# Patient Record
Sex: FEMALE | Race: WHITE | NOT HISPANIC OR LATINO | Employment: FULL TIME | URBAN - METROPOLITAN AREA
[De-identification: names, ages, dates, MRNs, and addresses within clinical notes are randomized per-mention and may not be internally consistent; named-entity substitution may affect disease eponyms.]

---

## 2017-01-16 ENCOUNTER — HISTORICAL (OUTPATIENT)
Dept: LAB | Facility: HOSPITAL | Age: 47
End: 2017-01-16

## 2017-02-13 ENCOUNTER — HISTORICAL (OUTPATIENT)
Dept: RADIOLOGY | Facility: HOSPITAL | Age: 47
End: 2017-02-13

## 2018-04-16 LAB — POC BETA-HCG (QUAL): NEGATIVE

## 2018-04-19 ENCOUNTER — HISTORICAL (OUTPATIENT)
Dept: RADIOLOGY | Facility: HOSPITAL | Age: 48
End: 2018-04-19

## 2018-05-01 LAB — POC BETA-HCG (QUAL): NEGATIVE

## 2018-10-03 ENCOUNTER — HISTORICAL (OUTPATIENT)
Dept: LAB | Facility: HOSPITAL | Age: 48
End: 2018-10-03

## 2018-10-03 LAB
CHOLEST SERPL-MCNC: 185 MG/DL (ref 0–200)
CHOLEST/HDLC SERPL: 4 {RATIO} (ref 0–4)
HDLC SERPL-MCNC: 46 MG/DL (ref 40–60)
LDLC SERPL CALC-MCNC: 113 MG/DL (ref 0–129)
T4 FREE SERPL-MCNC: 0.99 NG/DL (ref 0.76–1.46)
TRIGL SERPL-MCNC: 129 MG/DL
TSH SERPL-ACNC: 0.92 MIU/ML (ref 0.36–3.74)
VLDLC SERPL CALC-MCNC: 26 MG/DL

## 2018-10-25 LAB — POC BETA-HCG (QUAL): NEGATIVE

## 2019-05-07 ENCOUNTER — HISTORICAL (OUTPATIENT)
Dept: RADIOLOGY | Facility: HOSPITAL | Age: 49
End: 2019-05-07

## 2019-05-20 LAB
COLOR STL: NORMAL
CONSISTENCY STL: NORMAL
HEMOCCULT SP1 STL QL: NEGATIVE
HEMOCCULT SP2 STL QL: NEGATIVE

## 2019-05-21 ENCOUNTER — HISTORICAL (OUTPATIENT)
Dept: LAB | Facility: HOSPITAL | Age: 49
End: 2019-05-21

## 2019-06-06 ENCOUNTER — HISTORICAL (OUTPATIENT)
Dept: ANESTHESIOLOGY | Facility: HOSPITAL | Age: 49
End: 2019-06-06

## 2019-06-27 ENCOUNTER — HISTORICAL (OUTPATIENT)
Dept: RADIOLOGY | Facility: HOSPITAL | Age: 49
End: 2019-06-27

## 2019-07-08 LAB
ABS NEUT (OLG): 7.2 X10(3)/MCL (ref 1.5–6.9)
ALBUMIN SERPL-MCNC: 3.5 GM/DL (ref 3.4–5)
ALBUMIN/GLOB SERPL: 0.8 RATIO
ALP SERPL-CCNC: 91 UNIT/L (ref 30–113)
ALT SERPL-CCNC: 20 UNIT/L (ref 10–45)
APPEARANCE, UA: ABNORMAL
APTT PPP: 29.9 SECOND(S) (ref 25–35)
AST SERPL-CCNC: 16 UNIT/L (ref 15–37)
BACTERIA SPEC CULT: ABNORMAL /HPF
BASOPHILS # BLD AUTO: 0.1 X10(3)/MCL (ref 0–0.1)
BASOPHILS NFR BLD AUTO: 1 % (ref 0–1)
BILIRUB SERPL-MCNC: 0.2 MG/DL (ref 0.1–0.9)
BILIRUB UR QL STRIP: NEGATIVE
BILIRUBIN DIRECT+TOT PNL SERPL-MCNC: 0.1 MG/DL
BILIRUBIN DIRECT+TOT PNL SERPL-MCNC: 0.1 MG/DL (ref 0–0.3)
BUN SERPL-MCNC: 8 MG/DL (ref 10–20)
CALCIUM SERPL-MCNC: 9.2 MG/DL (ref 8–10.5)
CHLORIDE SERPL-SCNC: 99 MMOL/L (ref 100–108)
CO2 SERPL-SCNC: 25 MMOL/L (ref 21–35)
COLOR UR: ABNORMAL
CREAT SERPL-MCNC: 0.62 MG/DL (ref 0.7–1.3)
EOSINOPHIL # BLD AUTO: 0.2 X10(3)/MCL (ref 0–0.6)
EOSINOPHIL NFR BLD AUTO: 1 % (ref 0–5)
ERYTHROCYTE [DISTWIDTH] IN BLOOD BY AUTOMATED COUNT: 13.9 % (ref 11.5–17)
GLOBULIN SER-MCNC: 4.6 GM/DL
GLUCOSE (UA): NEGATIVE
GLUCOSE SERPL-MCNC: 113 MG/DL (ref 75–116)
HCT VFR BLD AUTO: 42 % (ref 36–48)
HGB BLD-MCNC: 13.6 GM/DL (ref 12–16)
HGB UR QL STRIP: ABNORMAL
IMM GRANULOCYTES # BLD AUTO: 0.03 10*3/UL (ref 0–0.02)
IMM GRANULOCYTES NFR BLD AUTO: 0.3 % (ref 0–0.43)
INR PPP: 1 (ref 0–1.2)
KETONES UR QL STRIP: NEGATIVE
LEUKOCYTE ESTERASE UR QL STRIP: NEGATIVE
LYMPHOCYTES # BLD AUTO: 2.5 X10(3)/MCL (ref 0.5–4.1)
LYMPHOCYTES NFR BLD AUTO: 24 % (ref 15–40)
MCH RBC QN AUTO: 28 PG (ref 27–34)
MCHC RBC AUTO-ENTMCNC: 32 GM/DL (ref 31–36)
MCV RBC AUTO: 88 FL (ref 80–99)
MONOCYTES # BLD AUTO: 0.8 X10(3)/MCL (ref 0–1.1)
MONOCYTES NFR BLD AUTO: 7 % (ref 4–12)
MUCOUS THREADS URNS QL MICRO: ABNORMAL
NEUTROPHILS # BLD AUTO: 7.2 X10(3)/MCL (ref 1.5–6.9)
NEUTROPHILS NFR BLD AUTO: 67 % (ref 43–75)
NITRITE UR QL STRIP: NEGATIVE
PH UR STRIP: 5.5 [PH]
PHOSPHATE SERPL-MCNC: 3.6 MG/DL (ref 2.6–4.7)
PLATELET # BLD AUTO: 308 X10(3)/MCL (ref 140–400)
PMV BLD AUTO: 9.5 FL (ref 6.8–10)
POTASSIUM SERPL-SCNC: 3.8 MMOL/L (ref 3.6–5.2)
PROT SERPL-MCNC: 8.1 GM/DL (ref 6.4–8.2)
PROT UR QL STRIP: 30 MG/DL
PROTHROMBIN TIME: 10.1 SECOND(S) (ref 9–12)
RBC # BLD AUTO: 4.79 X10(6)/MCL (ref 4.2–5.4)
RBC #/AREA URNS HPF: ABNORMAL /HPF
SODIUM SERPL-SCNC: 135 MMOL/L (ref 135–145)
SP GR UR STRIP: 1.02
SQUAMOUS EPITHELIAL, UA: ABNORMAL /LPF
UROBILINOGEN UR STRIP-ACNC: 0.2 EU/DL
WBC # SPEC AUTO: 10.8 X10(3)/MCL (ref 4.5–11.5)
WBC #/AREA URNS HPF: ABNORMAL /HPF

## 2019-07-15 ENCOUNTER — HISTORICAL (OUTPATIENT)
Dept: ANESTHESIOLOGY | Facility: HOSPITAL | Age: 49
End: 2019-07-15

## 2020-01-21 ENCOUNTER — HISTORICAL (OUTPATIENT)
Dept: RADIOLOGY | Facility: HOSPITAL | Age: 50
End: 2020-01-21

## 2020-08-11 ENCOUNTER — HISTORICAL (OUTPATIENT)
Dept: RADIOLOGY | Facility: HOSPITAL | Age: 50
End: 2020-08-11

## 2021-05-07 ENCOUNTER — HISTORICAL (OUTPATIENT)
Dept: RADIOLOGY | Facility: HOSPITAL | Age: 51
End: 2021-05-07

## 2021-05-27 ENCOUNTER — HISTORICAL (OUTPATIENT)
Dept: RADIOLOGY | Facility: HOSPITAL | Age: 51
End: 2021-05-27

## 2021-12-15 ENCOUNTER — HISTORICAL (OUTPATIENT)
Dept: LAB | Facility: HOSPITAL | Age: 51
End: 2021-12-15

## 2021-12-15 LAB
ABS NEUT (OLG): 5.45 X10(3)/MCL (ref 2.1–9.2)
ALBUMIN SERPL-MCNC: 3.7 GM/DL (ref 3.5–5)
ALBUMIN SERPL-MCNC: 3.7 GM/DL (ref 3.5–5)
ALBUMIN/GLOB SERPL: 1.1 RATIO (ref 1.1–2)
ALP SERPL-CCNC: 83 UNIT/L (ref 40–150)
ALP SERPL-CCNC: 83 UNIT/L (ref 40–150)
ALT SERPL-CCNC: 13 UNIT/L (ref 0–55)
ALT SERPL-CCNC: 16 UNIT/L (ref 0–55)
APPEARANCE, UA: CLEAR
AST SERPL-CCNC: 15 UNIT/L (ref 5–34)
AST SERPL-CCNC: 16 UNIT/L (ref 5–34)
BACTERIA SPEC CULT: ABNORMAL
BASOPHILS # BLD AUTO: 0.1 X10(3)/MCL (ref 0–0.2)
BASOPHILS NFR BLD AUTO: 1 %
BILIRUB SERPL-MCNC: 0.4 MG/DL
BILIRUB SERPL-MCNC: 0.4 MG/DL
BILIRUB UR QL STRIP: NEGATIVE
BILIRUBIN DIRECT+TOT PNL SERPL-MCNC: 0.1 MG/DL (ref 0–0.5)
BILIRUBIN DIRECT+TOT PNL SERPL-MCNC: 0.1 MG/DL (ref 0–0.5)
BILIRUBIN DIRECT+TOT PNL SERPL-MCNC: 0.3 MG/DL (ref 0–0.8)
BILIRUBIN DIRECT+TOT PNL SERPL-MCNC: 0.3 MG/DL (ref 0–0.8)
BUN SERPL-MCNC: 6.2 MG/DL (ref 9.8–20.1)
CALCIUM SERPL-MCNC: 9.5 MG/DL (ref 8.7–10.5)
CHLORIDE SERPL-SCNC: 99 MMOL/L (ref 98–107)
CHOLEST SERPL-MCNC: 233 MG/DL
CHOLEST SERPL-MCNC: 235 MG/DL
CHOLEST/HDLC SERPL: 4 {RATIO} (ref 0–5)
CHOLEST/HDLC SERPL: 4 {RATIO} (ref 0–5)
CO2 SERPL-SCNC: 30 MMOL/L (ref 22–29)
COLOR UR: YELLOW
CREAT SERPL-MCNC: 0.71 MG/DL (ref 0.55–1.02)
DEPRECATED CALCIDIOL+CALCIFEROL SERPL-MC: 27.7 NG/ML (ref 30–80)
EOSINOPHIL # BLD AUTO: 0.3 X10(3)/MCL (ref 0–0.9)
EOSINOPHIL NFR BLD AUTO: 3 %
ERYTHROCYTE [DISTWIDTH] IN BLOOD BY AUTOMATED COUNT: 13 % (ref 11.5–17)
EST. AVERAGE GLUCOSE BLD GHB EST-MCNC: 122.6 MG/DL
GLOBULIN SER-MCNC: 3.3 GM/DL (ref 2.4–3.5)
GLUCOSE (UA): NEGATIVE
GLUCOSE SERPL-MCNC: 121 MG/DL (ref 74–100)
HBA1C MFR BLD: 5.9 %
HCT VFR BLD AUTO: 44.6 % (ref 37–47)
HDLC SERPL-MCNC: 58 MG/DL (ref 35–60)
HDLC SERPL-MCNC: 58 MG/DL (ref 35–60)
HGB BLD-MCNC: 14.3 GM/DL (ref 12–16)
HGB UR QL STRIP: ABNORMAL
IMM GRANULOCYTES # BLD AUTO: 0.05 % (ref 0–0.02)
IMM GRANULOCYTES NFR BLD AUTO: 0.5 % (ref 0–0.43)
KETONES UR QL STRIP: NEGATIVE
LDLC SERPL CALC-MCNC: 129 MG/DL (ref 50–140)
LDLC SERPL CALC-MCNC: 131 MG/DL (ref 50–140)
LEUKOCYTE ESTERASE UR QL STRIP: ABNORMAL
LYMPHOCYTES # BLD AUTO: 3.7 X10(3)/MCL (ref 0.6–4.6)
LYMPHOCYTES NFR BLD AUTO: 36 %
MCH RBC QN AUTO: 28.4 PG (ref 27–31)
MCHC RBC AUTO-ENTMCNC: 32.1 GM/DL (ref 33–36)
MCV RBC AUTO: 88.5 FL (ref 80–94)
MONOCYTES # BLD AUTO: 0.6 X10(3)/MCL (ref 0.1–1.3)
MONOCYTES NFR BLD AUTO: 6 %
NEUTROPHILS # BLD AUTO: 5.45 X10(3)/MCL (ref 1.4–7.9)
NEUTROPHILS NFR BLD AUTO: 53 %
NITRITE UR QL STRIP: NEGATIVE
PH UR STRIP: 6 [PH] (ref 5–9)
PLATELET # BLD AUTO: 300 X10(3)/MCL (ref 130–400)
PMV BLD AUTO: 9.1 FL (ref 9.4–12.4)
POTASSIUM SERPL-SCNC: 4 MMOL/L (ref 3.5–5.1)
PROT SERPL-MCNC: 6.9 GM/DL (ref 6.4–8.3)
PROT SERPL-MCNC: 7 GM/DL (ref 6.4–8.3)
PROT UR QL STRIP: 100
RBC # BLD AUTO: 5.04 X10(6)/MCL (ref 4.2–5.4)
RBC #/AREA URNS HPF: ABNORMAL /HPF
SODIUM SERPL-SCNC: 136 MMOL/L (ref 136–145)
SP GR UR STRIP: 1.02 (ref 1–1.03)
SQUAMOUS EPITHELIAL, UA: ABNORMAL
TRIGL SERPL-MCNC: 229 MG/DL (ref 37–140)
TRIGL SERPL-MCNC: 230 MG/DL (ref 37–140)
TSH SERPL-ACNC: 0.63 UIU/ML (ref 0.35–4.94)
UROBILINOGEN UR STRIP-ACNC: 0.2
VIT B12 SERPL-MCNC: 497 PG/ML (ref 213–816)
VLDLC SERPL CALC-MCNC: 46 MG/DL
VLDLC SERPL CALC-MCNC: 46 MG/DL
WBC # SPEC AUTO: 10.2 X10(3)/MCL (ref 4.5–11.5)
WBC #/AREA URNS HPF: ABNORMAL /HPF

## 2022-02-03 ENCOUNTER — HISTORICAL (OUTPATIENT)
Dept: RADIOLOGY | Facility: HOSPITAL | Age: 52
End: 2022-02-03

## 2022-02-03 ENCOUNTER — HISTORICAL (OUTPATIENT)
Dept: ADMINISTRATIVE | Facility: HOSPITAL | Age: 52
End: 2022-02-03

## 2022-04-10 ENCOUNTER — HISTORICAL (OUTPATIENT)
Dept: ADMINISTRATIVE | Facility: HOSPITAL | Age: 52
End: 2022-04-10
Payer: MEDICAID

## 2022-04-26 VITALS
OXYGEN SATURATION: 98 % | WEIGHT: 142.63 LBS | HEIGHT: 62 IN | DIASTOLIC BLOOD PRESSURE: 74 MMHG | SYSTOLIC BLOOD PRESSURE: 126 MMHG | BODY MASS INDEX: 26.25 KG/M2

## 2022-06-19 PROBLEM — K21.9 GASTROESOPHAGEAL REFLUX DISEASE: Status: ACTIVE | Noted: 2022-06-19

## 2022-06-19 PROBLEM — E04.1 THYROID NODULE: Status: ACTIVE | Noted: 2018-10-16

## 2022-06-19 PROBLEM — R73.01 IMPAIRED FASTING GLUCOSE: Status: ACTIVE | Noted: 2022-06-19

## 2022-06-19 PROBLEM — G43.909 MIGRAINE HEADACHE: Status: ACTIVE | Noted: 2022-06-19

## 2022-06-21 PROBLEM — F32.9 MAJOR DEPRESSIVE DISORDER, SINGLE EPISODE, UNSPECIFIED: Status: ACTIVE | Noted: 2022-06-21

## 2022-06-21 PROBLEM — F41.9 ANXIETY: Status: ACTIVE | Noted: 2022-06-21

## 2022-06-21 PROBLEM — G56.00 CARPAL TUNNEL SYNDROME: Status: ACTIVE | Noted: 2022-06-21

## 2022-06-21 PROBLEM — F90.0 ATTENTION DEFICIT HYPERACTIVITY DISORDER, PREDOMINANTLY INATTENTIVE TYPE: Status: ACTIVE | Noted: 2022-06-21

## 2022-06-21 PROBLEM — E01.0 THYROMEGALY: Status: ACTIVE | Noted: 2018-10-16

## 2022-06-21 PROBLEM — R73.02 IMPAIRED GLUCOSE TOLERANCE: Status: ACTIVE | Noted: 2022-06-21

## 2022-06-28 ENCOUNTER — HOSPITAL ENCOUNTER (EMERGENCY)
Facility: HOSPITAL | Age: 52
Discharge: HOME | End: 2022-06-28
Attending: SPECIALIST
Payer: MEDICAID

## 2022-06-28 VITALS
RESPIRATION RATE: 18 BRPM | SYSTOLIC BLOOD PRESSURE: 152 MMHG | TEMPERATURE: 98 F | OXYGEN SATURATION: 100 % | HEART RATE: 87 BPM | DIASTOLIC BLOOD PRESSURE: 84 MMHG

## 2022-06-28 DIAGNOSIS — I83.91 VARICOSE VEINS OF RIGHT LOWER EXTREMITY, UNSPECIFIED WHETHER COMPLICATED: Primary | ICD-10-CM

## 2022-06-28 PROCEDURE — 99282 EMERGENCY DEPT VISIT SF MDM: CPT

## 2022-06-29 NOTE — ED PROVIDER NOTES
Encounter Date: 6/28/2022       History     Chief Complaint   Patient presents with    Knee Pain     Pt c/o posterior right knee pain x 2 days. Denies any redness or warmth.      52 year old female presents with right posterior knee pain. She reports she has had a tender area for about a week but over the past 2 days it feels a little bigger and increased tenderness. She denies redness or area. She reports no calf pain, chest pain or shortness of breath.     The history is provided by the patient. No  was used.     Review of patient's allergies indicates:  No Known Allergies  No past medical history on file.  Past Surgical History:   Procedure Laterality Date    HYSTERECTOMY  2015     No family history on file.  Social History     Tobacco Use    Smoking status: Current Every Day Smoker     Packs/day: 0.50     Types: Cigarettes    Smokeless tobacco: Never Used     Review of Systems   Constitutional: Negative for chills and fever.   Respiratory: Negative for shortness of breath.    Cardiovascular: Negative for chest pain.   Musculoskeletal: Positive for arthralgias.   Skin: Negative for color change and rash.   Neurological: Negative for dizziness and light-headedness.       Physical Exam     Initial Vitals [06/28/22 1913]   BP Pulse Resp Temp SpO2   (!) 152/84 87 18 97.9 °F (36.6 °C) 100 %      MAP       --         Physical Exam    Constitutional: She appears well-developed and well-nourished.   HENT:   Head: Normocephalic.   Eyes: EOM are normal.   Neck: Neck supple.   Normal range of motion.  Cardiovascular: Normal rate and intact distal pulses.   Pulmonary/Chest: No respiratory distress.   Musculoskeletal:      Cervical back: Normal range of motion and neck supple.        Legs:       Comments: TTP over posterior distal thigh without erythema or warmth.      Neurological: She is alert and oriented to person, place, and time.         ED Course   Procedures  Labs Reviewed - No data to  display       Imaging Results    None          Medications - No data to display  Medical Decision Making:   Differential Diagnosis:   Baker's cyst, varicose vein  ED Management:  Patient without erythema, warmth of area that is tender. Firmness of vein palpable. No calf pain. Patient without chest pain or shortness of breath                       Clinical Impression:   Final diagnoses:  [I83.91] Varicose veins of right lower extremity, unspecified whether complicated (Primary)          ED Disposition Condition    Discharge Stable        ED Prescriptions     None        Follow-up Information    None          MARTIN Andrew  06/28/22 193

## 2022-09-15 ENCOUNTER — HISTORICAL (OUTPATIENT)
Dept: ADMINISTRATIVE | Facility: HOSPITAL | Age: 52
End: 2022-09-15
Payer: MEDICAID

## 2022-09-16 ENCOUNTER — HISTORICAL (OUTPATIENT)
Dept: ADMINISTRATIVE | Facility: HOSPITAL | Age: 52
End: 2022-09-16
Payer: MEDICAID

## 2022-10-19 ENCOUNTER — HOSPITAL ENCOUNTER (EMERGENCY)
Facility: HOSPITAL | Age: 52
Discharge: HOME | End: 2022-10-19
Attending: STUDENT IN AN ORGANIZED HEALTH CARE EDUCATION/TRAINING PROGRAM
Payer: MEDICAID

## 2022-10-19 VITALS
HEART RATE: 71 BPM | DIASTOLIC BLOOD PRESSURE: 78 MMHG | HEIGHT: 62 IN | SYSTOLIC BLOOD PRESSURE: 132 MMHG | TEMPERATURE: 98 F | BODY MASS INDEX: 25.76 KG/M2 | OXYGEN SATURATION: 99 % | RESPIRATION RATE: 16 BRPM | WEIGHT: 140 LBS

## 2022-10-19 DIAGNOSIS — J06.9 UPPER RESPIRATORY TRACT INFECTION, UNSPECIFIED TYPE: ICD-10-CM

## 2022-10-19 DIAGNOSIS — E86.0 DEHYDRATION: Primary | ICD-10-CM

## 2022-10-19 LAB
APPEARANCE UR: CLEAR
BACTERIA #/AREA URNS AUTO: ABNORMAL /HPF
BILIRUB UR QL STRIP.AUTO: NEGATIVE MG/DL
COLOR UR AUTO: YELLOW
GLUCOSE UR QL STRIP.AUTO: NEGATIVE MG/DL
KETONES UR QL STRIP.AUTO: NEGATIVE MG/DL
LEUKOCYTE ESTERASE UR QL STRIP.AUTO: NEGATIVE UNIT/L
NITRITE UR QL STRIP.AUTO: NEGATIVE
PH UR STRIP.AUTO: 7 [PH]
PROT UR QL STRIP.AUTO: ABNORMAL MG/DL
RBC #/AREA URNS AUTO: ABNORMAL /HPF
RBC UR QL AUTO: ABNORMAL UNIT/L
SP GR UR STRIP.AUTO: 1.02
SQUAMOUS #/AREA URNS AUTO: ABNORMAL /HPF
STREP A PCR (OHS): NOT DETECTED
UROBILINOGEN UR STRIP-ACNC: 0.2 MG/DL
WBC #/AREA URNS AUTO: ABNORMAL /HPF

## 2022-10-19 PROCEDURE — 25000003 PHARM REV CODE 250: Performed by: STUDENT IN AN ORGANIZED HEALTH CARE EDUCATION/TRAINING PROGRAM

## 2022-10-19 PROCEDURE — 87651 STREP A DNA AMP PROBE: CPT | Performed by: STUDENT IN AN ORGANIZED HEALTH CARE EDUCATION/TRAINING PROGRAM

## 2022-10-19 PROCEDURE — 99284 EMERGENCY DEPT VISIT MOD MDM: CPT | Mod: 25

## 2022-10-19 PROCEDURE — 96360 HYDRATION IV INFUSION INIT: CPT

## 2022-10-19 PROCEDURE — 81001 URINALYSIS AUTO W/SCOPE: CPT | Performed by: STUDENT IN AN ORGANIZED HEALTH CARE EDUCATION/TRAINING PROGRAM

## 2022-10-19 RX ADMIN — SODIUM CHLORIDE 1000 ML: 9 INJECTION, SOLUTION INTRAVENOUS at 03:10

## 2022-10-19 NOTE — ED PROVIDER NOTES
Encounter Date: 10/19/2022       History     Chief Complaint   Patient presents with    throat pain     Throat pain for one month tested negative for flu and covid been on antibiotics not feeling better     52 F presents to ED w/ c/o sore throat x 5 days, lightheadedness x 2-3 days. Tested foir COVID/flu 5 days ago at urgent care, started on augmentin      Review of patient's allergies indicates:  No Known Allergies  History reviewed. No pertinent past medical history.  Past Surgical History:   Procedure Laterality Date    HYSTERECTOMY  2015     History reviewed. No pertinent family history.  Social History     Tobacco Use    Smoking status: Every Day     Packs/day: 0.50     Types: Cigarettes    Smokeless tobacco: Never     Review of Systems   HENT:  Positive for sore throat.    Neurological:  Positive for light-headedness.   All other systems reviewed and are negative.    Physical Exam     Initial Vitals [10/19/22 1359]   BP Pulse Resp Temp SpO2   (!) 137/91 77 20 98.1 °F (36.7 °C) 98 %      MAP       --         Physical Exam    Nursing note and vitals reviewed.  Constitutional: She appears well-developed and well-nourished.   HENT:   Head: Normocephalic and atraumatic.   Right Ear: External ear normal.   Left Ear: External ear normal.   Mouth/Throat: Oropharynx is clear and moist. No oropharyngeal exudate.   Eyes: EOM are normal. Pupils are equal, round, and reactive to light.   Neck: Neck supple.   Normal range of motion.  Cardiovascular:  Normal rate, regular rhythm, normal heart sounds and intact distal pulses.           Pulmonary/Chest: Breath sounds normal.   Abdominal: Abdomen is soft. Bowel sounds are normal.   Musculoskeletal:         General: Normal range of motion.      Cervical back: Normal range of motion and neck supple.     Neurological: She is alert and oriented to person, place, and time. She has normal strength.   Skin: Skin is warm and dry.   Psychiatric: She has a normal mood and affect. Thought  content normal.       ED Course   Procedures  Labs Reviewed   URINALYSIS, REFLEX TO URINE CULTURE - Abnormal; Notable for the following components:       Result Value    Protein, UA Trace (*)     Blood, UA Trace-Intact (*)     All other components within normal limits   URINALYSIS, MICROSCOPIC - Abnormal; Notable for the following components:    Squamous Epithelial Cells, UA Few (*)     All other components within normal limits   STREP GROUP A BY PCR - Normal    Narrative:     The Xpert Xpress Strep A test is a rapid, qualitative in vitro diagnostic test for the detection of Streptococcus pyogenes (Group A ß-hemolytic Streptococcus, Strep A) in throat swab specimens from patients with signs and symptoms of pharyngitis.            Imaging Results    None          Medications   sodium chloride 0.9% bolus 1,000 mL (0 mLs Intravenous Stopped 10/19/22 1638)                              Clinical Impression:   Final diagnoses:  [E86.0] Dehydration (Primary)  [J06.9] Upper respiratory tract infection, unspecified type      ED Disposition Condition    Discharge Stable          ED Prescriptions    None       Follow-up Information    None          Zeny Chaudhary MD  10/21/22 1267

## 2022-10-23 ENCOUNTER — HOSPITAL ENCOUNTER (INPATIENT)
Facility: HOSPITAL | Age: 52
LOS: 4 days | Discharge: HOME | DRG: 465 | End: 2022-10-27
Attending: STUDENT IN AN ORGANIZED HEALTH CARE EDUCATION/TRAINING PROGRAM | Admitting: ORTHOPAEDIC SURGERY
Payer: MEDICAID

## 2022-10-23 DIAGNOSIS — S82.401B TYPE I OR II OPEN FRACTURE OF RIGHT TIBIA AND FIBULA, INITIAL ENCOUNTER: ICD-10-CM

## 2022-10-23 DIAGNOSIS — S82.831A CLOSED FRACTURE OF PROXIMAL END OF RIGHT FIBULA, UNSPECIFIED FRACTURE MORPHOLOGY, INITIAL ENCOUNTER: Primary | ICD-10-CM

## 2022-10-23 DIAGNOSIS — S82.201B TYPE I OR II OPEN FRACTURE OF RIGHT TIBIA AND FIBULA, INITIAL ENCOUNTER: ICD-10-CM

## 2022-10-23 DIAGNOSIS — S82.201A: ICD-10-CM

## 2022-10-23 DIAGNOSIS — S91.022A: ICD-10-CM
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
## 2022-10-23 DIAGNOSIS — R52 PAIN: ICD-10-CM

## 2022-10-23 DIAGNOSIS — S93.05XA ANKLE DISLOCATION, LEFT, INITIAL ENCOUNTER: ICD-10-CM

## 2022-10-23 DIAGNOSIS — Z01.818 PRE-OP TESTING: ICD-10-CM

## 2022-10-23 LAB
ALBUMIN SERPL-MCNC: 3.7 GM/DL (ref 3.5–5)
ALBUMIN/GLOB SERPL: 1.2 RATIO (ref 1.1–2)
ALP SERPL-CCNC: 88 UNIT/L (ref 40–150)
ALT SERPL-CCNC: 28 UNIT/L (ref 0–55)
AMPHET UR QL SCN: POSITIVE
APPEARANCE UR: CLEAR
APTT PPP: 24 SECONDS (ref 23.2–33.7)
AST SERPL-CCNC: 27 UNIT/L (ref 5–34)
BACTERIA #/AREA URNS AUTO: NORMAL /HPF
BARBITURATE SCN PRESENT UR: NEGATIVE
BASOPHILS # BLD AUTO: 0.09 X10(3)/MCL (ref 0–0.2)
BASOPHILS NFR BLD AUTO: 0.7 %
BENZODIAZ UR QL SCN: NEGATIVE
BILIRUB UR QL STRIP.AUTO: NEGATIVE MG/DL
BILIRUBIN DIRECT+TOT PNL SERPL-MCNC: 0.3 MG/DL
BUN SERPL-MCNC: 14.6 MG/DL (ref 9.8–20.1)
CALCIUM SERPL-MCNC: 8.4 MG/DL (ref 8.4–10.2)
CANNABINOIDS UR QL SCN: POSITIVE
CHLORIDE SERPL-SCNC: 104 MMOL/L (ref 98–107)
CO2 SERPL-SCNC: 20 MMOL/L (ref 22–29)
COCAINE UR QL SCN: NEGATIVE
COLOR UR AUTO: YELLOW
CREAT SERPL-MCNC: 0.72 MG/DL (ref 0.55–1.02)
EOSINOPHIL # BLD AUTO: 0.12 X10(3)/MCL (ref 0–0.9)
EOSINOPHIL NFR BLD AUTO: 0.9 %
ERYTHROCYTE [DISTWIDTH] IN BLOOD BY AUTOMATED COUNT: 13.2 % (ref 11.5–17)
ETHANOL SERPL-MCNC: <10 MG/DL
FENTANYL UR QL SCN: POSITIVE
GFR SERPLBLD CREATININE-BSD FMLA CKD-EPI: >60 MLS/MIN/1.73/M2
GLOBULIN SER-MCNC: 3.1 GM/DL (ref 2.4–3.5)
GLUCOSE SERPL-MCNC: 124 MG/DL (ref 74–100)
GLUCOSE UR QL STRIP.AUTO: NEGATIVE MG/DL
GROUP & RH: NORMAL
HCT VFR BLD AUTO: 39 % (ref 37–47)
HGB BLD-MCNC: 12.9 GM/DL (ref 12–16)
IMM GRANULOCYTES # BLD AUTO: 0.17 X10(3)/MCL (ref 0–0.04)
IMM GRANULOCYTES NFR BLD AUTO: 1.2 %
INDIRECT COOMBS GEL: NORMAL
INR BLD: 1.01 (ref 0–1.3)
KETONES UR QL STRIP.AUTO: NEGATIVE MG/DL
LACTATE SERPL-SCNC: 1.9 MMOL/L (ref 0.5–2.2)
LEUKOCYTE ESTERASE UR QL STRIP.AUTO: NEGATIVE UNIT/L
LYMPHOCYTES # BLD AUTO: 1.94 X10(3)/MCL (ref 0.6–4.6)
LYMPHOCYTES NFR BLD AUTO: 14.1 %
MCH RBC QN AUTO: 29.7 PG (ref 27–31)
MCHC RBC AUTO-ENTMCNC: 33.1 MG/DL (ref 33–36)
MCV RBC AUTO: 89.9 FL (ref 80–94)
MDMA UR QL SCN: NEGATIVE
MONOCYTES # BLD AUTO: 0.87 X10(3)/MCL (ref 0.1–1.3)
MONOCYTES NFR BLD AUTO: 6.3 %
NEUTROPHILS # BLD AUTO: 10.6 X10(3)/MCL (ref 2.1–9.2)
NEUTROPHILS NFR BLD AUTO: 76.8 %
NITRITE UR QL STRIP.AUTO: NEGATIVE
NRBC BLD AUTO-RTO: 0 %
OPIATES UR QL SCN: NEGATIVE
PCP UR QL: NEGATIVE
PH UR STRIP.AUTO: 6.5 [PH]
PH UR: 6.5 [PH] (ref 3–11)
PLATELET # BLD AUTO: 275 X10(3)/MCL (ref 130–400)
PMV BLD AUTO: 9.7 FL (ref 7.4–10.4)
POTASSIUM SERPL-SCNC: 4.2 MMOL/L (ref 3.5–5.1)
PROT SERPL-MCNC: 6.8 GM/DL (ref 6.4–8.3)
PROT UR QL STRIP.AUTO: NEGATIVE MG/DL
PROTHROMBIN TIME: 13.2 SECONDS (ref 12.5–14.5)
RBC # BLD AUTO: 4.34 X10(6)/MCL (ref 4.2–5.4)
RBC #/AREA URNS AUTO: <5 /HPF
RBC UR QL AUTO: NEGATIVE UNIT/L
SARS-COV-2 RDRP RESP QL NAA+PROBE: NEGATIVE
SODIUM SERPL-SCNC: 138 MMOL/L (ref 136–145)
SP GR UR STRIP.AUTO: 1.04 (ref 1–1.03)
SPECIFIC GRAVITY, URINE AUTO (.000) (OHS): 1.04 (ref 1–1.03)
SQUAMOUS #/AREA URNS AUTO: <5 /HPF
UROBILINOGEN UR STRIP-ACNC: 0.2 MG/DL
WBC # SPEC AUTO: 13.8 X10(3)/MCL (ref 4.5–11.5)
WBC #/AREA URNS AUTO: <5 /HPF

## 2022-10-23 PROCEDURE — 85730 THROMBOPLASTIN TIME PARTIAL: CPT | Performed by: STUDENT IN AN ORGANIZED HEALTH CARE EDUCATION/TRAINING PROGRAM

## 2022-10-23 PROCEDURE — 85025 COMPLETE CBC W/AUTO DIFF WBC: CPT | Performed by: STUDENT IN AN ORGANIZED HEALTH CARE EDUCATION/TRAINING PROGRAM

## 2022-10-23 PROCEDURE — 11000001 HC ACUTE MED/SURG PRIVATE ROOM

## 2022-10-23 PROCEDURE — 90471 IMMUNIZATION ADMIN: CPT | Performed by: EMERGENCY MEDICINE

## 2022-10-23 PROCEDURE — 25500020 PHARM REV CODE 255: Performed by: STUDENT IN AN ORGANIZED HEALTH CARE EDUCATION/TRAINING PROGRAM

## 2022-10-23 PROCEDURE — 63600175 PHARM REV CODE 636 W HCPCS: Performed by: EMERGENCY MEDICINE

## 2022-10-23 PROCEDURE — 83605 ASSAY OF LACTIC ACID: CPT | Performed by: STUDENT IN AN ORGANIZED HEALTH CARE EDUCATION/TRAINING PROGRAM

## 2022-10-23 PROCEDURE — 85610 PROTHROMBIN TIME: CPT | Performed by: STUDENT IN AN ORGANIZED HEALTH CARE EDUCATION/TRAINING PROGRAM

## 2022-10-23 PROCEDURE — 80307 DRUG TEST PRSMV CHEM ANLYZR: CPT | Performed by: STUDENT IN AN ORGANIZED HEALTH CARE EDUCATION/TRAINING PROGRAM

## 2022-10-23 PROCEDURE — 25000003 PHARM REV CODE 250: Performed by: STUDENT IN AN ORGANIZED HEALTH CARE EDUCATION/TRAINING PROGRAM

## 2022-10-23 PROCEDURE — 86901 BLOOD TYPING SEROLOGIC RH(D): CPT | Performed by: STUDENT IN AN ORGANIZED HEALTH CARE EDUCATION/TRAINING PROGRAM

## 2022-10-23 PROCEDURE — 99285 EMERGENCY DEPT VISIT HI MDM: CPT | Mod: 25

## 2022-10-23 PROCEDURE — 80053 COMPREHEN METABOLIC PANEL: CPT | Performed by: STUDENT IN AN ORGANIZED HEALTH CARE EDUCATION/TRAINING PROGRAM

## 2022-10-23 PROCEDURE — 90715 TDAP VACCINE 7 YRS/> IM: CPT | Performed by: EMERGENCY MEDICINE

## 2022-10-23 PROCEDURE — G0390 TRAUMA RESPONS W/HOSP CRITI: HCPCS

## 2022-10-23 PROCEDURE — 81001 URINALYSIS AUTO W/SCOPE: CPT | Performed by: STUDENT IN AN ORGANIZED HEALTH CARE EDUCATION/TRAINING PROGRAM

## 2022-10-23 PROCEDURE — 87635 SARS-COV-2 COVID-19 AMP PRB: CPT | Performed by: STUDENT IN AN ORGANIZED HEALTH CARE EDUCATION/TRAINING PROGRAM

## 2022-10-23 PROCEDURE — 63600175 PHARM REV CODE 636 W HCPCS: Performed by: STUDENT IN AN ORGANIZED HEALTH CARE EDUCATION/TRAINING PROGRAM

## 2022-10-23 PROCEDURE — 96375 TX/PRO/DX INJ NEW DRUG ADDON: CPT

## 2022-10-23 PROCEDURE — 36415 COLL VENOUS BLD VENIPUNCTURE: CPT | Performed by: STUDENT IN AN ORGANIZED HEALTH CARE EDUCATION/TRAINING PROGRAM

## 2022-10-23 PROCEDURE — 96365 THER/PROPH/DIAG IV INF INIT: CPT

## 2022-10-23 PROCEDURE — 82077 ASSAY SPEC XCP UR&BREATH IA: CPT | Performed by: STUDENT IN AN ORGANIZED HEALTH CARE EDUCATION/TRAINING PROGRAM

## 2022-10-23 RX ORDER — SODIUM CHLORIDE 0.9 % (FLUSH) 0.9 %
10 SYRINGE (ML) INJECTION
Status: DISCONTINUED | OUTPATIENT
Start: 2022-10-23 | End: 2022-10-27 | Stop reason: HOSPADM

## 2022-10-23 RX ORDER — FENTANYL CITRATE 50 UG/ML
INJECTION, SOLUTION INTRAMUSCULAR; INTRAVENOUS
Status: COMPLETED
Start: 2022-10-23 | End: 2022-10-23

## 2022-10-23 RX ORDER — CEFAZOLIN SODIUM 1 G/3ML
INJECTION, POWDER, FOR SOLUTION INTRAMUSCULAR; INTRAVENOUS
Status: COMPLETED
Start: 2022-10-23 | End: 2022-10-23

## 2022-10-23 RX ORDER — CEFAZOLIN SODIUM 1 G/3ML
1 INJECTION, POWDER, FOR SOLUTION INTRAMUSCULAR; INTRAVENOUS
Status: DISCONTINUED | OUTPATIENT
Start: 2022-10-23 | End: 2022-10-23

## 2022-10-23 RX ORDER — HYDROMORPHONE HYDROCHLORIDE 2 MG/ML
0.5 INJECTION, SOLUTION INTRAMUSCULAR; INTRAVENOUS; SUBCUTANEOUS EVERY 4 HOURS PRN
Status: DISCONTINUED | OUTPATIENT
Start: 2022-10-23 | End: 2022-10-27 | Stop reason: HOSPADM

## 2022-10-23 RX ORDER — SODIUM CHLORIDE, SODIUM LACTATE, POTASSIUM CHLORIDE, CALCIUM CHLORIDE 600; 310; 30; 20 MG/100ML; MG/100ML; MG/100ML; MG/100ML
1000 INJECTION, SOLUTION INTRAVENOUS CONTINUOUS
Status: ACTIVE | OUTPATIENT
Start: 2022-10-23 | End: 2022-10-24

## 2022-10-23 RX ORDER — HYDROMORPHONE HYDROCHLORIDE 2 MG/ML
INJECTION, SOLUTION INTRAMUSCULAR; INTRAVENOUS; SUBCUTANEOUS CODE/TRAUMA/SEDATION MEDICATION
Status: COMPLETED | OUTPATIENT
Start: 2022-10-23 | End: 2022-10-23

## 2022-10-23 RX ORDER — ACETAMINOPHEN 325 MG/1
650 TABLET ORAL EVERY 8 HOURS PRN
Status: DISCONTINUED | OUTPATIENT
Start: 2022-10-23 | End: 2022-10-27 | Stop reason: HOSPADM

## 2022-10-23 RX ORDER — ONDANSETRON 2 MG/ML
INJECTION INTRAMUSCULAR; INTRAVENOUS CODE/TRAUMA/SEDATION MEDICATION
Status: COMPLETED | OUTPATIENT
Start: 2022-10-23 | End: 2022-10-23

## 2022-10-23 RX ORDER — FENTANYL CITRATE 50 UG/ML
INJECTION, SOLUTION INTRAMUSCULAR; INTRAVENOUS CODE/TRAUMA/SEDATION MEDICATION
Status: COMPLETED | OUTPATIENT
Start: 2022-10-23 | End: 2022-10-23

## 2022-10-23 RX ORDER — CEFAZOLIN SODIUM 2 G/50ML
SOLUTION INTRAVENOUS
Status: COMPLETED | OUTPATIENT
Start: 2022-10-23 | End: 2022-10-23

## 2022-10-23 RX ORDER — PROPOFOL 10 MG/ML
VIAL (ML) INTRAVENOUS CODE/TRAUMA/SEDATION MEDICATION
Status: COMPLETED | OUTPATIENT
Start: 2022-10-23 | End: 2022-10-23

## 2022-10-23 RX ORDER — HYDROMORPHONE HYDROCHLORIDE 2 MG/ML
1 INJECTION, SOLUTION INTRAMUSCULAR; INTRAVENOUS; SUBCUTANEOUS EVERY 4 HOURS PRN
Status: DISCONTINUED | OUTPATIENT
Start: 2022-10-23 | End: 2022-10-27 | Stop reason: HOSPADM

## 2022-10-23 RX ORDER — LIDOCAINE HYDROCHLORIDE 20 MG/ML
10 INJECTION, SOLUTION EPIDURAL; INFILTRATION; INTRACAUDAL; PERINEURAL ONCE AS NEEDED
Status: DISCONTINUED | OUTPATIENT
Start: 2022-10-23 | End: 2022-10-27 | Stop reason: HOSPADM

## 2022-10-23 RX ORDER — ONDANSETRON 4 MG/1
8 TABLET, ORALLY DISINTEGRATING ORAL EVERY 8 HOURS PRN
Status: DISCONTINUED | OUTPATIENT
Start: 2022-10-23 | End: 2022-10-27 | Stop reason: HOSPADM

## 2022-10-23 RX ORDER — KETOROLAC TROMETHAMINE 30 MG/ML
15 INJECTION, SOLUTION INTRAMUSCULAR; INTRAVENOUS EVERY 6 HOURS PRN
Status: DISPENSED | OUTPATIENT
Start: 2022-10-23 | End: 2022-10-26

## 2022-10-23 RX ORDER — ONDANSETRON 2 MG/ML
4 INJECTION INTRAMUSCULAR; INTRAVENOUS
Status: COMPLETED | OUTPATIENT
Start: 2022-10-23 | End: 2022-10-23

## 2022-10-23 RX ORDER — METHOCARBAMOL 100 MG/ML
1000 INJECTION, SOLUTION INTRAMUSCULAR; INTRAVENOUS EVERY 8 HOURS
Status: DISCONTINUED | OUTPATIENT
Start: 2022-10-23 | End: 2022-10-24

## 2022-10-23 RX ORDER — TALC
6 POWDER (GRAM) TOPICAL NIGHTLY PRN
Status: DISCONTINUED | OUTPATIENT
Start: 2022-10-23 | End: 2022-10-27 | Stop reason: HOSPADM

## 2022-10-23 RX ORDER — PROPOFOL 10 MG/ML
VIAL (ML) INTRAVENOUS
Status: COMPLETED
Start: 2022-10-23 | End: 2022-10-23

## 2022-10-23 RX ORDER — HYDROMORPHONE HYDROCHLORIDE 2 MG/ML
1 INJECTION, SOLUTION INTRAMUSCULAR; INTRAVENOUS; SUBCUTANEOUS
Status: COMPLETED | OUTPATIENT
Start: 2022-10-23 | End: 2022-10-23

## 2022-10-23 RX ADMIN — PROPOFOL 40 MG: 10 INJECTION, EMULSION INTRAVENOUS at 07:10

## 2022-10-23 RX ADMIN — HYDROMORPHONE HYDROCHLORIDE 1 MG: 2 INJECTION, SOLUTION INTRAMUSCULAR; INTRAVENOUS; SUBCUTANEOUS at 08:10

## 2022-10-23 RX ADMIN — ACETAMINOPHEN 650 MG: 325 TABLET, FILM COATED ORAL at 10:10

## 2022-10-23 RX ADMIN — PROPOFOL 20 MG: 10 INJECTION, EMULSION INTRAVENOUS at 07:10

## 2022-10-23 RX ADMIN — ONDANSETRON 4 MG: 2 INJECTION INTRAMUSCULAR; INTRAVENOUS at 08:10

## 2022-10-23 RX ADMIN — TETANUS TOXOID, REDUCED DIPHTHERIA TOXOID AND ACELLULAR PERTUSSIS VACCINE, ADSORBED 0.5 ML: 5; 2.5; 8; 8; 2.5 SUSPENSION INTRAMUSCULAR at 07:10

## 2022-10-23 RX ADMIN — METHOCARBAMOL 1000 MG: 100 INJECTION INTRAMUSCULAR; INTRAVENOUS at 10:10

## 2022-10-23 RX ADMIN — SODIUM CHLORIDE, POTASSIUM CHLORIDE, SODIUM LACTATE AND CALCIUM CHLORIDE 1000 ML: 600; 310; 30; 20 INJECTION, SOLUTION INTRAVENOUS at 10:10

## 2022-10-23 RX ADMIN — HYDROMORPHONE HYDROCHLORIDE 1 MG: 2 INJECTION, SOLUTION INTRAMUSCULAR; INTRAVENOUS; SUBCUTANEOUS at 11:10

## 2022-10-23 RX ADMIN — IOPAMIDOL 100 ML: 755 INJECTION, SOLUTION INTRAVENOUS at 08:10

## 2022-10-23 RX ADMIN — CEFAZOLIN SODIUM 2 G: 2 SOLUTION INTRAVENOUS at 07:10

## 2022-10-23 RX ADMIN — FENTANYL CITRATE 100 MCG: 50 INJECTION, SOLUTION INTRAMUSCULAR; INTRAVENOUS at 07:10

## 2022-10-24 ENCOUNTER — ANESTHESIA (OUTPATIENT)
Dept: SURGERY | Facility: HOSPITAL | Age: 52
DRG: 465 | End: 2022-10-24
Payer: MEDICAID

## 2022-10-24 ENCOUNTER — ANESTHESIA EVENT (OUTPATIENT)
Dept: SURGERY | Facility: HOSPITAL | Age: 52
DRG: 465 | End: 2022-10-24
Payer: MEDICAID

## 2022-10-24 PROBLEM — S91.312A FOOT LACERATION, LEFT, INITIAL ENCOUNTER: Status: ACTIVE | Noted: 2022-10-24

## 2022-10-24 PROBLEM — S82.201B TYPE I OR II OPEN FRACTURE OF RIGHT TIBIA AND FIBULA: Status: ACTIVE | Noted: 2022-10-24

## 2022-10-24 PROBLEM — S82.401B TYPE I OR II OPEN FRACTURE OF RIGHT TIBIA AND FIBULA: Status: ACTIVE | Noted: 2022-10-24

## 2022-10-24 PROCEDURE — 71000039 HC RECOVERY, EACH ADD'L HOUR: Performed by: ORTHOPAEDIC SURGERY

## 2022-10-24 PROCEDURE — 63600175 PHARM REV CODE 636 W HCPCS: Performed by: ANESTHESIOLOGY

## 2022-10-24 PROCEDURE — 63600175 PHARM REV CODE 636 W HCPCS: Performed by: PHYSICIAN ASSISTANT

## 2022-10-24 PROCEDURE — 71000016 HC POSTOP RECOV ADDL HR: Performed by: ORTHOPAEDIC SURGERY

## 2022-10-24 PROCEDURE — 71000015 HC POSTOP RECOV 1ST HR: Performed by: ORTHOPAEDIC SURGERY

## 2022-10-24 PROCEDURE — 93010 EKG 12-LEAD: ICD-10-PCS | Mod: ,,, | Performed by: INTERNAL MEDICINE

## 2022-10-24 PROCEDURE — C1713 ANCHOR/SCREW BN/BN,TIS/BN: HCPCS | Performed by: ORTHOPAEDIC SURGERY

## 2022-10-24 PROCEDURE — 63600175 PHARM REV CODE 636 W HCPCS: Performed by: STUDENT IN AN ORGANIZED HEALTH CARE EDUCATION/TRAINING PROGRAM

## 2022-10-24 PROCEDURE — 11012 PR DEBRIDE ASSOC OPEN FX/DISLO SKIN/MUS/BONE: ICD-10-PCS | Mod: 59,51,, | Performed by: ORTHOPAEDIC SURGERY

## 2022-10-24 PROCEDURE — 27201423 OPTIME MED/SURG SUP & DEVICES STERILE SUPPLY: Performed by: ORTHOPAEDIC SURGERY

## 2022-10-24 PROCEDURE — 27000221 HC OXYGEN, UP TO 24 HOURS

## 2022-10-24 PROCEDURE — 99223 PR INITIAL HOSPITAL CARE,LEVL III: ICD-10-PCS | Mod: 57,,, | Performed by: ORTHOPAEDIC SURGERY

## 2022-10-24 PROCEDURE — 27759 PR TREAT TIBIAL SHAFT FX, INTRAMED IMPLANT: ICD-10-PCS | Mod: RT,,, | Performed by: ORTHOPAEDIC SURGERY

## 2022-10-24 PROCEDURE — 27695 REPAIR OF ANKLE LIGAMENT: CPT | Mod: 51,LT,, | Performed by: ORTHOPAEDIC SURGERY

## 2022-10-24 PROCEDURE — 37000009 HC ANESTHESIA EA ADD 15 MINS: Performed by: ORTHOPAEDIC SURGERY

## 2022-10-24 PROCEDURE — 25000003 PHARM REV CODE 250: Performed by: ANESTHESIOLOGY

## 2022-10-24 PROCEDURE — 25000003 PHARM REV CODE 250: Performed by: PHYSICIAN ASSISTANT

## 2022-10-24 PROCEDURE — 99223 1ST HOSP IP/OBS HIGH 75: CPT | Mod: 57,,, | Performed by: ORTHOPAEDIC SURGERY

## 2022-10-24 PROCEDURE — 63600175 PHARM REV CODE 636 W HCPCS: Performed by: ORTHOPAEDIC SURGERY

## 2022-10-24 PROCEDURE — 37000008 HC ANESTHESIA 1ST 15 MINUTES: Performed by: ORTHOPAEDIC SURGERY

## 2022-10-24 PROCEDURE — 71000033 HC RECOVERY, INTIAL HOUR: Performed by: ORTHOPAEDIC SURGERY

## 2022-10-24 PROCEDURE — 27759 TREATMENT OF TIBIA FRACTURE: CPT | Mod: RT,,, | Performed by: ORTHOPAEDIC SURGERY

## 2022-10-24 PROCEDURE — 27800903 OPTIME MED/SURG SUP & DEVICES OTHER IMPLANTS: Performed by: ORTHOPAEDIC SURGERY

## 2022-10-24 PROCEDURE — 93005 ELECTROCARDIOGRAM TRACING: CPT

## 2022-10-24 PROCEDURE — 27695 PR REPAIR 1 COLLAT ANKLE LIGMNT,PRIMARY: ICD-10-PCS | Mod: 51,LT,, | Performed by: ORTHOPAEDIC SURGERY

## 2022-10-24 PROCEDURE — 11000001 HC ACUTE MED/SURG PRIVATE ROOM

## 2022-10-24 PROCEDURE — 27759 PR TREAT TIBIAL SHAFT FX, INTRAMED IMPLANT: ICD-10-PCS | Mod: AS,RT,, | Performed by: PHYSICIAN ASSISTANT

## 2022-10-24 PROCEDURE — 93010 ELECTROCARDIOGRAM REPORT: CPT | Mod: ,,, | Performed by: INTERNAL MEDICINE

## 2022-10-24 PROCEDURE — 25000003 PHARM REV CODE 250

## 2022-10-24 PROCEDURE — 25000003 PHARM REV CODE 250: Performed by: STUDENT IN AN ORGANIZED HEALTH CARE EDUCATION/TRAINING PROGRAM

## 2022-10-24 PROCEDURE — 63600175 PHARM REV CODE 636 W HCPCS

## 2022-10-24 PROCEDURE — 36000710: Performed by: ORTHOPAEDIC SURGERY

## 2022-10-24 PROCEDURE — 11012 DEB SKIN BONE AT FX SITE: CPT | Mod: 59,51,, | Performed by: ORTHOPAEDIC SURGERY

## 2022-10-24 PROCEDURE — 63600175 PHARM REV CODE 636 W HCPCS: Performed by: NURSE ANESTHETIST, CERTIFIED REGISTERED

## 2022-10-24 PROCEDURE — 36000711: Performed by: ORTHOPAEDIC SURGERY

## 2022-10-24 PROCEDURE — 25000003 PHARM REV CODE 250: Performed by: NURSE ANESTHETIST, CERTIFIED REGISTERED

## 2022-10-24 PROCEDURE — 27759 TREATMENT OF TIBIA FRACTURE: CPT | Mod: AS,RT,, | Performed by: PHYSICIAN ASSISTANT

## 2022-10-24 DEVICE — SCREW LP LOCKING XL25 30MM
Type: IMPLANTABLE DEVICE | Site: LEG | Status: NON-FUNCTIONAL
Removed: 2023-04-12

## 2022-10-24 DEVICE — SCREW LOK XL25 5X32MM: Type: IMPLANTABLE DEVICE | Site: LEG | Status: FUNCTIONAL

## 2022-10-24 DEVICE — IMPLANTABLE DEVICE: Type: IMPLANTABLE DEVICE | Site: LEG | Status: FUNCTIONAL

## 2022-10-24 DEVICE — SCREW XL25 IM 38X5MM: Type: IMPLANTABLE DEVICE | Site: LEG | Status: FUNCTIONAL

## 2022-10-24 DEVICE — SCREW XL25 IM NAIL 36X5MM
Type: IMPLANTABLE DEVICE | Site: LEG | Status: NON-FUNCTIONAL
Removed: 2023-04-12

## 2022-10-24 DEVICE — SCREW BONE XL25 40X5MM: Type: IMPLANTABLE DEVICE | Site: LEG | Status: FUNCTIONAL

## 2022-10-24 RX ORDER — IPRATROPIUM BROMIDE AND ALBUTEROL SULFATE 2.5; .5 MG/3ML; MG/3ML
3 SOLUTION RESPIRATORY (INHALATION)
Status: DISCONTINUED | OUTPATIENT
Start: 2022-10-24 | End: 2022-10-27 | Stop reason: HOSPADM

## 2022-10-24 RX ORDER — DOCUSATE SODIUM 100 MG/1
100 CAPSULE, LIQUID FILLED ORAL DAILY
Status: DISCONTINUED | OUTPATIENT
Start: 2022-10-24 | End: 2022-10-27 | Stop reason: HOSPADM

## 2022-10-24 RX ORDER — HYDROMORPHONE HYDROCHLORIDE 2 MG/ML
0.4 INJECTION, SOLUTION INTRAMUSCULAR; INTRAVENOUS; SUBCUTANEOUS EVERY 5 MIN PRN
Status: DISCONTINUED | OUTPATIENT
Start: 2022-10-24 | End: 2022-10-27 | Stop reason: HOSPADM

## 2022-10-24 RX ORDER — SODIUM CHLORIDE, SODIUM GLUCONATE, SODIUM ACETATE, POTASSIUM CHLORIDE AND MAGNESIUM CHLORIDE 30; 37; 368; 526; 502 MG/100ML; MG/100ML; MG/100ML; MG/100ML; MG/100ML
1000 INJECTION, SOLUTION INTRAVENOUS CONTINUOUS
Status: DISCONTINUED | OUTPATIENT
Start: 2022-10-24 | End: 2022-10-27 | Stop reason: HOSPADM

## 2022-10-24 RX ORDER — OXYCODONE AND ACETAMINOPHEN 10; 325 MG/1; MG/1
1 TABLET ORAL EVERY 4 HOURS PRN
Status: DISCONTINUED | OUTPATIENT
Start: 2022-10-24 | End: 2022-10-27 | Stop reason: HOSPADM

## 2022-10-24 RX ORDER — ONDANSETRON 2 MG/ML
4 INJECTION INTRAMUSCULAR; INTRAVENOUS EVERY 6 HOURS PRN
Status: DISCONTINUED | OUTPATIENT
Start: 2022-10-24 | End: 2022-10-27 | Stop reason: HOSPADM

## 2022-10-24 RX ORDER — METHOCARBAMOL 100 MG/ML
INJECTION, SOLUTION INTRAMUSCULAR; INTRAVENOUS
Status: COMPLETED
Start: 2022-10-24 | End: 2022-10-24

## 2022-10-24 RX ORDER — ACETAMINOPHEN 500 MG
1000 TABLET ORAL ONCE
Status: COMPLETED | OUTPATIENT
Start: 2022-10-24 | End: 2022-10-24

## 2022-10-24 RX ORDER — ONDANSETRON 2 MG/ML
INJECTION INTRAMUSCULAR; INTRAVENOUS
Status: DISCONTINUED | OUTPATIENT
Start: 2022-10-24 | End: 2022-10-24

## 2022-10-24 RX ORDER — METHOCARBAMOL 750 MG/1
750 TABLET, FILM COATED ORAL 3 TIMES DAILY
Status: DISCONTINUED | OUTPATIENT
Start: 2022-10-24 | End: 2022-10-27 | Stop reason: HOSPADM

## 2022-10-24 RX ORDER — KETOROLAC TROMETHAMINE 30 MG/ML
15 INJECTION, SOLUTION INTRAMUSCULAR; INTRAVENOUS EVERY 6 HOURS
Status: COMPLETED | OUTPATIENT
Start: 2022-10-24 | End: 2022-10-25

## 2022-10-24 RX ORDER — ROCURONIUM BROMIDE 10 MG/ML
INJECTION, SOLUTION INTRAVENOUS
Status: DISCONTINUED | OUTPATIENT
Start: 2022-10-24 | End: 2022-10-24

## 2022-10-24 RX ORDER — PHENYLEPHRINE HYDROCHLORIDE 10 MG/ML
INJECTION INTRAVENOUS
Status: DISCONTINUED | OUTPATIENT
Start: 2022-10-24 | End: 2022-10-24

## 2022-10-24 RX ORDER — DIPHENHYDRAMINE HYDROCHLORIDE 50 MG/ML
25 INJECTION INTRAMUSCULAR; INTRAVENOUS EVERY 6 HOURS PRN
Status: DISCONTINUED | OUTPATIENT
Start: 2022-10-24 | End: 2022-10-27 | Stop reason: HOSPADM

## 2022-10-24 RX ORDER — FENTANYL CITRATE 50 UG/ML
INJECTION, SOLUTION INTRAMUSCULAR; INTRAVENOUS
Status: DISCONTINUED | OUTPATIENT
Start: 2022-10-24 | End: 2022-10-24

## 2022-10-24 RX ORDER — LIDOCAINE HYDROCHLORIDE 10 MG/ML
1 INJECTION, SOLUTION EPIDURAL; INFILTRATION; INTRACAUDAL; PERINEURAL ONCE
Status: DISCONTINUED | OUTPATIENT
Start: 2022-10-24 | End: 2022-10-27 | Stop reason: HOSPADM

## 2022-10-24 RX ORDER — MEPERIDINE HYDROCHLORIDE 25 MG/ML
12.5 INJECTION INTRAMUSCULAR; INTRAVENOUS; SUBCUTANEOUS EVERY 10 MIN PRN
Status: DISPENSED | OUTPATIENT
Start: 2022-10-24 | End: 2022-10-25

## 2022-10-24 RX ORDER — CEFAZOLIN SODIUM 2 G/50ML
SOLUTION INTRAVENOUS
Status: DISPENSED
Start: 2022-10-24 | End: 2022-10-24

## 2022-10-24 RX ORDER — MIDAZOLAM HYDROCHLORIDE 1 MG/ML
INJECTION INTRAMUSCULAR; INTRAVENOUS
Status: DISCONTINUED | OUTPATIENT
Start: 2022-10-24 | End: 2022-10-24

## 2022-10-24 RX ORDER — SODIUM CITRATE AND CITRIC ACID MONOHYDRATE 334; 500 MG/5ML; MG/5ML
30 SOLUTION ORAL ONCE
Status: COMPLETED | OUTPATIENT
Start: 2022-10-24 | End: 2022-10-24

## 2022-10-24 RX ORDER — HYDROMORPHONE HYDROCHLORIDE 2 MG/ML
0.2 INJECTION, SOLUTION INTRAMUSCULAR; INTRAVENOUS; SUBCUTANEOUS EVERY 5 MIN PRN
Status: DISCONTINUED | OUTPATIENT
Start: 2022-10-24 | End: 2022-10-27 | Stop reason: HOSPADM

## 2022-10-24 RX ORDER — PROPOFOL 10 MG/ML
VIAL (ML) INTRAVENOUS
Status: DISCONTINUED | OUTPATIENT
Start: 2022-10-24 | End: 2022-10-24

## 2022-10-24 RX ORDER — HYDRALAZINE HYDROCHLORIDE 20 MG/ML
INJECTION INTRAMUSCULAR; INTRAVENOUS
Status: COMPLETED
Start: 2022-10-24 | End: 2022-10-24

## 2022-10-24 RX ORDER — ONDANSETRON 2 MG/ML
4 INJECTION INTRAMUSCULAR; INTRAVENOUS DAILY PRN
Status: DISCONTINUED | OUTPATIENT
Start: 2022-10-24 | End: 2022-10-27 | Stop reason: HOSPADM

## 2022-10-24 RX ORDER — DEXAMETHASONE SODIUM PHOSPHATE 4 MG/ML
INJECTION, SOLUTION INTRA-ARTICULAR; INTRALESIONAL; INTRAMUSCULAR; INTRAVENOUS; SOFT TISSUE
Status: DISCONTINUED | OUTPATIENT
Start: 2022-10-24 | End: 2022-10-24

## 2022-10-24 RX ORDER — MUPIROCIN 20 MG/G
OINTMENT TOPICAL 2 TIMES DAILY
Status: DISCONTINUED | OUTPATIENT
Start: 2022-10-24 | End: 2022-10-27 | Stop reason: HOSPADM

## 2022-10-24 RX ORDER — VANCOMYCIN HYDROCHLORIDE 1 G/20ML
INJECTION, POWDER, LYOPHILIZED, FOR SOLUTION INTRAVENOUS
Status: DISCONTINUED | OUTPATIENT
Start: 2022-10-24 | End: 2022-10-24 | Stop reason: HOSPADM

## 2022-10-24 RX ORDER — OXYCODONE AND ACETAMINOPHEN 5; 325 MG/1; MG/1
1 TABLET ORAL EVERY 4 HOURS PRN
Status: DISCONTINUED | OUTPATIENT
Start: 2022-10-24 | End: 2022-10-27 | Stop reason: HOSPADM

## 2022-10-24 RX ORDER — ENOXAPARIN SODIUM 100 MG/ML
40 INJECTION SUBCUTANEOUS EVERY 24 HOURS
Status: DISCONTINUED | OUTPATIENT
Start: 2022-10-24 | End: 2022-10-27 | Stop reason: HOSPADM

## 2022-10-24 RX ORDER — CEFAZOLIN SODIUM 2 G/50ML
2 SOLUTION INTRAVENOUS
Status: COMPLETED | OUTPATIENT
Start: 2022-10-24 | End: 2022-10-25

## 2022-10-24 RX ORDER — METHOCARBAMOL 500 MG/1
TABLET, FILM COATED ORAL
Status: COMPLETED
Start: 2022-10-24 | End: 2022-10-24

## 2022-10-24 RX ORDER — KETOROLAC TROMETHAMINE 30 MG/ML
INJECTION, SOLUTION INTRAMUSCULAR; INTRAVENOUS
Status: DISCONTINUED | OUTPATIENT
Start: 2022-10-24 | End: 2022-10-24

## 2022-10-24 RX ORDER — MIDAZOLAM HYDROCHLORIDE 1 MG/ML
2 INJECTION INTRAMUSCULAR; INTRAVENOUS
Status: ACTIVE | OUTPATIENT
Start: 2022-10-24 | End: 2022-10-24

## 2022-10-24 RX ORDER — PROCHLORPERAZINE EDISYLATE 5 MG/ML
5 INJECTION INTRAMUSCULAR; INTRAVENOUS EVERY 30 MIN PRN
Status: DISCONTINUED | OUTPATIENT
Start: 2022-10-24 | End: 2022-10-27 | Stop reason: HOSPADM

## 2022-10-24 RX ORDER — MORPHINE SULFATE 10 MG/ML
4 INJECTION INTRAMUSCULAR; INTRAVENOUS; SUBCUTANEOUS EVERY 6 HOURS PRN
Status: DISCONTINUED | OUTPATIENT
Start: 2022-10-24 | End: 2022-10-27 | Stop reason: HOSPADM

## 2022-10-24 RX ADMIN — KETOROLAC TROMETHAMINE 15 MG: 30 INJECTION, SOLUTION INTRAMUSCULAR at 03:10

## 2022-10-24 RX ADMIN — MEPERIDINE HYDROCHLORIDE 12.5 MG: 25 INJECTION INTRAMUSCULAR; INTRAVENOUS; SUBCUTANEOUS at 10:10

## 2022-10-24 RX ADMIN — METHOCARBAMOL 750 MG: 750 TABLET ORAL at 09:10

## 2022-10-24 RX ADMIN — DEXAMETHASONE SODIUM PHOSPHATE 4 MG: 4 INJECTION, SOLUTION INTRA-ARTICULAR; INTRALESIONAL; INTRAMUSCULAR; INTRAVENOUS; SOFT TISSUE at 08:10

## 2022-10-24 RX ADMIN — HYDROMORPHONE HYDROCHLORIDE 1 MG: 2 INJECTION, SOLUTION INTRAMUSCULAR; INTRAVENOUS; SUBCUTANEOUS at 04:10

## 2022-10-24 RX ADMIN — SODIUM CHLORIDE, SODIUM GLUCONATE, SODIUM ACETATE, POTASSIUM CHLORIDE AND MAGNESIUM CHLORIDE: 526; 502; 368; 37; 30 INJECTION, SOLUTION INTRAVENOUS at 07:10

## 2022-10-24 RX ADMIN — FENTANYL CITRATE 100 MCG: 50 INJECTION, SOLUTION INTRAMUSCULAR; INTRAVENOUS at 07:10

## 2022-10-24 RX ADMIN — PHENYLEPHRINE HYDROCHLORIDE 100 MCG: 10 INJECTION INTRAVENOUS at 08:10

## 2022-10-24 RX ADMIN — PHENYLEPHRINE HYDROCHLORIDE 100 MCG: 10 INJECTION INTRAVENOUS at 07:10

## 2022-10-24 RX ADMIN — MIDAZOLAM 2 MG: 1 INJECTION INTRAMUSCULAR; INTRAVENOUS at 07:10

## 2022-10-24 RX ADMIN — PROPOFOL 120 MG: 10 INJECTION, EMULSION INTRAVENOUS at 07:10

## 2022-10-24 RX ADMIN — ONDANSETRON 4 MG: 2 INJECTION INTRAMUSCULAR; INTRAVENOUS at 02:10

## 2022-10-24 RX ADMIN — KETOROLAC TROMETHAMINE 15 MG: 30 INJECTION, SOLUTION INTRAMUSCULAR at 08:10

## 2022-10-24 RX ADMIN — ENOXAPARIN SODIUM 40 MG: 40 INJECTION SUBCUTANEOUS at 05:10

## 2022-10-24 RX ADMIN — DIPHENHYDRAMINE HYDROCHLORIDE 10 ML: 25 SOLUTION ORAL at 03:10

## 2022-10-24 RX ADMIN — CEFAZOLIN SODIUM 2 G: 2 SOLUTION INTRAVENOUS at 03:10

## 2022-10-24 RX ADMIN — ROCURONIUM BROMIDE 50 MG: 10 INJECTION, SOLUTION INTRAVENOUS at 07:10

## 2022-10-24 RX ADMIN — METHOCARBAMOL 750 MG: 500 TABLET ORAL at 03:10

## 2022-10-24 RX ADMIN — ONDANSETRON 4 MG: 2 INJECTION INTRAMUSCULAR; INTRAVENOUS at 08:10

## 2022-10-24 RX ADMIN — HYDROMORPHONE HYDROCHLORIDE 0.4 MG: 2 INJECTION INTRAMUSCULAR; INTRAVENOUS; SUBCUTANEOUS at 09:10

## 2022-10-24 RX ADMIN — KETOROLAC TROMETHAMINE 15 MG: 30 INJECTION, SOLUTION INTRAMUSCULAR at 11:10

## 2022-10-24 RX ADMIN — METHOCARBAMOL 750 MG: 750 TABLET ORAL at 03:10

## 2022-10-24 RX ADMIN — DEXTROSE MONOHYDRATE 2 G: 5 INJECTION INTRAVENOUS at 07:10

## 2022-10-24 RX ADMIN — OXYCODONE AND ACETAMINOPHEN 1 TABLET: 325; 10 TABLET ORAL at 12:10

## 2022-10-24 RX ADMIN — SODIUM CITRATE AND CITRIC ACID MONOHYDRATE 30 ML: 500; 334 SOLUTION ORAL at 06:10

## 2022-10-24 RX ADMIN — OXYCODONE AND ACETAMINOPHEN 1 TABLET: 325; 10 TABLET ORAL at 05:10

## 2022-10-24 RX ADMIN — SUGAMMADEX 127 MG: 100 INJECTION, SOLUTION INTRAVENOUS at 08:10

## 2022-10-24 RX ADMIN — KETOROLAC TROMETHAMINE 30 MG: 30 INJECTION, SOLUTION INTRAMUSCULAR at 09:10

## 2022-10-24 RX ADMIN — ACETAMINOPHEN 1000 MG: 500 TABLET, FILM COATED ORAL at 06:10

## 2022-10-24 RX ADMIN — METHOCARBAMOL 1000 MG: 100 INJECTION, SOLUTION INTRAMUSCULAR; INTRAVENOUS at 09:10

## 2022-10-24 RX ADMIN — HYDROMORPHONE HYDROCHLORIDE 0.4 MG: 2 INJECTION INTRAMUSCULAR; INTRAVENOUS; SUBCUTANEOUS at 10:10

## 2022-10-24 RX ADMIN — OXYCODONE AND ACETAMINOPHEN 1 TABLET: 325; 10 TABLET ORAL at 11:10

## 2022-10-24 RX ADMIN — CEFAZOLIN SODIUM 2 G: 2 SOLUTION INTRAVENOUS at 10:10

## 2022-10-24 RX ADMIN — HYDRALAZINE HYDROCHLORIDE 10 MG: 20 INJECTION INTRAMUSCULAR; INTRAVENOUS at 09:10

## 2022-10-24 NOTE — OP NOTE
OPERATIVE REPORT    Patient: Deborah Cross   : 1970    MRN: [id number]  Date: 10/24/2022      Surgeon:Dillon Scott DO  Assistant: Kevin Mark was essential, part of the procedure including deep hardware placement and deep closure.  No senior assistant was availible  Preoperative Diagnosis:  Right tibial shaft/ fibula shaft fracture open, Left open ankle dislocation  Postoperative Diagnosis: Same  Procedure:    1) Treatment of Right tibial shaft fracture with intramedullary implant - 76628  2) left ankle primary ligament reconstruction lateral ATFL 80561  3) excisional debridement open fracture right tibia-CPT 87748  4) closed treatment right proximal fibula fracture without manipulation 20106  5) closed treatment left 5th metatarsal base fracture without manipulation 89271  Anesthesiologist: Abdirahman Iyer MD  OR Staff: Circulator: Ronda Kirkland RN; Maria Luisa Washington RN  Physician Assistant: Nupur Mark PA-C  Radiology Technologist: RT Batsheva  Scrub Person: ST Claribel  Implants:   Implant Name Type Inv. Item Serial No.  Lot No. LRB No. Used Action   Zite tibial nail-advanced/10mm 315mm    [employer] [id number] Right 1 Implanted   SCREW ROSIO XL25 5X32MM - [id number]  SCREW ROSIO XL25 5X32MM  [employer] [id number] Right 1 Implanted   SCREW XL25 IM NAIL 36X5MM - [id number]  SCREW XL25 IM NAIL 36X5MM  [employer] [id number] Right 1 Implanted   Reaming James 3.8mm Ball Tip [id number]    [employer] [id number] Right 1 Implanted and Explanted   SCREW LP LOCKING XL25 30MM - [id number]  SCREW LP LOCKING XL25 30MM  [employer] [id number] Right 1 Implanted   SCREW BONE XL25 40X5MM - [id number]  SCREW BONE XL25 40X5MM  [employer] [id number] Right 1 Implanted   SCREW XL25 IM 38X5MM - [id number]  SCREW XL25 IM 38X5MM  [employer] [id number] Right 1 Implanted   Mini QuickAnchor Plus (#2/0 Suture)     [id number] Left 1 Implanted   Suture [place], [place] FT    [employer] [id number] Left 1 Implanted     EBL:  200cc  Complications: None  Disposition: To PACU, stable    Indications: Deborah Cross is a 52 y.o. female presenting with the aforementioned injuries. The procedure is indicated to best obtain and maintain stability of the leg while allowing early ROM.  The patient is awake and alert. After thorough discussion of the risks, benefits, expected outcomes, and alternatives to surgical intervention, the patient agreed to proceed with surgical treatment.  Specific risks discussed included, but were not limited to: superficial or deep infection, wound healing complications, DVT/PE, significant bleeding requiring transfusion, damage to named anatomic structures in the immediate area including named neurovascular structures, implant failure, and general risks of anesthesia.  The patient voiced understanding and written as well as verbal consent was obtained by myself prior to the procedure.    Procedure in Detail:  The patient's extremity was marked by me in the preoperative area.  She was taken to the operating room and after satisfactory induction of anesthesia, was positioned supine on a radiolucent table, with a soft bump under the ipsilateral hip. The lower extremity was sterilely prepped and draped in the usual fashion.  Standard time out procedure was performed confirming the correct surgeon, site, side, patient ID and procedure.      Patient has a 1 cm opening on the anterior medial aspect of the right leg with bone fragment protruding for the skin.  I began the procedure with extending the incision proximally distally with a 15 blade followed by bone fragment excision with a rongeur followed by excisional debridement of skin subcutaneous tissue fascia and bone with a rongeur curette and 15 blade.  We then copiously irrigated this area.  We then were able to close reduce the fracture begin nailing of the tibia.    A small longitudinal incision was made superior to the patella. Bovie was used for hemostasis.  The  quadriceps tendon was split in-line with its fibers. Soft tissues and the patella were protected, and a guide wire was placed in the appropriate starting position.  Once this position was confirmed with  C-arm  in multiple planes, the wire was advanced into the proximal tibia.  I did place a blocking drill bit for the reaming to help reduce the distal fragment. The starting reamer was then used through protected soft tissues to create the entry hole over the guide wire.  Next, a long beaded-tip reaming wire was placed into the intramedullary canal.  C-arm images in multiple planes confirmed successful crossing of the fracture site and intramedullary location of this wire in the distal segment.   Intramedullary length was measured, and the intramedullary canal was sequentially reamed to a final reamer size of 1 mm larger than the final nail. Care was taken to maintain fracture reduction during the reaming process.      A Synthes tibia nail was assembled to the jig, and inserted into the tibia over the guide wire.  C-arm imaging confirmed full insertion of the nail, with no prominence.  Fracture reduction and alignment was well maintained.  This fracture is comminuted.  Therefore,  2 interlocking screws were placed proximally using the jig, and 3 interlocking screws were placed distally using free-hand perfect Native technique.  All interlocking screws were place through small incisions, blunt dissection, and with neuro-vascular structures protected.     My attention is now drawn to the left ankle patient contaminated left open ankle injury appears to be a simple fracture dislocation with complete disruption of the lateral ligament complex.  Then excisionally debrided the subcutaneous tissue fascia and capsule.  This was then copiously irrigated.  I then placed an Arthrex anchor in the fibula and repaired the ATFL recent stress the ankle appears to be stable.    At this time, the ankle was evaluated fluoroscopically  for instability.  With a mortise view held, an external rotation moment was placed on the ankle. There was no obvious widening of the syndesmosis or of the medial clear space.    The incisions were then irrigated using copious sterile saline and then closed in layered fashion.  The surgical sites were sterilely cleansed and dressed.    The patient was then subsequently extubated and transferred to to PACU in a stable condition.    All sponge and needle counts were correct at the end of the case.  I was present and participated in all aspects of the procedure.    Prognosis:  The patient will be kept WBAT on the RLE,  NWB LLE 8 weeks.  DVT prophylaxis is indicated for this patient and procedure.  The patient is at risk of pain and stiffness and will be allowed immediate AROM of the knee and ankle       This note/OR report was created with the assistance of  voice recognition software or phone  dictation.  There may be transcription errors as a result of using this technology however minimal. Effort has been made to assure accuracy of transcription but any obvious errors or omissions should be clarified with the author of the document.       Dillon Scott,   Orthopedic Trauma Surgery

## 2022-10-24 NOTE — CLINICAL REVIEW
1) Treatment of Right tibial shaft fracture with intramedullary implant - 16495                 2) left ankle primary ligament reconstruction lateral ATFL 54679                 3) excisional debridement open fracture right tibia-CPT 35371                 4) closed treatment right proximal fibula fracture without manipulation 24527                 5) closed treatment left 5th metatarsal base fracture without manipulation 02467    Chasidy Aaron MD  Utilization Management  Physician Advisor

## 2022-10-24 NOTE — ED NOTES
Pt states she is unable to void with purwick in place. Pt and family requesting kalpan cath. RN explained risk CAUTI. Pt states she still want one place. MD notified.

## 2022-10-24 NOTE — ANESTHESIA PROCEDURE NOTES
Intubation    Date/Time: 10/24/2022 7:08 AM  Performed by: Stanton Cardenas CRNA  Authorized by: Abdirahman Iyer MD     Intubation:     Induction:  Intravenous    Intubated:  Postinduction    Attempts:  1    Attempted By:  CRNA    Method of Intubation:  Direct    Blade:  Martin 2    Laryngeal View Grade: Grade IIA - cords partially seen      Difficult Airway Encountered?: No      Complications:  None    Airway Device:  Oral endotracheal tube    Airway Device Size:  7.5    Style/Cuff Inflation:  Cuffed (inflated to minimal occlusive pressure)    Inflation Amount (mL):  6    Tube secured:  21    Secured at:  The lips    Placement Verified By:  Capnometry    Complicating Factors:  None    Findings Post-Intubation:  BS equal bilateral and atraumatic/condition of teeth unchanged

## 2022-10-24 NOTE — PT/OT/SLP PROGRESS
Physical Therapy      Patient Name:  Deborah Cross   MRN:  [id number]    Patient not seen today secondary to pt off the floor in sx area. Will follow-up tomorrow as schedule permits.

## 2022-10-24 NOTE — H&P
[facility] - Emergency Dept  Orthopedic Trauma  History and Physical    Patient Name: Deborah Cross  MRN: [id number]  Admission Date: 10/23/2022  Hospital Length of Stay: 1 days  Attending Provider: Dillon Scott DO  Primary Care Provider: No primary care provider on file.        Chief Complaint: No chief complaint on file.       HPI: The accident occurred just prior to arrival. The fall occurred from a ladder (8ft). She fell from a height of 6 to 10 ft. Point of impact: right and left leg. Pain location: right and left leg. She was Not ambulatory at the scene. There was No entrapment after the fall. Pertinent negatives include no neck pain, no fever, no numbness, no abdominal pain, no nausea, no vomiting, no hematuria and no headaches. Treatment on scene includes A c-collar.       Patient has bilateral lower extremity injuries.,contaminated a left foot laceration as provisionally cleaned in the ER and a right open tibial shaft fracture. Denies numbness or tingling. No neck pain.        No past medical history on file.    No past surgical history on file.    Review of patient's allergies indicates:  No Known Allergies    Current Facility-Administered Medications   Medication    ceFAZolin 2 g/50mL Dextrose IVPB (ANCEF) 2 gram/50 mL IVPB PgBk    acetaminophen tablet 650 mg    ceFAZolin (ANCEF) 1 g in dextrose 5 % in water (D5W) 5 % 50 mL IVPB (MB+)    HYDROmorphone (PF) injection 0.5 mg    HYDROmorphone (PF) injection 1 mg    ketorolac injection 15 mg    LIDOcaine (PF) 20 mg/mL (2%) injection 10 mg    melatonin tablet 6 mg    methocarbamoL injection 1,000 mg    ondansetron disintegrating tablet 8 mg    sodium chloride 0.9% flush 10 mL     No current outpatient medications on file.     Family History    None       Tobacco Use    Smoking status: Not on file    Smokeless tobacco: Not on file   Substance and Sexual Activity    Alcohol use: Not on file    Drug use: Not on file    Sexual activity: Not on file  "      ROS:  Constitutional: Denies fever chills  Eyes: No change in vision  ENT: No ringing or current infections  CV: No chest pain  Resp: No labored breathing  MSK: Pain evident at site of injury located in HPI,   Integ: No signs of abrasions or lacerations  Neuro: No numbness or tingling  Lymphatic: No swelling outside the area of injury   Objective:     Vital Signs (Most Recent):  Temp: 97.6 °F (36.4 °C) (10/23/22 2025)  Pulse: 84 (10/24/22 0553)  Resp: 20 (10/24/22 0553)  BP: 137/81 (10/24/22 0553)  SpO2: (!) 91 % (10/24/22 0553)   Vital Signs (24h Range):  Temp:  [97.5 °F (36.4 °C)-97.6 °F (36.4 °C)] 97.6 °F (36.4 °C)  Pulse:  [75-93] 84  Resp:  [14-22] 20  SpO2:  [91 %-100 %] 91 %  BP: (122-179)/() 137/81     Weight: 63.5 kg (140 lb)  Height: 5' 4" (162.6 cm)  Body mass index is 24.03 kg/m².      Intake/Output Summary (Last 24 hours) at 10/24/2022 0628  Last data filed at 10/24/2022 0530  Gross per 24 hour   Intake 50 ml   Output 1300 ml   Net -1250 ml       Ortho/SPM Exam  General the patient is alert and oriented x3 no acute distress nontoxic-appearing appropriate affect.    Constitutional: Vital signs are examined and stable.  Resp: No signs of labored breathing                 LLE: -Skin: Dressing CDI, No signs of new abrasions or lacerations, no scars           -MSK: Hip and Knee F/E, EHL/FHL,Strength 5/5           -Neuro:  Sensation intact to light touch L3-S1 dermatomes           -Lymphatic: No signs of lymphadenopathy           -CV: Capillary refill is less than 2 seconds. DP/PT pulses 2/4. Compartments soft and compressible                      RLE: -Skin: Dressing CDI, No signs of new abrasions or lacerations, no scars           -MSK: : Hip and Knee F/E, EHL/FHL, Strength 5/5           -Neuro:  Sensation intact to light touch L3-S1 dermatomes           -Lymphatic: No signs of lymphadenopathy           -CV: Capillary refill is less than 2 seconds. DP/PT pulses  2/4. Compartments soft and " compressible.     Significant Labs:   Recent Lab Results         10/23/22  2151   10/23/22  2124   10/23/22  2021        Phencyclidine   Negative         Albumin/Globulin Ratio     1.2       Albumin     3.7       Alcohol, Serum     <10.0       Alkaline Phosphatase     88       ALT     28       Amphetamine Screen, Ur   Positive         Appearance, UA   Clear         aPTT     24.0  Comment: For Minimal Heparin Infusion, the goal aPTT 64-85 seconds corresponds to an anti-Xa of 0.3-0.5.    For Low Intensity and High Intensity Heparin, the goal aPTT  seconds corresponds to an anti-Xa of 0.3-0.7       AST     27       Bacteria, UA   None Seen         Barbiturate Screen, Ur   Negative         Baso #     0.09       Basophil %     0.7       Benzodiazepine Screen, Urine   Negative         BILIRUBIN TOTAL     0.3       Bilirubin, UA   Negative         BUN     14.6       Calcium     8.4       Cannabinoids, Urine   Positive         Chloride     104       CO2     20       Cocaine (Metab.)   Negative         Color, UA   Yellow         ID NOW COVID-19, (DINA) Negative           Creatinine     0.72       eGFR     >60       Eos #     0.12       Eosinophil %     0.9       Fentanyl, Urine   Positive         Globulin, Total     3.1       Glucose     124       Glucose, UA   Negative         Group & Rh     O POS       Hematocrit     39.0       Hemoglobin     12.9       Immature Grans (Abs)     0.17       Immature Granulocytes     1.2       Indirect Renzo GEL     NEG       INR     1.01       Ketones, UA   Negative         Lactate, Ino     1.9       Leukocytes, UA   Negative         Lymph #     1.94       LYMPH %     14.1       MCH     29.7       MCHC     33.1       MCV     89.9       MDMA, Urine   Negative         Mono #     0.87       Mono %     6.3       MPV     9.7       Neut #     10.6       Neut %     76.8       NITRITE UA   Negative         nRBC     0.0       Occult Blood UA   Negative         Opiate Scrn, Ur   Negative          pH, UA   6.5         pH, Urine   6.5         Platelets     275       Potassium     4.2       PROTEIN TOTAL     6.8       Protein, UA   Negative         Protime     13.2       RBC     4.34       RBC, UA   <5         RDW     13.2       Sodium     138       Specific Gravity,UA   1.038         Specific Gravity, Urine Auto   1.038         Squam Epithel, UA   <5         Urobilinogen, UA   0.2         WBC, UA   <5         WBC     13.8             All pertinent labs within the past 24 hours have been reviewed.  Recent Lab Results         10/23/22  2151   10/23/22  2124   10/23/22  2021        Phencyclidine   Negative         Albumin/Globulin Ratio     1.2       Albumin     3.7       Alcohol, Serum     <10.0       Alkaline Phosphatase     88       ALT     28       Amphetamine Screen, Ur   Positive         Appearance, UA   Clear         aPTT     24.0  Comment: For Minimal Heparin Infusion, the goal aPTT 64-85 seconds corresponds to an anti-Xa of 0.3-0.5.    For Low Intensity and High Intensity Heparin, the goal aPTT  seconds corresponds to an anti-Xa of 0.3-0.7       AST     27       Bacteria, UA   None Seen         Barbiturate Screen, Ur   Negative         Baso #     0.09       Basophil %     0.7       Benzodiazepine Screen, Urine   Negative         BILIRUBIN TOTAL     0.3       Bilirubin, UA   Negative         BUN     14.6       Calcium     8.4       Cannabinoids, Urine   Positive         Chloride     104       CO2     20       Cocaine (Metab.)   Negative         Color, UA   Yellow         ID NOW COVID-19, (DINA) Negative           Creatinine     0.72       eGFR     >60       Eos #     0.12       Eosinophil %     0.9       Fentanyl, Urine   Positive         Globulin, Total     3.1       Glucose     124       Glucose, UA   Negative         Group & Rh     O POS       Hematocrit     39.0       Hemoglobin     12.9       Immature Grans (Abs)     0.17       Immature Granulocytes     1.2       Indirect Renzo GEL     NEG        INR     1.01       Ketones, UA   Negative         Lactate, Ino     1.9       Leukocytes, UA   Negative         Lymph #     1.94       LYMPH %     14.1       MCH     29.7       MCHC     33.1       MCV     89.9       MDMA, Urine   Negative         Mono #     0.87       Mono %     6.3       MPV     9.7       Neut #     10.6       Neut %     76.8       NITRITE UA   Negative         nRBC     0.0       Occult Blood UA   Negative         Opiate Scrn, Ur   Negative         pH, UA   6.5         pH, Urine   6.5         Platelets     275       Potassium     4.2       PROTEIN TOTAL     6.8       Protein, UA   Negative         Protime     13.2       RBC     4.34       RBC, UA   <5         RDW     13.2       Sodium     138       Specific Gravity,UA   1.038         Specific Gravity, Urine Auto   1.038         Squam Epithel, UA   <5         Urobilinogen, UA   0.2         WBC, UA   <5         WBC     13.8                Significant Imaging: I have reviewed all pertinent imaging results/findings.  X-Ray Chest 1 View    Result Date: 10/23/2022  EXAMINATION: XR CHEST 1 VIEW CLINICAL HISTORY: r/o bleeding or hemorrhage; TECHNIQUE: Single frontal view of the chest was performed. COMPARISON: None FINDINGS: The lungs are clear, with normal appearance of pulmonary vasculature and no pleural effusion or pneumothorax. The cardiac silhouette is normal in size. The hilar and mediastinal contours are unremarkable. Bones are intact.     No acute abnormality. Electronically signed by: Florencio Atkinson MD Date:    10/23/2022 Time:    20:45    X-Ray Tibia Fibula 2 View Left    Result Date: 10/23/2022  EXAMINATION: XR TIBIA FIBULA 2 VIEW LEFT CLINICAL HISTORY: Unspecified fall, initial encounter TECHNIQUE: AP and lateral views of the left tibia and fibula were performed. COMPARISON: None. FINDINGS: Subluxation of the ankle mortise is identified without definitive fracture.  The subluxation is anterior and medial.  Diffuse soft tissue swelling is  identified.  Concern for subcutaneous emphysema is identified.  Avulsion injury of the 5th meta tarsal is identified.     Likely open fracture with subluxation of the ankle mortise and subcutaneous emphysema.  No definitive fracture of the ankle.  Incidental note of avulsion injury at the base of the 5th metatarsal. Electronically signed by: Florencio Atkinson MD Date:    10/23/2022 Time:    21:46    X-Ray Tibia Fibula 2 View Right    Result Date: 10/23/2022  EXAMINATION: XR TIBIA FIBULA 2 VIEW RIGHT CLINICAL HISTORY: Unspecified fracture of shaft of right tibia, initial encounter for closed fracture TECHNIQUE: AP and lateral views of the right tibia and fibula were performed. COMPARISON: Same day FINDINGS: Status post reduction with improved alignment of the tibial fracture.  No significant interval change of the fibular fracture.  Splinting material is identified.     As above Electronically signed by: Folrencio Atkinson MD Date:    10/23/2022 Time:    21:50    X-Ray Tibia Fibula 2 View Right    Result Date: 10/23/2022  EXAMINATION: XR TIBIA FIBULA 2 VIEW RIGHT CLINICAL HISTORY: Unspecified fall, initial encounter TECHNIQUE: AP and lateral views of the right tibia and fibula were performed. COMPARISON: None. FINDINGS: Comminuted spiral fracture of the distal tibia as well as fracture of the proximal fibula.  An open fracture is identified distally with subcutaneous emphysema.  The ankle appears grossly intact peer     Comminuted fractures of the tibia and fibula with an open fracture of the tibia. Electronically signed by: Florencio Atkinson MD Date:    10/23/2022 Time:    21:49    X-Ray Ankle Complete Left    Result Date: 10/23/2022  EXAMINATION: XR ANKLE COMPLETE 3 VIEW LEFT CLINICAL HISTORY: Pain, unspecified TECHNIQUE: AP, lateral and oblique views of the left ankle were performed. COMPARISON: None FINDINGS: Casting material is identified with reduction of the subluxed ankle.  No definitive osteochondral defect is identified.   Likely avulsion injury at the base of the 5th metatarsal is identified.     Reduction of the ankle without definitive talar dome defect.  Concern for avulsion injury at the base of the 5th metatarsal.. Electronically signed by: Florencio Atkinson MD Date:    10/23/2022 Time:    20:50    CT Head Without Contrast    Result Date: 10/23/2022  EXAMINATION: CT HEAD WITHOUT CONTRAST CLINICAL HISTORY: Trauma; TECHNIQUE: Low dose axial CT images obtained throughout the head without intravenous contrast. Sagittal and coronal reconstructions were performed. COMPARISON: None. FINDINGS: Intracranial compartment: Ventricles and sulci are normal in size for age without evidence of hydrocephalus. No extra-axial blood or fluid collections. The brain parenchyma appears normal. No parenchymal mass, hemorrhage, edema or major vascular distribution infarct. Skull/extracranial contents (limited evaluation): No fracture. Mastoid air cells and paranasal sinuses are essentially clear.     No acute abnormality. Electronically signed by: Florencio Atkinson MD Date:    10/23/2022 Time:    20:36    CT Cervical Spine Without Contrast    Result Date: 10/23/2022  EXAMINATION: CT CERVICAL SPINE WITHOUT CONTRAST CLINICAL HISTORY: Trauma; TECHNIQUE: Low dose axial images, sagittal and coronal reformations were performed though the cervical spine.  Contrast was not administered. COMPARISON: None FINDINGS: The alignment curvature of the cervical spine is normal. The vertebral heights and intervertebral disc spaces are maintained. No evidence for compression deformity, fracture, or subluxation. The predental space and prevertebral soft tissues are normal. No evidence for central canal stenosis or neural foraminal narrowing. Calcified thyroid nodules are identified. The remaining soft tissues within normal limits. The lung apices are clear.     No fracture of the cervical spine. Electronically signed by: Florencio Atkinson MD Date:    10/23/2022 Time:    20:38    CT Ankle  (Including Hindfoot) Without Contrast Right    Result Date: 10/23/2022  START OF REPORT: Technique: CT of the right ankle was performed with direct axial images as well as sagittal and coronal reconstruction images without intravenous contrast. Clinical History: Fell 8ft, rt ankle fx. Findings: Ankle: Ankle mortise: Intact. Alignment: Normal. Mineralization: Normal. Bones: Tibia: There is a comminuted mildly displaced acute oblique fracture of the distal shaft of the tibia. A bony fragment is seen that extends to the skin surface anteromedially. This is suggestive of an open fracture. There is associated mild soft tissue emphysema and swelling at the level of fracture. Fibula: The visualized distal fibula is intact with no fracture. Tarsal bones: Intact with no fracture. Metatarsal bones: Intact to the extent visualized with no fracture. Impression: 1. There is a comminuted mildly displaced acute oblique fracture of the distal shaft of the tibia. A bony fragment is seen that extends to the skin surface anteromedially. This is suggestive of an open fracture. There is associated mild soft tissue emphysema and swelling at the level of fracture. 2. Correlate clinically as regards additional evaluation and follow-up.     CT Chest Abdomen Pelvis With Contrast (xpd)    Result Date: 10/23/2022  EXAMINATION: CT CHEST ABDOMEN PELVIS WITH CONTRAST (XPD) CLINICAL HISTORY: Trauma; TECHNIQUE: Multiple cross-section of the chest, abdomen, and pelvis were obtained after the intravenous administration of contrast.  Coronal and sagittal reformatted images were obtained.  An automated dose exposure technique was utilized.  This limits radiation does the patient. COMPARISON: None FINDINGS: Chest: Heart size within normal limits with coronary artery calcifications.  The course and caliber of the thoracic aorta is normal.  A triple vessel aortic arch is identified with a great vessels being widely patent.  No evidence for aortic injury.   No evidence for mediastinal hematoma.  Likely remanent thymic tissues identified versus thymic rebound.  Main pulmonary artery is of normal caliber.  No evidence for hilar or mediastinal adenopathy. Bibasilar atelectatic changes lungs.  No evidence for consolidation.  Emphysematous changes are identified with apical blebs and bulla predominantly paraseptal emphysematous changes.  No pneumothorax, pulmonary contusion, laceration.  The trachea and airways are patent. Abdomen/pelvis: Liver demonstrates likely hemangioma in segment Yung numeral 8.  Liver is enlarged.  Gallbladder is present.  Focal area of perfusional defects in segment 5.  The spleen, adrenals, kidneys, and pancreas are normal. Small bowel is of normal caliber.  Colon is normal caliber in stool filled.  Normal appendix. Uterus is surgically absent.  No evidence for adnexal mass.  Bladder is distended.  Course and caliber of the abdominal aorta is normal with scattered calcified atheromatous disease.  No free fluid in the abdomen pelvis.  No evidence for adenopathy. No suspicious osseous lesions.  Spondylotic changes are identified.  Soft tissues are normal.     No sequela of trauma involving the chest, abdomen, or pelvis.  Other secondary findings as above. Electronically signed by: Florencio Atkinson MD Date:    10/23/2022 Time:    20:44       Assessment/Plan:     Active Diagnoses:    Diagnosis Date Noted POA    PRINCIPAL PROBLEM:  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 [S82.201B, S82.401B] 10/24/2022 Unknown    Foot laceration, left, initial encounter [S91.312A] 10/24/2022 Unknown      Problems Resolved During this Admission:       Patient has a right tibial shaft fracture open with a proximal fibula fracture.  Patient also has a contaminated left foot laceration.  We discussed the risks and benefits procedure as stated below.  Patient will be placed on the schedule for 1st thing this morning.  NPO over midnight.    I explained that surgery  and the nature of their condition are not without risks. These include, but are not limited to, bleeding, infection, neurovascular compromise, malunion, nonunion, hardware complications, wound complications, scarring, cosmetic defects, need for later and/or repeated surgeries, pain, loss of ROM, loss of function, PTOA, deformity, stance/gait and/or functional abnormalities, thromboembolic complications, compartment syndrome, loss of limb, loss of life, anesthetic complications, and other imponderables. I explained that these can occur despite the adequacy of treatments rendered, and that their risks are heightened given the nature of their condition. They verbalized understanding. They would like to continue with surgery at this time. If appropriate family was involved with surgical discussion.         The patient has been prescribed an opioid analgesic that exceeds the limits set forth in the opioid prescribing rules for acute pain. Treatment with non-opioid medications is not a suitable alternative given the patient's condition. The patient meets exception criteria for: exceeding the 5 or 7 day rule when prescribing narcotic medications because: he/she suffers from a injury that requires more than 5 or 7 day duration because the patient is continuing to recover. This is the minimum duration consistent with the patient's medical condition.  The prescribed dose and/or duration is the level necessary to therapeutically treat the patient's pain. The patient was advised of the risks and benefits of opioid medications, including the potential for addiction.    This note/OR report was created with the assistance of  voice recognition software or phone  dictation.  There may be transcription errors as a result of using this technology however minimal. Effort has been made to assure accuracy of transcription but any obvious errors or omissions should be clarified with the author of the document.       Dillon Scott, DO    Orthopedic Trauma Surgery  [facility] - Emergency Dept

## 2022-10-24 NOTE — ANESTHESIA POSTPROCEDURE EVALUATION
Anesthesia Post Evaluation    Patient: Deborah Cross    Procedure(s) Performed: Procedure(s) (LRB):  INSERTION, INTRAMEDULLARY JACK, TIBIA (Right)    Final Anesthesia Type: general      Patient location during evaluation: PACU  Patient participation: Yes- Able to Participate  Level of consciousness: awake and alert and oriented  Post-procedure vital signs: reviewed and stable  Pain management: adequate  Airway patency: patent    PONV status at discharge: No PONV  Anesthetic complications: no      Cardiovascular status: hemodynamically stable  Respiratory status: unassisted  Hydration status: euvolemic  Follow-up not needed.          Vitals Value Taken Time   /92 10/24/22 1031   Temp 36.7 °C (98.1 °F) 10/24/22 0909   Pulse 104 10/24/22 1035   Resp 14 10/24/22 1035   SpO2 97 % 10/24/22 1035   Vitals shown include unvalidated device data.      No case tracking events are documented in the log.      Pain/Veronika Score: Pain Rating Prior to Med Admin: 9 (10/24/2022  9:40 AM)  Pain Rating Post Med Admin: 5 (10/24/2022  9:40 AM)  Veronika Score: 8 (10/24/2022 10:15 AM)

## 2022-10-24 NOTE — ANESTHESIA PREPROCEDURE EVALUATION
10/24/2022  Deborah Cross is a 52 y.o., female.      Deborah Cross    Pre-op Diagnosis: Closed displaced comminuted fracture of shaft of right tibia, initial encounter [S82.680A]    Procedure(s): INSERTION, INTRAMEDULLARY JACK, TIBIA         Pre-op Assessment    I have reviewed the Patient Summary Reports.    I have reviewed the NPO Status.   I have reviewed the Medications.     Review of Systems  Anesthesia Hx:  No problems with previous Anesthesia  Denies Family Hx of Anesthesia complications.   Denies Personal Hx of Anesthesia complications.   Social:  Non-Smoker    Cardiovascular:   Exercise tolerance: good  Denies Angina.  Denies Orthopnea.  Denies PND. hyperlipidemia  Denies RIVERA.  Functional Capacity good / => 4 METS    Hepatic/GI:   GERD    Musculoskeletal:  Musculoskeletal Normal    Neurological:   Denies TIA. Denies CVA. Headaches (Migraine Headaches)    Endocrine:   Diabetes  Thyroid Disease (Thyromegaly)    Psych:   Psychiatric History (ADHD) anxiety depression          Physical Exam  General: Well nourished, Alert and Oriented    Airway:  Mallampati: III   Mouth Opening: Normal  TM Distance: Normal  Tongue: Normal  Neck ROM: Normal ROM    Dental:  Intact    Chest/Lungs:  Clear to auscultation    Heart:  Rate: Normal  Rhythm: Regular Rhythm  No pretibial edema  No carotid bruits    Lab Results   Component Value Date    WBC 13.8 (H) 10/23/2022    HGB 12.9 10/23/2022    HCT 39.0 10/23/2022    MCV 89.9 10/23/2022     10/23/2022   BMP  Lab Results   Component Value Date     10/23/2022    K 4.2 10/23/2022    CO2 20 (L) 10/23/2022    BUN 14.6 10/23/2022    CREATININE 0.72 10/23/2022    CALCIUM 8.4 10/23/2022    ECG -  NSR no acute ST T changes 10/24/2022    Anesthesia Plan  Type of Anesthesia, risks & benefits discussed:    Anesthesia Type: Gen ETT  Intra-op Monitoring Plan: Standard ASA  Monitors  Post Op Pain Control Plan: multimodal analgesia  Induction:  IV  Airway Plan: Direct, Post-Induction  Informed Consent: Informed consent signed with the Patient and all parties understand the risks and agree with anesthesia plan.  All questions answered. Patient consented to blood products? Yes  ASA Score: 3  Day of Surgery Review of History & Physical: H&P Update referred to the surgeon/provider.    Ready For Surgery From Anesthesia Perspective.     .

## 2022-10-24 NOTE — ED NOTES
Assumed care of patient at this time. Report received from ADAN Veras. Pt currently resting quietly on stretcher in NAD. Bilateral lower extremities splinted at this time. Neurovascular status intact in all extremities.

## 2022-10-25 LAB
ALBUMIN SERPL-MCNC: 3.2 GM/DL (ref 3.5–5)
ALBUMIN/GLOB SERPL: 1.1 RATIO (ref 1.1–2)
ALP SERPL-CCNC: 75 UNIT/L (ref 40–150)
ALT SERPL-CCNC: 20 UNIT/L (ref 0–55)
AST SERPL-CCNC: 20 UNIT/L (ref 5–34)
BASOPHILS # BLD AUTO: 0.03 X10(3)/MCL (ref 0–0.2)
BASOPHILS NFR BLD AUTO: 0.3 %
BILIRUBIN DIRECT+TOT PNL SERPL-MCNC: 0.5 MG/DL
BUN SERPL-MCNC: 9.5 MG/DL (ref 9.8–20.1)
CALCIUM SERPL-MCNC: 8.8 MG/DL (ref 8.4–10.2)
CHLORIDE SERPL-SCNC: 103 MMOL/L (ref 98–107)
CO2 SERPL-SCNC: 26 MMOL/L (ref 22–29)
CREAT SERPL-MCNC: 0.72 MG/DL (ref 0.55–1.02)
EOSINOPHIL # BLD AUTO: 0.03 X10(3)/MCL (ref 0–0.9)
EOSINOPHIL NFR BLD AUTO: 0.3 %
ERYTHROCYTE [DISTWIDTH] IN BLOOD BY AUTOMATED COUNT: 13.2 % (ref 11.5–17)
GFR SERPLBLD CREATININE-BSD FMLA CKD-EPI: >60 MLS/MIN/1.73/M2
GLOBULIN SER-MCNC: 2.9 GM/DL (ref 2.4–3.5)
GLUCOSE SERPL-MCNC: 146 MG/DL (ref 74–100)
HCT VFR BLD AUTO: 31.6 % (ref 37–47)
HGB BLD-MCNC: 10.2 GM/DL (ref 12–16)
IMM GRANULOCYTES # BLD AUTO: 0.05 X10(3)/MCL (ref 0–0.04)
IMM GRANULOCYTES NFR BLD AUTO: 0.4 %
LYMPHOCYTES # BLD AUTO: 2.29 X10(3)/MCL (ref 0.6–4.6)
LYMPHOCYTES NFR BLD AUTO: 19.4 %
MCH RBC QN AUTO: 29.2 PG (ref 27–31)
MCHC RBC AUTO-ENTMCNC: 32.3 MG/DL (ref 33–36)
MCV RBC AUTO: 90.5 FL (ref 80–94)
MONOCYTES # BLD AUTO: 1.25 X10(3)/MCL (ref 0.1–1.3)
MONOCYTES NFR BLD AUTO: 10.6 %
NEUTROPHILS # BLD AUTO: 8.2 X10(3)/MCL (ref 2.1–9.2)
NEUTROPHILS NFR BLD AUTO: 69 %
NRBC BLD AUTO-RTO: 0 %
PLATELET # BLD AUTO: 245 X10(3)/MCL (ref 130–400)
PMV BLD AUTO: 10.1 FL (ref 7.4–10.4)
POTASSIUM SERPL-SCNC: 4.6 MMOL/L (ref 3.5–5.1)
PROT SERPL-MCNC: 6.1 GM/DL (ref 6.4–8.3)
RBC # BLD AUTO: 3.49 X10(6)/MCL (ref 4.2–5.4)
SODIUM SERPL-SCNC: 140 MMOL/L (ref 136–145)
WBC # SPEC AUTO: 11.8 X10(3)/MCL (ref 4.5–11.5)

## 2022-10-25 PROCEDURE — 80053 COMPREHEN METABOLIC PANEL: CPT | Performed by: PHYSICIAN ASSISTANT

## 2022-10-25 PROCEDURE — 85025 COMPLETE CBC W/AUTO DIFF WBC: CPT | Performed by: PHYSICIAN ASSISTANT

## 2022-10-25 PROCEDURE — 25000003 PHARM REV CODE 250: Performed by: PHYSICIAN ASSISTANT

## 2022-10-25 PROCEDURE — 25000003 PHARM REV CODE 250: Performed by: ORTHOPAEDIC SURGERY

## 2022-10-25 PROCEDURE — 97162 PT EVAL MOD COMPLEX 30 MIN: CPT

## 2022-10-25 PROCEDURE — 63600175 PHARM REV CODE 636 W HCPCS: Performed by: PHYSICIAN ASSISTANT

## 2022-10-25 PROCEDURE — 11000001 HC ACUTE MED/SURG PRIVATE ROOM

## 2022-10-25 PROCEDURE — 36415 COLL VENOUS BLD VENIPUNCTURE: CPT | Performed by: PHYSICIAN ASSISTANT

## 2022-10-25 RX ADMIN — KETOROLAC TROMETHAMINE 15 MG: 30 INJECTION, SOLUTION INTRAMUSCULAR at 06:10

## 2022-10-25 RX ADMIN — MUPIROCIN: 20 OINTMENT TOPICAL at 08:10

## 2022-10-25 RX ADMIN — OXYCODONE AND ACETAMINOPHEN 1 TABLET: 325; 10 TABLET ORAL at 12:10

## 2022-10-25 RX ADMIN — OXYCODONE AND ACETAMINOPHEN 1 TABLET: 325; 10 TABLET ORAL at 04:10

## 2022-10-25 RX ADMIN — OXYCODONE AND ACETAMINOPHEN 1 TABLET: 325; 10 TABLET ORAL at 08:10

## 2022-10-25 RX ADMIN — OXYCODONE HYDROCHLORIDE AND ACETAMINOPHEN 1 TABLET: 5; 325 TABLET ORAL at 04:10

## 2022-10-25 RX ADMIN — METHOCARBAMOL 750 MG: 750 TABLET ORAL at 03:10

## 2022-10-25 RX ADMIN — ENOXAPARIN SODIUM 40 MG: 40 INJECTION SUBCUTANEOUS at 04:10

## 2022-10-25 RX ADMIN — METHOCARBAMOL 750 MG: 750 TABLET ORAL at 08:10

## 2022-10-25 RX ADMIN — DOCUSATE SODIUM 100 MG: 100 CAPSULE, LIQUID FILLED ORAL at 08:10

## 2022-10-25 RX ADMIN — CEFAZOLIN SODIUM 2 G: 2 SOLUTION INTRAVENOUS at 06:10

## 2022-10-25 NOTE — PLAN OF CARE
Goals to be met by: 2022     Patient will increase functional independence with mobility by performin. Sit to stand transfer with Modified [place]  2. Gait  x 100 feet with Modified [place] using Rolling Walker.

## 2022-10-25 NOTE — PROGRESS NOTES
[facility] Neuro  Orthopedics  Progress Note    Patient Name: Deborah Cross  MRN: [id number]  Admission Date: 10/23/2022  Hospital Length of Stay: 2 days  Attending Provider: Dillon Scott DO  Primary Care Provider: Adamaris Cao MD  Follow-up For: Procedure(s) (LRB):  INSERTION, INTRAMEDULLARY JACK, TIBIA (Right)    Post-Operative Day: 1 Day Post-Op  Subjective:     Principal Problem: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    Principal Orthopedic Problem: 1 Day Post-Op   IMN right tibia shaft fracture and I&D of left open ankle dislocation with ATFL repair.    Interval History: Patient states her pain is well controlled. Has not been up to work with therapy at this time. Splint in place to LLE. Okay for WB to RLE. She understands that due to bilateral lower ext injuries, she may need transfer to rehab facility for a short period.      Review of patient's allergies indicates:  No Known Allergies    Current Facility-Administered Medications   Medication    (Magic mouthwash) 1:1:1 diphenhydrAMINE(Benadryl) 12.5mg/5ml liq, aluminum & magnesium hydroxide-simethicone (Maalox), LIDOcaine viscous 2%    acetaminophen tablet 650 mg    albuterol-ipratropium 2.5 mg-0.5 mg/3 mL nebulizer solution 3 mL    diphenhydrAMINE injection 25 mg    docusate sodium capsule 100 mg    electrolyte-A infusion 1,000 mL    enoxaparin injection 40 mg    HYDROmorphone (PF) injection 0.2 mg    HYDROmorphone (PF) injection 0.4 mg    HYDROmorphone (PF) injection 0.5 mg    HYDROmorphone (PF) injection 1 mg    ketorolac injection 15 mg    LIDOcaine (PF) 10 mg/ml (1%) injection 10 mg    LIDOcaine (PF) 20 mg/mL (2%) injection 10 mg    melatonin tablet 6 mg    meperidine (PF) injection 12.5 mg    methocarbamoL tablet 750 mg    morphine injection 4 mg    mupirocin 2 % ointment    ondansetron disintegrating tablet 8 mg    ondansetron injection 4 mg    ondansetron injection 4 mg    oxyCODONE-acetaminophen  mg per  "tablet 1 tablet    oxyCODONE-acetaminophen 5-325 mg per tablet 1 tablet    prochlorperazine injection Soln 5 mg    sodium chloride 0.9% flush 10 mL     Objective:     Vital Signs (Most Recent):  Temp: 98.6 °F (37 °C) (10/25/22 0732)  Pulse: 88 (10/25/22 0732)  Resp: 18 (10/25/22 0732)  BP: 124/75 (10/25/22 0732)  SpO2: 96 % (10/25/22 0732) Vital Signs (24h Range):  Temp:  [98.1 °F (36.7 °C)-99 °F (37.2 °C)] 98.6 °F (37 °C)  Pulse:  [] 88  Resp:  [10-20] 18  SpO2:  [93 %-100 %] 96 %  BP: (104-181)/() 124/75     Weight: 63.5 kg (139 lb 15.9 oz)  Height: 5' 4.02" (162.6 cm)  Body mass index is 24.02 kg/m².      Intake/Output Summary (Last 24 hours) at 10/25/2022 0742  Last data filed at 10/24/2022 2350  Gross per 24 hour   Intake 3280 ml   Output 1050 ml   Net 2230 ml       Physical Exam:   Musculoskeletal:       Right lower extremity: Soft dressing in place clean dry and intact without obvious drainage noted; compartments are soft and compressible; No pain with ROM at the hip, knee, or ankle; appropriate tenderness to palpation; dorsi/plantar flexes the foot; SILT distally; BCR distally; DP pulse palpable    Left lower extremity: Splint in place to LLE; compartments are soft and compressible to posterior calf; No pain ROM attempted ; no tenderness to palpation; SILT distally; BCR distally; DP pulse palpable      Diagnostic Findings:   Significant Labs:   Recent Lab Results         10/25/22  0351   10/23/22  2151   10/23/22  2124   10/23/22  2021        Phencyclidine     Negative         Albumin/Globulin Ratio 1.1       1.2       Albumin 3.2       3.7       Alcohol, Serum       <10.0       Alkaline Phosphatase 75       88       ALT 20       28       Amphetamine Screen, Ur     Positive         Appearance, UA     Clear         aPTT       24.0  Comment: For Minimal Heparin Infusion, the goal aPTT 64-85 seconds corresponds to an anti-Xa of 0.3-0.5.    For Low Intensity and High Intensity Heparin, the goal aPTT "  seconds corresponds to an anti-Xa of 0.3-0.7       AST 20       27       Bacteria, UA     None Seen         Barbiturate Screen, Ur     Negative         Baso # 0.03       0.09       Basophil % 0.3       0.7       Benzodiazepine Screen, Urine     Negative         BILIRUBIN TOTAL 0.5       0.3       Bilirubin, UA     Negative         BUN 9.5       14.6       Calcium 8.8       8.4       Cannabinoids, Urine     Positive         Chloride 103       104       CO2 26       20       Cocaine (Metab.)     Negative         Color, UA     Yellow         ID NOW COVID-19, (DINA)   Negative           Creatinine 0.72       0.72       eGFR >60       >60       Eos # 0.03       0.12       Eosinophil % 0.3       0.9       Fentanyl, Urine     Positive         Globulin, Total 2.9       3.1       Glucose 146       124       Glucose, UA     Negative         Group & Rh       O POS       Hematocrit 31.6       39.0       Hemoglobin 10.2       12.9       Immature Grans (Abs) 0.05       0.17       Immature Granulocytes 0.4       1.2       Indirect Renzo GEL       NEG       INR       1.01       Ketones, UA     Negative         Lactate, Ino       1.9       Leukocytes, UA     Negative         Lymph # 2.29       1.94       LYMPH % 19.4       14.1       MCH 29.2       29.7       MCHC 32.3       33.1       MCV 90.5       89.9       MDMA, Urine     Negative         Mono # 1.25       0.87       Mono % 10.6       6.3       MPV 10.1       9.7       Neut # 8.2       10.6       Neut % 69.0       76.8       NITRITE UA     Negative         nRBC 0.0       0.0       Occult Blood UA     Negative         Opiate Scrn, Ur     Negative         pH, UA     6.5         pH, Urine     6.5         Platelets 245       275       Potassium 4.6       4.2       PROTEIN TOTAL 6.1       6.8       Protein, UA     Negative         Protime       13.2       RBC 3.49       4.34       RBC, UA     <5         RDW 13.2       13.2       Sodium 140       138       Specific Gravity,UA      1.038         Specific Gravity, Urine Auto     1.038         Squam Epithel, UA     <5         Urobilinogen, UA     0.2         WBC, UA     <5         WBC 11.8       13.8                Significant Imaging: I have reviewed and interpreted all pertinent imaging results/findings.     Assessment/Plan:     Active Diagnoses:    Diagnosis Date Noted POA    PRINCIPAL PROBLEM:  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 [S82.201B, S82.401B] 10/24/2022 Unknown    Foot laceration, left, initial encounter [S91.312A] 10/24/2022 Unknown      Problems Resolved During this Admission:     H&H down slightly but stable  NWB to the LLE; no range of motion. WBAT to the RLE with ROMAT  Splint to remain in place to LLE. Daily dry dressing changes to RLE beginning tomorrow.   Mobilize with therapy today if able.   Likely plan for rehab placement pending their evaluation.   Continue multimodal pain control   Lovenox for DVT ppx during stay and x 30 days upon discharge.  Follow up with Dr. Scott in approx 3 weeks. Will continue to monitor through her stay.    The above findings, diagnostics, and treatment plan were discussed with Dr. Scott who is in agreement with the plan of care except as stated in additional documentation.      Nupur Mark PA-C  Orthopedic Trauma Surgery  [facility]

## 2022-10-25 NOTE — PT/OT/SLP EVAL
Physical Therapy Evaluation    Patient Name:  Deborah Cross   MRN:  [id number]    Recommendations:     Discharge Recommendations:  outpatient PT   Discharge Equipment Recommendations: walker, rolling   Barriers to discharge:  acuity of illness    Assessment:     Deborah Cross is a 52 y.o. female admitted with a medical diagnosis of 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, left foot laceration of ATFL.  She presents with the following impairments/functional limitations:  weakness, impaired endurance, impaired self care skills, impaired functional mobility, gait instability, impaired balance, decreased lower extremity function, pain, decreased ROM, orthopedic precautions.    Rehab Prognosis: Good; patient would benefit from acute skilled PT services to address these deficits and reach maximum level of function.    Recent Surgery: Procedure(s) (LRB):  INSERTION, INTRAMEDULLARY JACK, TIBIA (Right) 1 Day Post-Op    Plan:     During this hospitalization, patient to be seen daily (/BID) to address the identified rehab impairments via gait training, therapeutic activities, therapeutic exercises, neuromuscular re-education and progress toward the following goals:    Plan of Care Expires:  11/24/22    Subjective     Chief Complaint: pain  Patient/Family Comments/goals: to be independent  Pain/Comfort:  Pain Rating 1: 4/10  Location - Side 1: Bilateral  Location - Orientation 1: lower  Location 1: leg    Patients cultural, spiritual, Confucianist conflicts given the current situation: no    Living Environment:  Pt lives with son in a [facility]H with no steps to enter.  Prior to admission, patients level of function was independent.  Equipment used at home: none.  DME owned (not currently used): none.  Upon discharge, patient will have assistance from unknown.    Objective:     Communicated with RN prior to session.  Patient found HOB elevated with peripheral IV, kaplan catheter  upon PT entry to room.    General  Precautions: Standard,     Orthopedic Precautions:RLE weight bearing as tolerated, LLE non weight bearing (RLE ROMAT)   Braces:    Respiratory Status: Room air    Exams:  Cognitive Exam:  Patient is oriented to Person, Place, Time, and Situation  RLE ROM: WFL except ankle ROM not assessed.  RLE Strength: WFL except knee and ankle strength not assessed due to surgery on LE  LLE ROM: WFL except ankle ROM not assessed.  LLE Strength: WFL except knee and ankle strength not assessed due to surgery on LE    Functional Mobility:  Bed Mobility:     Supine to Sit: independence  Transfers:     Sit to Stand:  contact guard assistance with rolling walker  Gait: pt ambulated 5 ft with a slow hop to gt pattern on the RLE with a RW and CGA.      AM-PAC 6 CLICK MOBILITY  Total Score:19       Patient left up in chair with all lines intact, call button in reach, RN notified, and daughter present.    GOALS:   Multidisciplinary Problems       Physical Therapy Goals          Problem: Physical Therapy    Goal Priority Disciplines Outcome Goal Variances Interventions   Physical Therapy Goal     PT, PT/OT      Description: Goals to be met by: 2022     Patient will increase functional independence with mobility by performin. Sit to stand transfer with Modified [place]  2. Gait  x 100 feet with Modified [place] using Rolling Walker.                          History:     No past medical history on file.    No past surgical history on file.    Time Tracking:     PT Received On: 10/25/22  PT Start Time: 747     PT Stop Time: 075  PT Total Time (min): 9 min     Billable Minutes: Evaluation , moderate complexity      10/25/2022

## 2022-10-26 LAB
ALBUMIN SERPL-MCNC: 3.1 GM/DL (ref 3.5–5)
ALBUMIN/GLOB SERPL: 0.8 RATIO (ref 1.1–2)
ALP SERPL-CCNC: 92 UNIT/L (ref 40–150)
ALT SERPL-CCNC: 55 UNIT/L (ref 0–55)
AST SERPL-CCNC: 59 UNIT/L (ref 5–34)
BASOPHILS # BLD AUTO: 0.07 X10(3)/MCL (ref 0–0.2)
BASOPHILS NFR BLD AUTO: 0.6 %
BILIRUBIN DIRECT+TOT PNL SERPL-MCNC: 0.4 MG/DL
BUN SERPL-MCNC: 13.2 MG/DL (ref 9.8–20.1)
CALCIUM SERPL-MCNC: 9.3 MG/DL (ref 8.4–10.2)
CHLORIDE SERPL-SCNC: 100 MMOL/L (ref 98–107)
CO2 SERPL-SCNC: 29 MMOL/L (ref 22–29)
CREAT SERPL-MCNC: 0.68 MG/DL (ref 0.55–1.02)
EOSINOPHIL # BLD AUTO: 0.15 X10(3)/MCL (ref 0–0.9)
EOSINOPHIL NFR BLD AUTO: 1.3 %
ERYTHROCYTE [DISTWIDTH] IN BLOOD BY AUTOMATED COUNT: 13.1 % (ref 11.5–17)
GFR SERPLBLD CREATININE-BSD FMLA CKD-EPI: >60 MLS/MIN/1.73/M2
GLOBULIN SER-MCNC: 3.7 GM/DL (ref 2.4–3.5)
GLUCOSE SERPL-MCNC: 141 MG/DL (ref 74–100)
HCT VFR BLD AUTO: 29.8 % (ref 37–47)
HGB BLD-MCNC: 9.5 GM/DL (ref 12–16)
IMM GRANULOCYTES # BLD AUTO: 0.07 X10(3)/MCL (ref 0–0.04)
IMM GRANULOCYTES NFR BLD AUTO: 0.6 %
LYMPHOCYTES # BLD AUTO: 3.18 X10(3)/MCL (ref 0.6–4.6)
LYMPHOCYTES NFR BLD AUTO: 28 %
MCH RBC QN AUTO: 29.3 PG (ref 27–31)
MCHC RBC AUTO-ENTMCNC: 31.9 MG/DL (ref 33–36)
MCV RBC AUTO: 92 FL (ref 80–94)
MONOCYTES # BLD AUTO: 0.98 X10(3)/MCL (ref 0.1–1.3)
MONOCYTES NFR BLD AUTO: 8.6 %
NEUTROPHILS # BLD AUTO: 6.9 X10(3)/MCL (ref 2.1–9.2)
NEUTROPHILS NFR BLD AUTO: 60.9 %
NRBC BLD AUTO-RTO: 0 %
PLATELET # BLD AUTO: 227 X10(3)/MCL (ref 130–400)
PMV BLD AUTO: 10 FL (ref 7.4–10.4)
POTASSIUM SERPL-SCNC: 4.2 MMOL/L (ref 3.5–5.1)
PROT SERPL-MCNC: 6.8 GM/DL (ref 6.4–8.3)
RBC # BLD AUTO: 3.24 X10(6)/MCL (ref 4.2–5.4)
SODIUM SERPL-SCNC: 140 MMOL/L (ref 136–145)
WBC # SPEC AUTO: 11.4 X10(3)/MCL (ref 4.5–11.5)

## 2022-10-26 PROCEDURE — 80053 COMPREHEN METABOLIC PANEL: CPT | Performed by: PHYSICIAN ASSISTANT

## 2022-10-26 PROCEDURE — 25000003 PHARM REV CODE 250: Performed by: ORTHOPAEDIC SURGERY

## 2022-10-26 PROCEDURE — 63600175 PHARM REV CODE 636 W HCPCS: Performed by: STUDENT IN AN ORGANIZED HEALTH CARE EDUCATION/TRAINING PROGRAM

## 2022-10-26 PROCEDURE — 11000001 HC ACUTE MED/SURG PRIVATE ROOM

## 2022-10-26 PROCEDURE — 63600175 PHARM REV CODE 636 W HCPCS: Performed by: PHYSICIAN ASSISTANT

## 2022-10-26 PROCEDURE — 97530 THERAPEUTIC ACTIVITIES: CPT

## 2022-10-26 PROCEDURE — 36415 COLL VENOUS BLD VENIPUNCTURE: CPT | Performed by: PHYSICIAN ASSISTANT

## 2022-10-26 PROCEDURE — 25000003 PHARM REV CODE 250: Performed by: PHYSICIAN ASSISTANT

## 2022-10-26 PROCEDURE — 85025 COMPLETE CBC W/AUTO DIFF WBC: CPT | Performed by: PHYSICIAN ASSISTANT

## 2022-10-26 PROCEDURE — 27000221 HC OXYGEN, UP TO 24 HOURS

## 2022-10-26 RX ORDER — OXYCODONE HYDROCHLORIDE 5 MG/1
5 TABLET ORAL 3 TIMES DAILY PRN
Status: DISCONTINUED | OUTPATIENT
Start: 2022-10-26 | End: 2022-10-27 | Stop reason: HOSPADM

## 2022-10-26 RX ADMIN — ENOXAPARIN SODIUM 40 MG: 40 INJECTION SUBCUTANEOUS at 04:10

## 2022-10-26 RX ADMIN — KETOROLAC TROMETHAMINE 15 MG: 30 INJECTION, SOLUTION INTRAMUSCULAR at 03:10

## 2022-10-26 RX ADMIN — OXYCODONE AND ACETAMINOPHEN 1 TABLET: 325; 10 TABLET ORAL at 08:10

## 2022-10-26 RX ADMIN — METHOCARBAMOL 750 MG: 750 TABLET ORAL at 08:10

## 2022-10-26 RX ADMIN — OXYCODONE AND ACETAMINOPHEN 1 TABLET: 325; 10 TABLET ORAL at 12:10

## 2022-10-26 RX ADMIN — KETOROLAC TROMETHAMINE 15 MG: 30 INJECTION, SOLUTION INTRAMUSCULAR at 08:10

## 2022-10-26 RX ADMIN — METHOCARBAMOL 750 MG: 750 TABLET ORAL at 03:10

## 2022-10-26 RX ADMIN — OXYCODONE AND ACETAMINOPHEN 1 TABLET: 325; 10 TABLET ORAL at 04:10

## 2022-10-26 RX ADMIN — DOCUSATE SODIUM 100 MG: 100 CAPSULE, LIQUID FILLED ORAL at 08:10

## 2022-10-26 RX ADMIN — MUPIROCIN: 20 OINTMENT TOPICAL at 08:10

## 2022-10-26 NOTE — PROGRESS NOTES
[facility] - [facility] Neuro  Orthopedics  Progress Note    Patient Name: Deborah Cross  MRN: [id number]  Admission Date: 10/23/2022  Hospital Length of Stay: 3 days  Attending Provider: Dillon Scott DO  Primary Care Provider: Adamaris Cao MD  Follow-up For: Procedure(s) (LRB):  INSERTION, INTRAMEDULLARY JACK, TIBIA (Right)    Post-Operative Day: 1 Day Post-Op  Subjective:     Principal Problem: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    Principal Orthopedic Problem: 2 Days Post-Op   IMN right tibia shaft fracture and I&D of left open ankle dislocation with ATFL repair.    Interval History: Patient states her pain is higher today. She worked with therapy on stand to transfer through the day yesterday and may have overdone it. to LLE. Okay for WB to RLE. She believes she may be able to go home if she can get her pain under control, will work with therapy today for final decision tomorrow.     Review of patient's allergies indicates:  No Known Allergies    Current Facility-Administered Medications   Medication    (Magic mouthwash) 1:1:1 diphenhydrAMINE(Benadryl) 12.5mg/5ml liq, aluminum & magnesium hydroxide-simethicone (Maalox), LIDOcaine viscous 2%    acetaminophen tablet 650 mg    albuterol-ipratropium 2.5 mg-0.5 mg/3 mL nebulizer solution 3 mL    diphenhydrAMINE injection 25 mg    docusate sodium capsule 100 mg    electrolyte-A infusion 1,000 mL    enoxaparin injection 40 mg    HYDROmorphone (PF) injection 0.2 mg    HYDROmorphone (PF) injection 0.4 mg    HYDROmorphone (PF) injection 0.5 mg    HYDROmorphone (PF) injection 1 mg    ketorolac injection 15 mg    LIDOcaine (PF) 10 mg/ml (1%) injection 10 mg    LIDOcaine (PF) 20 mg/mL (2%) injection 10 mg    melatonin tablet 6 mg    methocarbamoL tablet 750 mg    morphine injection 4 mg    mupirocin 2 % ointment    ondansetron disintegrating tablet 8 mg    ondansetron injection 4 mg    ondansetron injection 4 mg    oxyCODONE immediate release  "tablet 5 mg    oxyCODONE-acetaminophen  mg per tablet 1 tablet    oxyCODONE-acetaminophen 5-325 mg per tablet 1 tablet    prochlorperazine injection Soln 5 mg    sodium chloride 0.9% flush 10 mL     Objective:     Vital Signs (Most Recent):  Temp: 98.2 °F (36.8 °C) (10/26/22 0753)  Pulse: 91 (10/26/22 0753)  Resp: 19 (10/26/22 0851)  BP: (!) 163/80 (10/26/22 0753)  SpO2: 98 % (10/26/22 0753) Vital Signs (24h Range):  Temp:  [97.8 °F (36.6 °C)-98.7 °F (37.1 °C)] 98.2 °F (36.8 °C)  Pulse:  [] 91  Resp:  [17-20] 19  SpO2:  [90 %-98 %] 98 %  BP: (112-163)/(73-83) 163/80     Weight: 63.5 kg (139 lb 15.9 oz)  Height: 5' 4.02" (162.6 cm)  Body mass index is 24.02 kg/m².      Intake/Output Summary (Last 24 hours) at 10/26/2022 1004  Last data filed at 10/26/2022 0833  Gross per 24 hour   Intake 930 ml   Output --   Net 930 ml         Physical Exam:   Musculoskeletal:       Right lower extremity: Soft dressing in place clean dry and intact without obvious drainage noted; compartments are soft and compressible; No pain with ROM at the hip, knee, or ankle; appropriate tenderness to palpation; dorsi/plantar flexes the foot; SILT distally; BCR distally; DP pulse palpable    Left lower extremity: Splint in place to LLE; compartments are soft and compressible to posterior calf; No pain ROM attempted ; no tenderness to palpation; SILT distally; BCR distally; DP pulse palpable      Diagnostic Findings:   Significant Labs:   Recent Lab Results  (Last 5 results in the past 72 hours)        10/26/22  0452   10/25/22  0351   10/23/22  2151   10/23/22  2124   10/23/22  2021        Phencyclidine       Negative         Albumin/Globulin Ratio 0.8   1.1       1.2       Albumin 3.1   3.2       3.7       Alcohol, Serum         <10.0       Alkaline Phosphatase 92   75       88       ALT 55   20       28       Amphetamine Screen, Ur       Positive         Appearance, UA       Clear         aPTT         24.0  Comment: For Minimal " Heparin Infusion, the goal aPTT 64-85 seconds corresponds to an anti-Xa of 0.3-0.5.    For Low Intensity and High Intensity Heparin, the goal aPTT  seconds corresponds to an anti-Xa of 0.3-0.7       AST 59   20       27       Bacteria, UA       None Seen         Barbiturate Screen, Ur       Negative         Baso # 0.07   0.03       0.09       Basophil % 0.6   0.3       0.7       Benzodiazepine Screen, Urine       Negative         BILIRUBIN TOTAL 0.4   0.5       0.3       Bilirubin, UA       Negative         BUN 13.2   9.5       14.6       Calcium 9.3   8.8       8.4       Cannabinoids, Urine       Positive         Chloride 100   103       104       CO2 29   26       20       Cocaine (Metab.)       Negative         Color, UA       Yellow         ID NOW COVID-19, (DINA)     Negative           Creatinine 0.68   0.72       0.72       eGFR >60   >60       >60       Eos # 0.15   0.03       0.12       Eosinophil % 1.3   0.3       0.9       Fentanyl, Urine       Positive         Globulin, Total 3.7   2.9       3.1       Glucose 141   146       124       Glucose, UA       Negative         Group & Rh         O POS       Hematocrit 29.8   31.6       39.0       Hemoglobin 9.5   10.2       12.9       Immature Grans (Abs) 0.07   0.05       0.17       Immature Granulocytes 0.6   0.4       1.2       Indirect Renzo GEL         NEG       INR         1.01       Ketones, UA       Negative         Lactate, Ino         1.9       Leukocytes, UA       Negative         Lymph # 3.18   2.29       1.94       LYMPH % 28.0   19.4       14.1       MCH 29.3   29.2       29.7       MCHC 31.9   32.3       33.1       MCV 92.0   90.5       89.9       MDMA, Urine       Negative         Mono # 0.98   1.25       0.87       Mono % 8.6   10.6       6.3       MPV 10.0   10.1       9.7       Neut # 6.9   8.2       10.6       Neut % 60.9   69.0       76.8       NITRITE UA       Negative         nRBC 0.0   0.0       0.0       Occult Blood UA        Negative         Opiate Scrn, Ur       Negative         pH, UA       6.5         pH, Urine       6.5         Platelets 227   245       275       Potassium 4.2   4.6       4.2       PROTEIN TOTAL 6.8   6.1       6.8       Protein, UA       Negative         Protime         13.2       RBC 3.24   3.49       4.34       RBC, UA       <5         RDW 13.1   13.2       13.2       Sodium 140   140       138       Specific Gravity,UA       1.038         Specific Gravity, Urine Auto       1.038         Squam Epithel, UA       <5         Urobilinogen, UA       0.2         WBC, UA       <5         WBC 11.4   11.8       13.8                               Significant Imaging: I have reviewed and interpreted all pertinent imaging results/findings.     Assessment/Plan:     Active Diagnoses:    Diagnosis Date Noted POA    PRINCIPAL PROBLEM:  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 [S82.201B, S82.401B] 10/24/2022 Unknown    Foot laceration, left, initial encounter [S91.312A] 10/24/2022 Unknown      Problems Resolved During this Admission:     H&H down slightly but stable  NWB to the LLE; no range of motion. WBAT to the RLE with ROMAT  Splint to remain in place to LLE. Daily dry dressing changes to RLE beginning tomorrow.   Mobilize with therapy today if able.   Likely plan for rehab placement pending their evaluation.   Continue multimodal pain control   Lovenox for DVT ppx during stay and x 30 days upon discharge.  Work with therapy on mobilizing today.   Add oxy 5 TID for additional pain control. Plan for DC home tomorrow if she is doing well vs begin therapy referrals.  Follow up with Dr. Scott in approx 3 weeks. Will continue to monitor through her stay.    The above findings, diagnostics, and treatment plan were discussed with Dr. Scott who is in agreement with the plan of care except as stated in additional documentation.      Nupur Mark PA-C  Orthopedic Trauma Surgery  [facility]

## 2022-10-26 NOTE — PT/OT/SLP PROGRESS
Physical Therapy Treatment    Patient Name:  Deborah Cross   MRN:  [id number]    Recommendations:     Discharge Recommendations:  outpatient PT   Discharge Equipment Recommendations: walker, rolling   Barriers to discharge:  acuity of illness    Assessment:     Deborah Cross is a 52 y.o. female admitted with a medical diagnosis of 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, Left foot laceration, s/p Left ATFL reconstruction.  She presents with the following impairments/functional limitations:  weakness, impaired endurance, impaired self care skills, impaired functional mobility, gait instability, impaired balance, decreased lower extremity function, pain, orthopedic precautions, decreased safety awareness.    Rehab Prognosis: Good; patient would benefit from acute skilled PT services to address these deficits and reach maximum level of function.    Recent Surgery: Procedure(s) (LRB):  INSERTION, INTRAMEDULLARY JACK, TIBIA (Right)  REPAIR, LIGAMENT, ANKLE 2 Days Post-Op    Plan:     During this hospitalization, patient to be seen daily (/BID) to address the identified rehab impairments via gait training, therapeutic activities, therapeutic exercises, neuromuscular re-education and progress toward the following goals:    Plan of Care Expires:  11/25/22    Subjective     Chief Complaint: pain  Patient/Family Comments/goals: to be independent  Pain/Comfort:  Pain Rating 1: 5/10  Location - Side 1: Bilateral  Location - Orientation 1: lower  Location 1: leg      Objective:     Communicated with RN prior to session.  Patient found HOB elevated with peripheral IV upon PT entry to room.     General Precautions: Standard,     Orthopedic Precautions:RLE weight bearing as tolerated, LLE non weight bearing (RLE ROMAT)   Braces:    Respiratory Status: Room air     Functional Mobility:  Bed Mobility:     Supine to Sit: independence  Transfers:     Sit to Stand:  contact guard assistance with rolling walker  Gait: pt  ambulated 120 ft with a hop to gt pattern on the RLE with a RW and CGA.       AM-PAC 6 CLICK MOBILITY  Turning over in bed (including adjusting bedclothes, sheets and blankets)?: 4  Sitting down on and standing up from a chair with arms (e.g., wheelchair, bedside commode, etc.): 3  Moving from lying on back to sitting on the side of the bed?: 4  Moving to and from a bed to a chair (including a wheelchair)?: 3  Need to walk in hospital room?: 3  Climbing 3-5 steps with a railing?: 3  Basic Mobility Total Score: 20       Patient left up in chair with all lines intact, call button in reach, RN notified, and daughter present..    GOALS:   Multidisciplinary Problems       Physical Therapy Goals          Problem: Physical Therapy    Goal Priority Disciplines Outcome Goal Variances Interventions   Physical Therapy Goal     PT, PT/OT Ongoing, Progressing     Description: Goals to be met by: 2022     Patient will increase functional independence with mobility by performin. Sit to stand transfer with Modified [place]  2. Gait  x 500 feet with Modified [place] using Rolling Walker.                          Time Tracking:     PT Received On: 10/26/22  PT Start Time: 1025     PT Stop Time: 1038  PT Total Time (min): 13 min     Billable Minutes: Therapeutic Activity 13 minutes    Treatment Type: Treatment  PT/PTA: PT     PTA Visit Number: 1     10/26/2022

## 2022-10-26 NOTE — PLAN OF CARE
Problem: Infection  Goal: Absence of Infection Signs and Symptoms  Outcome: Ongoing, Progressing     Problem: Adult Inpatient Plan of Care  Goal: Plan of Care Review  Outcome: Ongoing, Progressing  Flowsheets (Taken 10/26/2022 0018)  Plan of Care Reviewed With: patient  Goal: Optimal Comfort and Wellbeing  Outcome: Ongoing, Progressing  Goal: Readiness for Transition of Care  Outcome: Ongoing, Progressing     Problem: Fall Injury Risk  Goal: Absence of Fall and Fall-Related Injury  Outcome: Ongoing, Progressing

## 2022-10-27 VITALS
BODY MASS INDEX: 23.9 KG/M2 | HEART RATE: 99 BPM | TEMPERATURE: 98 F | RESPIRATION RATE: 18 BRPM | OXYGEN SATURATION: 97 % | SYSTOLIC BLOOD PRESSURE: 141 MMHG | HEIGHT: 64 IN | WEIGHT: 140 LBS | DIASTOLIC BLOOD PRESSURE: 87 MMHG

## 2022-10-27 LAB
ALBUMIN SERPL-MCNC: 2.9 GM/DL (ref 3.5–5)
ALBUMIN/GLOB SERPL: 0.8 RATIO (ref 1.1–2)
ALP SERPL-CCNC: 94 UNIT/L (ref 40–150)
ALT SERPL-CCNC: 52 UNIT/L (ref 0–55)
AST SERPL-CCNC: 48 UNIT/L (ref 5–34)
BASOPHILS # BLD AUTO: 0.06 X10(3)/MCL (ref 0–0.2)
BASOPHILS NFR BLD AUTO: 0.6 %
BILIRUBIN DIRECT+TOT PNL SERPL-MCNC: 0.4 MG/DL
BUN SERPL-MCNC: 7.9 MG/DL (ref 9.8–20.1)
CALCIUM SERPL-MCNC: 9.2 MG/DL (ref 8.4–10.2)
CHLORIDE SERPL-SCNC: 99 MMOL/L (ref 98–107)
CO2 SERPL-SCNC: 30 MMOL/L (ref 22–29)
CREAT SERPL-MCNC: 0.64 MG/DL (ref 0.55–1.02)
EOSINOPHIL # BLD AUTO: 0.21 X10(3)/MCL (ref 0–0.9)
EOSINOPHIL NFR BLD AUTO: 2.1 %
ERYTHROCYTE [DISTWIDTH] IN BLOOD BY AUTOMATED COUNT: 12.9 % (ref 11.5–17)
GFR SERPLBLD CREATININE-BSD FMLA CKD-EPI: >60 MLS/MIN/1.73/M2
GLOBULIN SER-MCNC: 3.6 GM/DL (ref 2.4–3.5)
GLUCOSE SERPL-MCNC: 162 MG/DL (ref 74–100)
HCT VFR BLD AUTO: 26.4 % (ref 37–47)
HGB BLD-MCNC: 8.6 GM/DL (ref 12–16)
IMM GRANULOCYTES # BLD AUTO: 0.06 X10(3)/MCL (ref 0–0.04)
IMM GRANULOCYTES NFR BLD AUTO: 0.6 %
LYMPHOCYTES # BLD AUTO: 2.42 X10(3)/MCL (ref 0.6–4.6)
LYMPHOCYTES NFR BLD AUTO: 24.6 %
MCH RBC QN AUTO: 29.5 PG (ref 27–31)
MCHC RBC AUTO-ENTMCNC: 32.6 MG/DL (ref 33–36)
MCV RBC AUTO: 90.4 FL (ref 80–94)
MONOCYTES # BLD AUTO: 0.79 X10(3)/MCL (ref 0.1–1.3)
MONOCYTES NFR BLD AUTO: 8 %
NEUTROPHILS # BLD AUTO: 6.3 X10(3)/MCL (ref 2.1–9.2)
NEUTROPHILS NFR BLD AUTO: 64.1 %
NRBC BLD AUTO-RTO: 0 %
PLATELET # BLD AUTO: 231 X10(3)/MCL (ref 130–400)
PMV BLD AUTO: 9.9 FL (ref 7.4–10.4)
POTASSIUM SERPL-SCNC: 3.8 MMOL/L (ref 3.5–5.1)
PROT SERPL-MCNC: 6.5 GM/DL (ref 6.4–8.3)
RBC # BLD AUTO: 2.92 X10(6)/MCL (ref 4.2–5.4)
SODIUM SERPL-SCNC: 138 MMOL/L (ref 136–145)
WBC # SPEC AUTO: 9.8 X10(3)/MCL (ref 4.5–11.5)

## 2022-10-27 PROCEDURE — 85025 COMPLETE CBC W/AUTO DIFF WBC: CPT | Performed by: PHYSICIAN ASSISTANT

## 2022-10-27 PROCEDURE — 80053 COMPREHEN METABOLIC PANEL: CPT | Performed by: PHYSICIAN ASSISTANT

## 2022-10-27 PROCEDURE — 25000003 PHARM REV CODE 250: Performed by: PHYSICIAN ASSISTANT

## 2022-10-27 PROCEDURE — 36415 COLL VENOUS BLD VENIPUNCTURE: CPT | Performed by: PHYSICIAN ASSISTANT

## 2022-10-27 RX ORDER — OXYCODONE AND ACETAMINOPHEN 10; 325 MG/1; MG/1
1 TABLET ORAL EVERY 4 HOURS PRN
Qty: 42 TABLET | Refills: 0 | Status: SHIPPED | OUTPATIENT
Start: 2022-10-27 | End: 2022-11-03

## 2022-10-27 RX ORDER — METHOCARBAMOL 750 MG/1
750 TABLET, FILM COATED ORAL 3 TIMES DAILY
Qty: 30 TABLET | Refills: 0 | Status: SHIPPED | OUTPATIENT
Start: 2022-10-27 | End: 2022-11-07 | Stop reason: SDUPTHER

## 2022-10-27 RX ORDER — ENOXAPARIN SODIUM 100 MG/ML
40 INJECTION SUBCUTANEOUS DAILY
Qty: 30 EACH | Refills: 0 | Status: SHIPPED | OUTPATIENT
Start: 2022-10-27 | End: 2022-12-27

## 2022-10-27 RX ORDER — OXYCODONE HYDROCHLORIDE 5 MG/1
5 TABLET ORAL EVERY 8 HOURS PRN
Qty: 9 TABLET | Refills: 0 | Status: SHIPPED | OUTPATIENT
Start: 2022-10-27 | End: 2022-10-30

## 2022-10-27 RX ORDER — KETOROLAC TROMETHAMINE 10 MG/1
10 TABLET, FILM COATED ORAL EVERY 6 HOURS PRN
Qty: 20 TABLET | Refills: 0 | Status: SHIPPED | OUTPATIENT
Start: 2022-10-27 | End: 2022-11-01

## 2022-10-27 RX ADMIN — DIPHENHYDRAMINE HYDROCHLORIDE 10 ML: 25 SOLUTION ORAL at 11:10

## 2022-10-27 RX ADMIN — DOCUSATE SODIUM 100 MG: 100 CAPSULE, LIQUID FILLED ORAL at 08:10

## 2022-10-27 RX ADMIN — OXYCODONE AND ACETAMINOPHEN 1 TABLET: 325; 10 TABLET ORAL at 06:10

## 2022-10-27 RX ADMIN — METHOCARBAMOL 750 MG: 750 TABLET ORAL at 08:10

## 2022-10-27 RX ADMIN — OXYCODONE AND ACETAMINOPHEN 1 TABLET: 325; 10 TABLET ORAL at 10:10

## 2022-10-27 RX ADMIN — OXYCODONE AND ACETAMINOPHEN 1 TABLET: 325; 10 TABLET ORAL at 02:10

## 2022-10-27 NOTE — PROGRESS NOTES
[facility]  Orthopedics  Progress Note    Patient Name: Deborah Cross  MRN: [id number]  Admission Date: 10/23/2022  Hospital Length of Stay: 4 days  Attending Provider: Dillon Scott DO  Primary Care Provider: Adamaris Cao MD  Follow-up For: Procedure(s) (LRB):  INSERTION, INTRAMEDULLARY JACK, TIBIA (Right)    Post-Operative Day: 1 Day Post-Op  Subjective:     Principal Problem: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    Principal Orthopedic Problem: 3 Days Post-Op   IMN right tibia shaft fracture and I&D of left open ankle dislocation with ATFL repair.    Interval History: Patient states her pain is higher today. Pain improved overall to LLE. Okay for WB to RLE. She is eager for discharge home today. Is working well with physical therapy. No acute complaints. Is requesting oral toradol upon discharge.     Review of patient's allergies indicates:  No Known Allergies    Current Facility-Administered Medications   Medication    (Magic mouthwash) 1:1:1 diphenhydrAMINE(Benadryl) 12.5mg/5ml liq, aluminum & magnesium hydroxide-simethicone (Maalox), LIDOcaine viscous 2%    acetaminophen tablet 650 mg    albuterol-ipratropium 2.5 mg-0.5 mg/3 mL nebulizer solution 3 mL    diphenhydrAMINE injection 25 mg    docusate sodium capsule 100 mg    electrolyte-A infusion 1,000 mL    enoxaparin injection 40 mg    HYDROmorphone (PF) injection 0.2 mg    HYDROmorphone (PF) injection 0.4 mg    HYDROmorphone (PF) injection 0.5 mg    HYDROmorphone (PF) injection 1 mg    LIDOcaine (PF) 10 mg/ml (1%) injection 10 mg    LIDOcaine (PF) 20 mg/mL (2%) injection 10 mg    melatonin tablet 6 mg    methocarbamoL tablet 750 mg    morphine injection 4 mg    mupirocin 2 % ointment    ondansetron disintegrating tablet 8 mg    ondansetron injection 4 mg    ondansetron injection 4 mg    oxyCODONE immediate release tablet 5 mg    oxyCODONE-acetaminophen  mg per tablet 1 tablet    oxyCODONE-acetaminophen  "5-325 mg per tablet 1 tablet    prochlorperazine injection Soln 5 mg    sodium chloride 0.9% flush 10 mL     Objective:     Vital Signs (Most Recent):  Temp: 98.1 °F (36.7 °C) (10/27/22 0717)  Pulse: 90 (10/27/22 0717)  Resp: 19 (10/27/22 1024)  BP: (!) 147/87 (10/27/22 0717)  SpO2: (!) 94 % (10/27/22 0717) Vital Signs (24h Range):  Temp:  [97.7 °F (36.5 °C)-99.3 °F (37.4 °C)] 98.1 °F (36.7 °C)  Pulse:  [80-90] 90  Resp:  [18-20] 19  SpO2:  [91 %-98 %] 94 %  BP: (120-147)/(72-87) 147/87     Weight: 63.5 kg (139 lb 15.9 oz)  Height: 5' 4.02" (162.6 cm)  Body mass index is 24.02 kg/m².      Intake/Output Summary (Last 24 hours) at 10/27/2022 1030  Last data filed at 10/26/2022 1354  Gross per 24 hour   Intake 480 ml   Output --   Net 480 ml         Physical Exam:   Musculoskeletal:       Right lower extremity: Soft dressing in place clean dry and intact without obvious drainage noted; compartments are soft and compressible; No pain with ROM at the hip, knee, or ankle; appropriate tenderness to palpation; dorsi/plantar flexes the foot; SILT distally; BCR distally; DP pulse palpable    Left lower extremity: Splint in place to LLE; compartments are soft and compressible to posterior calf; No pain ROM attempted ; no tenderness to palpation; SILT distally; BCR distally; DP pulse palpable      Diagnostic Findings:   Significant Labs:   Recent Lab Results         10/27/22  0556   10/26/22  0452   10/25/22  0351        Albumin/Globulin Ratio 0.8   0.8   1.1       Albumin 2.9   3.1   3.2       Alkaline Phosphatase 94   92   75       ALT 52   55   20       AST 48   59   20       Baso # 0.06   0.07   0.03       Basophil % 0.6   0.6   0.3       BILIRUBIN TOTAL 0.4   0.4   0.5       BUN 7.9   13.2   9.5       Calcium 9.2   9.3   8.8       Chloride 99   100   103       CO2 30   29   26       Creatinine 0.64   0.68   0.72       eGFR >60   >60   >60       Eos # 0.21   0.15   0.03       Eosinophil % 2.1   1.3   0.3       Globulin, " Total 3.6   3.7   2.9       Glucose 162   141   146       Hematocrit 26.4   29.8   31.6       Hemoglobin 8.6   9.5   10.2       Immature Grans (Abs) 0.06   0.07   0.05       Immature Granulocytes 0.6   0.6   0.4       Lymph # 2.42   3.18   2.29       LYMPH % 24.6   28.0   19.4       MCH 29.5   29.3   29.2       MCHC 32.6   31.9   32.3       MCV 90.4   92.0   90.5       Mono # 0.79   0.98   1.25       Mono % 8.0   8.6   10.6       MPV 9.9   10.0   10.1       Neut # 6.3   6.9   8.2       Neut % 64.1   60.9   69.0       nRBC 0.0   0.0   0.0       Platelets 231   227   245       Potassium 3.8   4.2   4.6       PROTEIN TOTAL 6.5   6.8   6.1       RBC 2.92   3.24   3.49       RDW 12.9   13.1   13.2       Sodium 138   140   140       WBC 9.8   11.4   11.8                Significant Imaging: I have reviewed and interpreted all pertinent imaging results/findings.     Assessment/Plan:     Active Diagnoses:    Diagnosis Date Noted POA    PRINCIPAL PROBLEM:  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 [S82.201B, S82.401B] 10/24/2022 Yes    Foot laceration, left, initial encounter [S91.312A] 10/24/2022 Yes      Problems Resolved During this Admission:     H&H down slightly but stable this morning.  NWB to the LLE; no range of motion. WBAT to the RLE with ROMAT  Splint to remain in place to LLE. Daily dry dressing changes to the RLE.   Mobilize with therapy today if able.   Likely plan for rehab placement pending their evaluation.   Continue multimodal pain control  Lovenox for DVT ppx during stay and x 30 days upon discharge.  Work with therapy on mobilizing today.   Short script for oxy 5 for breakthrough in addition to her regular meds.   Stable for discharge home today.   Follow up with Dr. Scott in approx 3 weeks. Will continue to monitor through her stay.    The above findings, diagnostics, and treatment plan were discussed with Dr. Scott who is in agreement with the plan of care except as stated in additional  documentation.      Nupur Mark PA-C  Orthopedic Trauma Surgery  [facility]

## 2022-10-27 NOTE — NURSING
Patient Verbalizes understanding of discharge teaching. Out patient therapy has been set up and facility will call with appointment times. Follow up has been made, and patient understands when to call doctor with any questions or concerns. Medications were delivered to room and a schedule was made for patient to stay on track of pain control, no further questions with medication education. Night nurse removed IV prior to AM shift. Patient had no further questions. Patient stable upon discharge.

## 2022-10-27 NOTE — DISCHARGE SUMMARY
Discharge Summary    Admit Date: 10/23/2022     Discharge Date: 10/27/2022     Operative Procedure: INSERTION, INTRAMEDULLARY JACK, TIBIA (Right)  REPAIR, LIGAMENT, ANKLE (Ankle)     History of Present Illness/Hospital Course: Deborah Cross is a 52 y.o. female presenting with the aforementioned injuries. The procedure is indicated to best obtain and maintain stability of the leg while allowing early ROM.  The patient is awake and alert. After thorough discussion of the risks, benefits, expected outcomes, and alternatives to surgical intervention, the patient agreed to proceed with surgical treatment.  Specific risks discussed included, but were not limited to: superficial or deep infection, wound healing complications, DVT/PE, significant bleeding requiring transfusion, damage to named anatomic structures in the immediate area including named neurovascular structures, implant failure, and general risks of anesthesia.  The patient voiced understanding and written as well as verbal consent was obtained by myself prior to the procedure.Patient has done well following surgery and has adequate pain control. Working well with therapy on mobilizing.     Discharge Disposition: Home    Activity: WBAT to RLE ; ROMAT; NWB to LLE; Splint to remain in place to follow up. No ankle range of motion.     Diet: Resume previous home diet    Medications:      Medication List        START taking these medications      enoxaparin 40 mg/0.4 mL Syrg  Commonly known as: LOVENOX  Inject 0.4 mLs (40 mg total) into the skin once daily.     ketorolac 10 mg tablet  Commonly known as: TORADOL  Take 1 tablet (10 mg total) by mouth every 6 (six) hours as needed for Pain.     methocarbamoL 750 MG Tab  Commonly known as: ROBAXIN  Take 1 tablet (750 mg total) by mouth 3 (three) times daily. for 10 days     oxyCODONE 5 MG immediate release tablet  Commonly known as: ROXICODONE  Take 1 tablet (5 mg total) by mouth every 8 (eight) hours as needed for  Pain (In addition to regular pain medication for breakthrough).     oxyCODONE-acetaminophen  mg per tablet  Commonly known as: PERCOCET  Take 1 tablet by mouth every 4 (four) hours as needed for Pain.               Where to Get Your Medications        These medications were sent to [facility] - [place], [place] - [address]  [address], [facility] [address]      Phone: [phone]   enoxaparin 40 mg/0.4 mL Syrg  ketorolac 10 mg tablet  methocarbamoL 750 MG Tab  oxyCODONE 5 MG immediate release tablet  oxyCODONE-acetaminophen  mg per tablet          Discharge Instructions: If in splint, keep clean and dry until follow up. Otherwise daily dry dressing changes until follow up. Keep incisions clean and dry. Do not apply ointments or creams.    Follow Up: Dr. Scott in approx 3 weeks    The above findings, diagnostics, and treatment plan were discussed with Dr. Scott who is in agreement with the plan of care except as stated in additional documentation.     Alexandra Blair Lemaire, PA-C [facility]   Orthopedic Trauma

## 2022-10-31 DIAGNOSIS — S82.201E TYPE I OR II OPEN FRACTURE OF RIGHT TIBIA AND FIBULA WITH ROUTINE HEALING, SUBSEQUENT ENCOUNTER: Primary | ICD-10-CM

## 2022-10-31 DIAGNOSIS — S91.022A LACERATION OF ANKLE WITH FOREIGN BODY, LEFT, INITIAL ENCOUNTER: ICD-10-CM

## 2022-10-31 DIAGNOSIS — S82.401E TYPE I OR II OPEN FRACTURE OF RIGHT TIBIA AND FIBULA WITH ROUTINE HEALING, SUBSEQUENT ENCOUNTER: Primary | ICD-10-CM

## 2022-10-31 DIAGNOSIS — S91.312A FOOT LACERATION, LEFT, INITIAL ENCOUNTER: ICD-10-CM

## 2022-10-31 DIAGNOSIS — S82.201A: Primary | ICD-10-CM

## 2022-10-31 DIAGNOSIS — S93.05XA ANKLE DISLOCATION, LEFT, INITIAL ENCOUNTER: ICD-10-CM

## 2022-11-04 ENCOUNTER — CLINICAL SUPPORT (OUTPATIENT)
Dept: REHABILITATION | Facility: HOSPITAL | Age: 52
End: 2022-11-04
Payer: MEDICAID

## 2022-11-04 DIAGNOSIS — S82.201A CLOSED FRACTURE OF RIGHT TIBIA AND FIBULA, INITIAL ENCOUNTER: ICD-10-CM

## 2022-11-04 DIAGNOSIS — S91.022A LACERATION OF ANKLE WITH FOREIGN BODY, LEFT, INITIAL ENCOUNTER: ICD-10-CM

## 2022-11-04 DIAGNOSIS — S82.401A CLOSED FRACTURE OF RIGHT TIBIA AND FIBULA, INITIAL ENCOUNTER: ICD-10-CM

## 2022-11-04 DIAGNOSIS — M79.604 ACUTE LEG PAIN, RIGHT: ICD-10-CM

## 2022-11-04 DIAGNOSIS — R26.2 UNABLE TO AMBULATE: ICD-10-CM

## 2022-11-04 PROCEDURE — 97162 PT EVAL MOD COMPLEX 30 MIN: CPT

## 2022-11-04 NOTE — PLAN OF CARE
[facility]   Physical Therapy Initial Evaluation     Date: 11/4/2022   Name: Deborah Cross  Clinic Number: [id number]    Therapy Diagnosis:   Encounter Diagnoses   Name Primary?    Closed fracture of right tibia and fibula, initial encounter     Laceration of ankle with foreign body, left, initial encounter     Unable to ambulate     Acute leg pain, right      Physician: Dillon Scott DO    Physician Orders: PT Eval and Treat   Medical Diagnosis from Referral: s/p surgeries for fracture of R tibia and fibula, L ankle dislocation  Evaluation Date: 11/4/2022  Authorization Period Expiration: TBD  Plan of Care Expiration: TBD  Reassessment / POC Due: 12/7/2022  Visit # / Visits authorized: 0/      Precautions: standard, fall, NWB L LE, WBAT R LE     Time In: 13:05  Time Out: 13:45  Total Appointment Time (timed & untimed codes): 40 minutes    SUBJECTIVE     Date of onset / History of current condition - Deborah reports: she was on a 8 ft ladder hanging [employer] lights and fell off on 10/23/2022 causing fracture to R tibia and fibula, dislocated L ankle, and had surgery on 10/24/2022. Pt lives alone but is currently staying at daughter's house, no steps to enter either home. Pt using wheelchair for all mobility due to severe pain in R LE and NWB on L LE. Pt was taking Percocet but her daughter took them from her due to history of drug abuse. Pt's DME: RW, wheelchair, BSC.    Limitation/Restriction for FOTO ankle Survey    Therapist reviewed FOTO scores for Deborah Cross on 11/4/2022.   FOTO documents entered into [employer] - see Media section.     Functional Status Score: 41%       Prior Therapy: in hospital prior to discharge  Social History:  lives alone but currently staying at daughter's house  Occupation: works in cafeteria at school  Prior Level of Function: independent  Current Level of Function: modified Independent in wheelchair, min A with transfers    Pain:  Current 8/10, worst 9/10,  "best 5/10   Location: right lower legs, no pain in L ankle   Description: Aching, Dull, Throbbing, Sharp, Shooting, and constant  Aggravating Factors: Standing, Walking, and R leg hanging down  Easing Factors: pain medication and sitting, elevating R LE    Patients goals: "be independent again"     Medical History:   No past medical history on file.    Surgical History:   Deborah Cross  has a past surgical history that includes Hysterectomy (2015); Insertion of intramedullary nail into tibia (Right, 10/24/2022); and Repair of ligament of ankle (10/24/2022).    Medications:   Deborah has a current medication list which includes the following prescription(s): alprazolam, amlodipine, amoxicillin, enoxaparin, fluoxetine, fluticasone propionate, metformin, methocarbamol, ondansetron, oxycodone-acetaminophen, simvastatin, and sumatriptan.    Allergies:   Review of patient's allergies indicates:  No Known Allergies       OBJECTIVE     B LE strength: 4/5 MMT throughout  Swelling: present in R foot / ankle, proximal medial tibia  Functional mobility: modified Independent with all bed mobility, wheelchair mobility, SBA with transfers modified pivot on R LE, CGA for amb using RW 3 ft     TREATMENT     Total Treatment time (time-based codes) separate from Evaluation: 15 minutes      Deborah received the treatments listed below:      therapeutic exercises to develop strength and endurance for 15 minutes including:    Therapeutic Exercise Grid     Exercise 1  Exercise 2  Exercise 3  Exercise 4    Exercise :    B LE  4 way hip LAQ, hip flexion, knee flexion with TB Wheelchair push-ups   Sit to stands     Repetition/Time :    10   10   10        Resist or Assist :                  Comment :                Done :    yes  yes   yes                       Exercise 5  Exercise 6  Exercise 7  Exercise 8    Exercise :    NuStep              Repetition/Time :                  Resist or Assist :                  Comment :                "   Done :                                  Exercise 9  Exercise 10  Exercise 11  Exercise 12    Exercise :                  Repetition/Time :                  Resist or Assist :                  Comment :                  Done :                               PATIENT EDUCATION AND HOME EXERCISES     Education provided / Written Home Exercises Provided  Exercises and stretches demonstrated and initiated, written HEP issued, educated pt on importance and benefits of exercises and stretches, and encouraged pt to continue with HEP daily. Pt voiced understanding    ASSESSMENT     Debroah is a 52 y.o. female referred to outpatient Physical Therapy with a medical diagnosis of R tibia and fibula fracture, L ankle dislocation. Patient presents with limited mobility relying on wheelchair for all mobility due to NWB on L LE and WBAT on R LE, R LE pain, unable to amb, and limited standing tolerance. Discussed currently limited on activities able to perform during therapy but importance of staying as active as possible and performing HEP daily. Will upgrade POC as WB status increases. TB issued of UE strengthening exercises      Patient prognosis is Good.   Patient will benefit from skilled outpatient Physical Therapy to address the deficits stated above and in the chart below, provide patient /family education, and to maximize patientt's level of independence.     Plan of care discussed with patient: Yes  Patient's spiritual, cultural and educational needs considered and patient is agreeable to the plan of care and goals as stated below:     Anticipated Barriers for therapy: NWB on L LE, R LE pain    Goals:  Short Term Goals: 4 weeks     Pt will demonstrate knowledge and independence with HEP to continue with exercises outside of therapy    Reduce c/o pain in R LE to < 4/10 pain level with daily activities    Improve strength in B LE to 4+/5 MMT throughout in order to improve tolerance with overall functional mobility    Improve  safety with transfers to modified Independent level    Long Term Goals: 6 weeks     Pt will improve score on ankle FOTO > 64 which indicates improved ability to perform daily tasks with less difficulty and pain    Improve strength in B LE to 5/5 MMT throughout in order to improve tolerance with overall functional mobility    Pt will improve distance and stability with amb using RW performed with SBA 50 ft to allow pt to amb in her home safely    Pt will reduce risk for falls and improve ability and safety with transfers as evidence by improved 5x sit to stands to < 20 secs      PLAN     Outpatient Physical Therapy 2 times weekly for 6 weeks to include the following interventions: Patient Education, Therapeutic Exercise, and pain management .     Jessica South, PT      I CERTIFY THE NEED FOR THESE SERVICES FURNISHED UNDER THIS PLAN OF TREATMENT AND WHILE UNDER MY CARE   Physician's comments:     Physician's Signature: ___________________________________________________

## 2022-11-07 DIAGNOSIS — S91.022A: ICD-10-CM

## 2022-11-07 DIAGNOSIS — S93.05XA ANKLE DISLOCATION, LEFT, INITIAL ENCOUNTER: ICD-10-CM

## 2022-11-07 DIAGNOSIS — S82.201A RIGHT TIBIAL FRACTURE: Primary | ICD-10-CM

## 2022-11-07 RX ORDER — METHOCARBAMOL 750 MG/1
750 TABLET, FILM COATED ORAL 3 TIMES DAILY
Qty: 42 TABLET | Refills: 0 | Status: SHIPPED | OUTPATIENT
Start: 2022-11-07 | End: 2022-11-21

## 2022-11-07 RX ORDER — OXYCODONE AND ACETAMINOPHEN 10; 325 MG/1; MG/1
1 TABLET ORAL EVERY 4 HOURS PRN
Qty: 42 TABLET | Refills: 0 | Status: SHIPPED | OUTPATIENT
Start: 2022-11-07 | End: 2022-11-14 | Stop reason: SDUPTHER

## 2022-11-09 ENCOUNTER — CLINICAL SUPPORT (OUTPATIENT)
Dept: REHABILITATION | Facility: HOSPITAL | Age: 52
End: 2022-11-09
Payer: MEDICAID

## 2022-11-09 DIAGNOSIS — S82.401A CLOSED FRACTURE OF RIGHT TIBIA AND FIBULA, INITIAL ENCOUNTER: Primary | ICD-10-CM

## 2022-11-09 DIAGNOSIS — S91.022A LACERATION OF ANKLE WITH FOREIGN BODY, LEFT, INITIAL ENCOUNTER: ICD-10-CM

## 2022-11-09 DIAGNOSIS — R26.2 UNABLE TO AMBULATE: ICD-10-CM

## 2022-11-09 DIAGNOSIS — S82.201A CLOSED FRACTURE OF RIGHT TIBIA AND FIBULA, INITIAL ENCOUNTER: Primary | ICD-10-CM

## 2022-11-09 DIAGNOSIS — M79.604 ACUTE LEG PAIN, RIGHT: ICD-10-CM

## 2022-11-09 PROCEDURE — 97110 THERAPEUTIC EXERCISES: CPT

## 2022-11-09 NOTE — PROGRESS NOTES
[facility]   Physical Therapy Treatment Note     Name: Deborah Ahmadiais  Clinic Number: [id number]    Therapy Diagnosis:   Encounter Diagnoses   Name Primary?    Closed fracture of right tibia and fibula, initial encounter Yes    Laceration of ankle with foreign body, left, initial encounter     Unable to ambulate     Acute leg pain, right      Physician: Dillon Scott DO    Authorization Period Expiration: 12/21/22  Plan of Care Expiration: 12/21/22  Reassessment / POC Due: 12/7/2022  Visit # / Visits authorized: 1/ 12     Visit Date: 11/9/2022    Time In: 09:00 am  Time Out: 09:30 am  Total Billable Time: 30 minutes    Precautions: standard, fall, NWB L LE, WBAT R LE     SUBJECTIVE     Pt reports: she did not perform HEP yet, has a lot going on, plans to move back to her house soon, her wheelchair fits throughout her house.  She was not compliant with home exercise program.  Response to previous treatment: good  Functional change: less pain    Pain: 4/10 without medication for pain  Location: right lower leg     OBJECTIVE     Objective Measures updated at progress report unless specified.     Treatment     Deborah received the treatments listed below:      therapeutic exercises to develop strength and endurance for 30 minutes including:     Therapeutic Exercise Grid     Exercise 1  Exercise 2  Exercise 3  Exercise 4    Exercise :    B LE  4 way hip LAQ, hip flexion, knee flexion with TB Wheelchair push-ups   Sit to stands     Repetition/Time :    15  15   15   7     Resist or Assist :       Red TB      RW     Comment :            R LE only     Done :    yes  yes   yes   yes                      Exercise 5  Exercise 6  Exercise 7  Exercise 8    Exercise :    NuStep              Repetition/Time :    5 min              Resist or Assist :    L5              Comment :    NBA CROUCH              Done :                                   Exercise 9  Exercise 10  Exercise 11  Exercise 12     Exercise :                  Repetition/Time :                  Resist or Assist :                  Comment :                  Done :                                Patient Education and Home Exercises     Home Exercises Provided and Patient Education Provided     Education provided: Educated pt on importance of HEP and encouraged pt to continue daily    ASSESSMENT     Pt tolerated initiation of strengthening and endurance exercises to improve / maintain strength while limited with mobility due to multiple fractures and surgeries. Discussed importance of performing HEP daily    Deborah Is progressing well towards her goals.   Pt prognosis is Good.     Pt will continue to benefit from skilled outpatient physical therapy to address the deficits listed in the problem list box on initial evaluation, provide pt/family education and to maximize pt's level of independence in the home and community environment.     Pt's spiritual, cultural and educational needs considered and pt agreeable to plan of care and goals.     Anticipated Barriers for therapy: NWB on L LE, R LE pain     Goals:  Short Term Goals: 4 weeks      Pt will demonstrate knowledge and independence with HEP to continue with exercises outside of therapy     Reduce c/o pain in R LE to < 4/10 pain level with daily activities     Improve strength in B LE to 4+/5 MMT throughout in order to improve tolerance with overall functional mobility     Improve safety with transfers to modified Independent level     Long Term Goals: 6 weeks      Pt will improve score on ankle FOTO > 64 which indicates improved ability to perform daily tasks with less difficulty and pain     Improve strength in B LE to 5/5 MMT throughout in order to improve tolerance with overall functional mobility     Pt will improve distance and stability with amb using RW performed with SBA 50 ft to allow pt to amb in her home safely     Pt will reduce risk for falls and improve ability and safety with  transfers as evidence by improved 5x sit to stands to < 20 secs     PLAN     Continue with current Plan of Care and progress per pt's tolerance    Jessica South, PT

## 2022-11-14 ENCOUNTER — OFFICE VISIT (OUTPATIENT)
Dept: ORTHOPEDICS | Facility: CLINIC | Age: 52
End: 2022-11-14
Payer: MEDICAID

## 2022-11-14 ENCOUNTER — HOSPITAL ENCOUNTER (OUTPATIENT)
Dept: RADIOLOGY | Facility: CLINIC | Age: 52
Discharge: HOME | End: 2022-11-14
Attending: ORTHOPAEDIC SURGERY
Payer: MEDICAID

## 2022-11-14 VITALS
SYSTOLIC BLOOD PRESSURE: 159 MMHG | HEIGHT: 64 IN | HEART RATE: 103 BPM | TEMPERATURE: 98 F | BODY MASS INDEX: 23.73 KG/M2 | WEIGHT: 139 LBS | DIASTOLIC BLOOD PRESSURE: 100 MMHG

## 2022-11-14 DIAGNOSIS — S92.355D CLOSED NONDISPLACED FRACTURE OF FIFTH METATARSAL BONE OF LEFT FOOT WITH ROUTINE HEALING, SUBSEQUENT ENCOUNTER: ICD-10-CM

## 2022-11-14 DIAGNOSIS — S82.201E TYPE I OR II OPEN FRACTURE OF RIGHT TIBIA AND FIBULA WITH ROUTINE HEALING, SUBSEQUENT ENCOUNTER: ICD-10-CM

## 2022-11-14 DIAGNOSIS — S82.401E TYPE I OR II OPEN FRACTURE OF RIGHT TIBIA AND FIBULA WITH ROUTINE HEALING, SUBSEQUENT ENCOUNTER: Primary | ICD-10-CM

## 2022-11-14 DIAGNOSIS — S91.022A LACERATION OF ANKLE WITH FOREIGN BODY, LEFT, INITIAL ENCOUNTER: ICD-10-CM

## 2022-11-14 DIAGNOSIS — S82.401E TYPE I OR II OPEN FRACTURE OF RIGHT TIBIA AND FIBULA WITH ROUTINE HEALING, SUBSEQUENT ENCOUNTER: ICD-10-CM

## 2022-11-14 DIAGNOSIS — S82.201E TYPE I OR II OPEN FRACTURE OF RIGHT TIBIA AND FIBULA WITH ROUTINE HEALING, SUBSEQUENT ENCOUNTER: Primary | ICD-10-CM

## 2022-11-14 PROCEDURE — 73610 XR ANKLE COMPLETE 3 VIEW LEFT: ICD-10-PCS | Mod: LT,,, | Performed by: ORTHOPAEDIC SURGERY

## 2022-11-14 PROCEDURE — 73610 X-RAY EXAM OF ANKLE: CPT | Mod: LT,,, | Performed by: ORTHOPAEDIC SURGERY

## 2022-11-14 PROCEDURE — 73630 X-RAY EXAM OF FOOT: CPT | Mod: LT,,, | Performed by: ORTHOPAEDIC SURGERY

## 2022-11-14 PROCEDURE — 73590 XR TIBIA FIBULA 2 VIEW RIGHT: ICD-10-PCS | Mod: RT,,, | Performed by: ORTHOPAEDIC SURGERY

## 2022-11-14 PROCEDURE — 73630 XR FOOT COMPLETE 3 VIEW LEFT: ICD-10-PCS | Mod: LT,,, | Performed by: ORTHOPAEDIC SURGERY

## 2022-11-14 PROCEDURE — 1159F MED LIST DOCD IN RCRD: CPT | Mod: CPTII,,, | Performed by: ORTHOPAEDIC SURGERY

## 2022-11-14 PROCEDURE — 3008F PR BODY MASS INDEX (BMI) DOCUMENTED: ICD-10-PCS | Mod: CPTII,,, | Performed by: ORTHOPAEDIC SURGERY

## 2022-11-14 PROCEDURE — 1159F PR MEDICATION LIST DOCUMENTED IN MEDICAL RECORD: ICD-10-PCS | Mod: CPTII,,, | Performed by: ORTHOPAEDIC SURGERY

## 2022-11-14 PROCEDURE — 99024 PR POST-OP FOLLOW-UP VISIT: ICD-10-PCS | Mod: ,,, | Performed by: ORTHOPAEDIC SURGERY

## 2022-11-14 PROCEDURE — 99024 POSTOP FOLLOW-UP VISIT: CPT | Mod: ,,, | Performed by: ORTHOPAEDIC SURGERY

## 2022-11-14 PROCEDURE — 3077F PR MOST RECENT SYSTOLIC BLOOD PRESSURE >= 140 MM HG: ICD-10-PCS | Mod: CPTII,,, | Performed by: ORTHOPAEDIC SURGERY

## 2022-11-14 PROCEDURE — 3080F DIAST BP >= 90 MM HG: CPT | Mod: CPTII,,, | Performed by: ORTHOPAEDIC SURGERY

## 2022-11-14 PROCEDURE — 73590 X-RAY EXAM OF LOWER LEG: CPT | Mod: RT,,, | Performed by: ORTHOPAEDIC SURGERY

## 2022-11-14 PROCEDURE — 3077F SYST BP >= 140 MM HG: CPT | Mod: CPTII,,, | Performed by: ORTHOPAEDIC SURGERY

## 2022-11-14 PROCEDURE — 3008F BODY MASS INDEX DOCD: CPT | Mod: CPTII,,, | Performed by: ORTHOPAEDIC SURGERY

## 2022-11-14 PROCEDURE — 3080F PR MOST RECENT DIASTOLIC BLOOD PRESSURE >= 90 MM HG: ICD-10-PCS | Mod: CPTII,,, | Performed by: ORTHOPAEDIC SURGERY

## 2022-11-14 RX ORDER — OXYCODONE AND ACETAMINOPHEN 10; 325 MG/1; MG/1
1 TABLET ORAL EVERY 6 HOURS PRN
Qty: 28 TABLET | Refills: 0 | Status: SHIPPED | OUTPATIENT
Start: 2022-11-14 | End: 2022-11-25 | Stop reason: SDUPTHER

## 2022-11-14 NOTE — PROGRESS NOTES
Subjective:       Patient ID: Deborah Cross is a 52 y.o. female.  No chief complaint on file.      HPI:  Patient is doing well after bilateral lower extremity crush injuries.  Her skin has been healing nicely she is been compliant with weight-bearing.  She is working on getting a knee scooter once her weight-bearing changes.  Dull achy pain controlled with pain medication.  No numbness or tingling.    ROS:  Constitutional: Denies fever chills  Eyes: No change in vision  ENT: No ringing or current infections  CV: No chest pain  Resp: No labored breathing  MSK: Pain evident at site of injury located in HPI,   Integ: No signs of abrasions or lacerations  Neuro: No numbness or tingling  Lymphatic: No swelling outside the area of injury     Current Outpatient Medications on File Prior to Visit   Medication Sig Dispense Refill    ALPRAZolam (XANAX) 1 MG tablet TAKE ONE TABLET BY MOUTH at bedtime AS NEEDED FOR ANXIETY 30 tablet 5    amLODIPine (NORVASC) 5 MG tablet TAKE ONE TABLET BY MOUTH DAILY 90 tablet 1    amoxicillin (AMOXIL) 875 MG tablet Take 1 tablet (875 mg total) by mouth every 12 (twelve) hours. 20 tablet 0    enoxaparin (LOVENOX) 40 mg/0.4 mL Syrg Inject 0.4 mLs (40 mg total) into the skin once daily. 30 each 0    FLUoxetine 40 MG capsule TAKE ONE CAPSULE BY MOUTH EVERY MORNING 90 capsule 1    fluticasone propionate (FLONASE) 50 mcg/actuation nasal spray spray 1 spray into each nostril twice per day 16 g 2    metFORMIN (GLUCOPHAGE) 500 MG tablet Take 1 tablet (500 mg total) by mouth once daily at 6am. 90 tablet 3    methocarbamoL (ROBAXIN) 750 MG Tab Take 1 tablet (750 mg total) by mouth 3 (three) times daily. for 14 days 42 tablet 0    ondansetron (ZOFRAN-ODT) 8 MG TbDL Take 8 mg by mouth every 6 (six) hours as needed.      oxyCODONE-acetaminophen (PERCOCET)  mg per tablet Take 1 tablet by mouth every 4 (four) hours as needed for Pain. for pain. 42 tablet 0    simvastatin (ZOCOR) 40 MG tablet Take 40 mg  "by mouth once daily at 6am.      sumatriptan (IMITREX) 25 MG Tab TAKE ONE TABLET BY MOUTH as a single DOSE AND MAY REPEAT DOSE in 2 hours if needed 9 tablet 1     No current facility-administered medications on file prior to visit.          Objective:      BP (!) 159/100   Pulse 103   Temp 98.4 °F (36.9 °C)   Ht 5' 4" (1.626 m)   Wt 63 kg (139 lb)   BMI 23.86 kg/m²   General the patient is alert and oriented x3 no acute distress nontoxic-appearing appropriate affect.    Constitutional: Vital signs are examined and stable.  Resp: No signs of labored breathing               LLE: -Skin:  Sutures intact and removed, No signs of new abrasions or lacerations, no scars           -MSK: Hip and Knee F/E, EHL/FHL, Gastroc/Tib anterior Strength 5/5           -Neuro:  Sensation intact to light touch L3-S1 dermatomes           -Lymphatic: No signs of lymphadenopathy           -CV: Capillary refill is less than 2 seconds. DP/PT pulses 2/4. Compartments soft and compressible                      RLE: -Skin:  Sutures intact and removed, No signs of new abrasions or lacerations, no scars           -MSK: : Hip and Knee F/E, EHL/FHL, Gastroc/Tib anterior Strength 5/5           -Neuro:  Sensation intact to light touch L3-S1 dermatomes           -Lymphatic: No signs of lymphadenopathy           -CV: Capillary refill is less than 2 seconds. DP/PT pulses  2/4. Compartments soft and compressible.   Body mass index is 23.86 kg/m².  Ideal body weight: 54.7 kg (120 lb 9.5 oz)  Adjusted ideal body weight: 58 kg (127 lb 15.3 oz)  Hemoglobin A1c   Date Value Ref Range Status   12/15/2021 5.9 <<=7.0 % Final     Hgb   Date Value Ref Range Status   10/27/2022 8.6 (L) 12.0 - 16.0 gm/dL Final   10/26/2022 9.5 (L) 12.0 - 16.0 gm/dL Final     Vit D 25 OH   Date Value Ref Range Status   12/15/2021 27.7 (L) 30.0 - 80.0 ng/mL Final     WBC   Date Value Ref Range Status   10/27/2022 9.8 4.5 - 11.5 x10(3)/mcL Final   10/26/2022 11.4 4.5 - 11.5 " x10(3)/mcL Final       Radiology:  Three views left ankle skeletally mature individual showing no talar tilt tibiotalar joint appears to be stable.  Mason appears to be intact.    Two views left foot skeletally mature individual shows a avulsion fracture of the 5th metatarsal no sign of increased displacement    Two views right tibia skeletally mature individual showing intact hardware without signs of displacement        Assessment:         1. 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 with routine healing, subsequent encounter  X-Ray Tibia Fibula 2 View Right      2. Laceration of ankle with foreign body, left, initial encounter  X-Ray Ankle Complete Left      3. Closed nondisplaced fracture of fifth metatarsal bone of left foot with routine healing, subsequent encounter  X-Ray Foot Complete Left              Plan:         No follow-ups on file.    Diagnoses and all orders for this visit:    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 with routine healing, subsequent encounter  -     X-Ray Tibia Fibula 2 View Right; Future    Laceration of ankle with foreign body, left, initial encounter  -     X-Ray Ankle Complete Left; Future    Closed nondisplaced fracture of fifth metatarsal bone of left foot with routine healing, subsequent encounter  -     X-Ray Foot Complete Left; Future    The patient is doing well healing her wounds nicely.  Patient has diabetes not controlling her sugars currently.  She also is using vape and nicotine.  Are pain has been well controlled.  She is been compliant with nonweightbearing the left lower extremity.  We will advance her weight-bearing in a boot at next visit follow-up 4-6 weeks.  She is able to weightbear in a boot in the right leg for now we will transition her to a shoe at the next visit.  Refill pain medications PRN.          This note/OR report was created with the assistance of  voice recognition software or phone  dictation.  There may be transcription  errors as a result of using this technology however minimal. Effort has been made to assure accuracy of transcription but any obvious errors or omissions should be clarified with the author of the document.     Dillon Scott DO  Orthopedic Trauma Surgery  11/14/2022      Future Appointments   Date Time Provider Department Center   12/14/2022  9:30 AM Dillon Scott DO [facility]   12/28/2022  8:15 AM Adamaris Cao MD [facility]

## 2022-11-16 ENCOUNTER — CLINICAL SUPPORT (OUTPATIENT)
Dept: REHABILITATION | Facility: HOSPITAL | Age: 52
End: 2022-11-16
Payer: MEDICAID

## 2022-11-16 DIAGNOSIS — M79.604 ACUTE LEG PAIN, RIGHT: ICD-10-CM

## 2022-11-16 DIAGNOSIS — S82.201A CLOSED FRACTURE OF RIGHT TIBIA AND FIBULA, INITIAL ENCOUNTER: Primary | ICD-10-CM

## 2022-11-16 DIAGNOSIS — S82.401A CLOSED FRACTURE OF RIGHT TIBIA AND FIBULA, INITIAL ENCOUNTER: Primary | ICD-10-CM

## 2022-11-16 DIAGNOSIS — S91.022A LACERATION OF ANKLE WITH FOREIGN BODY, LEFT, INITIAL ENCOUNTER: ICD-10-CM

## 2022-11-16 DIAGNOSIS — R26.2 UNABLE TO AMBULATE: ICD-10-CM

## 2022-11-16 PROCEDURE — 97110 THERAPEUTIC EXERCISES: CPT

## 2022-11-16 NOTE — PROGRESS NOTES
[facility]   Physical Therapy Treatment Note     Name: Deborah Ahmadiais  [facility] Number: [id number]    Therapy Diagnosis:   Encounter Diagnoses   Name Primary?    Closed fracture of right tibia and fibula, initial encounter Yes    Laceration of ankle with foreign body, left, initial encounter     Unable to ambulate     Acute leg pain, right        Physician: Dillon Scott DO    Authorization Period Expiration: 12/21/22  Plan of Care Expiration: 12/21/22  Reassessment / POC Due: 12/7/2022  Visit # / Visits authorized: 2/ 12     Visit Date: 11/16/2022    Time In: 14:00 am  Time Out: 14:30 am  Total Billable Time: 30 minutes    Precautions: standard, fall, NWB L LE, WBAT R LE     SUBJECTIVE     Pt reports: she has been down due to being stuck in the house in a wheelchair. Saw orthopedic surgeon on 11/14/2022, removed cast on L ankle, still unable to WB on L foot, unsure when she should wear the boots she got, and she does not like the look of her L ankle incision  She was not compliant with home exercise program.  Response to previous treatment: good  Functional change: less pain    Pain: 5/10   Location: right lower leg     OBJECTIVE     Objective Measures updated at progress report unless specified.     Treatment     Deborah received the treatments listed below:      therapeutic exercises to develop strength and endurance for 30 minutes including:     Therapeutic Exercise Grid     Exercise 1  Exercise 2  Exercise 3  Exercise 4    Exercise :    B LE  4 way hip LAQ, hip flexion, knee flexion with TB Wheelchair push-ups   Sit to stands     Repetition/Time :    20  20   15   7     Resist or Assist :       Red TB      RW     Comment :            R LE only     Done :    yes  yes   no   no                      Exercise 5  Exercise 6  Exercise 7  Exercise 8    Exercise :    NuStep   L ankle alphabets   L ankle towel stretch        Repetition/Time :    4 min      3 x 30 sec        Resist or Assist :     L6              Comment :    B UE, R LE              Done :    yes   yes   yes                         Exercise 9  Exercise 10  Exercise 11  Exercise 12    Exercise :                  Repetition/Time :                  Resist or Assist :                  Comment :                  Done :                                Patient Education and Home Exercises     Home Exercises Provided and Patient Education Provided     Education provided: Educated pt on importance of HEP and encouraged pt to continue daily    ASSESSMENT     Recommend pt cover L ankle incision with 4x4 gauze due to pt has DM and risk for infection due to incision slightly open in some areas. Recommended pt wear L boot to maintain neutral position due to NWB and R boot when WB. Added gentle L ankle ROM exercises to POC. Pt upset during therapy due to limitations     Deborah Is progressing well towards her goals.   Pt prognosis is Good.     Pt will continue to benefit from skilled outpatient physical therapy to address the deficits listed in the problem list box on initial evaluation, provide pt/family education and to maximize pt's level of independence in the home and community environment.     Pt's spiritual, cultural and educational needs considered and pt agreeable to plan of care and goals.     Anticipated Barriers for therapy: NWB on L LE, R LE pain     Goals:  Short Term Goals: 4 weeks      Pt will demonstrate knowledge and independence with HEP to continue with exercises outside of therapy     Reduce c/o pain in R LE to < 4/10 pain level with daily activities     Improve strength in B LE to 4+/5 MMT throughout in order to improve tolerance with overall functional mobility     Improve safety with transfers to modified Independent level     Long Term Goals: 6 weeks      Pt will improve score on ankle FOTO > 64 which indicates improved ability to perform daily tasks with less difficulty and pain     Improve strength in B LE to 5/5 MMT throughout in  order to improve tolerance with overall functional mobility     Pt will improve distance and stability with amb using RW performed with SBA 50 ft to allow pt to amb in her home safely     Pt will reduce risk for falls and improve ability and safety with transfers as evidence by improved 5x sit to stands to < 20 secs     PLAN     Continue with current Plan of Care and progress per pt's tolerance    Jessica South, PT

## 2022-11-18 ENCOUNTER — CLINICAL SUPPORT (OUTPATIENT)
Dept: REHABILITATION | Facility: HOSPITAL | Age: 52
End: 2022-11-18
Payer: MEDICAID

## 2022-11-18 DIAGNOSIS — S82.201A CLOSED FRACTURE OF RIGHT TIBIA AND FIBULA, INITIAL ENCOUNTER: Primary | ICD-10-CM

## 2022-11-18 DIAGNOSIS — R26.2 UNABLE TO AMBULATE: ICD-10-CM

## 2022-11-18 DIAGNOSIS — M79.604 ACUTE LEG PAIN, RIGHT: ICD-10-CM

## 2022-11-18 DIAGNOSIS — S82.401A CLOSED FRACTURE OF RIGHT TIBIA AND FIBULA, INITIAL ENCOUNTER: Primary | ICD-10-CM

## 2022-11-18 DIAGNOSIS — S91.022A LACERATION OF ANKLE WITH FOREIGN BODY, LEFT, INITIAL ENCOUNTER: ICD-10-CM

## 2022-11-18 PROCEDURE — 97110 THERAPEUTIC EXERCISES: CPT

## 2022-11-18 NOTE — PROGRESS NOTES
[facility]   Physical Therapy Treatment Note     Name: Deborah Ahmadiais  Clinic Number: [id number]    Therapy Diagnosis:   Encounter Diagnoses   Name Primary?    Closed fracture of right tibia and fibula, initial encounter Yes    Laceration of ankle with foreign body, left, initial encounter     Unable to ambulate     Acute leg pain, right        Physician: Dillon Scott DO    Authorization Period Expiration: 12/21/22  Plan of Care Expiration: 12/21/22  Reassessment / POC Due: 12/7/2022  Visit # / Visits authorized: 2/ 12     Visit Date: 11/18/2022    Time In: 1000 am  Time Out: 1035 am  Total Billable Time: 35 minutes    Precautions: standard, fall, NWB L LE, WBAT R LE     SUBJECTIVE     Pt reports: that she is not feeling well today. Having a hard time dealing with losing her independence. Pain level is higher today especially around screws in tibia.   She was not compliant with home exercise program.  Response to previous treatment: good  Functional change: none    Pain: 7/10   Location: right lower leg     OBJECTIVE     Objective Measures updated at progress report unless specified.     Treatment     Deborah received the treatments listed below:      therapeutic exercises to develop strength and endurance for 30 minutes including:     Therapeutic Exercise Grid     Exercise 1  Exercise 2  Exercise 3  Exercise 4    Exercise :    B LE  4 way hip LAQ, hip flexion, knee flexion with TB Wheelchair push-ups   Sit to stands     Repetition/Time :    20  20   15   7     Resist or Assist :       Red TB      RW     Comment :            R LE only     Done :    yes  yes   no   no                      Exercise 5  Exercise 6  Exercise 7  Exercise 8    Exercise :    NuStep   L ankle alphabets   L ankle towel stretch   Balance pad- single leg stand R     Repetition/Time :    4 min      3 x 30 sec   3 x 15 sec     Resist or Assist :    L6              Comment :    CHANTAL UE R LE              Done :    yes    yes   yes   Yes                       Exercise 9  Exercise 10  Exercise 11  Exercise 12    Exercise :                  Repetition/Time :                  Resist or Assist :                  Comment :                  Done :                                Patient Education and Home Exercises     Home Exercises Provided and Patient Education Provided     Education provided: Educated pt on importance of HEP and encouraged pt to continue daily    ASSESSMENT     Deborah tolerated treatment well today as demonstrated with completion of exercise program with min reports of pain . Added standing on balance pad in parallel bars single leg stand  Right. Patient required min tactile cues to complete.     Deborah Is progressing well towards her goals.   Pt prognosis is Good.     Pt will continue to benefit from skilled outpatient physical therapy to address the deficits listed in the problem list box on initial evaluation, provide pt/family education and to maximize pt's level of independence in the home and community environment.     Pt's spiritual, cultural and educational needs considered and pt agreeable to plan of care and goals.     Anticipated Barriers for therapy: NWB on L LE, R LE pain     Goals:  Short Term Goals: 4 weeks      Pt will demonstrate knowledge and independence with HEP to continue with exercises outside of therapy     Reduce c/o pain in R LE to < 4/10 pain level with daily activities     Improve strength in B LE to 4+/5 MMT throughout in order to improve tolerance with overall functional mobility     Improve safety with transfers to modified Independent level     Long Term Goals: 6 weeks      Pt will improve score on ankle FOTO > 64 which indicates improved ability to perform daily tasks with less difficulty and pain     Improve strength in B LE to 5/5 MMT throughout in order to improve tolerance with overall functional mobility     Pt will improve distance and stability with amb using RW performed with  SBA 50 ft to allow pt to amb in her home safely     Pt will reduce risk for falls and improve ability and safety with transfers as evidence by improved 5x sit to stands to < 20 secs     PLAN     Continue with current Plan of Care and progress per pt's tolerance    Ciro Rasmussen, PT

## 2022-11-21 ENCOUNTER — CLINICAL SUPPORT (OUTPATIENT)
Dept: REHABILITATION | Facility: HOSPITAL | Age: 52
End: 2022-11-21
Payer: MEDICAID

## 2022-11-21 DIAGNOSIS — S82.401A CLOSED FRACTURE OF RIGHT TIBIA AND FIBULA, INITIAL ENCOUNTER: Primary | ICD-10-CM

## 2022-11-21 DIAGNOSIS — S82.201A CLOSED FRACTURE OF RIGHT TIBIA AND FIBULA, INITIAL ENCOUNTER: Primary | ICD-10-CM

## 2022-11-21 DIAGNOSIS — R26.2 UNABLE TO AMBULATE: ICD-10-CM

## 2022-11-21 DIAGNOSIS — M79.604 ACUTE LEG PAIN, RIGHT: ICD-10-CM

## 2022-11-21 DIAGNOSIS — S91.022A LACERATION OF ANKLE WITH FOREIGN BODY, LEFT, INITIAL ENCOUNTER: ICD-10-CM

## 2022-11-21 PROCEDURE — 97110 THERAPEUTIC EXERCISES: CPT | Mod: CQ

## 2022-11-21 NOTE — PROGRESS NOTES
[facility]   Physical Therapy Treatment Note     Name: Deborah Ahmadiais  [facility] Number: [id number]    Therapy Diagnosis:   Encounter Diagnoses   Name Primary?    Closed fracture of right tibia and fibula, initial encounter Yes    Laceration of ankle with foreign body, left, initial encounter     Unable to ambulate     Acute leg pain, right        Physician: Dillon Scott DO    Authorization Period Expiration: 12/21/22  Plan of Care Expiration: 12/21/22  Reassessment / POC Due: 12/7/2022  Visit # / Visits authorized: 3/ 12       PTA Visit: 1/5  Visit Date: 11/21/2022    Time In: 1345  Time Out: 1415  Total Billable Time: 30 minutes    Precautions: standard, fall, NWB L LE, WBAT R LE     SUBJECTIVE     Pt reports: pain has been up and down.   She was not compliant with home exercise program.  Response to previous treatment: good  Functional change: none    Pain: 6/10   Location: right lower leg     OBJECTIVE     Objective Measures updated at progress report unless specified.     Treatment     Deborah received the treatments listed below:      therapeutic exercises to develop strength and endurance for 30 minutes including:     Therapeutic Exercise Grid     Exercise 1  Exercise 2  Exercise 3  Exercise 4    Exercise :    B LE  4 way hip LAQ, hip flexion, knee flexion with TB Wheelchair push-ups   Sit to stands     Repetition/Time :    20  20   15   7     Resist or Assist :       Red TB      RW     Comment :            R LE only     Done :    yes  yes   no   no                      Exercise 5  Exercise 6  Exercise 7  Exercise 8    Exercise :    NuStep   L ankle alphabets   L ankle towel stretch   Balance pad- single leg stand R     Repetition/Time :    4 min      3 x 30 sec   3 x 15 sec     Resist or Assist :    L6              Comment :    B UE, R LE              Done :    yes   yes   yes   Yes                       Exercise 9  Exercise 10  Exercise 11  Exercise 12    Exercise :                   Repetition/Time :                  Resist or Assist :                  Comment :                  Done :                                Patient Education and Home Exercises     Home Exercises Provided and Patient Education Provided     Education provided: Educated pt on importance of HEP and encouraged pt to continue daily    ASSESSMENT     Deborah completed all exercises with good effort and no reported increase in pain She had no LOB during SLS on R LE in // bars.     Deborah Is progressing well towards her goals.   Pt prognosis is Good.     Pt will continue to benefit from skilled outpatient physical therapy to address the deficits listed in the problem list box on initial evaluation, provide pt/family education and to maximize pt's level of independence in the home and community environment.     Pt's spiritual, cultural and educational needs considered and pt agreeable to plan of care and goals.     Anticipated Barriers for therapy: NWB on L LE, R LE pain     Goals:  Short Term Goals: 4 weeks      Pt will demonstrate knowledge and independence with HEP to continue with exercises outside of therapy     Reduce c/o pain in R LE to < 4/10 pain level with daily activities     Improve strength in B LE to 4+/5 MMT throughout in order to improve tolerance with overall functional mobility     Improve safety with transfers to modified Independent level     Long Term Goals: 6 weeks      Pt will improve score on ankle FOTO > 64 which indicates improved ability to perform daily tasks with less difficulty and pain     Improve strength in B LE to 5/5 MMT throughout in order to improve tolerance with overall functional mobility     Pt will improve distance and stability with amb using RW performed with SBA 50 ft to allow pt to amb in her home safely     Pt will reduce risk for falls and improve ability and safety with transfers as evidence by improved 5x sit to stands to < 20 secs     PLAN     Continue with current Plan of  Care and progress per pt's tolerance    Ricardo Fortune, PTA

## 2022-11-30 ENCOUNTER — CLINICAL SUPPORT (OUTPATIENT)
Dept: REHABILITATION | Facility: HOSPITAL | Age: 52
End: 2022-11-30
Payer: MEDICAID

## 2022-11-30 DIAGNOSIS — S91.022A LACERATION OF ANKLE WITH FOREIGN BODY, LEFT, INITIAL ENCOUNTER: ICD-10-CM

## 2022-11-30 DIAGNOSIS — S82.201A CLOSED FRACTURE OF RIGHT TIBIA AND FIBULA, INITIAL ENCOUNTER: Primary | ICD-10-CM

## 2022-11-30 DIAGNOSIS — R26.2 UNABLE TO AMBULATE: ICD-10-CM

## 2022-11-30 DIAGNOSIS — M79.604 ACUTE LEG PAIN, RIGHT: ICD-10-CM

## 2022-11-30 DIAGNOSIS — S82.401A CLOSED FRACTURE OF RIGHT TIBIA AND FIBULA, INITIAL ENCOUNTER: Primary | ICD-10-CM

## 2022-11-30 PROCEDURE — 97110 THERAPEUTIC EXERCISES: CPT | Mod: CQ

## 2022-11-30 NOTE — PROGRESS NOTES
[facility]   Physical Therapy Treatment Note     Name: Deborah Cross  Clinic Number: [id number]    Therapy Diagnosis:   Encounter Diagnoses   Name Primary?    Closed fracture of right tibia and fibula, initial encounter Yes    Laceration of ankle with foreign body, left, initial encounter     Unable to ambulate     Acute leg pain, right          Physician: Dillon Scott DO    Authorization Period Expiration: 12/21/22  Plan of Care Expiration: 12/21/22  Reassessment / POC Due: 12/7/2022  Visit # / Visits authorized: 4/ 12       PTA Visit: 2/5  Visit Date: 11/30/2022    Time In: 1303  Time Out: 1332  Total Billable Time: 29 minutes    Precautions: standard, fall, NWB L LE, WBAT R LE     SUBJECTIVE     Pt reports: she is having much more pain in the R ankle than the L. She presents to therapy with FWW today still observing NWB precautions on L LE.   She was not compliant with home exercise program.  Response to previous treatment: good  Functional change: none    Pain: 7/10   Location: right lower leg     OBJECTIVE     Objective Measures updated at progress report unless specified.     Treatment     Deborah received the treatments listed below:      therapeutic exercises to develop strength and endurance for 29 minutes including:     Therapeutic Exercise Grid     Exercise 1  Exercise 2  Exercise 3  Exercise 4    Exercise :    B LE  4 way hip LAQ, hip flexion, knee flexion with TB Wheelchair push-ups   Sit to stands     Repetition/Time :    20  20   15   7     Resist or Assist :       Red TB      RW     Comment :            R LE only     Done :    yes  yes   no   no                      Exercise 5  Exercise 6  Exercise 7  Exercise 8    Exercise :    NuStep   L ankle alphabets   L ankle towel stretch   Balance pad- single leg stand R     Repetition/Time :    4 min      3 x 30 sec   3 x 15 sec     Resist or Assist :    L6              Comment :    B UE, R LE              Done :    yes   yes    yes   Yes                       Exercise 9  Exercise 10  Exercise 11  Exercise 12    Exercise :                  Repetition/Time :                  Resist or Assist :                  Comment :                  Done :                                Patient Education and Home Exercises     Home Exercises Provided and Patient Education Provided     Education provided: Educated pt on importance of HEP and encouraged pt to continue daily    ASSESSMENT     Deborah completed all exercises with good effort and no reported increase in pain She had no LOB during SLS on R LE in // bars. Adjusted FWW to lower height to allow for better SUZE    Deborah Is progressing well towards her goals.   Pt prognosis is Good.     Pt will continue to benefit from skilled outpatient physical therapy to address the deficits listed in the problem list box on initial evaluation, provide pt/family education and to maximize pt's level of independence in the home and community environment.     Pt's spiritual, cultural and educational needs considered and pt agreeable to plan of care and goals.     Anticipated Barriers for therapy: NWB on L LE, R LE pain     Goals:  Short Term Goals: 4 weeks      Pt will demonstrate knowledge and independence with HEP to continue with exercises outside of therapy     Reduce c/o pain in R LE to < 4/10 pain level with daily activities     Improve strength in B LE to 4+/5 MMT throughout in order to improve tolerance with overall functional mobility     Improve safety with transfers to modified Independent level     Long Term Goals: 6 weeks      Pt will improve score on ankle FOTO > 64 which indicates improved ability to perform daily tasks with less difficulty and pain     Improve strength in B LE to 5/5 MMT throughout in order to improve tolerance with overall functional mobility     Pt will improve distance and stability with amb using RW performed with SBA 50 ft to allow pt to amb in her home safely     Pt will  reduce risk for falls and improve ability and safety with transfers as evidence by improved 5x sit to stands to < 20 secs     PLAN     Continue with current Plan of Care and progress per pt's tolerance    Ricardo Fortune, PTA

## 2022-12-05 DIAGNOSIS — S82.201E TYPE I OR II OPEN FRACTURE OF RIGHT TIBIA AND FIBULA WITH ROUTINE HEALING, SUBSEQUENT ENCOUNTER: ICD-10-CM

## 2022-12-05 DIAGNOSIS — S82.401E TYPE I OR II OPEN FRACTURE OF RIGHT TIBIA AND FIBULA WITH ROUTINE HEALING, SUBSEQUENT ENCOUNTER: ICD-10-CM

## 2022-12-06 ENCOUNTER — CLINICAL SUPPORT (OUTPATIENT)
Dept: REHABILITATION | Facility: HOSPITAL | Age: 52
End: 2022-12-06
Payer: MEDICAID

## 2022-12-06 DIAGNOSIS — R26.2 UNABLE TO AMBULATE: ICD-10-CM

## 2022-12-06 DIAGNOSIS — S82.401A CLOSED FRACTURE OF RIGHT TIBIA AND FIBULA, INITIAL ENCOUNTER: Primary | ICD-10-CM

## 2022-12-06 DIAGNOSIS — S91.022A LACERATION OF ANKLE WITH FOREIGN BODY, LEFT, INITIAL ENCOUNTER: ICD-10-CM

## 2022-12-06 DIAGNOSIS — M79.604 ACUTE LEG PAIN, RIGHT: ICD-10-CM

## 2022-12-06 DIAGNOSIS — S82.201A CLOSED FRACTURE OF RIGHT TIBIA AND FIBULA, INITIAL ENCOUNTER: Primary | ICD-10-CM

## 2022-12-06 PROCEDURE — 97110 THERAPEUTIC EXERCISES: CPT

## 2022-12-06 RX ORDER — OXYCODONE AND ACETAMINOPHEN 10; 325 MG/1; MG/1
1 TABLET ORAL EVERY 8 HOURS PRN
Qty: 21 TABLET | Refills: 0 | Status: SHIPPED | OUTPATIENT
Start: 2022-12-06 | End: 2022-12-13

## 2022-12-06 NOTE — PLAN OF CARE
[facility]  Physical Therapy Plan of Care Note    Name: Deborah Cross  Clinic Number: [id number]    Therapy Diagnosis:   Encounter Diagnoses   Name Primary?    Closed fracture of right tibia and fibula, initial encounter Yes    Laceration of ankle with foreign body, left, initial encounter     Unable to ambulate     Acute leg pain, right      Physician: Dillon Scott DO    Visit Date: 12/6/2022  Time In: 09:00 am  Time Out: 09:35 am  Total billable minutes:35 minutes     Physician Orders: PT eval and treat  Medical Diagnosis from Referral:  fx R tibia and fibula, L ankle dislocation   Evaluation Date:11/4/2022  Authorization Period Expiration: 12/21/2022  Reassessment / POC completed: 12/6/2022  Visit # / Visits authorized: 5/ 12    Precautions: Standard, Fall, and NWB L LE  Functional Level Prior to Evaluation:   Independent    SUBJECTIVE     Pt reports: she has severe pain in R LE on inside below her knee. Has follow-up appointment with surgeon on 12/14/2022. Been using RW a lot more for mobility, afraid to drive or eventually put weight on L ankle  She was not compliant with home exercise program.  Response to previous treatment: good  Functional change: amb vs using wheelchair for mobility    Ankle functional status FOTO score: 36 declined from 41 on eval (pt was using wheelchair on eval, now using RW)    Pain: 9/10 with 3 Mortrin (out of pain meds)  Location: right lower legs     OBJECTIVE     Update:   B LE strength: 4+/5 MMT throughout  L ankle ROM: DF 0 degrees, PF 20 degrees  Swelling: present in R proximal medial tibia where pain is located  Functional mobility: modified Independent with all bed mobility, supervision with transfers modified pivot on R LE, supervision with amb using RW 50 ft limited due to R LE pain     Treatment:      therapeutic exercises to develop strength and endurance for 35 minutes including:     Therapeutic Exercise Grid     Exercise 1  Exercise 2   Exercise 3  Exercise 4    Exercise :    B LE  4 way hip LAQ, hip flexion, knee flexion with TB Wheelchair push-ups   Sit to stands     Repetition/Time :    20  20   15   15     Resist or Assist :    2 lbs   Red TB, 2 lbs      RW     Comment :            R LE only     Done :    yes  yes   no   yes                      Exercise 5  Exercise 6  Exercise 7  Exercise 8    Exercise :    NuStep   L ankle alphabets   L ankle towel stretch   Balance pad- single leg stand R     Repetition/Time :    4 min      3 x 30 sec   3 x 15 sec     Resist or Assist :    L6              Comment :    B UE, R LE              Done :    no   yes   yes   no                       Exercise 9  Exercise 10  Exercise 11  Exercise 12    Exercise :                  Repetition/Time :                  Resist or Assist :                  Comment :                  Done :                                ASSESSMENT     Update: Pt progressing well but slowly with overall functional mobility using RW primarily for mobility vs wheelchair. Pt's progress limited due to NWB status on L LE and severe R LE pain in proximal, medial tibia with swelling present. Progression of POC will be based on MD reassessment with surgeon on 12/14/2022. Discussed slow progress to allow for bone healing but may be increased to PWB on L LE. Decreased L ankle ROM and strength present. Pt has difficulty finding transportation to therapy and afraid to drive at this time. Pt has been noncompliant with HEP thus far and discussed importance of stretches and strengthening exercises to prepare for when pt able to FWB with mobility    Anticipated Barriers for therapy: NWB on L LE, R LE pain     Goals:  Short Term Goals: 4 weeks      Pt will demonstrate knowledge and independence with HEP to continue with exercises outside of therapy- progressing (pt reports moves around a lot during the day but does not do HEP)     Reduce c/o pain in R LE to < 4/10 pain level with daily activities- not met  (c/o 9/10 pain level R medial, proximal tibia- out of pain medication)     Improve strength in B LE to 4+/5 MMT throughout in order to improve tolerance with overall functional mobility- met (4+/5 MMT throughout)     Improve safety with transfers to modified Independent level- progressing (Supervision with modified pivot transfer on R LE using RW NWB L LE)     Long Term Goals: 6 weeks      Pt will improve score on ankle FOTO > 64 which indicates improved ability to perform daily tasks with less difficulty and pain- not met (36 declined from 41 on eval (pt was using wheelchair on eval, now using RW))     Improve strength in B LE to 5/5 MMT throughout in order to improve tolerance with overall functional mobility- progressing (4+/5 MMT throughout)     Pt will improve distance and stability with amb using RW performed with SBA 50 ft to allow pt to amb in her home safely- met (amb using RW NWB L LE with supervision, distance limited due to R LE pain)     Pt will reduce risk for falls and improve ability and safety with transfers as evidence by improved 5x sit to stands to < 20 secs- met (12 secs required)    Reasons for Recertification of Therapy:  NWB on L LE, limited amb and mobility, decreased L ankle ROM and strength    PLAN     Recommended Treatment Plan: 3 times per week for 6 weeks:  Gait Training, Neuromuscular Re-ed, Patient Education, Therapeutic Exercise, and pain management      Jessica South, PT    I CERTIFY THE NEED FOR THESE SERVICES FURNISHED UNDER THIS PLAN OF TREATMENT AND WHILE UNDER MY CARE  Physician's comments:      Physician's Signature: ___________________________________________________

## 2022-12-07 ENCOUNTER — CLINICAL SUPPORT (OUTPATIENT)
Dept: REHABILITATION | Facility: HOSPITAL | Age: 52
End: 2022-12-07
Payer: MEDICAID

## 2022-12-07 DIAGNOSIS — S82.401A CLOSED FRACTURE OF RIGHT TIBIA AND FIBULA, INITIAL ENCOUNTER: Primary | ICD-10-CM

## 2022-12-07 DIAGNOSIS — R26.2 UNABLE TO AMBULATE: ICD-10-CM

## 2022-12-07 DIAGNOSIS — S82.201A CLOSED FRACTURE OF RIGHT TIBIA AND FIBULA, INITIAL ENCOUNTER: Primary | ICD-10-CM

## 2022-12-07 DIAGNOSIS — S91.022A LACERATION OF ANKLE WITH FOREIGN BODY, LEFT, INITIAL ENCOUNTER: ICD-10-CM

## 2022-12-07 DIAGNOSIS — M79.604 ACUTE LEG PAIN, RIGHT: ICD-10-CM

## 2022-12-07 PROCEDURE — 97110 THERAPEUTIC EXERCISES: CPT

## 2022-12-07 NOTE — PROGRESS NOTES
[facility]   Physical Therapy Treatment Note     Name: Deborah Cross  [facility] Number: [id number]    Therapy Diagnosis:   Encounter Diagnoses   Name Primary?    Closed fracture of right tibia and fibula, initial encounter Yes    Laceration of ankle with foreign body, left, initial encounter     Unable to ambulate     Acute leg pain, right      Physician: Dillon Scott DO    Authorization Period Expiration: 12/21/22  Plan of Care Expiration: 12/21/22  Reassessment / POC completed: 12/6/2022  Visit # / Visits authorized: 6/ 12     PTA Visit: 2/5  Visit Date: 12/7/2022    Time In: 08:30 am  Time Out: 09:00 am  Total Billable Time: 30 minutes    Precautions: standard, fall, NWB L LE, WBAT R LE     SUBJECTIVE     Pt reports: slightly less pain in R LE today   She was not compliant with home exercise program.  Response to previous treatment: good  Functional change: improved strength, walking using RW vs wheelchair    Pain: 6/10 without medication of pain   Location: right lower leg     OBJECTIVE     Objective Measures updated at progress report unless specified.     Treatment     Deborah received the treatments listed below:      therapeutic exercises to develop strength and endurance for 30 minutes including:     Therapeutic Exercise Grid     Exercise 1  Exercise 2  Exercise 3  Exercise 4    Exercise :    B LE  4 way hip LAQ, hip flexion, knee flexion with TB BAPS: L ankle ROM (CW, CCW)   Sit to stands     Repetition/Time :    20  20   10 each   20     Resist or Assist :    2 lbs   Red TB   L2   RW     Comment :            R LE only     Done :    yes  yes   yes   yes                      Exercise 5  Exercise 6  Exercise 7  Exercise 8    Exercise :    NuStep   L ankle alphabets   L ankle towel stretch   Balance pad- single leg stand R     Repetition/Time :    4 min      3 x 30 sec   3 x 15 sec     Resist or Assist :    L6              Comment :    B UE, R LE              Done :    no   yes   yes    no                       Exercise 9  Exercise 10  Exercise 11  Exercise 12    Exercise :    L ankle TB: DF, PF, IV, EV              Repetition/Time :                  Resist or Assist :    Yellow TB              Comment :                  Done :                                Patient Education and Home Exercises     Home Exercises Provided and Patient Education Provided     Education provided: Educated pt on importance of HEP and encouraged pt to continue daily    ASSESSMENT     Cues required to decrease speed of movements to perform correctly, improve safety, and isolate specific movements. Added BAPS board of L ankle ROM and light resisted ankle strengthening exercises to POC. Discussed importance of maintaining NWB on L ankle until reassessed by MD Cabrera Is progressing well towards her goals.   Pt prognosis is Good.     Pt will continue to benefit from skilled outpatient physical therapy to address the deficits listed in the problem list box on initial evaluation, provide pt/family education and to maximize pt's level of independence in the home and community environment.     Pt's spiritual, cultural and educational needs considered and pt agreeable to plan of care and goals.     Anticipated Barriers for therapy: NWB on L LE, R LE pain     Goals:  Short Term Goals: 4 weeks      Pt will demonstrate knowledge and independence with HEP to continue with exercises outside of therapy- progressing (pt reports moves around a lot during the day but does not do HEP)     Reduce c/o pain in R LE to < 4/10 pain level with daily activities- not met (c/o 9/10 pain level R medial, proximal tibia- out of pain medication)     Improve strength in B LE to 4+/5 MMT throughout in order to improve tolerance with overall functional mobility- met (4+/5 MMT throughout)     Improve safety with transfers to modified Independent level- progressing (Supervision with modified pivot transfer on R LE using RW NWB L LE)     Long Term  Goals: 6 weeks      Pt will improve score on ankle FOTO > 64 which indicates improved ability to perform daily tasks with less difficulty and pain- not met (36 declined from 41 on eval (pt was using wheelchair on eval, now using RW))     Improve strength in B LE to 5/5 MMT throughout in order to improve tolerance with overall functional mobility- progressing (4+/5 MMT throughout)     Pt will improve distance and stability with amb using RW performed with SBA 50 ft to allow pt to amb in her home safely- met (amb using RW NWB L LE with supervision, distance limited due to R LE pain)     Pt will reduce risk for falls and improve ability and safety with transfers as evidence by improved 5x sit to stands to < 20 secs- met (12 secs required)     Reasons for Recertification of Therapy:  NWB on L LE, limited amb and mobility, decreased L ankle ROM and strength      PLAN     Continue with current Plan of Care and progress per pt's tolerance    Jessica South, PT

## 2022-12-14 ENCOUNTER — HOSPITAL ENCOUNTER (OUTPATIENT)
Dept: RADIOLOGY | Facility: CLINIC | Age: 52
Discharge: HOME | End: 2022-12-14
Attending: ORTHOPAEDIC SURGERY
Payer: MEDICAID

## 2022-12-14 ENCOUNTER — OFFICE VISIT (OUTPATIENT)
Dept: ORTHOPEDICS | Facility: CLINIC | Age: 52
End: 2022-12-14
Payer: MEDICAID

## 2022-12-14 VITALS
HEIGHT: 64 IN | WEIGHT: 138.88 LBS | SYSTOLIC BLOOD PRESSURE: 126 MMHG | HEART RATE: 77 BPM | BODY MASS INDEX: 23.71 KG/M2 | DIASTOLIC BLOOD PRESSURE: 85 MMHG | RESPIRATION RATE: 18 BRPM

## 2022-12-14 DIAGNOSIS — S82.401A CLOSED FRACTURE OF RIGHT FIBULA AND TIBIA, INITIAL ENCOUNTER: Primary | ICD-10-CM

## 2022-12-14 DIAGNOSIS — S92.355D CLOSED NONDISPLACED FRACTURE OF FIFTH METATARSAL BONE OF LEFT FOOT WITH ROUTINE HEALING, SUBSEQUENT ENCOUNTER: ICD-10-CM

## 2022-12-14 DIAGNOSIS — S82.201E TYPE I OR II OPEN FRACTURE OF RIGHT TIBIA AND FIBULA WITH ROUTINE HEALING, SUBSEQUENT ENCOUNTER: ICD-10-CM

## 2022-12-14 DIAGNOSIS — S91.022A LACERATION OF ANKLE WITH FOREIGN BODY, LEFT, INITIAL ENCOUNTER: ICD-10-CM

## 2022-12-14 DIAGNOSIS — S82.201E TYPE I OR II OPEN FRACTURE OF RIGHT TIBIA AND FIBULA WITH ROUTINE HEALING, SUBSEQUENT ENCOUNTER: Primary | ICD-10-CM

## 2022-12-14 DIAGNOSIS — S93.05XA ANKLE DISLOCATION, LEFT, INITIAL ENCOUNTER: ICD-10-CM

## 2022-12-14 DIAGNOSIS — S82.401E TYPE I OR II OPEN FRACTURE OF RIGHT TIBIA AND FIBULA WITH ROUTINE HEALING, SUBSEQUENT ENCOUNTER: Primary | ICD-10-CM

## 2022-12-14 DIAGNOSIS — S82.401E TYPE I OR II OPEN FRACTURE OF RIGHT TIBIA AND FIBULA WITH ROUTINE HEALING, SUBSEQUENT ENCOUNTER: ICD-10-CM

## 2022-12-14 DIAGNOSIS — S82.201A CLOSED FRACTURE OF RIGHT FIBULA AND TIBIA, INITIAL ENCOUNTER: Primary | ICD-10-CM

## 2022-12-14 PROCEDURE — 1159F MED LIST DOCD IN RCRD: CPT | Mod: CPTII,,, | Performed by: PHYSICIAN ASSISTANT

## 2022-12-14 PROCEDURE — 73630 X-RAY EXAM OF FOOT: CPT | Mod: LT,,, | Performed by: ORTHOPAEDIC SURGERY

## 2022-12-14 PROCEDURE — 3079F PR MOST RECENT DIASTOLIC BLOOD PRESSURE 80-89 MM HG: ICD-10-PCS | Mod: CPTII,,, | Performed by: PHYSICIAN ASSISTANT

## 2022-12-14 PROCEDURE — 1159F PR MEDICATION LIST DOCUMENTED IN MEDICAL RECORD: ICD-10-PCS | Mod: CPTII,,, | Performed by: PHYSICIAN ASSISTANT

## 2022-12-14 PROCEDURE — 73590 X-RAY EXAM OF LOWER LEG: CPT | Mod: RT,,, | Performed by: ORTHOPAEDIC SURGERY

## 2022-12-14 PROCEDURE — 73610 XR ANKLE COMPLETE 3 VIEW LEFT: ICD-10-PCS | Mod: LT,,, | Performed by: ORTHOPAEDIC SURGERY

## 2022-12-14 PROCEDURE — 3074F PR MOST RECENT SYSTOLIC BLOOD PRESSURE < 130 MM HG: ICD-10-PCS | Mod: CPTII,,, | Performed by: PHYSICIAN ASSISTANT

## 2022-12-14 PROCEDURE — 99024 POSTOP FOLLOW-UP VISIT: CPT | Mod: ,,, | Performed by: PHYSICIAN ASSISTANT

## 2022-12-14 PROCEDURE — 3079F DIAST BP 80-89 MM HG: CPT | Mod: CPTII,,, | Performed by: PHYSICIAN ASSISTANT

## 2022-12-14 PROCEDURE — 3008F PR BODY MASS INDEX (BMI) DOCUMENTED: ICD-10-PCS | Mod: CPTII,,, | Performed by: PHYSICIAN ASSISTANT

## 2022-12-14 PROCEDURE — 3074F SYST BP LT 130 MM HG: CPT | Mod: CPTII,,, | Performed by: PHYSICIAN ASSISTANT

## 2022-12-14 PROCEDURE — 73610 X-RAY EXAM OF ANKLE: CPT | Mod: LT,,, | Performed by: ORTHOPAEDIC SURGERY

## 2022-12-14 PROCEDURE — 99024 PR POST-OP FOLLOW-UP VISIT: ICD-10-PCS | Mod: ,,, | Performed by: PHYSICIAN ASSISTANT

## 2022-12-14 PROCEDURE — 3008F BODY MASS INDEX DOCD: CPT | Mod: CPTII,,, | Performed by: PHYSICIAN ASSISTANT

## 2022-12-14 PROCEDURE — 73630 XR FOOT COMPLETE 3 VIEW LEFT: ICD-10-PCS | Mod: LT,,, | Performed by: ORTHOPAEDIC SURGERY

## 2022-12-14 PROCEDURE — 73590 XR TIBIA FIBULA 2 VIEW RIGHT: ICD-10-PCS | Mod: RT,,, | Performed by: ORTHOPAEDIC SURGERY

## 2022-12-14 RX ORDER — OXYCODONE AND ACETAMINOPHEN 5; 325 MG/1; MG/1
1 TABLET ORAL EVERY 12 HOURS PRN
Qty: 14 TABLET | Refills: 0 | Status: SHIPPED | OUTPATIENT
Start: 2022-12-14 | End: 2022-12-19 | Stop reason: SDUPTHER

## 2022-12-14 NOTE — LETTER
[facility]  [address] [place] [address]. [address], [address]  Phone: [phone]  Fax: [phone]    Name:Deborah Cross  :1970   Date:2022     PATIENT IS UNABLE TO WORK AS OF:  [_] Pending treatment.  [_] For approximately [_] Days [_] Weeks [_] Months  [_] Pending diagnostic testing.  [_] Pending surgical treatment.  [_] For approximately _ months (Post Surgery)    PATIENT IS ABLE TO RETURN TO WORK AS OF: 2022    [XX] SEDENTARY WORK: Lifting 10 pounds maximum and occasionally lifting and/or carrying articles such as dockers, ledgers and small tools.  Although a sedentary job is defined as one which involved sitting, a certain amount of walking and standing are required only occasionally and other sedentary criteria are met.    [_] LIGHT WORK: Lifting 20 pounds with frequent lifting and/or carrying objects weighing up to 10 pounds.  Even though the weight lifted may be only a negotiable amount, a job is in the category when it involves sitting most of the time with a degree of pushing/pulling of arm and/or leg controls.    [_] MEDIUM WORK: Lifting of 50 pounds maximum with frequent lifting and/or carrying of objects up to 25 pounds.    [_] HEAVY WORK: Lifting of 100 pounds maximum with frequent lifting and/or carrying objects up to 50 pounds.    [_] VERY HEAVY WORK: Lifting objects in excess of 100 pounds with frequent lifting and/or carrying of objects weighing 50 pounds or more.    [_] REGULAR DUTY: [_] No Restrictions. [_] With Restrictions (See comments below0:    COMMENTS      IKE DUPONT,

## 2022-12-15 NOTE — PROGRESS NOTES
Subjective:       Patient ID: Deborah Cross is a 52 y.o. female.  Chief Complaint   Patient presents with    Right Lower Leg - Follow-up     7.5 WEEK F/U IMN RIGHT TIBIA, LEFT ANKLE DISLOCATION, LEFT 5TH MT BASE FX, REPORTS PAIN TO RIGHT LOWER LEG.         HPI     Patient presents for 7 week follow up IMN right tibia, left ankle dislocation and 5th MT base fx. States she is having some pain in the right lower leg since her last visit. She has been compliant with non weight bearing. Is eager to begin weight bearing and ambulation. Does have a CAM boot to the Left ankle which she does not have with her today. 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     ROS:  Constitutional: Denies fever chills  Eyes: No change in vision  ENT: No ringing or current infections  CV: No chest pain  Resp: No labored breathing  MSK: Pain evident at site of injury located in HPI,   Integ: No signs of abrasions or lacerations  Neuro: No numbness or tingling  Lymphatic: No swelling outside the area of injury     Current Outpatient Medications on File Prior to Visit   Medication Sig Dispense Refill    ALPRAZolam (XANAX) 1 MG tablet TAKE ONE TABLET BY MOUTH at bedtime AS NEEDED FOR ANXIETY 30 tablet 5    amLODIPine (NORVASC) 5 MG tablet TAKE ONE TABLET BY MOUTH DAILY 90 tablet 1    enoxaparin (LOVENOX) 40 mg/0.4 mL Syrg Inject 0.4 mLs (40 mg total) into the skin once daily. 30 each 0    FLUoxetine 40 MG capsule TAKE ONE CAPSULE BY MOUTH EVERY MORNING 90 capsule 1    fluticasone propionate (FLONASE) 50 mcg/actuation nasal spray spray 1 spray into each nostril twice per day 16 g 1    metFORMIN (GLUCOPHAGE) 500 MG tablet TAKE ONE TABLET BY MOUTH once daily 90 tablet 1    ondansetron (ZOFRAN-ODT) 8 MG TbDL Take 8 mg by mouth every 6 (six) hours as needed.      simvastatin (ZOCOR) 40 MG tablet Take 40 mg by mouth once daily at 6am.      sumatriptan (IMITREX) 25 MG Tab TAKE ONE TABLET BY MOUTH as a  "single DOSE AND MAY REPEAT DOSE in 2 hours if needed 9 tablet 1    amoxicillin (AMOXIL) 875 MG tablet Take 1 tablet (875 mg total) by mouth every 12 (twelve) hours. 20 tablet 0    [] oxyCODONE-acetaminophen (PERCOCET)  mg per tablet Take 1 tablet by mouth every 8 (eight) hours as needed for Pain. for pain. 21 tablet 0     No current facility-administered medications on file prior to visit.          Objective:      /85   Pulse 77   Resp 18   Ht 5' 4" (1.626 m)   Wt 63 kg (138 lb 14.2 oz)   BMI 23.84 kg/m²   Physical Exam  General the patient is alert and oriented x3 no acute distress nontoxic-appearing appropriate affect.    Constitutional: Vital signs are examined and stable.  Resp: No signs of labored breathing    Musculoskeletal:     Right lower extremity: no swelling; no deformity; no open wounds; incisions are healing well without erythema, drainage, signs of dehiscence; compartments are soft and compressible; No pain with ROM at the hip, knee, or ankle; mild tenderness over lateral ankle ; dorsi/plantar flexes the foot; SILT distally; BCR distally; DP pulse 2+  Left lower extremity: no swelling; no deformity; no open wounds; incisions are healing well without erythema or drainage; compartments are soft and compressible; No pain with ROM at the hip, knee, or ankle; mild tenderness to palpation; dorsi/plantar flexes the foot; SILT distally; BCR distally; DP pulse 2+      Body mass index is 23.84 kg/m².  Ideal body weight: 54.7 kg (120 lb 9.5 oz)  Adjusted ideal body weight: 58 kg (127 lb 14.6 oz)  Hemoglobin A1c   Date Value Ref Range Status   12/15/2021 5.9 <<=7.0 % Final     Hgb   Date Value Ref Range Status   10/27/2022 8.6 (L) 12.0 - 16.0 gm/dL Final   10/26/2022 9.5 (L) 12.0 - 16.0 gm/dL Final     Hct   Date Value Ref Range Status   10/27/2022 26.4 (L) 37.0 - 47.0 % Final   10/26/2022 29.8 (L) 37.0 - 47.0 % Final     No results found for: IRON  No components found for: FROLATE  Vit D " 25 OH   Date Value Ref Range Status   12/15/2021 27.7 (L) 30.0 - 80.0 ng/mL Final     WBC   Date Value Ref Range Status   10/27/2022 9.8 4.5 - 11.5 x10(3)/mcL Final   10/26/2022 11.4 4.5 - 11.5 x10(3)/mcL Final       Radiology: 3 view x ray of the right tibia and fibula: hardware intact without signs of loosening or failure. Early consolidation noted to fracture sites, consistent with routine healing.   3 view x ray of the left ankle: anchor in place with positioning unchanged as compared to previous images. No acute injury noted  3 view x ray of the left foot: fracture of the left 5th MT unchanged as compared to previous images        Assessment:         1. 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 with routine healing, subsequent encounter  X-Ray Tibia Fibula 2 View Right    Ambulatory referral/consult to Physical/Occupational Therapy    oxyCODONE-acetaminophen (PERCOCET) 5-325 mg per tablet      2. Laceration of ankle with foreign body, left, initial encounter  X-Ray Ankle Complete Left      3. Closed nondisplaced fracture of fifth metatarsal bone of left foot with routine healing, subsequent encounter  X-Ray Foot Complete Left              Plan:         No follow-ups on file.    Deborah was seen today for follow-up.    Diagnoses and all orders for this visit:    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 with routine healing, subsequent encounter  -     X-Ray Tibia Fibula 2 View Right; Future  -     Ambulatory referral/consult to Physical/Occupational Therapy; Future  -     oxyCODONE-acetaminophen (PERCOCET) 5-325 mg per tablet; Take 1 tablet by mouth every 12 (twelve) hours as needed for Pain.    Laceration of ankle with foreign body, left, initial encounter  -     X-Ray Ankle Complete Left; Future    Closed nondisplaced fracture of fifth metatarsal bone of left foot with routine healing, subsequent encounter  -     X-Ray Foot Complete Left; Future        - Remain NWB to the E through the  week, may begin weight bearing as tolerated to the LLE next week in the CAM boot. Physical therapy order provided today to begin ambulation and gait training.   - refill medications today. Continue taper  - nicotine cessation discussed. Patient smoking approx 1 cigarette per day at this time. Voiced understanding  -Follow up with Dr. Scott in approx 8 weeks for repeat x rays of the left and right lower ex. No foot x rays necessary at the next visit.   -ED precautions given    The above findings, diagnostics, and treatment plan were discussed with Dr. Scott who is in agreement with the plan of care except as stated in additional documentation.       Nupur Mark PA-C          Future Appointments   Date Time Provider Department Center   12/15/2022  8:30 AM Jessica South PT [facility]   12/28/2022  8:15 AM Adamaris Cao MD [facility] [place]   2/8/2023  9:00 AM Dillon Scott DO [facility]

## 2022-12-19 DIAGNOSIS — S82.401E TYPE I OR II OPEN FRACTURE OF RIGHT TIBIA AND FIBULA WITH ROUTINE HEALING, SUBSEQUENT ENCOUNTER: ICD-10-CM

## 2022-12-19 DIAGNOSIS — S82.201E TYPE I OR II OPEN FRACTURE OF RIGHT TIBIA AND FIBULA WITH ROUTINE HEALING, SUBSEQUENT ENCOUNTER: ICD-10-CM

## 2022-12-20 RX ORDER — OXYCODONE AND ACETAMINOPHEN 5; 325 MG/1; MG/1
1 TABLET ORAL
Qty: 7 TABLET | Refills: 0 | Status: SHIPPED | OUTPATIENT
Start: 2022-12-20 | End: 2022-12-26 | Stop reason: SDUPTHER

## 2022-12-31 ENCOUNTER — HOSPITAL ENCOUNTER (EMERGENCY)
Facility: HOSPITAL | Age: 52
Discharge: HOME | End: 2022-12-31
Attending: SPECIALIST
Payer: MEDICAID

## 2022-12-31 VITALS
TEMPERATURE: 98 F | DIASTOLIC BLOOD PRESSURE: 92 MMHG | SYSTOLIC BLOOD PRESSURE: 136 MMHG | RESPIRATION RATE: 18 BRPM | OXYGEN SATURATION: 98 % | HEART RATE: 84 BPM

## 2022-12-31 DIAGNOSIS — G89.29 OTHER CHRONIC PAIN: Primary | ICD-10-CM

## 2022-12-31 PROCEDURE — 99283 EMERGENCY DEPT VISIT LOW MDM: CPT

## 2022-12-31 PROCEDURE — 25000003 PHARM REV CODE 250: Performed by: SPECIALIST

## 2022-12-31 RX ORDER — KETOROLAC TROMETHAMINE 10 MG/1
10 TABLET, FILM COATED ORAL
Status: COMPLETED | OUTPATIENT
Start: 2022-12-31 | End: 2022-12-31

## 2022-12-31 RX ORDER — DICLOFENAC SODIUM 50 MG/1
50 TABLET, DELAYED RELEASE ORAL 3 TIMES DAILY PRN
Qty: 30 TABLET | Refills: 0 | Status: ON HOLD | OUTPATIENT
Start: 2022-12-31 | End: 2023-04-12 | Stop reason: HOSPADM

## 2022-12-31 RX ADMIN — KETOROLAC TROMETHAMINE 10 MG: 10 TABLET, FILM COATED ORAL at 04:12

## 2022-12-31 NOTE — ED NOTES
Patient c/o bilateral lower extremity pain. She reports that it hurts worse on right side. She fell off a ladder 2 months ago and had surgery to bilateral lower extremities. Patient sitting in a wheelchair with bilateral ortho boots on. No acute distress noted

## 2022-12-31 NOTE — ED PROVIDER NOTES
Encounter Date: 2022       History     Chief Complaint   Patient presents with    Leg Pain     C/o pain to lower right extremity pain from fall 2 months ago took percocet yesterday      Patient presents with chronic pain to her right leg following a fall 2 months ago and states she took her last Percocet yesterday    The history is provided by the patient.   Leg Pain     Review of patient's allergies indicates:  No Known Allergies  History reviewed. No pertinent past medical history.  Past Surgical History:   Procedure Laterality Date     SECTION  2005    HYSTERECTOMY  2015    INSERTION OF INTRAMEDULLARY NAIL INTO TIBIA Right 10/24/2022    Procedure: INSERTION, INTRAMEDULLARY JACK, TIBIA;  Surgeon: Dillon Scott DO;  Location: [facility] OR;  Service: Orthopedics;  Laterality: Right;  supine, vascular, bone foam, c-arm, wash stuff    REPAIR OF LIGAMENT OF ANKLE  10/24/2022    Procedure: REPAIR, LIGAMENT, ANKLE;  Surgeon: Dillon Scott DO;  Location: [facility] OR;  Service: Orthopedics;;     Family History   Problem Relation Age of Onset    Diabetes Mother     Heart disease Mother     Diabetes Father      Social History     Tobacco Use    Smoking status: Some Days     Packs/day: 0.50     Years: 15.00     Pack years: 7.50     Types: Cigarettes    Smokeless tobacco: Never   Substance Use Topics    Alcohol use: Not Currently    Drug use: Not Currently     Types: Benzodiazepines, Marijuana     Review of Systems   Constitutional: Negative.    HENT: Negative.     Respiratory: Negative.     Cardiovascular: Negative.    Gastrointestinal: Negative.    Musculoskeletal:  Positive for arthralgias.   Skin: Negative.    Neurological: Negative.    All other systems reviewed and are negative.    Physical Exam     Initial Vitals [22 0415]   BP Pulse Resp Temp SpO2   (!) 136/92 84 18 98 °F (36.7 °C) 98 %      MAP       --         Physical Exam    Constitutional: She appears well-developed and well-nourished.   HENT:    Head: Normocephalic and atraumatic.   Eyes: EOM are normal. Pupils are equal, round, and reactive to light.   Cardiovascular:  Normal rate.           Pulmonary/Chest: No respiratory distress.   Musculoskeletal:      Comments: Leg pain     Neurological: She is alert and oriented to person, place, and time. GCS score is 15. GCS eye subscore is 4. GCS verbal subscore is 5. GCS motor subscore is 6.       ED Course   Procedures  Labs Reviewed - No data to display       Imaging Results    None          Medications   ketorolac tablet 10 mg (has no administration in time range)                       Patient to stop ibuprofen while taking diclofenac       Clinical Impression:   Final diagnoses:  [G89.29] Other chronic pain (Primary)        ED Disposition Condition    Discharge Stable          ED Prescriptions       Medication Sig Dispense Start Date End Date Auth. Provider    diclofenac (VOLTAREN) 50 MG EC tablet Take 1 tablet (50 mg total) by mouth 3 (three) times daily as needed (pain). 30 tablet 12/31/2022 -- Russ Rinaldi MD          Follow-up Information       Follow up With Specialties Details Why Contact Info    Primary MD  Schedule an appointment as soon as possible for a visit       Pain management  Schedule an appointment as soon as possible for a visit                Russ Rinaldi MD  12/31/22 0426       Russ Rinaldi MD  12/31/22 0428

## 2023-01-04 ENCOUNTER — CLINICAL SUPPORT (OUTPATIENT)
Dept: REHABILITATION | Facility: HOSPITAL | Age: 53
End: 2023-01-04
Payer: MEDICAID

## 2023-01-04 DIAGNOSIS — R26.2 UNABLE TO AMBULATE: ICD-10-CM

## 2023-01-04 DIAGNOSIS — S91.022A LACERATION OF ANKLE WITH FOREIGN BODY, LEFT, INITIAL ENCOUNTER: ICD-10-CM

## 2023-01-04 DIAGNOSIS — S82.201A CLOSED FRACTURE OF RIGHT TIBIA AND FIBULA, INITIAL ENCOUNTER: Primary | ICD-10-CM

## 2023-01-04 DIAGNOSIS — S82.401A CLOSED FRACTURE OF RIGHT TIBIA AND FIBULA, INITIAL ENCOUNTER: Primary | ICD-10-CM

## 2023-01-04 DIAGNOSIS — M79.604 ACUTE LEG PAIN, RIGHT: ICD-10-CM

## 2023-01-04 PROCEDURE — 97110 THERAPEUTIC EXERCISES: CPT

## 2023-01-04 NOTE — PROGRESS NOTES
[facility]   Physical Therapy Treatment Note     Name: Deborah Ahmadiais  Clinic Number: [id number]    Therapy Diagnosis:   Encounter Diagnoses   Name Primary?    Closed fracture of right tibia and fibula, initial encounter Yes    Laceration of ankle with foreign body, left, initial encounter     Unable to ambulate     Acute leg pain, right        Physician: Dillon Scott DO    Authorization Period Expiration: 1/30/2023  Plan of Care Expiration: 1/30/2023  Reassessment / POC completed: 12/6/2022  Visit # / Visits authorized:  1/12    PTA Visit: 2/5  Visit Date: 1/4/2023    Time In: 08:30 am  Time Out: 09:00 am  Total Billable Time: 30 minutes    Precautions: standard, fall, WBAT in CAM boot L LE, WBAT R LE     SUBJECTIVE     Pt reports: pain in R mid tibia area, no pain in L ankle. She went back to work 1/3/2023 but used wheelchair and walking boot, sat most of the day. Had follow-up with surgeon on 12/14/2022    She was not compliant with home exercise program.  Response to previous treatment: good  Functional change: improved strength, walking using RW vs wheelchair    Pain: 6/10 with medication for pain   Location: right lower leg     OBJECTIVE     Objective Measures updated at progress report unless specified.     Treatment     Deborah received the treatments listed below:      therapeutic exercises to develop strength and endurance for 30 minutes including:     Therapeutic Exercise Grid     Exercise 1  Exercise 2  Exercise 3  Exercise 4    Exercise :    B LE  4 way hip LAQ, knee flexion with TB BAPS: L ankle ROM (CW, CCW)   Sit to stands     Repetition/Time :    20  20   15 each   20     Resist or Assist :    2 lbs   green TB   L2        Comment :       L LE only     B LE     Done :    no  yes   yes   yes                      Exercise 5  Exercise 6  Exercise 7  Exercise 8    Exercise :    NuStep   L ankle alphabets   L ankle towel stretch   Balance pad- single leg stand R      Repetition/Time :    4 min      3 x 30 sec   3 x 15 sec     Resist or Assist :    L6              Comment :    B UE, R LE              Done :    no   no   no   no                       Exercise 9  Exercise 10  Exercise 11  Exercise 12    Exercise :    L ankle TB: DF, PF, IV, EV              Repetition/Time :    20              Resist or Assist :    red TB              Comment :                  Done :    yes                            Patient Education and Home Exercises     Home Exercises Provided and Patient Education Provided     Education provided: Educated pt on importance of HEP and encouraged pt to continue daily    ASSESSMENT     Discussed MD orders for WBAT in CAM boot on L LE. POC modified based on upgraded WB status with L ankle strengthening exercises added. Pt had difficulty coordinating L ankle IV and EV movements. To added proprioception exercises per pt's tolerance. Pt amb up on L toes due to fear vs decreased ankle ROM    Deborah Is progressing well towards her goals.   Pt prognosis is Good.     Pt will continue to benefit from skilled outpatient physical therapy to address the deficits listed in the problem list box on initial evaluation, provide pt/family education and to maximize pt's level of independence in the home and community environment.     Pt's spiritual, cultural and educational needs considered and pt agreeable to plan of care and goals.     Anticipated Barriers for therapy: WBAT on L LE, R LE pain     Goals:  Short Term Goals: 4 weeks      Pt will demonstrate knowledge and independence with HEP to continue with exercises outside of therapy- progressing (pt reports moves around a lot during the day but does not do HEP)     Reduce c/o pain in R LE to < 4/10 pain level with daily activities- not met (c/o 9/10 pain level R medial, proximal tibia- out of pain medication)     Improve strength in B LE to 4+/5 MMT throughout in order to improve tolerance with overall functional mobility-  met (4+/5 MMT throughout)     Improve safety with transfers to modified Independent level- progressing (Supervision with modified pivot transfer on R LE using RW NWB L LE)     Long Term Goals: 6 weeks      Pt will improve score on ankle FOTO > 64 which indicates improved ability to perform daily tasks with less difficulty and pain- not met (36 declined from 41 on eval (pt was using wheelchair on eval, now using RW))     Improve strength in B LE to 5/5 MMT throughout in order to improve tolerance with overall functional mobility- progressing (4+/5 MMT throughout)     Pt will improve distance and stability with amb using RW performed with SBA 50 ft to allow pt to amb in her home safely- met (amb using RW NWB L LE with supervision, distance limited due to R LE pain)     Pt will reduce risk for falls and improve ability and safety with transfers as evidence by improved 5x sit to stands to < 20 secs- met (12 secs required)     PLAN     Continue with current Plan of Care and progress per pt's tolerance    Jessica South, PT

## 2023-01-11 ENCOUNTER — CLINICAL SUPPORT (OUTPATIENT)
Dept: REHABILITATION | Facility: HOSPITAL | Age: 53
End: 2023-01-11
Payer: MEDICAID

## 2023-01-11 ENCOUNTER — DOCUMENTATION ONLY (OUTPATIENT)
Dept: REHABILITATION | Facility: HOSPITAL | Age: 53
End: 2023-01-11

## 2023-01-11 DIAGNOSIS — S82.201A CLOSED FRACTURE OF RIGHT TIBIA AND FIBULA, INITIAL ENCOUNTER: Primary | ICD-10-CM

## 2023-01-11 DIAGNOSIS — S91.022A LACERATION OF ANKLE WITH FOREIGN BODY, LEFT, INITIAL ENCOUNTER: ICD-10-CM

## 2023-01-11 DIAGNOSIS — R26.2 UNABLE TO AMBULATE: ICD-10-CM

## 2023-01-11 DIAGNOSIS — S82.401A CLOSED FRACTURE OF RIGHT TIBIA AND FIBULA, INITIAL ENCOUNTER: Primary | ICD-10-CM

## 2023-01-11 DIAGNOSIS — M79.604 ACUTE LEG PAIN, RIGHT: ICD-10-CM

## 2023-01-11 PROCEDURE — 97110 THERAPEUTIC EXERCISES: CPT

## 2023-01-11 NOTE — PROGRESS NOTES
[facility]   Physical Therapy Treatment Note     Name: Deborah Ahmadiais  Clinic Number: [id number]    Therapy Diagnosis:   Encounter Diagnoses   Name Primary?    Closed fracture of right tibia and fibula, initial encounter Yes    Laceration of ankle with foreign body, left, initial encounter     Unable to ambulate     Acute leg pain, right        Physician: Dillon Scott DO    Authorization Period Expiration: 1/30/2023  Plan of Care Expiration: 1/30/2023  Reassessment / POC completed: 12/6/2022  Visit # / Visits authorized:  2/12    PTA Visit: 0/5  Visit Date: 1/11/2023    Time In: 08:30 am  Time Out: 09:00 am  Total Billable Time: 30 minutes    Precautions: standard, fall, WBAT B LE     SUBJECTIVE     Pt reports: pain in R mid tibia area, no pain in L ankle. She sits most of the day at work. Purchased good supportive shoes. Having trouble sleeping at night    She was not compliant with home exercise program.  Response to previous treatment: good  Functional change: improved strength, L ankle ROM, normal gait pattern    Ankle FOTO functional score: 43 improved from 41 on eval    Pain: 8/10 without medication for pain   Location: right lower leg     OBJECTIVE     Objective Measures updated at progress report unless specified.     Treatment     Deborah received the treatments listed below:      therapeutic exercises to develop strength and endurance for 30 minutes including:     Therapeutic Exercise Grid     Exercise 1  Exercise 2  Exercise 3  Exercise 4    Exercise :    B LE  4 way hip LAQ, knee flexion with TB BAPS: L ankle ROM (CW, CCW)   Sit to stands     Repetition/Time :    20  20   15 each   20     Resist or Assist :    2 lbs   green TB   L2   Feet on foam mat     Comment :       Knee flexion only- B LE only     B LE     Done :    no  yes   yes   yes                      Exercise 5  Exercise 6  Exercise 7  Exercise 8    Exercise :    NuStep   L ankle alphabets   L ankle towel stretch    single leg stance    Repetition/Time :    4 min      3 x 30 sec   B LE- 30 sec     Resist or Assist :    L6              Comment :    B UE, R LE              Done :    no   no   no   yes                       Exercise 9  Exercise 10  Exercise 11  Exercise 12    Exercise :    L ankle TB: DF, PF, IV, EV   Squats, heel raises           Repetition/Time :    20   20           Resist or Assist :    green TB              Comment :       supported           Done :    yes   yes                         Patient Education and Home Exercises     Home Exercises Provided and Patient Education Provided     Education provided: Educated pt on importance of HEP and encouraged pt to continue daily    ASSESSMENT     Improved gait pattern with minimal to no abnormalities visible. L ankle ROM and strength continues to improve with no pain however pain in R mid tibia area. Upgraded POC. Performed single leg stance well to improve proprioceptive input with multiple attempts required on L LE    Deborah Is progressing well towards her goals.   Pt prognosis is Good.     Pt will continue to benefit from skilled outpatient physical therapy to address the deficits listed in the problem list box on initial evaluation, provide pt/family education and to maximize pt's level of independence in the home and community environment.     Pt's spiritual, cultural and educational needs considered and pt agreeable to plan of care and goals.     Anticipated Barriers for therapy: WBAT on L LE, R LE pain     Goals:  Short Term Goals: 4 weeks      Pt will demonstrate knowledge and independence with HEP to continue with exercises outside of therapy- progressing (pt reports moves around a lot during the day but does not do HEP)     Reduce c/o pain in R LE to < 4/10 pain level with daily activities- not met (c/o 9/10 pain level R medial, proximal tibia- out of pain medication)     Improve strength in B LE to 4+/5 MMT throughout in order to improve tolerance with  overall functional mobility- met (4+/5 MMT throughout)     Improve safety with transfers to modified Independent level- progressing (Supervision with modified pivot transfer on R LE using RW NWB L LE)     Long Term Goals: 6 weeks      Pt will improve score on ankle FOTO > 64 which indicates improved ability to perform daily tasks with less difficulty and pain- not met (36 declined from 41 on eval (pt was using wheelchair on eval, now using RW))     Improve strength in B LE to 5/5 MMT throughout in order to improve tolerance with overall functional mobility- progressing (4+/5 MMT throughout)     Pt will improve distance and stability with amb using RW performed with SBA 50 ft to allow pt to amb in her home safely- met (amb using RW NWB L LE with supervision, distance limited due to R LE pain)     Pt will reduce risk for falls and improve ability and safety with transfers as evidence by improved 5x sit to stands to < 20 secs- met (12 secs required)     PLAN     Continue with current Plan of Care and progress per pt's tolerance    Jessica South, PT

## 2023-01-11 NOTE — PROGRESS NOTES
Performed face to face conference with Ricardo Fortune PTA, regarding pt's POC and progress thus far

## 2023-01-13 ENCOUNTER — CLINICAL SUPPORT (OUTPATIENT)
Dept: REHABILITATION | Facility: HOSPITAL | Age: 53
End: 2023-01-13
Payer: MEDICAID

## 2023-01-13 DIAGNOSIS — S82.201A CLOSED FRACTURE OF RIGHT TIBIA AND FIBULA, INITIAL ENCOUNTER: Primary | ICD-10-CM

## 2023-01-13 DIAGNOSIS — M79.604 ACUTE LEG PAIN, RIGHT: ICD-10-CM

## 2023-01-13 DIAGNOSIS — R26.2 UNABLE TO AMBULATE: ICD-10-CM

## 2023-01-13 DIAGNOSIS — S82.401A CLOSED FRACTURE OF RIGHT TIBIA AND FIBULA, INITIAL ENCOUNTER: Primary | ICD-10-CM

## 2023-01-13 DIAGNOSIS — S91.022A LACERATION OF ANKLE WITH FOREIGN BODY, LEFT, INITIAL ENCOUNTER: ICD-10-CM

## 2023-01-13 PROCEDURE — 97110 THERAPEUTIC EXERCISES: CPT

## 2023-01-13 NOTE — PROGRESS NOTES
[facility]   Physical Therapy Treatment Note     Name: Deborah Ahmadiais  Clinic Number: [id number]    Therapy Diagnosis:   Encounter Diagnoses   Name Primary?    Closed fracture of right tibia and fibula, initial encounter Yes    Laceration of ankle with foreign body, left, initial encounter     Unable to ambulate     Acute leg pain, right      Physician: Dillon Scott DO    Authorization Period Expiration: 1/30/2023  Plan of Care Expiration: 1/30/2023  Reassessment / POC completed: 12/6/2022  Visit # / Visits authorized:  3/12    PTA Visit: 0/5  Visit Date: 1/13/2023    Time In: 08:30 am  Time Out: 08:55 am  Total Billable Time: 25 minutes    Precautions: standard, fall, WBAT B LE     SUBJECTIVE     Pt reports: increased pain in R tibia and L ankle today wearing B CAM boots and using wheelchair for mobility. Reports no increased pain or problems after last PT session  She was not compliant with home exercise program.  Response to previous treatment: good  Functional change: improved strength, L ankle ROM, normal gait pattern    Ankle FOTO functional score: 43 improved from 41 on eval    Pain: 8/10 R tibia, 4/10 L ankle without medication for pain   Location: right lower leg, L ankle     OBJECTIVE     Objective Measures updated at progress report unless specified.     Treatment     Deborah received the treatments listed below:      therapeutic exercises to develop strength and endurance for 30 minutes including:     Therapeutic Exercise Grid     Exercise 1  Exercise 2  Exercise 3  Exercise 4    Exercise :    B LE  4 way hip LAQ, knee flexion with TB BAPS: L ankle ROM (CW, CCW)   Sit to stands     Repetition/Time :    20  20   20 each   20     Resist or Assist :    2 lbs   green TB   L2   Feet on foam mat     Comment :       Knee flexion only- B LE only     B LE     Done :    no  yes   yes   no                      Exercise 5  Exercise 6  Exercise 7  Exercise 8    Exercise :    NuStep   L  ankle alphabets   L ankle towel stretch   single leg stance    Repetition/Time :    4 min      3 x 30 sec   B LE- 30 sec     Resist or Assist :    L6              Comment :    B UE, R LE              Done :    no   no   no   no                       Exercise 9  Exercise 10  Exercise 11  Exercise 12    Exercise :    L ankle TB: DF, PF, IV, EV   Squats, heel raises           Repetition/Time :    20   20           Resist or Assist :    green TB              Comment :       supported           Done :    yes   no                         Patient Education and Home Exercises     Home Exercises Provided and Patient Education Provided     Education provided: Educated pt on importance of HEP and encouraged pt to continue daily    ASSESSMENT     Pt unable to perform all exercises per POC due to increased pain in B LE requiring pt to wear B CAM boots and use wheelchair to reduce pain. Information provided on L ankle brace with medial and lateral upright supports to use vs CAM boot to be less bulky but still provide support    Deborah Is progressing well towards her goals.   Pt prognosis is Good.     Pt will continue to benefit from skilled outpatient physical therapy to address the deficits listed in the problem list box on initial evaluation, provide pt/family education and to maximize pt's level of independence in the home and community environment.     Pt's spiritual, cultural and educational needs considered and pt agreeable to plan of care and goals.     Anticipated Barriers for therapy: WBAT on L LE, R LE pain     Goals:  Short Term Goals: 4 weeks      Pt will demonstrate knowledge and independence with HEP to continue with exercises outside of therapy- progressing (pt reports moves around a lot during the day but does not do HEP)     Reduce c/o pain in R LE to < 4/10 pain level with daily activities- not met (c/o 9/10 pain level R medial, proximal tibia- out of pain medication)     Improve strength in B LE to 4+/5 MMT  throughout in order to improve tolerance with overall functional mobility- met (4+/5 MMT throughout)     Improve safety with transfers to modified Independent level- progressing (Supervision with modified pivot transfer on R LE using RW NWB L LE)     Long Term Goals: 6 weeks      Pt will improve score on ankle FOTO > 64 which indicates improved ability to perform daily tasks with less difficulty and pain- not met (36 declined from 41 on eval (pt was using wheelchair on eval, now using RW))     Improve strength in B LE to 5/5 MMT throughout in order to improve tolerance with overall functional mobility- progressing (4+/5 MMT throughout)     Pt will improve distance and stability with amb using RW performed with SBA 50 ft to allow pt to amb in her home safely- met (amb using RW NWB L LE with supervision, distance limited due to R LE pain)     Pt will reduce risk for falls and improve ability and safety with transfers as evidence by improved 5x sit to stands to < 20 secs- met (12 secs required)     PLAN     Continue with current Plan of Care and progress per pt's tolerance    Jessica South, PT

## 2023-01-18 ENCOUNTER — CLINICAL SUPPORT (OUTPATIENT)
Dept: REHABILITATION | Facility: HOSPITAL | Age: 53
End: 2023-01-18
Payer: MEDICAID

## 2023-01-18 DIAGNOSIS — S82.201A CLOSED FRACTURE OF RIGHT TIBIA AND FIBULA, INITIAL ENCOUNTER: Primary | ICD-10-CM

## 2023-01-18 DIAGNOSIS — S91.022A LACERATION OF ANKLE WITH FOREIGN BODY, LEFT, INITIAL ENCOUNTER: ICD-10-CM

## 2023-01-18 DIAGNOSIS — S82.401A CLOSED FRACTURE OF RIGHT TIBIA AND FIBULA, INITIAL ENCOUNTER: Primary | ICD-10-CM

## 2023-01-18 DIAGNOSIS — R26.2 UNABLE TO AMBULATE: ICD-10-CM

## 2023-01-18 DIAGNOSIS — M79.604 ACUTE LEG PAIN, RIGHT: ICD-10-CM

## 2023-01-18 PROCEDURE — 97110 THERAPEUTIC EXERCISES: CPT

## 2023-01-18 NOTE — PLAN OF CARE
[facility]  Physical Therapy Plan of Care Note    Name: Deborah Cross  Clinic Number: [id number]    Therapy Diagnosis:   Encounter Diagnoses   Name Primary?    Closed fracture of right tibia and fibula, initial encounter Yes    Laceration of ankle with foreign body, left, initial encounter     Unable to ambulate     Acute leg pain, right      Physician: Dillon Scott DO    Visit Date: 1/18/2023  Time In: 08:30 am  Time Out: 09:00 am  Total billable minutes: 30 minutes     Physician Orders: PT eval and treat  Medical Diagnosis from Referral:  Fx of R tibia and fibula, L ankle dislocation   Evaluation Date:11/4/2022  Authorization Period Expiration: 1/30/2023  Reassessment / POC completed: 1/18/2023  Visit # / Visits authorized: 4/ 12    Precautions: Standard  Functional Level Prior to Evaluation: Independent    SUBJECTIVE     Pt reports: she has minimal pain in L ankle today, mostly R mid shin area especially when walking with minimal to no relief with any medication. She walked into PT with shoes on vs wheelchair with B CAM boots like last session. She has follow-up with surgeon on 2/8/2023.  She was not compliant with home exercise program.  Response to previous treatment: good  Functional change: improved strength, balance, gait pattern    Pain: 5/10 R tibia, 1/10 L ankle  Location: R tibia, L ankle     OBJECTIVE     Update:   B LE strength: 4+/5 MMT throughout  L ankle ROM: DF 5 degrees, PF 30 degrees    Functional mobility: modified Independent with all mobility. Improved gait pattern with minimal abnormalities however distance limited due to R tibia pain    Single leg stance: B LE: 30 secs (on firm surface)     Treatment:   therapeutic exercises to develop strength and endurance for 30 minutes including:     Therapeutic Exercise Grid     Exercise 1  Exercise 2  Exercise 3  Exercise 4    Exercise :    Tandem walking LAQ, knee flexion with TB BAPS: L ankle ROM (CW, CCW)   Sit to stands      Repetition/Time :    2 laps 20   20 each   20     Resist or Assist :     green TB   L2   Feet on foam mat     Comment :       Knee flexion only- B LE    B LE     Done :    yes  yes   no  yes                      Exercise 5  Exercise 6  Exercise 7  Exercise 8    Exercise :    Heel walking, toe walking  L ankle towel stretch   single leg stance on foam mat    Repetition/Time :        3 x 30 sec   R:22 sec max  L:19 sec max    Resist or Assist :                Comment :                Done :    no    no   yes                     Exercise 9  Exercise 10  Exercise 11  Exercise 12    Exercise :    L ankle TB: DF, PF, IV, EV   Squats, heel raises           Repetition/Time :    20   20           Resist or Assist :    green TB   On foam pad           Comment :       supported           Done :    yes   yes                         ASSESSMENT     Update: Pt progressing well with B LE strengthening, stability with amb and gait pattern, proprioceptive input improving balance, and reduction of pain allowing pt to be less limited with daily activities. Pt mostly limited due to R tibia pain occasionally requiring pt to wear CAM boots and use wheelchair for mobility. Pt purchased and wears good supportive shoes. Recommended pt increase daily standing and walking to improve tolerance and maybe reduce pain. Progressing POC to challenge ankle strategies and balance. Pt to continue with PT to address continued deficits with planned discharge from PT at end of authorization period     Goals:    Short Term Goals: 4 weeks      Pt will demonstrate knowledge and independence with HEP to continue with exercises outside of therapy- progressing (pt reports moves around a lot during the day but does not do HEP)     Reduce c/o pain in R LE to < 4/10 pain level with daily activities- progressing (c/o 5/10 pain level R mid tibia, 1/10 L ankle)     Improve strength in B LE to 4+/5 MMT throughout in order to improve tolerance with overall  functional mobility- met (4+/5 MMT throughout)     Improve safety with transfers to modified Independent level- met (modified Independent WBAT on B LE)     Long Term Goals: 6 weeks      Pt will improve score on ankle FOTO > 64 which indicates improved ability to perform daily tasks with less difficulty and pain- progressing (43 improved from 41 on eval however pt was using wheelchair on eval, now no AD))     Improve strength in B LE to 5/5 MMT throughout in order to improve tolerance with overall functional mobility- progressing (4+/5 MMT throughout)     Pt will improve distance and stability with amb using RW performed with SBA 50 ft to allow pt to amb in her home safely- met (amb without AD modified Independent with improved gait pattern however distance limited due to R LE pain)     Pt will reduce risk for falls and improve ability and safety with transfers as evidence by improved 5x sit to stands to < 20 secs- met (12 secs required)     PLAN     Continue with current POC, progress per pt's tolerance, prepare for discharge from PT at end of authorization period    Jessica South, PT

## 2023-01-20 ENCOUNTER — CLINICAL SUPPORT (OUTPATIENT)
Dept: REHABILITATION | Facility: HOSPITAL | Age: 53
End: 2023-01-20
Payer: MEDICAID

## 2023-01-20 DIAGNOSIS — S82.201A CLOSED FRACTURE OF RIGHT TIBIA AND FIBULA, INITIAL ENCOUNTER: Primary | ICD-10-CM

## 2023-01-20 DIAGNOSIS — S82.401A CLOSED FRACTURE OF RIGHT TIBIA AND FIBULA, INITIAL ENCOUNTER: Primary | ICD-10-CM

## 2023-01-20 DIAGNOSIS — R26.2 UNABLE TO AMBULATE: ICD-10-CM

## 2023-01-20 DIAGNOSIS — S91.022A LACERATION OF ANKLE WITH FOREIGN BODY, LEFT, INITIAL ENCOUNTER: ICD-10-CM

## 2023-01-20 DIAGNOSIS — M79.604 ACUTE LEG PAIN, RIGHT: ICD-10-CM

## 2023-01-20 PROCEDURE — 97110 THERAPEUTIC EXERCISES: CPT

## 2023-01-20 NOTE — PROGRESS NOTES
[facility]PAT[facility] THERAP[facility] AND [facility]   Physical Therapy Treatment Note     Name: Deborah Ahmadiais  Clinic Number: [id number]    Therapy Diagnosis:   Encounter Diagnoses   Name Primary?    Closed fracture of right tibia and fibula, initial encounter Yes    Laceration of ankle with foreign body, left, initial encounter     Unable to ambulate     Acute leg pain, right      Physician: Dillon Scott DO    Authorization Period Expiration: 1/30/2023  Plan of Care Expiration: 1/30/2023  Reassessment / POC completed: 12/6/2022  Visit # / Visits authorized:  5/12    PTA Visit: 0/5  Visit Date: 1/20/2023    Time In: 08:30 am  Time Out: 08:55 am  Total Billable Time: 25 minutes    Precautions: standard, fall, WBAT B LE     SUBJECTIVE     Pt reports: she has increased pain at the end of the day even though she is not walking a lot  She was not compliant with home exercise program.  Response to previous treatment: good  Functional change: improved strength, L ankle ROM, normal gait pattern    Ankle FOTO functional score: 43 improved from 41 on eval    Pain: 5/10 R tibia, 4/10 L ankle without medication for pain   Location: right lower leg, L ankle     OBJECTIVE     Objective Measures updated at progress report unless specified.     Treatment     Deborah received the treatments listed below:      therapeutic exercises to develop strength and endurance for 25 minutes including:     Therapeutic Exercise Grid     Exercise 1  Exercise 2  Exercise 3  Exercise 4    Exercise :    BOSU: static, ball toss LAQ, knee flexion with TB BAPS: L ankle ROM (CW, CCW)   Sit to stands     Repetition/Time :     20   20 each   20     Resist or Assist :    SBA green TB   L2   Feet on foam mat     Comment :       Knee flexion only- B LE only     B LE     Done :    yes  no   no no                      Exercise 5  Exercise 6  Exercise 7  Exercise 8    Exercise :    Leg press Heel walking, toe walking L ankle towel stretch   single leg stance on balance pad     Repetition/Time :    20   2 laps each 3 x 30 sec   R: >30 sec  L: 21 sec max    Resist or Assist :    80 lbs            Comment :                Done :    yes yes no   yes                     Exercise 9  Exercise 10  Exercise 11  Exercise 12    Exercise :    L ankle TB: DF, PF, IV, EV   Squats, heel raises   Tandem walking        Repetition/Time :    20   20           Resist or Assist :    blue TB   On balance pad           Comment :       Unsupported with heel raises           Done :    yes   yes   no                      Patient Education and Home Exercises     Home Exercises Provided and Patient Education Provided     Education provided: Educated pt on importance of HEP and encouraged pt to continue daily    ASSESSMENT     B LE proprioceptive input progressing well with improved stability with dynamic standing balance activities. Continue to progress POC per pt's tolerance to continue to challenge pt. Distance with amb and standing tolerance continues to be limited due to pain    Deborah Is progressing well towards her goals.   Pt prognosis is Good.     Pt will continue to benefit from skilled outpatient physical therapy to address the deficits listed in the problem list box on initial evaluation, provide pt/family education and to maximize pt's level of independence in the home and community environment.     Pt's spiritual, cultural and educational needs considered and pt agreeable to plan of care and goals.     Anticipated Barriers for therapy: WBAT on L LE, R LE pain     Goals:     Short Term Goals: 4 weeks      Pt will demonstrate knowledge and independence with HEP to continue with exercises outside of therapy- progressing (pt reports moves around a lot during the day but does not do HEP)     Reduce c/o pain in R LE to < 4/10 pain level with daily activities- progressing (c/o 5/10 pain level R mid tibia, 1/10 L ankle)     Improve strength in B LE to 4+/5 MMT throughout in order to improve tolerance with  overall functional mobility- met (4+/5 MMT throughout)     Improve safety with transfers to modified Independent level- met (modified Independent WBAT on B LE)     Long Term Goals: 6 weeks      Pt will improve score on ankle FOTO > 64 which indicates improved ability to perform daily tasks with less difficulty and pain- progressing (43 improved from 41 on eval however pt was using wheelchair on eval, now no AD))     Improve strength in B LE to 5/5 MMT throughout in order to improve tolerance with overall functional mobility- progressing (4+/5 MMT throughout)     Pt will improve distance and stability with amb using RW performed with SBA 50 ft to allow pt to amb in her home safely- met (amb without AD modified Independent with improved gait pattern however distance limited due to R LE pain)     Pt will reduce risk for falls and improve ability and safety with transfers as evidence by improved 5x sit to stands to < 20 secs- met (12 secs required)     PLAN     Continue with current Plan of Care and progress per pt's tolerance    Jessica South, PT

## 2023-01-23 ENCOUNTER — CLINICAL SUPPORT (OUTPATIENT)
Dept: REHABILITATION | Facility: HOSPITAL | Age: 53
End: 2023-01-23
Payer: MEDICAID

## 2023-01-23 DIAGNOSIS — S82.201A CLOSED FRACTURE OF RIGHT TIBIA AND FIBULA, INITIAL ENCOUNTER: Primary | ICD-10-CM

## 2023-01-23 DIAGNOSIS — R26.2 UNABLE TO AMBULATE: ICD-10-CM

## 2023-01-23 DIAGNOSIS — S91.022A LACERATION OF ANKLE WITH FOREIGN BODY, LEFT, INITIAL ENCOUNTER: ICD-10-CM

## 2023-01-23 DIAGNOSIS — M79.604 ACUTE LEG PAIN, RIGHT: ICD-10-CM

## 2023-01-23 DIAGNOSIS — S82.401A CLOSED FRACTURE OF RIGHT TIBIA AND FIBULA, INITIAL ENCOUNTER: Primary | ICD-10-CM

## 2023-01-23 PROCEDURE — 97110 THERAPEUTIC EXERCISES: CPT

## 2023-01-23 NOTE — PROGRESS NOTES
[facility]ERAP[facility] AND [facility]   Physical Therapy Treatment Note     Name: Deborah Ahmadiais  Clinic Number: [id number]    Therapy Diagnosis:   Encounter Diagnoses   Name Primary?    Closed fracture of right tibia and fibula, initial encounter Yes    Laceration of ankle with foreign body, left, initial encounter     Unable to ambulate     Acute leg pain, right        Physician: Dillon Scott DO    Authorization Period Expiration: 1/30/2023  Plan of Care Expiration: 1/30/2023  Reassessment / POC completed: 12/6/2022  Visit # / Visits authorized:  6/12    PTA Visit: 0/5  Visit Date: 1/23/2023    Time In: 08:25 am  Time Out: 08:55 am  Total Billable Time: 30 minutes    Precautions: standard, fall, WBAT B LE     SUBJECTIVE     Pt reports: increased pain in B LE, R > L, limiting daily activities and requiring pt to take pain medication. Pt tearful due to limitations and pain  She was not compliant with home exercise program.  Response to previous treatment: good  Functional change: improved strength, L ankle ROM, normal gait pattern    Ankle FOTO functional score: 43 improved from 41 on eval    Pain: 9/10 R tibia, 3/10 L ankle with [place]   Location: right lower leg, L ankle     OBJECTIVE     Objective Measures updated at progress report unless specified.     Treatment     Deborah received the treatments listed below:      therapeutic exercises to develop strength and endurance for 30 minutes including:     Therapeutic Exercise Grid     Exercise 1  Exercise 2  Exercise 3  Exercise 4    Exercise :    BOSU: static, ball toss, squats LAQ, knee flexion with TB BAPS: L ankle ROM (CW, CCW)   Sit to stands     Repetition/Time :    10 20   20 each   20     Resist or Assist :    SBA green TB   L2   Feet on foam mat     Comment :      no ball toss Knee flexion only- B LE only     B LE     Done :    yes  no   no yes                      Exercise 5  Exercise 6  Exercise 7  Exercise 8    Exercise :    Leg press, calf press Heel  walking, toe walking L ankle towel stretch   single leg stance on balance pad    Repetition/Time :    20, 10   2 laps each 3 x 30 sec   L: 21 sec max    Resist or Assist :    80 lbs            Comment :           L LE only     Done :    yes no no   yes                     Exercise 9  Exercise 10  Exercise 11  Exercise 12    Exercise :    L ankle TB: DF, PF, IV, EV   Squats, heel raises   Tandem walking        Repetition/Time :    20   20           Resist or Assist :    blue TB, red TB for IV and EV   On balance pad           Comment :       Unsupported           Done :    yes   yes   no                      Patient Education and Home Exercises     Home Exercises Provided and Patient Education Provided     Education provided: Educated pt on importance of HEP and encouraged pt to continue daily    ASSESSMENT     Discussed possible change in weather that could be causing increased pain. Pt very discouraged about continued severe R LE pain limiting daily activities including standing tolerance and distance with amb. Pt's pain is constant without any specific R LE exercises increasing her pain. Less exercises performed on R LE today    Deborah Is progressing well towards her goals.   Pt prognosis is Good.     Pt will continue to benefit from skilled outpatient physical therapy to address the deficits listed in the problem list box on initial evaluation, provide pt/family education and to maximize pt's level of independence in the home and community environment.     Pt's spiritual, cultural and educational needs considered and pt agreeable to plan of care and goals.     Anticipated Barriers for therapy: WBAT on L LE, R LE pain     Goals:     Short Term Goals: 4 weeks      Pt will demonstrate knowledge and independence with HEP to continue with exercises outside of therapy- progressing (pt reports moves around a lot during the day but does not do HEP)     Reduce c/o pain in R LE to < 4/10 pain level with daily activities-  progressing (c/o 5/10 pain level R mid tibia, 1/10 L ankle)     Improve strength in B LE to 4+/5 MMT throughout in order to improve tolerance with overall functional mobility- met (4+/5 MMT throughout)     Improve safety with transfers to modified Independent level- met (modified Independent WBAT on B LE)     Long Term Goals: 6 weeks      Pt will improve score on ankle FOTO > 64 which indicates improved ability to perform daily tasks with less difficulty and pain- progressing (43 improved from 41 on eval however pt was using wheelchair on eval, now no AD))     Improve strength in B LE to 5/5 MMT throughout in order to improve tolerance with overall functional mobility- progressing (4+/5 MMT throughout)     Pt will improve distance and stability with amb using RW performed with SBA 50 ft to allow pt to amb in her home safely- met (amb without AD modified Independent with improved gait pattern however distance limited due to R LE pain)     Pt will reduce risk for falls and improve ability and safety with transfers as evidence by improved 5x sit to stands to < 20 secs- met (12 secs required)     PLAN     Continue with current Plan of Care and progress per pt's tolerance    Jessica South, PT

## 2023-01-25 ENCOUNTER — CLINICAL SUPPORT (OUTPATIENT)
Dept: REHABILITATION | Facility: HOSPITAL | Age: 53
End: 2023-01-25
Payer: MEDICAID

## 2023-01-25 DIAGNOSIS — R26.2 UNABLE TO AMBULATE: ICD-10-CM

## 2023-01-25 DIAGNOSIS — S82.401A CLOSED FRACTURE OF RIGHT TIBIA AND FIBULA, INITIAL ENCOUNTER: Primary | ICD-10-CM

## 2023-01-25 DIAGNOSIS — M79.604 ACUTE LEG PAIN, RIGHT: ICD-10-CM

## 2023-01-25 DIAGNOSIS — S91.022A LACERATION OF ANKLE WITH FOREIGN BODY, LEFT, INITIAL ENCOUNTER: ICD-10-CM

## 2023-01-25 DIAGNOSIS — S82.201A CLOSED FRACTURE OF RIGHT TIBIA AND FIBULA, INITIAL ENCOUNTER: Primary | ICD-10-CM

## 2023-01-25 PROCEDURE — 97110 THERAPEUTIC EXERCISES: CPT | Mod: CQ

## 2023-01-25 NOTE — PROGRESS NOTES
[facility]   Physical Therapy Treatment Note     Name: Deborah Ahmadiais  Clinic Number: [id number]    Therapy Diagnosis:   Encounter Diagnoses   Name Primary?    Closed fracture of right tibia and fibula, initial encounter Yes    Laceration of ankle with foreign body, left, initial encounter     Unable to ambulate     Acute leg pain, right          Physician: Dillon Scott DO    Authorization Period Expiration: 1/30/2023  Plan of Care Expiration: 1/30/2023  Reassessment / POC completed: 1/18/2022  Visit # / Visits authorized:  6/12    PTA Visit: 1/5    Visit Date: 1/25/2023    Time In: 0830 am  Time Out: 0856 am  Total Billable Time: 26 minutes    Precautions: standard, fall, WBAT B LE     SUBJECTIVE     Pt reports: islightly less pain today, but knows it will be worse after she goes to work all day.   She was not compliant with home exercise program.  Response to previous treatment: good  Functional change: improved strength, L ankle ROM, normal gait pattern        Pain: 6/10 R tibia, 0/10 L ankle with [place]   Location: right lower leg, L ankle     OBJECTIVE     Objective Measures updated at progress report unless specified.     Treatment     Deborah received the treatments listed below:      therapeutic exercises to develop strength and endurance for 26 minutes including:     Therapeutic Exercise Grid     Exercise 1  Exercise 2  Exercise 3  Exercise 4    Exercise :    BOSU: static, ball toss, squats LAQ, knee flexion with TB BAPS: L ankle ROM (CW, CCW)   Sit to stands     Repetition/Time :    10 20   20 each   20     Resist or Assist :    SBA green TB   L2   Feet on foam mat     Comment :      no ball toss Knee flexion only- B LE only     B LE     Done :    yes  no   no yes                      Exercise 5  Exercise 6  Exercise 7  Exercise 8    Exercise :    Leg press, calf press Heel walking, toe walking L ankle towel stretch   single leg stance on balance pad    Repetition/Time :    20,  10   2 laps each 3 x 30 sec   L: 21 sec max    Resist or Assist :    80 lbs            Comment :           L LE only     Done :    yes no no   yes                     Exercise 9  Exercise 10  Exercise 11  Exercise 12    Exercise :    L ankle TB: DF, PF, IV, EV   Squats, heel raises   Tandem walking        Repetition/Time :    20   20           Resist or Assist :    blue TB, red TB for IV and EV   On balance pad           Comment :       Unsupported           Done :    yes   yes   no                      Patient Education and Home Exercises     Home Exercises Provided and Patient Education Provided     Education provided: Educated pt on importance of HEP and encouraged pt to continue daily    ASSESSMENT     Patient completed all exercises with good effort and no reported increase in pain. Was able to perform  SLS for 20 sec without any LOB. Deborah was more positive toward therapy today.     Deborah Is progressing well towards her goals.   Pt prognosis is Good.     Pt will continue to benefit from skilled outpatient physical therapy to address the deficits listed in the problem list box on initial evaluation, provide pt/family education and to maximize pt's level of independence in the home and community environment.     Pt's spiritual, cultural and educational needs considered and pt agreeable to plan of care and goals.     Anticipated Barriers for therapy: WBAT on L LE, R LE pain     Goals:     Short Term Goals: 4 weeks      Pt will demonstrate knowledge and independence with HEP to continue with exercises outside of therapy- progressing (pt reports moves around a lot during the day but does not do HEP)     Reduce c/o pain in R LE to < 4/10 pain level with daily activities- progressing (c/o 5/10 pain level R mid tibia, 1/10 L ankle)     Improve strength in B LE to 4+/5 MMT throughout in order to improve tolerance with overall functional mobility- met (4+/5 MMT throughout)     Improve safety with transfers to  modified Independent level- met (modified Independent WBAT on B LE)     Long Term Goals: 6 weeks      Pt will improve score on ankle FOTO > 64 which indicates improved ability to perform daily tasks with less difficulty and pain- progressing (43 improved from 41 on eval however pt was using wheelchair on eval, now no AD))     Improve strength in B LE to 5/5 MMT throughout in order to improve tolerance with overall functional mobility- progressing (4+/5 MMT throughout)     Pt will improve distance and stability with amb using RW performed with SBA 50 ft to allow pt to amb in her home safely- met (amb without AD modified Independent with improved gait pattern however distance limited due to R LE pain)     Pt will reduce risk for falls and improve ability and safety with transfers as evidence by improved 5x sit to stands to < 20 secs- met (12 secs required)     PLAN     Continue with current Plan of Care and progress per pt's tolerance    Ricardo Fortune, PTA

## 2023-01-27 ENCOUNTER — CLINICAL SUPPORT (OUTPATIENT)
Dept: REHABILITATION | Facility: HOSPITAL | Age: 53
End: 2023-01-27
Payer: MEDICAID

## 2023-01-27 DIAGNOSIS — S82.201A CLOSED FRACTURE OF RIGHT TIBIA AND FIBULA, INITIAL ENCOUNTER: Primary | ICD-10-CM

## 2023-01-27 DIAGNOSIS — M79.604 ACUTE LEG PAIN, RIGHT: ICD-10-CM

## 2023-01-27 DIAGNOSIS — S82.401A CLOSED FRACTURE OF RIGHT TIBIA AND FIBULA, INITIAL ENCOUNTER: Primary | ICD-10-CM

## 2023-01-27 DIAGNOSIS — S91.022A LACERATION OF ANKLE WITH FOREIGN BODY, LEFT, INITIAL ENCOUNTER: ICD-10-CM

## 2023-01-27 DIAGNOSIS — R26.2 UNABLE TO AMBULATE: ICD-10-CM

## 2023-01-27 PROCEDURE — 97110 THERAPEUTIC EXERCISES: CPT | Mod: CQ

## 2023-01-27 NOTE — PROGRESS NOTES
[facility]   Physical Therapy Treatment Note     Name: Deborah Ahmadiais  Clinic Number: [id number]    Therapy Diagnosis:   Encounter Diagnoses   Name Primary?    Closed fracture of right tibia and fibula, initial encounter Yes    Laceration of ankle with foreign body, left, initial encounter     Unable to ambulate     Acute leg pain, right            Physician: Dillon Scott DO    Authorization Period Expiration: 1/30/2023  Plan of Care Expiration: 1/30/2023  Reassessment / POC completed: 1/18/2022  Visit # / Visits authorized:  8/12    PTA Visit: 2/5    Visit Date: 1/27/2023    Time In: 0830 am  Time Out: 0900 am  Total Billable Time: 30 minutes    Precautions: standard, fall, WBAT B LE     SUBJECTIVE     Pt reports: islightly less pain today, but knows it will be worse after she goes to work all day.   She was not compliant with home exercise program.  Response to previous treatment: good  Functional change: improved strength, L ankle ROM, normal gait pattern        Pain: 6/10 R tibia, 0/10 L ankle with [place]   Location: right lower leg, L ankle     OBJECTIVE     Objective Measures updated at progress report unless specified.     Treatment     Deborah received the treatments listed below:      therapeutic exercises to develop strength and endurance for 30 minutes including:     Therapeutic Exercise Grid     Exercise 1  Exercise 2  Exercise 3  Exercise 4    Exercise :    BOSU: static, ball toss, squats LAQ, knee flexion with TB BAPS: L ankle ROM (CW, CCW)   Sit to stands     Repetition/Time :    10 20   20 each   20     Resist or Assist :    SBA green TB   L2   Feet on foam mat     Comment :      no ball toss Knee flexion only- B LE only     B LE     Done :    yes  no   no yes                      Exercise 5  Exercise 6  Exercise 7  Exercise 8    Exercise :    Leg press, calf press Heel walking, toe walking L ankle towel stretch   single leg stance on balance pad    Repetition/Time :    20,  10   2 laps each 3 x 30 sec   L: 21 sec max    Resist or Assist :    80 lbs            Comment :           L LE only     Done :    yes no no   yes                     Exercise 9  Exercise 10  Exercise 11  Exercise 12    Exercise :    L ankle TB: DF, PF, IV, EV   Squats, heel raises   Tandem walking        Repetition/Time :    20   20           Resist or Assist :    blue TB, red TB for IV and EV   On balance pad           Comment :       Unsupported           Done :    yes   yes   no                      Patient Education and Home Exercises     Home Exercises Provided and Patient Education Provided     Education provided: Educated pt on importance of HEP and encouraged pt to continue daily    ASSESSMENT     Patient completed all exercises with good effort and no reported increase in pain. Was able to perform  SLS for 25 sec without any LOB. Moderate verbal cues required for form.    Deborah Is progressing well towards her goals.   Pt prognosis is Good.     Pt will continue to benefit from skilled outpatient physical therapy to address the deficits listed in the problem list box on initial evaluation, provide pt/family education and to maximize pt's level of independence in the home and community environment.     Pt's spiritual, cultural and educational needs considered and pt agreeable to plan of care and goals.     Anticipated Barriers for therapy: WBAT on L LE, R LE pain     Goals:     Short Term Goals: 4 weeks      Pt will demonstrate knowledge and independence with HEP to continue with exercises outside of therapy- progressing (pt reports moves around a lot during the day but does not do HEP)     Reduce c/o pain in R LE to < 4/10 pain level with daily activities- progressing (c/o 5/10 pain level R mid tibia, 1/10 L ankle)     Improve strength in B LE to 4+/5 MMT throughout in order to improve tolerance with overall functional mobility- met (4+/5 MMT throughout)     Improve safety with transfers to modified  Independent level- met (modified Independent WBAT on B LE)     Long Term Goals: 6 weeks      Pt will improve score on ankle FOTO > 64 which indicates improved ability to perform daily tasks with less difficulty and pain- progressing (43 improved from 41 on eval however pt was using wheelchair on eval, now no AD))     Improve strength in B LE to 5/5 MMT throughout in order to improve tolerance with overall functional mobility- progressing (4+/5 MMT throughout)     Pt will improve distance and stability with amb using RW performed with SBA 50 ft to allow pt to amb in her home safely- met (amb without AD modified Independent with improved gait pattern however distance limited due to R LE pain)     Pt will reduce risk for falls and improve ability and safety with transfers as evidence by improved 5x sit to stands to < 20 secs- met (12 secs required)     PLAN     Continue with current Plan of Care and progress per pt's tolerance    Ricardo Fortune, PTA

## 2023-01-30 ENCOUNTER — DOCUMENTATION ONLY (OUTPATIENT)
Dept: REHABILITATION | Facility: HOSPITAL | Age: 53
End: 2023-01-30

## 2023-01-30 ENCOUNTER — CLINICAL SUPPORT (OUTPATIENT)
Dept: REHABILITATION | Facility: HOSPITAL | Age: 53
End: 2023-01-30
Payer: MEDICAID

## 2023-01-30 DIAGNOSIS — S91.022A LACERATION OF ANKLE WITH FOREIGN BODY, LEFT, INITIAL ENCOUNTER: ICD-10-CM

## 2023-01-30 DIAGNOSIS — M79.604 ACUTE LEG PAIN, RIGHT: ICD-10-CM

## 2023-01-30 DIAGNOSIS — S82.401A CLOSED FRACTURE OF RIGHT TIBIA AND FIBULA, INITIAL ENCOUNTER: Primary | ICD-10-CM

## 2023-01-30 DIAGNOSIS — R26.2 UNABLE TO AMBULATE: ICD-10-CM

## 2023-01-30 DIAGNOSIS — S82.201A CLOSED FRACTURE OF RIGHT TIBIA AND FIBULA, INITIAL ENCOUNTER: Primary | ICD-10-CM

## 2023-01-30 PROCEDURE — 97110 THERAPEUTIC EXERCISES: CPT

## 2023-01-30 NOTE — PLAN OF CARE
[facility]  Physical Therapy Discharge Note    Name: Deborah Ahmadiais  Clinic Number: [id number]    Therapy Diagnosis:   Encounter Diagnoses   Name Primary?    Closed fracture of right tibia and fibula, initial encounter Yes    Laceration of ankle with foreign body, left, initial encounter     Unable to ambulate     Acute leg pain, right      Physician: Dillon Scott DO    Physician Orders: PT eval and treat  Medical Diagnosis: fx R tibia and fibula, L ankle dislocation  PT diagnosis: limited amb and mobility, R LE pain, decreased L ankle ROM and strength  Evaluation Date: 11/4/2022    Date of Last visit: 1/30/2023  Total Visits Received: 15 plus initial eval    Time In: 08:30 am  Time Out: 08:55 am  Total Billable Time: 25 minutes    SUBJECTIVE     Pt reports: she still has most pain in R LE right below her knee, some pain in L ankle which increases at the end of the day even if she has not been standing a lot. NMD 2/8/2023  She was compliant with home exercise program.  Response to previous treatment: good  Functional change: improved balance, gait pattern    Functional ankle FOTO score: 29 declined from 41 on eval    Pain: 5/10 R LE, 3/10 L ankle  Location: R LE, L ankle     OBJECTIVE     B LE strength: 5/5 MMT throughout  L ankle ROM: DF 10 degrees, PF 40 degrees     Functional mobility: Independent with all mobility. Improved gait pattern with minimal to no abnormalities however distance limited due to R tibia pain     Single leg stance: B LE: >30 secs (on firm surface)      Treatment     Deborah received the treatments listed below:         therapeutic exercises to develop strength and endurance for 30 minutes including:     Therapeutic Exercise Grid     Exercise 1  Exercise 2  Exercise 3  Exercise 4    Exercise :    BOSU: static, ball toss, squats LAQ, knee flexion with TB BAPS: L ankle ROM (CW, CCW)   Sit to stands     Repetition/Time :    20 20   20 each   20     Resist or Assist :      green TB   L2   Feet on foam mat     Comment :      no ball toss Knee flexion only- B LE only     B LE     Done :    yes  no   no yes                      Exercise 5  Exercise 6  Exercise 7  Exercise 8    Exercise :    Leg press, calf press Heel walking, toe walking L ankle towel stretch   single leg stance on balance pad    Repetition/Time :    20   2 laps each 3 x 30 sec   >30 secs    Resist or Assist :    90 lbs            Comment :               Done :    yes yes no   yes                     Exercise 9  Exercise 10  Exercise 11  Exercise 12    Exercise :    L ankle TB: DF, PF, IV, EV   Squats, heel raises   Tandem walking        Repetition/Time :    20   20           Resist or Assist :    blue TB, red TB for IV and EV   On balance pad           Comment :       Unsupported           Done :    yes   yes   no                      Patient Education and Home Exercises     Home Exercises Provided and Patient Education Provided     Education provided:   Pt has written HEP issued on initial eval, educated pt on importance and benefits of exercises and stretches, and encouraged pt to continue with HEP daily    ASSESSMENT     Pt has progressed well with overall functional mobility, B LE strength, L ankle ROM, stability and gait pattern, and balance since initial eval no longer using AD however continues to be limited due to continued pain mostly in R proximal tibia, minimal pain in L ankle which limits daily activities. Pt's functional FOTO score does not reflect how pt is truly doing functionally.Pt has minimal to no gait abnormalities however distance limited due to pain. Improved proprioceptive input and minimal difficulty with B LE balance activities. Pt to be discharged from PT due to goals met with HEP    Deborah Is progressing well towards her goals.   Pt prognosis is Good.     Pt's spiritual, cultural and educational needs considered and pt agreeable to plan of care and goals.     Anticipated barriers to  physical therapy: severity of injury    Discharge reason: Patient has met all of his/her goals    Goals:   Short Term Goals: 4 weeks      Pt will demonstrate knowledge and independence with HEP to continue with exercises outside of therapy- progressing (pt reports moves around a lot during the day but does not do HEP)     Reduce c/o pain in R LE to < 4/10 pain level with daily activities- progressing (c/o 5/10 pain level R proximal tibia, 3/10 L ankle)     Improve strength in B LE to 4+/5 MMT throughout in order to improve tolerance with overall functional mobility- met (5/5 MMT throughout)     Improve safety with transfers to modified Independent level- met ( Independent WBAT on B LE)     Long Term Goals: 6 weeks      Pt will improve score on ankle FOTO > 64 which indicates improved ability to perform daily tasks with less difficulty and pain- progressing (43 improved from 41 on eval however pt was using wheelchair on eval, now no AD))     Improve strength in B LE to 5/5 MMT throughout in order to improve tolerance with overall functional mobility- met (5/5 MMT throughout)     Pt will improve distance and stability with amb using RW performed with SBA 50 ft to allow pt to amb in her home safely- met (amb without AD modified Independent with improved gait pattern however distance limited due to R LE pain)     Pt will reduce risk for falls and improve ability and safety with transfers as evidence by improved 5x sit to stands to < 20 secs- met (12 secs required)       PLAN   This patient is discharged from Physical Therapy      Jessica South, PT

## 2023-02-08 ENCOUNTER — OFFICE VISIT (OUTPATIENT)
Dept: ORTHOPEDICS | Facility: CLINIC | Age: 53
End: 2023-02-08
Payer: MEDICAID

## 2023-02-08 ENCOUNTER — HOSPITAL ENCOUNTER (OUTPATIENT)
Dept: RADIOLOGY | Facility: CLINIC | Age: 53
Discharge: HOME | End: 2023-02-08
Attending: ORTHOPAEDIC SURGERY
Payer: MEDICAID

## 2023-02-08 VITALS
HEIGHT: 64 IN | WEIGHT: 135 LBS | TEMPERATURE: 98 F | BODY MASS INDEX: 23.05 KG/M2 | SYSTOLIC BLOOD PRESSURE: 138 MMHG | HEART RATE: 98 BPM | DIASTOLIC BLOOD PRESSURE: 76 MMHG

## 2023-02-08 DIAGNOSIS — S82.401M TYPE I OR II OPEN FRACTURE OF RIGHT TIBIA AND FIBULA WITH NONUNION, SUBSEQUENT ENCOUNTER: Primary | ICD-10-CM

## 2023-02-08 DIAGNOSIS — S82.201E TYPE I OR II OPEN FRACTURE OF RIGHT TIBIA AND FIBULA WITH ROUTINE HEALING, SUBSEQUENT ENCOUNTER: ICD-10-CM

## 2023-02-08 DIAGNOSIS — S82.201M TYPE I OR II OPEN FRACTURE OF RIGHT TIBIA AND FIBULA WITH NONUNION, SUBSEQUENT ENCOUNTER: Primary | ICD-10-CM

## 2023-02-08 DIAGNOSIS — S91.022A LACERATION OF ANKLE WITH FOREIGN BODY, LEFT, INITIAL ENCOUNTER: ICD-10-CM

## 2023-02-08 DIAGNOSIS — S82.401E TYPE I OR II OPEN FRACTURE OF RIGHT TIBIA AND FIBULA WITH ROUTINE HEALING, SUBSEQUENT ENCOUNTER: ICD-10-CM

## 2023-02-08 PROCEDURE — 73590 X-RAY EXAM OF LOWER LEG: CPT | Mod: RT,,, | Performed by: ORTHOPAEDIC SURGERY

## 2023-02-08 PROCEDURE — 73610 X-RAY EXAM OF ANKLE: CPT | Mod: LT,,, | Performed by: ORTHOPAEDIC SURGERY

## 2023-02-08 PROCEDURE — 1159F PR MEDICATION LIST DOCUMENTED IN MEDICAL RECORD: ICD-10-PCS | Mod: CPTII,,, | Performed by: ORTHOPAEDIC SURGERY

## 2023-02-08 PROCEDURE — 99214 OFFICE O/P EST MOD 30 MIN: CPT | Mod: ,,, | Performed by: ORTHOPAEDIC SURGERY

## 2023-02-08 PROCEDURE — 3008F BODY MASS INDEX DOCD: CPT | Mod: CPTII,,, | Performed by: ORTHOPAEDIC SURGERY

## 2023-02-08 PROCEDURE — 1159F MED LIST DOCD IN RCRD: CPT | Mod: CPTII,,, | Performed by: ORTHOPAEDIC SURGERY

## 2023-02-08 PROCEDURE — 3008F PR BODY MASS INDEX (BMI) DOCUMENTED: ICD-10-PCS | Mod: CPTII,,, | Performed by: ORTHOPAEDIC SURGERY

## 2023-02-08 PROCEDURE — 73590 XR TIBIA FIBULA 2 VIEW RIGHT: ICD-10-PCS | Mod: RT,,, | Performed by: ORTHOPAEDIC SURGERY

## 2023-02-08 PROCEDURE — 3078F DIAST BP <80 MM HG: CPT | Mod: CPTII,,, | Performed by: ORTHOPAEDIC SURGERY

## 2023-02-08 PROCEDURE — 3075F SYST BP GE 130 - 139MM HG: CPT | Mod: CPTII,,, | Performed by: ORTHOPAEDIC SURGERY

## 2023-02-08 PROCEDURE — 99214 PR OFFICE/OUTPT VISIT, EST, LEVL IV, 30-39 MIN: ICD-10-PCS | Mod: ,,, | Performed by: ORTHOPAEDIC SURGERY

## 2023-02-08 PROCEDURE — 3075F PR MOST RECENT SYSTOLIC BLOOD PRESS GE 130-139MM HG: ICD-10-PCS | Mod: CPTII,,, | Performed by: ORTHOPAEDIC SURGERY

## 2023-02-08 PROCEDURE — 73610 XR ANKLE COMPLETE 3 VIEW LEFT: ICD-10-PCS | Mod: LT,,, | Performed by: ORTHOPAEDIC SURGERY

## 2023-02-08 PROCEDURE — 3078F PR MOST RECENT DIASTOLIC BLOOD PRESSURE < 80 MM HG: ICD-10-PCS | Mod: CPTII,,, | Performed by: ORTHOPAEDIC SURGERY

## 2023-02-08 NOTE — PROGRESS NOTES
Subjective:       Patient ID: Deborah Cross is a 52 y.o. female.  Chief Complaint   Patient presents with    Right Lower Leg - Follow-up     3.5 month f/u from IMN right tib/fib shaft fx, left ankle dislocation, left 5th MT base fx. In boot to RLE. No complaints.        HPI:  Patient is 4 months out from intramedullary nail right tibia and fibula.  Patient has a left ankle dislocation with the ATFL repair.  Patient is doing well.  She does state she has achy pain in her right ankle dull achy pain around fracture site.  She states she is taking 1 pain pill daily but she is not getting this medication from my office.  She is asked for pain management referral.  She is overall very happy with the care at this time states she is still smoking a vape nicotine.  We discussed again that nicotine is bad for bone healing.    ROS:  Constitutional: Denies fever chills  Eyes: No change in vision  ENT: No ringing or current infections  CV: No chest pain  Resp: No labored breathing  MSK: Pain evident at site of injury located in HPI,   Integ: No signs of abrasions or lacerations  Neuro: No numbness or tingling  Lymphatic: No swelling outside the area of injury     Current Outpatient Medications on File Prior to Visit   Medication Sig Dispense Refill    ALPRAZolam (XANAX) 1 MG tablet Take 1 tablet (1 mg total) by mouth 2 (two) times daily as needed for Anxiety. 40 tablet 5    amLODIPine (NORVASC) 5 MG tablet Take 1 tablet (5 mg total) by mouth once daily. 90 tablet 1    diclofenac (VOLTAREN) 50 MG EC tablet Take 1 tablet (50 mg total) by mouth 3 (three) times daily as needed (pain). 30 tablet 0    FLUoxetine 40 MG capsule Take 1 capsule (40 mg total) by mouth every morning. 90 capsule 1    fluticasone propionate (FLONASE) 50 mcg/actuation nasal spray spray 1 spray into each nostril twice per day 16 g 1    ibuprofen (ADVIL,MOTRIN) 800 MG tablet Take 1 tablet (800 mg total) by mouth 2 (two) times a day. 60 tablet 2    metFORMIN  "(GLUCOPHAGE) 500 MG tablet Take 1 tablet (500 mg total) by mouth once daily. 90 tablet 1    ondansetron (ZOFRAN-ODT) 8 MG TbDL Take 1 tablet (8 mg total) by mouth every 6 (six) hours as needed (nausea). 21 tablet 0    QUEtiapine (SEROQUEL) 50 MG tablet Take 1 tablet (50 mg total) by mouth every evening. 30 tablet 11    simvastatin (ZOCOR) 40 MG tablet Take 1 tablet (40 mg total) by mouth every evening. 90 tablet 1    sumatriptan (IMITREX) 25 MG Tab TAKE ONE TABLET BY MOUTH as a single DOSE AND MAY REPEAT DOSE in 2 hours if needed 9 tablet 1     No current facility-administered medications on file prior to visit.          Objective:      /76   Pulse 98   Temp 97.9 °F (36.6 °C)   Ht 5' 4" (1.626 m)   Wt 61.2 kg (135 lb)   BMI 23.17 kg/m²   General the patient is alert and oriented x3 no acute distress nontoxic-appearing appropriate affect.    Constitutional: Vital signs are examined and stable.  Resp: No signs of labored breathing                 LLE: -Skin:  No signs of new abrasions or lacerations, no scars           -MSK: Hip and Knee F/E, EHL/FHL, Gastroc/Tib anterior Strength 5/5           -Neuro:  Sensation intact to light touch L3-S1 dermatomes           -Lymphatic: No signs of lymphadenopathy           -CV: Capillary refill is less than 2 seconds. DP/PT pulses 2/4. Compartments soft and compressible                      RLE: -Skin:  No signs of new abrasions or lacerations, no scars           -MSK: : Hip and Knee F/E, EHL/FHL, Gastroc/Tib anterior Strength 5/5           -Neuro:  Sensation intact to light touch L3-S1 dermatomes           -Lymphatic: No signs of lymphadenopathy           -CV: Capillary refill is less than 2 seconds. DP/PT pulses  2/4. Compartments soft and compressible.   Body mass index is 23.17 kg/m².  Ideal body weight: 54.7 kg (120 lb 9.5 oz)  Adjusted ideal body weight: 57.3 kg (126 lb 5.7 oz)  Hemoglobin A1c   Date Value Ref Range Status   12/15/2021 5.9 <<=7.0 % Final     Hgb "   Date Value Ref Range Status   10/27/2022 8.6 (L) 12.0 - 16.0 gm/dL Final   10/26/2022 9.5 (L) 12.0 - 16.0 gm/dL Final     Vit D 25 OH   Date Value Ref Range Status   12/15/2021 27.7 (L) 30.0 - 80.0 ng/mL Final     WBC   Date Value Ref Range Status   10/27/2022 9.8 4.5 - 11.5 x10(3)/mcL Final   10/26/2022 11.4 4.5 - 11.5 x10(3)/mcL Final       Radiology:  Two views right tibia skeletally mature individual showing nonunion tibia skeletally mature less than 1 cm fracture gap hardware intact.  Three views left ankle skeletally mature individual show intact hardware tibiotalar joint appears to be stable        Assessment:         1. 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 Laceration of ankle with foreign body, left, initial encounter  X-Ray Ankle Complete Left              Plan:         No follow-ups on file.    Deborah was seen today for follow-up.    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  Her left tibia appears to be slow to heal likely nonunion.  We will order a bone stimulator due the fact she skeletally mature with the fracture gap less than 1 cm.  Continue weight-bearing as tolerated with normal shoes.  Continue smoking cessation.  She understands this increases her risk of infection amputation and possible need for revision surgery with non union . F/u 2-3 months. She is asking for her records because she needs pain mgt. She is taking 1 pain pill daily from another provider or private use. We discussed non union and risks with revision surgery including nail dynamization.        This note/OR report was created with the assistance of  voice  recognition software or phone  dictation.  There may be transcription errors as a result of using this technology however minimal. Effort has been made to assure accuracy of transcription but any obvious errors or omissions should be clarified with the author of the document.     Dillon Scott DO  Orthopedic Trauma Surgery  02/14/2023      Future Appointments   Date Time Provider Department Center   4/11/2023  9:00 AM Dillon Scott DO [facility]   6/28/2023  8:45 AM Adamaris Cao MD [facility]

## 2023-02-14 DIAGNOSIS — S82.401M TYPE I OR II OPEN FRACTURE OF RIGHT TIBIA AND FIBULA WITH NONUNION, SUBSEQUENT ENCOUNTER: Primary | ICD-10-CM

## 2023-02-14 DIAGNOSIS — S82.201M TYPE I OR II OPEN FRACTURE OF RIGHT TIBIA AND FIBULA WITH NONUNION, SUBSEQUENT ENCOUNTER: Primary | ICD-10-CM

## 2023-02-23 ENCOUNTER — TELEPHONE (OUTPATIENT)
Dept: ORTHOPEDICS | Facility: CLINIC | Age: 53
End: 2023-02-23
Payer: MEDICAID

## 2023-02-23 NOTE — TELEPHONE ENCOUNTER
Received message through call center re: patient requesting call back, called patient, she has not heard from Aminah with [employer] bone stim.  Given Aminah's number.

## 2023-03-02 ENCOUNTER — HOSPITAL ENCOUNTER (EMERGENCY)
Facility: HOSPITAL | Age: 53
Discharge: HOME | End: 2023-03-02
Attending: STUDENT IN AN ORGANIZED HEALTH CARE EDUCATION/TRAINING PROGRAM
Payer: MEDICAID

## 2023-03-02 VITALS
HEIGHT: 62 IN | HEART RATE: 83 BPM | RESPIRATION RATE: 18 BRPM | SYSTOLIC BLOOD PRESSURE: 165 MMHG | BODY MASS INDEX: 24.84 KG/M2 | WEIGHT: 135 LBS | DIASTOLIC BLOOD PRESSURE: 107 MMHG | TEMPERATURE: 98 F | OXYGEN SATURATION: 97 %

## 2023-03-02 DIAGNOSIS — R11.0 NAUSEA: ICD-10-CM

## 2023-03-02 DIAGNOSIS — N20.0 NEPHROLITHIASIS: ICD-10-CM

## 2023-03-02 DIAGNOSIS — R11.2 NAUSEA & VOMITING: ICD-10-CM

## 2023-03-02 DIAGNOSIS — N30.01 ACUTE CYSTITIS WITH HEMATURIA: Primary | ICD-10-CM

## 2023-03-02 LAB
ALBUMIN SERPL-MCNC: 4.1 G/DL (ref 3.5–5)
ALBUMIN/GLOB SERPL: 1.1 RATIO (ref 1.1–2)
ALP SERPL-CCNC: 110 UNIT/L (ref 40–150)
ALT SERPL-CCNC: 12 UNIT/L (ref 0–55)
APPEARANCE UR: CLEAR
AST SERPL-CCNC: 14 UNIT/L (ref 5–34)
BACTERIA #/AREA URNS AUTO: ABNORMAL /HPF
BASOPHILS # BLD AUTO: 0.06 X10(3)/MCL (ref 0–0.2)
BASOPHILS NFR BLD AUTO: 0.8 %
BILIRUB UR QL STRIP.AUTO: NEGATIVE MG/DL
BILIRUBIN DIRECT+TOT PNL SERPL-MCNC: 0.4 MG/DL
BUN SERPL-MCNC: 7.5 MG/DL (ref 9.8–20.1)
CALCIUM SERPL-MCNC: 9.7 MG/DL (ref 8.4–10.2)
CHLORIDE SERPL-SCNC: 100 MMOL/L (ref 98–107)
CO2 SERPL-SCNC: 29 MMOL/L (ref 22–29)
COLOR UR AUTO: YELLOW
CREAT SERPL-MCNC: 0.65 MG/DL (ref 0.55–1.02)
EOSINOPHIL # BLD AUTO: 0.1 X10(3)/MCL (ref 0–0.9)
EOSINOPHIL NFR BLD AUTO: 1.3 %
ERYTHROCYTE [DISTWIDTH] IN BLOOD BY AUTOMATED COUNT: 13.9 % (ref 11.5–17)
FLUAV AG UPPER RESP QL IA.RAPID: NOT DETECTED
FLUBV AG UPPER RESP QL IA.RAPID: NOT DETECTED
GFR SERPLBLD CREATININE-BSD FMLA CKD-EPI: >60 MLS/MIN/1.73/M2
GLOBULIN SER-MCNC: 3.8 GM/DL (ref 2.4–3.5)
GLUCOSE SERPL-MCNC: 112 MG/DL (ref 74–100)
GLUCOSE UR QL STRIP.AUTO: NEGATIVE MG/DL
HCT VFR BLD AUTO: 39 % (ref 37–47)
HGB BLD-MCNC: 12.5 G/DL (ref 12–16)
IMM GRANULOCYTES # BLD AUTO: 0.01 X10(3)/MCL (ref 0–0.04)
IMM GRANULOCYTES NFR BLD AUTO: 0.1 %
KETONES UR QL STRIP.AUTO: NEGATIVE MG/DL
LEUKOCYTE ESTERASE UR QL STRIP.AUTO: ABNORMAL UNIT/L
LIPASE SERPL-CCNC: 21 U/L
LYMPHOCYTES # BLD AUTO: 2.64 X10(3)/MCL (ref 0.6–4.6)
LYMPHOCYTES NFR BLD AUTO: 33.1 %
MCH RBC QN AUTO: 26.1 PG
MCHC RBC AUTO-ENTMCNC: 32.1 G/DL (ref 33–36)
MCV RBC AUTO: 81.4 FL (ref 80–94)
MONOCYTES # BLD AUTO: 0.41 X10(3)/MCL (ref 0.1–1.3)
MONOCYTES NFR BLD AUTO: 5.1 %
NEUTROPHILS # BLD AUTO: 4.75 X10(3)/MCL (ref 2.1–9.2)
NEUTROPHILS NFR BLD AUTO: 59.6 %
NITRITE UR QL STRIP.AUTO: NEGATIVE
PH UR STRIP.AUTO: 7 [PH]
PLATELET # BLD AUTO: 345 X10(3)/MCL (ref 130–400)
PMV BLD AUTO: 9.3 FL (ref 7.4–10.4)
POTASSIUM SERPL-SCNC: 3.2 MMOL/L (ref 3.5–5.1)
PROT SERPL-MCNC: 7.9 GM/DL (ref 6.4–8.3)
PROT UR QL STRIP.AUTO: 100 MG/DL
RBC # BLD AUTO: 4.79 X10(6)/MCL (ref 4.2–5.4)
RBC #/AREA URNS AUTO: ABNORMAL /HPF
RBC UR QL AUTO: ABNORMAL UNIT/L
SARS-COV-2 RNA RESP QL NAA+PROBE: NOT DETECTED
SODIUM SERPL-SCNC: 142 MMOL/L (ref 136–145)
SP GR UR STRIP.AUTO: 1.01
SQUAMOUS #/AREA URNS AUTO: ABNORMAL /HPF
UROBILINOGEN UR STRIP-ACNC: 0.2 MG/DL
WBC # SPEC AUTO: 8 X10(3)/MCL (ref 4.5–11.5)
WBC #/AREA URNS AUTO: ABNORMAL /HPF

## 2023-03-02 PROCEDURE — 25000003 PHARM REV CODE 250: Performed by: STUDENT IN AN ORGANIZED HEALTH CARE EDUCATION/TRAINING PROGRAM

## 2023-03-02 PROCEDURE — 99285 EMERGENCY DEPT VISIT HI MDM: CPT | Mod: 25

## 2023-03-02 PROCEDURE — 93010 ELECTROCARDIOGRAM REPORT: CPT | Mod: ,,, | Performed by: INTERNAL MEDICINE

## 2023-03-02 PROCEDURE — 81001 URINALYSIS AUTO W/SCOPE: CPT | Performed by: STUDENT IN AN ORGANIZED HEALTH CARE EDUCATION/TRAINING PROGRAM

## 2023-03-02 PROCEDURE — 93005 ELECTROCARDIOGRAM TRACING: CPT

## 2023-03-02 PROCEDURE — 80053 COMPREHEN METABOLIC PANEL: CPT | Performed by: STUDENT IN AN ORGANIZED HEALTH CARE EDUCATION/TRAINING PROGRAM

## 2023-03-02 PROCEDURE — 93010 EKG 12-LEAD: ICD-10-PCS | Mod: ,,, | Performed by: INTERNAL MEDICINE

## 2023-03-02 PROCEDURE — 83690 ASSAY OF LIPASE: CPT | Performed by: STUDENT IN AN ORGANIZED HEALTH CARE EDUCATION/TRAINING PROGRAM

## 2023-03-02 PROCEDURE — 0240U COVID/FLU A&B PCR: CPT | Performed by: STUDENT IN AN ORGANIZED HEALTH CARE EDUCATION/TRAINING PROGRAM

## 2023-03-02 PROCEDURE — 85025 COMPLETE CBC W/AUTO DIFF WBC: CPT | Performed by: STUDENT IN AN ORGANIZED HEALTH CARE EDUCATION/TRAINING PROGRAM

## 2023-03-02 RX ORDER — NITROFURANTOIN 25; 75 MG/1; MG/1
100 CAPSULE ORAL 2 TIMES DAILY
Qty: 14 CAPSULE | Refills: 0 | Status: SHIPPED | OUTPATIENT
Start: 2023-03-02 | End: 2023-03-09

## 2023-03-02 RX ORDER — PROMETHAZINE HYDROCHLORIDE 25 MG/1
25 TABLET ORAL
Status: COMPLETED | OUTPATIENT
Start: 2023-03-02 | End: 2023-03-02

## 2023-03-02 RX ORDER — POTASSIUM CHLORIDE 20 MEQ/1
40 TABLET, EXTENDED RELEASE ORAL
Status: COMPLETED | OUTPATIENT
Start: 2023-03-02 | End: 2023-03-02

## 2023-03-02 RX ORDER — ONDANSETRON 4 MG/1
4 TABLET, ORALLY DISINTEGRATING ORAL
Status: COMPLETED | OUTPATIENT
Start: 2023-03-02 | End: 2023-03-02

## 2023-03-02 RX ORDER — PROMETHAZINE HYDROCHLORIDE 25 MG/1
25 TABLET ORAL EVERY 6 HOURS PRN
Qty: 7 TABLET | Refills: 0 | Status: SHIPPED | OUTPATIENT
Start: 2023-03-02 | End: 2023-06-25

## 2023-03-02 RX ADMIN — ONDANSETRON 4 MG: 4 TABLET, ORALLY DISINTEGRATING ORAL at 06:03

## 2023-03-02 RX ADMIN — POTASSIUM CHLORIDE 40 MEQ: 1500 TABLET, EXTENDED RELEASE ORAL at 08:03

## 2023-03-02 RX ADMIN — PROMETHAZINE HYDROCHLORIDE 25 MG: 25 TABLET ORAL at 08:03

## 2023-03-03 NOTE — ED PROVIDER NOTES
"Encounter Date: 3/2/2023       History     Chief Complaint   Patient presents with    Vomiting     N,V,D with ABD cramping today --also c/o pain and swelling to L ankle from previous injury        Is a 53-year-old white female past medical history of chronic smoking, hyperlipidemia and prediabetes who presented to the ER today due to 3 episodes of nonbloody nonbilious emesis associated with abdominal cramping that has resolved.  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 "Patient denies any accompanying chest pain, shortness of breath, dysuria, hematuria, diarrhea constipation.  Last bowel movement was this morning was normal brown in color with no hematochezia melena.  Patient also complains of left ankle pain for the last 8 months in which she had an ORIF to fix fractures. Pt denies fever but does endorse chills.  Has not taken anything for his symptoms.    Review of patient's allergies indicates:  No Known Allergies  Past Medical History:   Diagnosis Date    Hypertension      Past Surgical History:   Procedure Laterality Date     SECTION  2005    HYSTERECTOMY  2015    INSERTION OF INTRAMEDULLARY NAIL INTO TIBIA Right 10/24/2022    Procedure: INSERTION, INTRAMEDULLARY JACK, TIBIA;  Surgeon: Dillon Scott DO;  Location: [facility] OR;  Service: Orthopedics;  Laterality: Right;  supine, vascular, bone foam, c-arm, wash stuff    REPAIR OF LIGAMENT OF ANKLE  10/24/2022    Procedure: REPAIR, LIGAMENT, ANKLE;  Surgeon: Dillon Scott DO;  Location: [facility] OR;  Service: Orthopedics;;     Family History   Problem Relation Age of Onset    Diabetes Mother     Heart disease Mother     Diabetes Father      Social History     Tobacco Use    Smoking status: Some Days     Packs/day: 0.50     Years: 15.00     Pack years: 7.50     Types: Cigarettes    Smokeless tobacco: Never   Substance Use Topics    Alcohol use: Not Currently    Drug use: Not " Currently     Types: Benzodiazepines, Marijuana     Review of Systems   Constitutional:  Negative for chills, fatigue and fever.   HENT:  Negative for congestion, sore throat and trouble swallowing.    Eyes:  Negative for pain and visual disturbance.   Respiratory:  Negative for cough, shortness of breath and wheezing.    Cardiovascular:  Negative for chest pain and palpitations.   Gastrointestinal:  Positive for nausea and vomiting. Negative for abdominal pain, blood in stool, constipation and diarrhea.   Genitourinary:  Negative for dysuria and hematuria.   Musculoskeletal:  Positive for arthralgias. Negative for back pain and myalgias.   Skin:  Negative for rash and wound.   Neurological:  Negative for seizures, syncope and headaches.   Psychiatric/Behavioral:  Negative for confusion. The patient is not nervous/anxious.      Physical Exam     Initial Vitals [03/02/23 1700]   BP Pulse Resp Temp SpO2   (!) 145/103 92 18 98.2 °F (36.8 °C) 99 %      MAP       --         Physical Exam    Nursing note and vitals reviewed.  Constitutional: She appears well-developed and well-nourished. She is not diaphoretic. No distress.   HENT:   Head: Normocephalic.   Right Ear: External ear normal.   Left Ear: External ear normal.   Nose: Nose normal.   Eyes: Conjunctivae and EOM are normal. Right eye exhibits no discharge. Left eye exhibits no discharge. No scleral icterus.   Neck:   Normal range of motion.  Cardiovascular:  Normal rate, regular rhythm and normal heart sounds.     Exam reveals no gallop and no friction rub.       No murmur heard.  Pulmonary/Chest: Breath sounds normal. No stridor. No respiratory distress. She has no wheezes. She has no rhonchi. She has no rales.   Abdominal: Abdomen is soft. She exhibits no distension. There is no abdominal tenderness.   Negative Gaffney sign.  No pain at McBurney's point.  Abdomen soft diffusely.  No rebound tenderness or guarding.  Negative Rovsing sign.  No rashes. There is no  rebound and no guarding.   Musculoskeletal:         General: Normal range of motion.      Cervical back: Normal range of motion.     Neurological: She is alert.   Skin: Skin is warm. No rash noted. No erythema.   Psychiatric: She has a normal mood and affect. Her behavior is normal.       ED Course   Procedures  Labs Reviewed   COMPREHENSIVE METABOLIC PANEL - Abnormal; Notable for the following components:       Result Value    Potassium Level 3.2 (*)     Glucose Level 112 (*)     Blood Urea Nitrogen 7.5 (*)     Globulin 3.8 (*)     All other components within normal limits   URINALYSIS, REFLEX TO URINE CULTURE - Abnormal; Notable for the following components:    Protein,  (*)     Blood, UA Moderate (*)     Leukocyte Esterase, UA Small (*)     All other components within normal limits   CBC WITH DIFFERENTIAL - Abnormal; Notable for the following components:    MCHC 32.1 (*)     All other components within normal limits   URINALYSIS, MICROSCOPIC - Abnormal; Notable for the following components:    Bacteria, UA Few (*)     RBC, UA 11-20 (*)     WBC, UA 6-10 (*)     Squamous Epithelial Cells, UA Moderate (*)     All other components within normal limits   LIPASE - Normal   COVID/FLU A&B PCR - Normal    Narrative:     The Xpert Xpress SARS-CoV-2/FLU/RSV plus is a rapid, multiplexed real-time PCR test intended for the simultaneous qualitative detection and differentiation of SARS-CoV-2, Influenza A, Influenza B, and respiratory syncytial virus (RSV) viral RNA in either nasopharyngeal swab or nasal swab specimens.         CBC W/ AUTO DIFFERENTIAL    Narrative:     The following orders were created for panel order CBC Auto Differential.  Procedure                               Abnormality         Status                     ---------                               -----------         ------                     CBC with Differential[[id number]]        Abnormal            Final result                 Please view results  for these tests on the individual orders.     EKG Readings: (Independently Interpreted)   Initial Reading: No STEMI. Rhythm: Normal Sinus Rhythm. Heart Rate: 82. Ectopy: No Ectopy. Conduction: Normal. ST Segments: Normal ST Segments. T Waves: Normal. Axis: Normal.     Imaging Results              CT Renal Stone Study ABD Pelvis WO (Final result)  Result time 03/02/23 20:57:58      Final result by Alex Villar MD (03/02/23 20:57:58)                   Narrative:    EXAMINATION  CT RENAL STONE STUDY ABD PELVIS WO    CLINICAL HISTORY  Nephrolithiasis, symptomatic/complicated;    TECHNIQUE  Non-contrast helical-acquisition CT images were obtained and multiplanar reformats accomplished by a CT technologist at a separate workstation, pushed to PACS for physician review.    Enteric contrast: none utilized    COMPARISON  23 October 2022    FINDINGS  Images were reviewed in soft tissue, lung, and bone windows.    Exam quality: Inherently limited evaluation of the abdominopelvic organs and vasculature secondary to non-contrast protocol.    Lines/tubes: none visualized    Chest: Stable heart chamber size. No pericardial effusion. No interval change of the visualized vasculature. No airspace consolidation or suspicious lung lesion. No worsening pleural effusion or evidence for loculation.    Hepatobiliary/Pancreas: No density structure statistically favoring cyst at the hepatic dome unchanged in the interval.  No new or enlarging hepatic lesion. No suspicious findings of the gallbladder or biliary system. No acute process or suspicious interval change of the pancreas.    Spleen: No interval change.    Adrenal/: No new, enlarging, or otherwise suspicious adrenal or renal lesion. Multiple 2-3 mm nonobstructing bilateral caliceal stones are present, with no findings to suggest distal obstructive uropathy.  No abnormal perinephric fluid is appreciated.  The urinary bladder is minimally distended, with no focal mural abnormality  identified.  The uterus is not visualized.  No suspicious adnexal lesion.    Esophagus/GI tract: The included lower esophagus is unremarkable. No evidence of gastric outlet or small bowel obstruction. No acute inflammatory process or convincing focal lesion.    Musculoskeletal: No interval change.    Other findings: No free fluid or air. No drainable collections. Aortoiliac vascular structures are similar in comparison. No development of pathologic conor enlargement or necrotic adenopathy.    IMPRESSION  1. Bilateral nonobstructing nephrolithiasis.  2. No evidence of distal obstructive uropathy or other acute abdominopelvic process.  3. Additional secondary details discussed above, grossly unchanged from the 23 October 2022 CT appearance.    RADIATION DOSE  Automated tube current modulation, weight-based exposure dosing, and/or iterative reconstruction technique utilized to reach lowest reasonably achievable exposure rate.    DLP: 399 mGy*cm      Electronically signed by: Alex Villar  Date:    03/02/2023  Time:    20:57                                     X-Ray Abdomen Flat And Erect (Final result)  Result time 03/02/23 19:51:10      Final result by Janelle eGnao MD (03/02/23 19:51:10)                   Impression:      Possible punctate left renal calculus.      Electronically signed by: Janelle Genao  Date:    03/02/2023  Time:    19:51               Narrative:    EXAMINATION:  XR ABDOMEN FLAT AND ERECT    CLINICAL HISTORY:  Nausea with vomiting, unspecified    TECHNIQUE:  Supine and upright views of the abdomen performed.    COMPARISON:  None.    FINDINGS:  No dilated bowel loops. No evidence of obstruction.    No evidence of free intraperitoneal air.    Phleboliths in the pelvis.  Punctate 2-3 mm calcification projects over the lower left renal shadow.                                       Medications   ondansetron disintegrating tablet 4 mg (4 mg Oral Given 3/2/23 8773)   potassium chloride SA CR  tablet 40 mEq (40 mEq Oral Given 3/2/23 2010)   promethazine tablet 25 mg (25 mg Oral Given 3/2/23 2045)     Medical Decision Making:   Initial Assessment:   Overall well-appearing 53-year-old female  Differential Diagnosis:   Viral gastroenteritis, UTI, nephrolithiasis, pyelonephritis, other intra-abdominal etiology  Clinical Tests:   Lab Tests: Ordered and Reviewed  The following lab test(s) were unremarkable: CBC, CMP, Lipase and Urinalysis  Radiological Study: Ordered and Reviewed  ED Management:  Vital signs stable patient is afebrile   Abdominal exam is largely unremarkable   Zofran and fluids given  Zofran patient states she is resistant to   Phenergan given as patient has a ride home  Nausea resolved   Basic labs largely unremarkable  Urine concerning for nephrolithiasis versus pyelonephritis versus UTI   CT stone protocol showed multiple nonobstructing noninfected stones measuring 2 mm in diameter  Do suspect this will likely past with adequate hydration  Will put patient on Macrobid b.i.d. for 7 days and send Phenergan to the pharmacy with fall precautions driving restrictions   All questions answered in layman's terms and patient states she is feeling much better   Return precautions discussed and follow up with PCP is recommended                        Clinical Impression:   Final diagnoses:  [R11.0] Nausea  [R11.2] Nausea & vomiting  [N30.01] Acute cystitis with hematuria (Primary)  [N20.0] Nephrolithiasis        ED Disposition Condition    Discharge Stable          ED Prescriptions       Medication Sig Dispense Start Date End Date Auth. Provider    nitrofurantoin, macrocrystal-monohydrate, (MACROBID) 100 MG capsule Take 1 capsule (100 mg total) by mouth 2 (two) times daily. for 7 days 14 capsule 3/2/2023 3/9/2023 Josh Robles MD    promethazine (PHENERGAN) 25 MG tablet Take 1 tablet (25 mg total) by mouth every 6 (six) hours as needed for Nausea. 7 tablet 3/2/2023 -- Josh Robles MD           Follow-up Information       Follow up With Specialties Details Why Contact Info    [facility] - Emergency Dept Emergency Medicine  If symptoms worsen [address] [facility] [id number] [phone]    Adamaris Cao MD Family Medicine Schedule an appointment as soon as possible for a visit   [address] [facility] [address]  [phone]               Josh Robles MD  03/02/23 211

## 2023-04-06 ENCOUNTER — HOSPITAL ENCOUNTER (OUTPATIENT)
Dept: RADIOLOGY | Facility: HOSPITAL | Age: 53
Discharge: HOME | End: 2023-04-06
Attending: FAMILY MEDICINE
Payer: MEDICAID

## 2023-04-06 DIAGNOSIS — Z12.31 SCREENING MAMMOGRAM FOR BREAST CANCER: ICD-10-CM

## 2023-04-06 PROCEDURE — 77063 MAMMO DIGITAL SCREENING BILAT WITH TOMO: ICD-10-PCS | Mod: 26,,, | Performed by: STUDENT IN AN ORGANIZED HEALTH CARE EDUCATION/TRAINING PROGRAM

## 2023-04-06 PROCEDURE — 77063 BREAST TOMOSYNTHESIS BI: CPT | Mod: 26,,, | Performed by: STUDENT IN AN ORGANIZED HEALTH CARE EDUCATION/TRAINING PROGRAM

## 2023-04-06 PROCEDURE — 77067 SCR MAMMO BI INCL CAD: CPT | Mod: TC

## 2023-04-06 PROCEDURE — 77067 MAMMO DIGITAL SCREENING BILAT WITH TOMO: ICD-10-PCS | Mod: 26,,, | Performed by: STUDENT IN AN ORGANIZED HEALTH CARE EDUCATION/TRAINING PROGRAM

## 2023-04-06 PROCEDURE — 77067 SCR MAMMO BI INCL CAD: CPT | Mod: 26,,, | Performed by: STUDENT IN AN ORGANIZED HEALTH CARE EDUCATION/TRAINING PROGRAM

## 2023-04-11 ENCOUNTER — OFFICE VISIT (OUTPATIENT)
Dept: ORTHOPEDICS | Facility: CLINIC | Age: 53
End: 2023-04-11
Payer: MEDICAID

## 2023-04-11 ENCOUNTER — HOSPITAL ENCOUNTER (OUTPATIENT)
Dept: RADIOLOGY | Facility: CLINIC | Age: 53
Discharge: HOME | End: 2023-04-11
Attending: ORTHOPAEDIC SURGERY
Payer: MEDICAID

## 2023-04-11 ENCOUNTER — LAB VISIT (OUTPATIENT)
Dept: LAB | Facility: HOSPITAL | Age: 53
End: 2023-04-11
Attending: ORTHOPAEDIC SURGERY
Payer: MEDICAID

## 2023-04-11 VITALS
SYSTOLIC BLOOD PRESSURE: 136 MMHG | TEMPERATURE: 98 F | HEART RATE: 112 BPM | WEIGHT: 135 LBS | DIASTOLIC BLOOD PRESSURE: 89 MMHG | BODY MASS INDEX: 24.84 KG/M2 | HEIGHT: 62 IN

## 2023-04-11 DIAGNOSIS — S91.022D: ICD-10-CM

## 2023-04-11 DIAGNOSIS — S82.201M TYPE I OR II OPEN FRACTURE OF RIGHT TIBIA AND FIBULA WITH NONUNION, SUBSEQUENT ENCOUNTER: ICD-10-CM

## 2023-04-11 DIAGNOSIS — S82.201M TYPE I OR II OPEN FRACTURE OF RIGHT TIBIA AND FIBULA WITH NONUNION, SUBSEQUENT ENCOUNTER: Primary | ICD-10-CM

## 2023-04-11 DIAGNOSIS — S82.401M TYPE I OR II OPEN FRACTURE OF RIGHT TIBIA AND FIBULA WITH NONUNION, SUBSEQUENT ENCOUNTER: ICD-10-CM

## 2023-04-11 DIAGNOSIS — S82.401M TYPE I OR II OPEN FRACTURE OF RIGHT TIBIA AND FIBULA WITH NONUNION, SUBSEQUENT ENCOUNTER: Primary | ICD-10-CM

## 2023-04-11 LAB
ANION GAP SERPL CALC-SCNC: 8 MEQ/L
BASOPHILS # BLD AUTO: 0.07 X10(3)/MCL (ref 0–0.2)
BASOPHILS NFR BLD AUTO: 1 %
BUN SERPL-MCNC: 9.3 MG/DL (ref 9.8–20.1)
CALCIUM SERPL-MCNC: 9.6 MG/DL (ref 8.4–10.2)
CHLORIDE SERPL-SCNC: 101 MMOL/L (ref 98–107)
CO2 SERPL-SCNC: 28 MMOL/L (ref 22–29)
CREAT SERPL-MCNC: 0.71 MG/DL (ref 0.55–1.02)
CREAT/UREA NIT SERPL: 13
CRP SERPL-MCNC: 4.7 MG/L
EOSINOPHIL # BLD AUTO: 0.11 X10(3)/MCL (ref 0–0.9)
EOSINOPHIL NFR BLD AUTO: 1.5 %
ERYTHROCYTE [DISTWIDTH] IN BLOOD BY AUTOMATED COUNT: 13.2 % (ref 11.5–17)
ERYTHROCYTE [SEDIMENTATION RATE] IN BLOOD: 7 MM/HR (ref 0–20)
GFR SERPLBLD CREATININE-BSD FMLA CKD-EPI: >60 MLS/MIN/1.73/M2
GLUCOSE SERPL-MCNC: 107 MG/DL (ref 74–100)
HCT VFR BLD AUTO: 39.4 % (ref 37–47)
HGB BLD-MCNC: 12.8 G/DL (ref 12–16)
IMM GRANULOCYTES # BLD AUTO: 0.02 X10(3)/MCL (ref 0–0.04)
IMM GRANULOCYTES NFR BLD AUTO: 0.3 %
LYMPHOCYTES # BLD AUTO: 2.48 X10(3)/MCL (ref 0.6–4.6)
LYMPHOCYTES NFR BLD AUTO: 34.3 %
MCH RBC QN AUTO: 28 PG (ref 27–31)
MCHC RBC AUTO-ENTMCNC: 32.5 G/DL (ref 33–36)
MCV RBC AUTO: 86.2 FL (ref 80–94)
MONOCYTES # BLD AUTO: 0.46 X10(3)/MCL (ref 0.1–1.3)
MONOCYTES NFR BLD AUTO: 6.4 %
NEUTROPHILS # BLD AUTO: 4.08 X10(3)/MCL (ref 2.1–9.2)
NEUTROPHILS NFR BLD AUTO: 56.5 %
NRBC BLD AUTO-RTO: 0 %
PLATELET # BLD AUTO: 290 X10(3)/MCL (ref 130–400)
PMV BLD AUTO: 9.2 FL (ref 7.4–10.4)
POTASSIUM SERPL-SCNC: 4.2 MMOL/L (ref 3.5–5.1)
PREALB SERPL-MCNC: 30.6 MG/DL (ref 16–38)
RBC # BLD AUTO: 4.57 X10(6)/MCL (ref 4.2–5.4)
SODIUM SERPL-SCNC: 137 MMOL/L (ref 136–145)
TSH SERPL-ACNC: 0.91 UIU/ML (ref 0.35–4.94)
WBC # SPEC AUTO: 7.2 X10(3)/MCL (ref 4.5–11.5)

## 2023-04-11 PROCEDURE — 84134 ASSAY OF PREALBUMIN: CPT

## 2023-04-11 PROCEDURE — 99214 PR OFFICE/OUTPT VISIT, EST, LEVL IV, 30-39 MIN: ICD-10-PCS | Mod: 57,,, | Performed by: ORTHOPAEDIC SURGERY

## 2023-04-11 PROCEDURE — 3008F PR BODY MASS INDEX (BMI) DOCUMENTED: ICD-10-PCS | Mod: CPTII,,, | Performed by: ORTHOPAEDIC SURGERY

## 2023-04-11 PROCEDURE — 80048 BASIC METABOLIC PNL TOTAL CA: CPT

## 2023-04-11 PROCEDURE — 36415 COLL VENOUS BLD VENIPUNCTURE: CPT

## 2023-04-11 PROCEDURE — 80323 ALKALOIDS NOS: CPT | Mod: 90

## 2023-04-11 PROCEDURE — 85651 RBC SED RATE NONAUTOMATED: CPT

## 2023-04-11 PROCEDURE — 3075F SYST BP GE 130 - 139MM HG: CPT | Mod: CPTII,,, | Performed by: ORTHOPAEDIC SURGERY

## 2023-04-11 PROCEDURE — 73610 XR ANKLE COMPLETE 3 VIEW LEFT: ICD-10-PCS | Mod: LT,,, | Performed by: ORTHOPAEDIC SURGERY

## 2023-04-11 PROCEDURE — 99214 OFFICE O/P EST MOD 30 MIN: CPT | Mod: 57,,, | Performed by: ORTHOPAEDIC SURGERY

## 2023-04-11 PROCEDURE — 73610 X-RAY EXAM OF ANKLE: CPT | Mod: LT,,, | Performed by: ORTHOPAEDIC SURGERY

## 2023-04-11 PROCEDURE — 85025 COMPLETE CBC W/AUTO DIFF WBC: CPT

## 2023-04-11 PROCEDURE — 3079F PR MOST RECENT DIASTOLIC BLOOD PRESSURE 80-89 MM HG: ICD-10-PCS | Mod: CPTII,,, | Performed by: ORTHOPAEDIC SURGERY

## 2023-04-11 PROCEDURE — 86140 C-REACTIVE PROTEIN: CPT

## 2023-04-11 PROCEDURE — 3075F PR MOST RECENT SYSTOLIC BLOOD PRESS GE 130-139MM HG: ICD-10-PCS | Mod: CPTII,,, | Performed by: ORTHOPAEDIC SURGERY

## 2023-04-11 PROCEDURE — 73590 X-RAY EXAM OF LOWER LEG: CPT | Mod: RT,,, | Performed by: ORTHOPAEDIC SURGERY

## 2023-04-11 PROCEDURE — 1159F MED LIST DOCD IN RCRD: CPT | Mod: CPTII,,, | Performed by: ORTHOPAEDIC SURGERY

## 2023-04-11 PROCEDURE — 3008F BODY MASS INDEX DOCD: CPT | Mod: CPTII,,, | Performed by: ORTHOPAEDIC SURGERY

## 2023-04-11 PROCEDURE — 84443 ASSAY THYROID STIM HORMONE: CPT

## 2023-04-11 PROCEDURE — 3079F DIAST BP 80-89 MM HG: CPT | Mod: CPTII,,, | Performed by: ORTHOPAEDIC SURGERY

## 2023-04-11 PROCEDURE — 1159F PR MEDICATION LIST DOCUMENTED IN MEDICAL RECORD: ICD-10-PCS | Mod: CPTII,,, | Performed by: ORTHOPAEDIC SURGERY

## 2023-04-11 PROCEDURE — 73590 XR TIBIA FIBULA 2 VIEW RIGHT: ICD-10-PCS | Mod: RT,,, | Performed by: ORTHOPAEDIC SURGERY

## 2023-04-11 RX ORDER — SODIUM CHLORIDE 0.9 % (FLUSH) 0.9 %
10 SYRINGE (ML) INJECTION
Status: DISCONTINUED | OUTPATIENT
Start: 2023-04-11 | End: 2023-04-12 | Stop reason: CLARIF

## 2023-04-11 RX ORDER — MUPIROCIN 20 MG/G
OINTMENT TOPICAL
Status: CANCELLED | OUTPATIENT
Start: 2023-04-11

## 2023-04-11 NOTE — PROGRESS NOTES
Spoke with the patient to schedule preop labs for scheduled surgery on 4/12/23. The patient requested to contact Dr. Scott office to make sure more labs were needed. Spoke with Carla (nurse) at Dr. Scott office who verified that patient needed to complete preop labs.

## 2023-04-11 NOTE — H&P (VIEW-ONLY)
Subjective:       Patient ID: Deborah Cross is a 53 y.o. female.  Chief Complaint   Patient presents with    Left Ankle - Follow-up     5.5 MONTH F/U FROM IMN RIGHT TIB/FIB SHAFT FX. LEFT ANKLE DISLOCATION WITH ATFL, LEFT 5TH MT BASE FX. REPORTS NO IMPROVEMENT IN PAIN. AMBULATES WITHOUT ASSISTANCE.          HPI:  Patient is 6 months out from a right tibia and fibula shaft intramedullary nail and a ligamentous reconstruction left ankle.  Patient continues to smoke tobacco she is gone on to nonunion.  She used bone stimulator for proximally 4 weeks and then ceased using it due to a lump on the leg.  Patient has dull achy pain to the right tibia which worse with ambulation in the morning better rest better with pain medication denies numbness tingling.    ROS:  Constitutional: Denies fever chills  Eyes: No change in vision  ENT: No ringing or current infections  CV: No chest pain  Resp: No labored breathing  MSK: Pain evident at site of injury located in HPI,   Integ: No signs of abrasions or lacerations  Neuro: No numbness or tingling  Lymphatic: No swelling outside the area of injury     Current Outpatient Medications on File Prior to Visit   Medication Sig Dispense Refill    ALPRAZolam (XANAX) 1 MG tablet Take 1 tablet (1 mg total) by mouth 2 (two) times daily as needed for Anxiety. 40 tablet 5    amLODIPine (NORVASC) 5 MG tablet Take 1 tablet (5 mg total) by mouth once daily. 90 tablet 1    diclofenac (VOLTAREN) 50 MG EC tablet Take 1 tablet (50 mg total) by mouth 3 (three) times daily as needed (pain). 30 tablet 0    FLUoxetine 40 MG capsule Take 1 capsule (40 mg total) by mouth every morning. 90 capsule 1    fluticasone propionate (FLONASE) 50 mcg/actuation nasal spray use 1 spray in each nostril twice daily 16 g 1    ibuprofen (ADVIL,MOTRIN) 800 MG tablet Take 1 tablet (800 mg total) by mouth 2 (two) times a day. 60 tablet 2    metFORMIN (GLUCOPHAGE) 500 MG tablet Take 1 tablet (500 mg total) by mouth once  "daily. 90 tablet 1    promethazine (PHENERGAN) 25 MG tablet Take 1 tablet (25 mg total) by mouth every 6 (six) hours as needed for Nausea. 7 tablet 0    QUEtiapine (SEROQUEL) 50 MG tablet Take 1 tablet (50 mg total) by mouth every evening. 30 tablet 11    rosuvastatin (CRESTOR) 20 MG tablet Take 1 tablet (20 mg total) by mouth once daily. 90 tablet 0    simvastatin (ZOCOR) 40 MG tablet Take 1 tablet (40 mg total) by mouth every evening. 90 tablet 1    sumatriptan (IMITREX) 25 MG Tab TAKE ONE TABLET BY MOUTH as a single DOSE AND MAY REPEAT DOSE in 2 hours if needed 9 tablet 1    traZODone (DESYREL) 100 MG tablet Take 1 tablet (100 mg total) by mouth every evening. 90 tablet 0     No current facility-administered medications on file prior to visit.          Objective:      /89   Pulse (!) 112   Temp 98 °F (36.7 °C)   Ht 5' 2" (1.575 m)   Wt 61.2 kg (135 lb)   BMI 24.69 kg/m²   General the patient is alert and oriented x3 no acute distress nontoxic-appearing appropriate affect.    Constitutional: Vital signs are examined and stable.  Resp: No signs of labored breathing               LLE: -Skin: No signs of new abrasions or lacerations, no scars           -MSK: Hip and Knee F/E, EHL/FHL, Gastroc/Tib anterior Strength 5/5           -Neuro:  Sensation intact to light touch L3-S1 dermatomes           -Lymphatic: No signs of lymphadenopathy           -CV: Capillary refill is less than 2 seconds. DP/PT pulses 2/4. Compartments soft and compressible                      RLE: -Skin:  Tenderness to palpation over fracture site no sign of infection No signs of new abrasions or lacerations, no scars           -MSK: : Hip and Knee F/E, EHL/FHL, Gastroc/Tib anterior Strength 5/5           -Neuro:  Sensation intact to light touch L3-S1 dermatomes           -Lymphatic: No signs of lymphadenopathy           -CV: Capillary refill is less than 2 seconds. DP/PT pulses  2/4. Compartments soft and compressible.   Body mass index " is 24.69 kg/m².  Ideal body weight: 50.1 kg (110 lb 7.2 oz)  Adjusted ideal body weight: 54.6 kg (120 lb 4.3 oz)  Hemoglobin A1c   Date Value Ref Range Status   04/06/2023 6.0 <=7.0 % Final   12/15/2021 5.9 <<=7.0 % Final     Hgb   Date Value Ref Range Status   04/06/2023 14.0 12.0 - 16.0 g/dL Final   03/02/2023 12.5 12.0 - 16.0 g/dL Final     Vit D 25 OH   Date Value Ref Range Status   04/06/2023 63.1 30.0 - 80.0 ng/mL Final   12/15/2021 27.7 (L) 30.0 - 80.0 ng/mL Final     WBC   Date Value Ref Range Status   04/06/2023 8.3 4.5 - 11.5 x10(3)/mcL Final   03/02/2023 8.0 4.5 - 11.5 x10(3)/mcL Final       Radiology:  Two views right fibula tibia skeletally mature individual show hypertrophic nonunion right tibia        Assessment:         1. 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 Laceration of left ankle with foreign body, subsequent encounter  X-Ray Ankle Complete Left              Plan:         No follow-ups on file.    Deborah was seen today for follow-up.    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, subsequent encounter  -     X-Ray Tibia Fibula 2 View Right; Future  -     Case Request Operating Room: REPAIR,FRACTURE NONUNION,TIBIA  -     CBC Auto Differential; Future  -     Basic Metabolic Panel; Future  -     Prealbumin; Future  -     TSH; Future  -     C-reactive protein; Future  -     Sedimentation rate; Future  -     Nicotine and Metabolites, Blood; Future    Laceration of left ankle with foreign body, subsequent encounter  -     X-Ray Ankle Complete Left; Future    Other orders  -     Vital signs; Standing  -     Cleanse with Chlorhexidine  (CHG); Standing  -     Diet NPO; Standing  -     sodium chloride 0.9% flush 10 mL  -     IP VTE LOW RISK PATIENT; Standing  -     Place ESTELLE hose; Standing  -     Place sequential compression device; Standing  -     Chlorohexidine Gluconate Bath; Standing  -     mupirocin 2 % ointment  -     Full code; Standing  -     Place in Outpatient; Standing  -     ceFAZolin (ANCEF) 2 g in dextrose 5 % (D5W) 50 mL IVPB  -     Cleanse with Chlorhexidine (CHG)  -     Diet NPO  -     IP VTE LOW RISK PATIENT  -     Place ESTELLE hose  -     Place sequential compression device  -     Full code      Patient has continued pain in the right tibia.  She states she is taking pain medication currently.  Patient continues to use nicotine.  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  Patient understands smoking leads to increased risk of infection nonunion possible need for revision surgery including poor outcome.  Patient continues to smoke against medical recommendations.  We will add her on the schedule for tomorrow for nail dynamization possible screw placement proximally.  We will also repair of the nonunion.  Patient may need pain referral in the future.  She will be an outpatient procedure she understands risks and benefits and possible failure of the surgery.    I explained that surgery and the nature of their condition are not without risks. These include, but are not limited to, bleeding, infection, neurovascular compromise, malunion, nonunion, hardware complications, wound complications, scarring, cosmetic defects, need for later and/or repeated surgeries, pain, loss of ROM, loss of function, PTOA, deformity, stance/gait and/or functional abnormalities, thromboembolic complications, compartment syndrome, loss of limb, loss of life, anesthetic complications, and other imponderables. I explained that these can occur despite the adequacy of treatments rendered, and that their risks are heightened given the nature of their  condition. They verbalized understanding. They would like to continue with surgery at this time. If appropriate family was involved with surgical discussion.         This note/OR report was created with the assistance of  voice recognition software or phone  dictation.  There may be transcription errors as a result of using this technology however minimal. Effort has been made to assure accuracy of transcription but any obvious errors or omissions should be clarified with the author of the document.     Dillon Scott DO  Orthopedic Trauma Surgery  04/11/2023      Future Appointments   Date Time Provider Department Center   6/28/2023  8:45 AM Adamaris Cao MD [facility] MIKE Cao

## 2023-04-11 NOTE — PRE-PROCEDURE INSTRUCTIONS
[facility]: Outpatient Surgery  Preprocedure Check-In Instructions    Your arrival time for your surgery or procedure is 5:30am.  We ask patients to arrive about 2 hours before surgery to allow for enough time to review your health history & medications, start your IV, complete any outstanding labwork or tests, and meet your Anesthesiologist.  You will arrive at [facility], [address] [place] [address].  Enter through the [place] entrance next to the Emergency Room, and come to the 6th floor to the Outpatient Surgery Department.    Visitory Policy:  You are allowed 2 adult visitors to be with you in the hospital.  All hospital visitors should be in good current health.  No small children.    What to Bring:  Please have your ID, insurance cards, and all home medication bottles with you at check in.  Bring your CPAP machine if one is used at home.    Fasting:  Nothing to eat or drink after midnight the night before your procedure. This includes no ice, gum, hard candies, and/or tobacco products.  Follow your doctor's instructions for taking any medications on the morning of your procedure.  If no instructions for taking medications were given, do not take any medications but bring your medications in their bottles to your procedure check in.    Follow your doctor's preoperative instructions regarding skin prep, bowel prep, bathing, or showering prior to your procedure.  If any special soaps were provided to you, please use according to your doctor's instructions. If no instructions were given from your doctor, take a good bath or shower with antibacterial soap the night before and the morning of your procedure.  On the morning of procedure, wear loose, comfortable clothing.  No lotions, makeup, perfumes, colognes, deodorant, or jewelry to your procedure.  Removable items (glasses, contact lenses, dentures, retainers, hearing aids) need to be removed for your procedure.  Bring  your storage containers for these items if you wear them.    Artificial nails, body jewelry, eyelash extensions, and/or hair extensions with metal clips are not allowed during your surgery.  If you currently wear any of these items, please arrange for them to be removed prior to your arrival to the hospital.    Outpatient or Same Day Surgeries:  Any patients receiving sedation/anesthesia are advised not to drive for 24 hours after their procedure.  We do not allow patients to drive themselves home after discharge.  If you are going home after your procedure, please have someone available to drive you home from the hospital.      You may call the Outpatient Surgery Department at [phone] with any questions or concerns.  We are looking forward to meeting you and taking great care of you for your procedure.  Thank you for choosing [facility] for your surgical needs.        Status: complete  Spoke with: patient  Time:  7128

## 2023-04-11 NOTE — PROGRESS NOTES
Subjective:       Patient ID: Deborah Cross is a 53 y.o. female.  Chief Complaint   Patient presents with    Left Ankle - Follow-up     5.5 MONTH F/U FROM IMN RIGHT TIB/FIB SHAFT FX. LEFT ANKLE DISLOCATION WITH ATFL, LEFT 5TH MT BASE FX. REPORTS NO IMPROVEMENT IN PAIN. AMBULATES WITHOUT ASSISTANCE.          HPI:  Patient is 6 months out from a right tibia and fibula shaft intramedullary nail and a ligamentous reconstruction left ankle.  Patient continues to smoke tobacco she is gone on to nonunion.  She used bone stimulator for proximally 4 weeks and then ceased using it due to a lump on the leg.  Patient has dull achy pain to the right tibia which worse with ambulation in the morning better rest better with pain medication denies numbness tingling.    ROS:  Constitutional: Denies fever chills  Eyes: No change in vision  ENT: No ringing or current infections  CV: No chest pain  Resp: No labored breathing  MSK: Pain evident at site of injury located in HPI,   Integ: No signs of abrasions or lacerations  Neuro: No numbness or tingling  Lymphatic: No swelling outside the area of injury     Current Outpatient Medications on File Prior to Visit   Medication Sig Dispense Refill    ALPRAZolam (XANAX) 1 MG tablet Take 1 tablet (1 mg total) by mouth 2 (two) times daily as needed for Anxiety. 40 tablet 5    amLODIPine (NORVASC) 5 MG tablet Take 1 tablet (5 mg total) by mouth once daily. 90 tablet 1    diclofenac (VOLTAREN) 50 MG EC tablet Take 1 tablet (50 mg total) by mouth 3 (three) times daily as needed (pain). 30 tablet 0    FLUoxetine 40 MG capsule Take 1 capsule (40 mg total) by mouth every morning. 90 capsule 1    fluticasone propionate (FLONASE) 50 mcg/actuation nasal spray use 1 spray in each nostril twice daily 16 g 1    ibuprofen (ADVIL,MOTRIN) 800 MG tablet Take 1 tablet (800 mg total) by mouth 2 (two) times a day. 60 tablet 2    metFORMIN (GLUCOPHAGE) 500 MG tablet Take 1 tablet (500 mg total) by mouth once  "daily. 90 tablet 1    promethazine (PHENERGAN) 25 MG tablet Take 1 tablet (25 mg total) by mouth every 6 (six) hours as needed for Nausea. 7 tablet 0    QUEtiapine (SEROQUEL) 50 MG tablet Take 1 tablet (50 mg total) by mouth every evening. 30 tablet 11    rosuvastatin (CRESTOR) 20 MG tablet Take 1 tablet (20 mg total) by mouth once daily. 90 tablet 0    simvastatin (ZOCOR) 40 MG tablet Take 1 tablet (40 mg total) by mouth every evening. 90 tablet 1    sumatriptan (IMITREX) 25 MG Tab TAKE ONE TABLET BY MOUTH as a single DOSE AND MAY REPEAT DOSE in 2 hours if needed 9 tablet 1    traZODone (DESYREL) 100 MG tablet Take 1 tablet (100 mg total) by mouth every evening. 90 tablet 0     No current facility-administered medications on file prior to visit.          Objective:      /89   Pulse (!) 112   Temp 98 °F (36.7 °C)   Ht 5' 2" (1.575 m)   Wt 61.2 kg (135 lb)   BMI 24.69 kg/m²   General the patient is alert and oriented x3 no acute distress nontoxic-appearing appropriate affect.    Constitutional: Vital signs are examined and stable.  Resp: No signs of labored breathing               LLE: -Skin: No signs of new abrasions or lacerations, no scars           -MSK: Hip and Knee F/E, EHL/FHL, Gastroc/Tib anterior Strength 5/5           -Neuro:  Sensation intact to light touch L3-S1 dermatomes           -Lymphatic: No signs of lymphadenopathy           -CV: Capillary refill is less than 2 seconds. DP/PT pulses 2/4. Compartments soft and compressible                      RLE: -Skin:  Tenderness to palpation over fracture site no sign of infection No signs of new abrasions or lacerations, no scars           -MSK: : Hip and Knee F/E, EHL/FHL, Gastroc/Tib anterior Strength 5/5           -Neuro:  Sensation intact to light touch L3-S1 dermatomes           -Lymphatic: No signs of lymphadenopathy           -CV: Capillary refill is less than 2 seconds. DP/PT pulses  2/4. Compartments soft and compressible.   Body mass index " is 24.69 kg/m².  Ideal body weight: 50.1 kg (110 lb 7.2 oz)  Adjusted ideal body weight: 54.6 kg (120 lb 4.3 oz)  Hemoglobin A1c   Date Value Ref Range Status   04/06/2023 6.0 <=7.0 % Final   12/15/2021 5.9 <<=7.0 % Final     Hgb   Date Value Ref Range Status   04/06/2023 14.0 12.0 - 16.0 g/dL Final   03/02/2023 12.5 12.0 - 16.0 g/dL Final     Vit D 25 OH   Date Value Ref Range Status   04/06/2023 63.1 30.0 - 80.0 ng/mL Final   12/15/2021 27.7 (L) 30.0 - 80.0 ng/mL Final     WBC   Date Value Ref Range Status   04/06/2023 8.3 4.5 - 11.5 x10(3)/mcL Final   03/02/2023 8.0 4.5 - 11.5 x10(3)/mcL Final       Radiology:  Two views right fibula tibia skeletally mature individual show hypertrophic nonunion right tibia        Assessment:         1. 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 Laceration of left ankle with foreign body, subsequent encounter  X-Ray Ankle Complete Left              Plan:         No follow-ups on file.    Deborah was seen today for follow-up.    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, subsequent encounter  -     X-Ray Tibia Fibula 2 View Right; Future  -     Case Request Operating Room: REPAIR,FRACTURE NONUNION,TIBIA  -     CBC Auto Differential; Future  -     Basic Metabolic Panel; Future  -     Prealbumin; Future  -     TSH; Future  -     C-reactive protein; Future  -     Sedimentation rate; Future  -     Nicotine and Metabolites, Blood; Future    Laceration of left ankle with foreign body, subsequent encounter  -     X-Ray Ankle Complete Left; Future    Other orders  -     Vital signs; Standing  -     Cleanse with Chlorhexidine  (CHG); Standing  -     Diet NPO; Standing  -     sodium chloride 0.9% flush 10 mL  -     IP VTE LOW RISK PATIENT; Standing  -     Place ESTELLE hose; Standing  -     Place sequential compression device; Standing  -     Chlorohexidine Gluconate Bath; Standing  -     mupirocin 2 % ointment  -     Full code; Standing  -     Place in Outpatient; Standing  -     ceFAZolin (ANCEF) 2 g in dextrose 5 % (D5W) 50 mL IVPB  -     Cleanse with Chlorhexidine (CHG)  -     Diet NPO  -     IP VTE LOW RISK PATIENT  -     Place ESTELLE hose  -     Place sequential compression device  -     Full code      Patient has continued pain in the right tibia.  She states she is taking pain medication currently.  Patient continues to use nicotine.  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  Patient understands smoking leads to increased risk of infection nonunion possible need for revision surgery including poor outcome.  Patient continues to smoke against medical recommendations.  We will add her on the schedule for tomorrow for nail dynamization possible screw placement proximally.  We will also repair of the nonunion.  Patient may need pain referral in the future.  She will be an outpatient procedure she understands risks and benefits and possible failure of the surgery.    I explained that surgery and the nature of their condition are not without risks. These include, but are not limited to, bleeding, infection, neurovascular compromise, malunion, nonunion, hardware complications, wound complications, scarring, cosmetic defects, need for later and/or repeated surgeries, pain, loss of ROM, loss of function, PTOA, deformity, stance/gait and/or functional abnormalities, thromboembolic complications, compartment syndrome, loss of limb, loss of life, anesthetic complications, and other imponderables. I explained that these can occur despite the adequacy of treatments rendered, and that their risks are heightened given the nature of their  condition. They verbalized understanding. They would like to continue with surgery at this time. If appropriate family was involved with surgical discussion.         This note/OR report was created with the assistance of  voice recognition software or phone  dictation.  There may be transcription errors as a result of using this technology however minimal. Effort has been made to assure accuracy of transcription but any obvious errors or omissions should be clarified with the author of the document.     Dillon Scott DO  Orthopedic Trauma Surgery  04/11/2023      Future Appointments   Date Time Provider Department Center   6/28/2023  8:45 AM Adamaris Cao MD [facility] Cao

## 2023-04-12 ENCOUNTER — HOSPITAL ENCOUNTER (OUTPATIENT)
Facility: HOSPITAL | Age: 53
Discharge: HOME | End: 2023-04-12
Attending: ORTHOPAEDIC SURGERY | Admitting: ORTHOPAEDIC SURGERY
Payer: MEDICAID

## 2023-04-12 ENCOUNTER — ANESTHESIA (OUTPATIENT)
Dept: SURGERY | Facility: HOSPITAL | Age: 53
End: 2023-04-12
Payer: MEDICAID

## 2023-04-12 ENCOUNTER — ANESTHESIA EVENT (OUTPATIENT)
Dept: SURGERY | Facility: HOSPITAL | Age: 53
End: 2023-04-12
Payer: MEDICAID

## 2023-04-12 DIAGNOSIS — S82.201M TYPE I OR II OPEN FRACTURE OF RIGHT TIBIA AND FIBULA WITH NONUNION, SUBSEQUENT ENCOUNTER: ICD-10-CM

## 2023-04-12 DIAGNOSIS — S82.401K CLOSED FRACTURE OF RIGHT TIBIA AND FIBULA WITH NONUNION, SUBSEQUENT ENCOUNTER: Primary | ICD-10-CM

## 2023-04-12 DIAGNOSIS — S82.201K CLOSED FRACTURE OF RIGHT TIBIA AND FIBULA WITH NONUNION, SUBSEQUENT ENCOUNTER: Primary | ICD-10-CM

## 2023-04-12 DIAGNOSIS — S82.401M TYPE I OR II OPEN FRACTURE OF RIGHT TIBIA AND FIBULA WITH NONUNION, SUBSEQUENT ENCOUNTER: ICD-10-CM

## 2023-04-12 LAB
COTININE SERPL-MCNC: >500 NG/ML
NICOTINE SERPL-MCNC: 15 NG/ML
POCT GLUCOSE: 119 MG/DL (ref 70–110)

## 2023-04-12 PROCEDURE — 71000033 HC RECOVERY, INTIAL HOUR: Performed by: ORTHOPAEDIC SURGERY

## 2023-04-12 PROCEDURE — 63600175 PHARM REV CODE 636 W HCPCS: Performed by: ORTHOPAEDIC SURGERY

## 2023-04-12 PROCEDURE — D9220A PRA ANESTHESIA: ICD-10-PCS | Mod: CRNA,,, | Performed by: NURSE ANESTHETIST, CERTIFIED REGISTERED

## 2023-04-12 PROCEDURE — 27720 PR RNON/MALUNION TIBIA: ICD-10-PCS | Mod: AS,RT,, | Performed by: PHYSICIAN ASSISTANT

## 2023-04-12 PROCEDURE — D9220A PRA ANESTHESIA: Mod: CRNA,,, | Performed by: NURSE ANESTHETIST, CERTIFIED REGISTERED

## 2023-04-12 PROCEDURE — 36000710: Performed by: ORTHOPAEDIC SURGERY

## 2023-04-12 PROCEDURE — 63600175 PHARM REV CODE 636 W HCPCS

## 2023-04-12 PROCEDURE — 25000003 PHARM REV CODE 250: Performed by: PHYSICIAN ASSISTANT

## 2023-04-12 PROCEDURE — 71000015 HC POSTOP RECOV 1ST HR: Performed by: ORTHOPAEDIC SURGERY

## 2023-04-12 PROCEDURE — C1713 ANCHOR/SCREW BN/BN,TIS/BN: HCPCS | Performed by: ORTHOPAEDIC SURGERY

## 2023-04-12 PROCEDURE — 27720 PR RNON/MALUNION TIBIA: ICD-10-PCS | Mod: RT,,, | Performed by: ORTHOPAEDIC SURGERY

## 2023-04-12 PROCEDURE — 82962 GLUCOSE BLOOD TEST: CPT | Performed by: ORTHOPAEDIC SURGERY

## 2023-04-12 PROCEDURE — 37000009 HC ANESTHESIA EA ADD 15 MINS: Performed by: ORTHOPAEDIC SURGERY

## 2023-04-12 PROCEDURE — 71000039 HC RECOVERY, EACH ADD'L HOUR: Performed by: ORTHOPAEDIC SURGERY

## 2023-04-12 PROCEDURE — 27720 REPAIR OF TIBIA: CPT | Mod: AS,RT,, | Performed by: PHYSICIAN ASSISTANT

## 2023-04-12 PROCEDURE — D9220A PRA ANESTHESIA: Mod: ANES,,, | Performed by: ANESTHESIOLOGY

## 2023-04-12 PROCEDURE — 36000711: Performed by: ORTHOPAEDIC SURGERY

## 2023-04-12 PROCEDURE — 27201423 OPTIME MED/SURG SUP & DEVICES STERILE SUPPLY: Performed by: ORTHOPAEDIC SURGERY

## 2023-04-12 PROCEDURE — 37000008 HC ANESTHESIA 1ST 15 MINUTES: Performed by: ORTHOPAEDIC SURGERY

## 2023-04-12 PROCEDURE — D9220A PRA ANESTHESIA: ICD-10-PCS | Mod: ANES,,, | Performed by: ANESTHESIOLOGY

## 2023-04-12 PROCEDURE — 63600175 PHARM REV CODE 636 W HCPCS: Performed by: NURSE ANESTHETIST, CERTIFIED REGISTERED

## 2023-04-12 PROCEDURE — 25000003 PHARM REV CODE 250: Performed by: NURSE ANESTHETIST, CERTIFIED REGISTERED

## 2023-04-12 PROCEDURE — 27720 REPAIR OF TIBIA: CPT | Mod: RT,,, | Performed by: ORTHOPAEDIC SURGERY

## 2023-04-12 DEVICE — SCREW XL25 IM NAIL 36X5MM: Type: IMPLANTABLE DEVICE | Site: TIBIA | Status: FUNCTIONAL

## 2023-04-12 RX ORDER — FENTANYL CITRATE 50 UG/ML
INJECTION, SOLUTION INTRAMUSCULAR; INTRAVENOUS
Status: DISCONTINUED | OUTPATIENT
Start: 2023-04-12 | End: 2023-04-12

## 2023-04-12 RX ORDER — IPRATROPIUM BROMIDE AND ALBUTEROL SULFATE 2.5; .5 MG/3ML; MG/3ML
3 SOLUTION RESPIRATORY (INHALATION) ONCE AS NEEDED
Status: DISCONTINUED | OUTPATIENT
Start: 2023-04-12 | End: 2023-04-12 | Stop reason: HOSPADM

## 2023-04-12 RX ORDER — ROCURONIUM BROMIDE 10 MG/ML
INJECTION, SOLUTION INTRAVENOUS
Status: DISCONTINUED | OUTPATIENT
Start: 2023-04-12 | End: 2023-04-12

## 2023-04-12 RX ORDER — ACETAMINOPHEN 10 MG/ML
1000 INJECTION, SOLUTION INTRAVENOUS ONCE
Status: DISCONTINUED | OUTPATIENT
Start: 2023-04-12 | End: 2023-04-12 | Stop reason: HOSPADM

## 2023-04-12 RX ORDER — HYDROMORPHONE HYDROCHLORIDE 2 MG/ML
INJECTION, SOLUTION INTRAMUSCULAR; INTRAVENOUS; SUBCUTANEOUS
Status: COMPLETED
Start: 2023-04-12 | End: 2023-04-12

## 2023-04-12 RX ORDER — IPRATROPIUM BROMIDE AND ALBUTEROL SULFATE 2.5; .5 MG/3ML; MG/3ML
3 SOLUTION RESPIRATORY (INHALATION)
Status: CANCELLED | OUTPATIENT
Start: 2023-04-12

## 2023-04-12 RX ORDER — ONDANSETRON 2 MG/ML
INJECTION INTRAMUSCULAR; INTRAVENOUS
Status: DISCONTINUED | OUTPATIENT
Start: 2023-04-12 | End: 2023-04-12

## 2023-04-12 RX ORDER — CEFAZOLIN SODIUM 2 G/50ML
2 SOLUTION INTRAVENOUS
Status: COMPLETED | OUTPATIENT
Start: 2023-04-12 | End: 2023-04-12

## 2023-04-12 RX ORDER — ONDANSETRON 2 MG/ML
4 INJECTION INTRAMUSCULAR; INTRAVENOUS ONCE AS NEEDED
Status: COMPLETED | OUTPATIENT
Start: 2023-04-12 | End: 2023-04-12

## 2023-04-12 RX ORDER — ONDANSETRON 2 MG/ML
4 INJECTION INTRAMUSCULAR; INTRAVENOUS EVERY 6 HOURS PRN
Status: DISCONTINUED | OUTPATIENT
Start: 2023-04-12 | End: 2023-04-12 | Stop reason: HOSPADM

## 2023-04-12 RX ORDER — OXYCODONE AND ACETAMINOPHEN 10; 325 MG/1; MG/1
1 TABLET ORAL EVERY 4 HOURS PRN
Status: DISCONTINUED | OUTPATIENT
Start: 2023-04-12 | End: 2023-04-12 | Stop reason: HOSPADM

## 2023-04-12 RX ORDER — MUPIROCIN 20 MG/G
OINTMENT TOPICAL
Status: DISCONTINUED | OUTPATIENT
Start: 2023-04-12 | End: 2023-04-12 | Stop reason: HOSPADM

## 2023-04-12 RX ORDER — OXYCODONE AND ACETAMINOPHEN 5; 325 MG/1; MG/1
1 TABLET ORAL EVERY 4 HOURS PRN
Status: DISCONTINUED | OUTPATIENT
Start: 2023-04-12 | End: 2023-04-12 | Stop reason: HOSPADM

## 2023-04-12 RX ORDER — LIDOCAINE HYDROCHLORIDE 20 MG/ML
INJECTION, SOLUTION EPIDURAL; INFILTRATION; INTRACAUDAL; PERINEURAL
Status: DISCONTINUED | OUTPATIENT
Start: 2023-04-12 | End: 2023-04-12

## 2023-04-12 RX ORDER — OXYCODONE AND ACETAMINOPHEN 10; 325 MG/1; MG/1
1 TABLET ORAL EVERY 4 HOURS PRN
Qty: 42 TABLET | Refills: 0 | Status: SHIPPED | OUTPATIENT
Start: 2023-04-12 | End: 2023-04-24 | Stop reason: SDUPTHER

## 2023-04-12 RX ORDER — ROSUVASTATIN CALCIUM 20 MG/1
20 TABLET, COATED ORAL DAILY
Status: ON HOLD | COMMUNITY
End: 2023-06-12 | Stop reason: HOSPADM

## 2023-04-12 RX ORDER — PROCHLORPERAZINE EDISYLATE 5 MG/ML
5 INJECTION INTRAMUSCULAR; INTRAVENOUS EVERY 30 MIN PRN
Status: CANCELLED | OUTPATIENT
Start: 2023-04-12

## 2023-04-12 RX ORDER — ONDANSETRON 2 MG/ML
4 INJECTION INTRAMUSCULAR; INTRAVENOUS DAILY PRN
Status: CANCELLED | OUTPATIENT
Start: 2023-04-12

## 2023-04-12 RX ORDER — PROPOFOL 10 MG/ML
VIAL (ML) INTRAVENOUS
Status: DISCONTINUED | OUTPATIENT
Start: 2023-04-12 | End: 2023-04-12

## 2023-04-12 RX ORDER — HYDROMORPHONE HYDROCHLORIDE 2 MG/ML
0.5 INJECTION, SOLUTION INTRAMUSCULAR; INTRAVENOUS; SUBCUTANEOUS EVERY 5 MIN PRN
Status: DISCONTINUED | OUTPATIENT
Start: 2023-04-12 | End: 2023-04-12 | Stop reason: HOSPADM

## 2023-04-12 RX ORDER — HYDROMORPHONE HYDROCHLORIDE 2 MG/ML
0.4 INJECTION, SOLUTION INTRAMUSCULAR; INTRAVENOUS; SUBCUTANEOUS EVERY 5 MIN PRN
Status: CANCELLED | OUTPATIENT
Start: 2023-04-12

## 2023-04-12 RX ORDER — MEPERIDINE HYDROCHLORIDE 25 MG/ML
12.5 INJECTION INTRAMUSCULAR; INTRAVENOUS; SUBCUTANEOUS EVERY 10 MIN PRN
Status: CANCELLED | OUTPATIENT
Start: 2023-04-12 | End: 2023-04-13

## 2023-04-12 RX ORDER — METHOCARBAMOL 100 MG/ML
1000 INJECTION, SOLUTION INTRAMUSCULAR; INTRAVENOUS ONCE
Status: COMPLETED | OUTPATIENT
Start: 2023-04-12 | End: 2023-04-12

## 2023-04-12 RX ORDER — VANCOMYCIN HYDROCHLORIDE 1 G/20ML
INJECTION, POWDER, LYOPHILIZED, FOR SOLUTION INTRAVENOUS
Status: DISCONTINUED | OUTPATIENT
Start: 2023-04-12 | End: 2023-04-12 | Stop reason: HOSPADM

## 2023-04-12 RX ORDER — HYDROMORPHONE HYDROCHLORIDE 2 MG/ML
0.2 INJECTION, SOLUTION INTRAMUSCULAR; INTRAVENOUS; SUBCUTANEOUS EVERY 5 MIN PRN
Status: CANCELLED | OUTPATIENT
Start: 2023-04-12

## 2023-04-12 RX ORDER — MIDAZOLAM HYDROCHLORIDE 1 MG/ML
INJECTION INTRAMUSCULAR; INTRAVENOUS
Status: DISCONTINUED
Start: 2023-04-12 | End: 2023-04-12 | Stop reason: WASHOUT

## 2023-04-12 RX ORDER — LIDOCAINE HYDROCHLORIDE 10 MG/ML
1 INJECTION, SOLUTION EPIDURAL; INFILTRATION; INTRACAUDAL; PERINEURAL ONCE
Status: CANCELLED | OUTPATIENT
Start: 2023-04-12 | End: 2023-04-12

## 2023-04-12 RX ORDER — MORPHINE SULFATE 10 MG/ML
4 INJECTION INTRAMUSCULAR; INTRAVENOUS; SUBCUTANEOUS EVERY 6 HOURS PRN
Status: DISCONTINUED | OUTPATIENT
Start: 2023-04-12 | End: 2023-04-12 | Stop reason: HOSPADM

## 2023-04-12 RX ORDER — SODIUM CHLORIDE, SODIUM GLUCONATE, SODIUM ACETATE, POTASSIUM CHLORIDE AND MAGNESIUM CHLORIDE 30; 37; 368; 526; 502 MG/100ML; MG/100ML; MG/100ML; MG/100ML; MG/100ML
INJECTION, SOLUTION INTRAVENOUS CONTINUOUS
Status: CANCELLED | OUTPATIENT
Start: 2023-04-12 | End: 2023-05-12

## 2023-04-12 RX ORDER — ASPIRIN 81 MG/1
81 TABLET ORAL 2 TIMES DAILY
Qty: 28 TABLET | Refills: 0 | Status: ON HOLD | OUTPATIENT
Start: 2023-04-12 | End: 2023-10-16 | Stop reason: HOSPADM

## 2023-04-12 RX ORDER — LORAZEPAM 2 MG/ML
0.25 INJECTION INTRAMUSCULAR ONCE AS NEEDED
Status: CANCELLED | OUTPATIENT
Start: 2023-04-12 | End: 2034-09-07

## 2023-04-12 RX ORDER — MEPERIDINE HYDROCHLORIDE 25 MG/ML
12.5 INJECTION INTRAMUSCULAR; INTRAVENOUS; SUBCUTANEOUS EVERY 10 MIN PRN
Status: DISCONTINUED | OUTPATIENT
Start: 2023-04-12 | End: 2023-04-12 | Stop reason: HOSPADM

## 2023-04-12 RX ORDER — KETOROLAC TROMETHAMINE 30 MG/ML
15 INJECTION, SOLUTION INTRAMUSCULAR; INTRAVENOUS ONCE
Status: DISCONTINUED | OUTPATIENT
Start: 2023-04-12 | End: 2023-04-12 | Stop reason: HOSPADM

## 2023-04-12 RX ORDER — MIDAZOLAM HYDROCHLORIDE 1 MG/ML
INJECTION INTRAMUSCULAR; INTRAVENOUS
Status: DISCONTINUED | OUTPATIENT
Start: 2023-04-12 | End: 2023-04-12

## 2023-04-12 RX ORDER — METHOCARBAMOL 100 MG/ML
INJECTION, SOLUTION INTRAMUSCULAR; INTRAVENOUS
Status: COMPLETED
Start: 2023-04-12 | End: 2023-04-12

## 2023-04-12 RX ORDER — METHOCARBAMOL 750 MG/1
750 TABLET, FILM COATED ORAL 3 TIMES DAILY
Status: DISCONTINUED | OUTPATIENT
Start: 2023-04-12 | End: 2023-04-12 | Stop reason: HOSPADM

## 2023-04-12 RX ORDER — KETOROLAC TROMETHAMINE 30 MG/ML
15 INJECTION, SOLUTION INTRAMUSCULAR; INTRAVENOUS ONCE
Status: COMPLETED | OUTPATIENT
Start: 2023-04-12 | End: 2023-04-12

## 2023-04-12 RX ORDER — METHOCARBAMOL 750 MG/1
750 TABLET, FILM COATED ORAL 3 TIMES DAILY
Qty: 30 TABLET | Refills: 0 | Status: SHIPPED | OUTPATIENT
Start: 2023-04-12 | End: 2023-04-22

## 2023-04-12 RX ORDER — MIDAZOLAM HYDROCHLORIDE 1 MG/ML
2 INJECTION INTRAMUSCULAR; INTRAVENOUS ONCE AS NEEDED
Status: CANCELLED | OUTPATIENT
Start: 2023-04-12 | End: 2034-09-07

## 2023-04-12 RX ORDER — BUPIVACAINE HYDROCHLORIDE AND EPINEPHRINE 5; 5 MG/ML; UG/ML
INJECTION, SOLUTION EPIDURAL; INTRACAUDAL; PERINEURAL
Status: DISCONTINUED
Start: 2023-04-12 | End: 2023-04-12 | Stop reason: WASHOUT

## 2023-04-12 RX ORDER — DIPHENHYDRAMINE HYDROCHLORIDE 50 MG/ML
25 INJECTION INTRAMUSCULAR; INTRAVENOUS ONCE AS NEEDED
Status: COMPLETED | OUTPATIENT
Start: 2023-04-12 | End: 2023-04-12

## 2023-04-12 RX ORDER — ONDANSETRON 4 MG/1
8 TABLET, ORALLY DISINTEGRATING ORAL EVERY 6 HOURS PRN
Status: CANCELLED | OUTPATIENT
Start: 2023-04-12

## 2023-04-12 RX ADMIN — FENTANYL CITRATE 50 MCG: 50 INJECTION, SOLUTION INTRAMUSCULAR; INTRAVENOUS at 08:04

## 2023-04-12 RX ADMIN — DIPHENHYDRAMINE HYDROCHLORIDE 6.25 MG: 50 INJECTION INTRAMUSCULAR; INTRAVENOUS at 09:04

## 2023-04-12 RX ADMIN — HYDROMORPHONE HYDROCHLORIDE 0.5 MG: 2 INJECTION, SOLUTION INTRAMUSCULAR; INTRAVENOUS; SUBCUTANEOUS at 09:04

## 2023-04-12 RX ADMIN — HYDROMORPHONE HYDROCHLORIDE 2 MG: 2 INJECTION, SOLUTION INTRAMUSCULAR; INTRAVENOUS; SUBCUTANEOUS at 08:04

## 2023-04-12 RX ADMIN — OXYCODONE AND ACETAMINOPHEN 1 TABLET: 325; 10 TABLET ORAL at 10:04

## 2023-04-12 RX ADMIN — ONDANSETRON 4 MG: 2 INJECTION INTRAMUSCULAR; INTRAVENOUS at 10:04

## 2023-04-12 RX ADMIN — MIDAZOLAM HYDROCHLORIDE 2 MG: 1 INJECTION, SOLUTION INTRAMUSCULAR; INTRAVENOUS at 07:04

## 2023-04-12 RX ADMIN — SODIUM CHLORIDE, SODIUM GLUCONATE, SODIUM ACETATE, POTASSIUM CHLORIDE AND MAGNESIUM CHLORIDE: 526; 502; 368; 37; 30 INJECTION, SOLUTION INTRAVENOUS at 07:04

## 2023-04-12 RX ADMIN — ROCURONIUM BROMIDE 50 MG: 10 SOLUTION INTRAVENOUS at 08:04

## 2023-04-12 RX ADMIN — LIDOCAINE HYDROCHLORIDE 80 MG: 20 INJECTION, SOLUTION EPIDURAL; INFILTRATION; INTRACAUDAL; PERINEURAL at 08:04

## 2023-04-12 RX ADMIN — METHOCARBAMOL 1000 MG: 100 INJECTION, SOLUTION INTRAMUSCULAR; INTRAVENOUS at 09:04

## 2023-04-12 RX ADMIN — ONDANSETRON 4 MG: 2 INJECTION INTRAMUSCULAR; INTRAVENOUS at 08:04

## 2023-04-12 RX ADMIN — KETOROLAC TROMETHAMINE 15 MG: 30 INJECTION, SOLUTION INTRAMUSCULAR; INTRAVENOUS at 09:04

## 2023-04-12 RX ADMIN — PROPOFOL 150 MG: 10 INJECTION, EMULSION INTRAVENOUS at 08:04

## 2023-04-12 RX ADMIN — FENTANYL CITRATE 100 MCG: 50 INJECTION, SOLUTION INTRAMUSCULAR; INTRAVENOUS at 08:04

## 2023-04-12 RX ADMIN — SUGAMMADEX 130 MG: 100 INJECTION, SOLUTION INTRAVENOUS at 08:04

## 2023-04-12 RX ADMIN — CEFAZOLIN SODIUM 2 G: 2 SOLUTION INTRAVENOUS at 08:04

## 2023-04-12 RX ADMIN — METHOCARBAMOL 1000 MG: 100 INJECTION INTRAMUSCULAR; INTRAVENOUS at 09:04

## 2023-04-12 RX ADMIN — PROPOFOL 50 MG: 10 INJECTION, EMULSION INTRAVENOUS at 08:04

## 2023-04-12 NOTE — OP NOTE
OPERATIVE REPORT    Patient: Deborah Cross   : 1970    MRN: [id number]  Date: 2023      Surgeon:Dillon Scott DO  Assistant: Kevin Mark was essential, part of the procedure including deep hardware placement and deep closure.  No senior assistant was availible  Preoperative Diagnosis:  Right tibial shaft nonunion  Postoperative Diagnosis: Same  Procedure:  1) Repair of Right tibial nonunion -  CPT 38727     Anesthesiologist: Gallo Garcia Jr., MD  OR Staff: Circulator: Ronda Kirkland RN; Rustam Sanchez RN  Physician Assistant: Nupur Mark PA-C  Scrub Person: Shana MendezST  Implants:   Implant Name Type Inv. Item Serial No.  Lot No. LRB No. Used Action   SCREW XL25 IM NAIL 36X5MM - [id number]  SCREW XL25 IM NAIL 36X5MM  [employer] [id number] Right 1 Implanted   SCREW XL25 IM NAIL 36X5MM - [id number]  SCREW XL25 IM NAIL 36X5MM  [employer] [id number] Right 1 Explanted   SCREW LP LOCKING XL25 30MM - [id number]  SCREW LP LOCKING XL25 30MM  [employer] [id number] Right 1 Explanted     EBL: 20cc  Complications: None  Disposition: To PACU, stable    Indications: Deborah Cross is a 53 y.o. female presenting with the aforementioned injuries. The procedure is indicated to best obtain and maintain stability of the leg while allowing early ROM.  The patient is awake and alert. After thorough discussion of the risks, benefits, expected outcomes, and alternatives to surgical intervention, the patient agreed to proceed with surgical treatment.  Specific risks discussed included, but were not limited to: superficial or deep infection, wound healing complications, DVT/PE, significant bleeding requiring transfusion, damage to named anatomic structures in the immediate area including named neurovascular structures, implant failure, recalcitrant nonunion, knee pain, and general risks of anesthesia.  The patient voiced understanding and written as well as verbal consent was  obtained by myself prior to the procedure.    Patient understands if she continues to smoke she will likely lead to nonunion and continued complications from her tibial shaft fracture    Procedure in Detail:  The patient's extremity was marked by me in the preoperative area.  She was taken to the operating room and after satisfactory induction of anesthesia, was positioned supine on a radiolucent table, with a soft bump under the ipsilateral hip. The lower extremity was sterilely prepped and draped in the usual fashion.  Standard time out procedure was performed confirming the correct surgeon, site, side, patient ID and procedure.      I utilized the previous approach for the tibia nail proximal screws.Bovie was used for hemostasis.  I was able to access the proximal nail and  removing the 2 static bolts without difficulty.  I then approached the nonunion site made a small 2 cm incision over the nonunion site of the distal tibia I then used a curved curette to address the nonunion and to get good bleeding fracture ends.  Also used a drill bit to provide bleeding bone.  I then placed a dynamic bolt proximally to allow compression at the nonunion site.  This completes the nonunion repair.        The incisions were then irrigated using copious sterile saline and then closed in layered fashion.  The leg was sterilely cleansed and dressed.    The patient was then subsequently extubated and transferred to to PACU in a stable condition.    All sponge and needle counts were correct at the end of the case.  I was present and participated in all aspects of the procedure.    Prognosis:  The patient will be kept WBAT on the ipsilateral extremity.  DVT prophylaxis is not indicated for this patient and procedure.  The patient is at risk of continued pain, nonunion, and need for further surgery, along with chronic knee pain and infection.        This note/OR report was created with the assistance of  voice recognition software or  phone  dictation.  There may be transcription errors as a result of using this technology however minimal. Effort has been made to assure accuracy of transcription but any obvious errors or omissions should be clarified with the author of the document.       Dillon Scott,   Orthopedic Trauma Surgery

## 2023-04-12 NOTE — INTERVAL H&P NOTE
The patient has been examined and the H&P has been reviewed:    I concur with the findings and no changes have occurred since H&P was written.    Surgery risks, benefits and alternative options discussed and understood by patient/family.    I explained that surgery and the nature of their condition are not without risks. These include, but are not limited to, bleeding, infection, neurovascular compromise, malunion, nonunion, hardware complications, wound complications, scarring, cosmetic defects, need for later and/or repeated surgeries, pain, loss of ROM, loss of function, PTOA, deformity, stance/gait and/or functional abnormalities, thromboembolic complications, compartment syndrome, loss of limb, loss of life, anesthetic complications, and other imponderables. I explained that these can occur despite the adequacy of treatments rendered, and that their risks are heightened given the nature of their condition. They verbalized understanding. They would like to continue with surgery at this time. If appropriate family was involved with surgical discussion.     This note/OR report was created with the assistance of  voice recognition software or phone  dictation.  There may be transcription errors as a result of using this technology however minimal. Effort has been made to assure accuracy of transcription but any obvious errors or omissions should be clarified with the author of the document.       Dillon Scott, DO  Orthopedic Trauma Surgery       There are no hospital problems to display for this patient.

## 2023-04-12 NOTE — ANESTHESIA PREPROCEDURE EVALUATION
04/12/2023  Deborah Cross is a 53 y.o., female presents as an outpatient for ORIF right tibia (nonunion).  Approximately 5 months postop from tib-fib fracture requiring ORIF.  EKG normal sinus rhythm regional anesthesia offered for postoperative pain management and declined by patient.    Last 3 sets of Vitals    Vitals - 1 value per visit 4/11/2023 4/12/2023 4/12/2023   SYSTOLIC - 110 -   DIASTOLIC - 73 -   Pulse - 68 -   Temp - 97.9 -   Resp - 19 -   SPO2 - 96 -   Weight (lb) 135 - 132.94   Weight (kg) 61.236 - 60.3   Height 62 - 62   BMI (Calculated) 24.7 - 24.3   VISIT REPORT - - -   Pain Score  - - -         Lab Results   Component Value Date    WBC 7.2 04/11/2023    HGB 12.8 04/11/2023    HCT 39.4 04/11/2023    MCV 86.2 04/11/2023     04/11/2023          BMP  Lab Results   Component Value Date     04/11/2023    K 4.2 04/11/2023    CO2 28 04/11/2023    BUN 9.3 (L) 04/11/2023    CREATININE 0.71 04/11/2023    CALCIUM 9.6 04/11/2023    EGFRNONAA >60 12/15/2021                Pre-op Assessment    I have reviewed the Patient Summary Reports.    I have reviewed the NPO Status.   I have reviewed the Medications.     Review of Systems  Anesthesia Hx:   Denies Personal Hx of Anesthesia complications.   Social:  Non-Smoker    Cardiovascular:   Hypertension  Functional Capacity good / => 4 METS, Prior to injury in October    Hepatic/GI:   GERD    Endocrine:   Diabetes, type 2        Physical Exam  General: Well nourished, Cooperative, Alert and Oriented    Airway:  Mallampati: II   Mouth Opening: Normal  TM Distance: Normal  Tongue: Normal  Neck ROM: Normal ROM    Dental:  Intact    Chest/Lungs:  Clear to auscultation, Normal Respiratory Rate    Heart:  Rate: Normal  Rhythm: Regular Rhythm        Anesthesia Plan  Type of Anesthesia, risks & benefits discussed:    Anesthesia Type: Gen  Supraglottic Airway  Intra-op Monitoring Plan: Standard ASA Monitors  Post Op Pain Control Plan: multimodal analgesia and IV/PO Opioids PRN  Induction:  IV  Airway Plan: Direct  Informed Consent: Informed consent signed with the Patient and all parties understand the risks and agree with anesthesia plan.  All questions answered.   ASA Score: 2  Day of Surgery Review of History & Physical: H&P Update referred to the surgeon/provider.    Ready For Surgery From Anesthesia Perspective.     .

## 2023-04-12 NOTE — ANESTHESIA POSTPROCEDURE EVALUATION
Anesthesia Post Evaluation    Patient: Deborah Cross    Procedure(s) Performed: Procedure(s) (LRB):  ORIF, FRACTURE, TIBIA (Right)    Final Anesthesia Type: general      Patient location during evaluation: floor  Patient participation: Yes- Able to Participate  Level of consciousness: awake and alert  Post-procedure vital signs: reviewed and stable  Pain management: adequate  Airway patency: patent    PONV status at discharge: No PONV  Anesthetic complications: no      Cardiovascular status: blood pressure returned to baseline  Respiratory status: spontaneous ventilation and room air  Hydration status: euvolemic  Follow-up not needed.          Vitals Value Taken Time   /71 04/12/23 1025   Temp 36.2 °C (97.2 °F) 04/12/23 0850   Pulse 80 04/12/23 1025   Resp 18 04/12/23 1011   SpO2 97 % 04/12/23 1025         Event Time   Out of Recovery 10:00:00         Pain/Veronika Score: Pain Rating Prior to Med Admin: 10 (4/12/2023 10:11 AM)  Pain Rating Post Med Admin: 8 (4/12/2023  9:30 AM)  Veronika Score: 10 (4/12/2023 10:06 AM)  Modified Veronika Score: 20 (4/12/2023 10:06 AM)

## 2023-04-12 NOTE — ANESTHESIA PROCEDURE NOTES
Intubation    Date/Time: 4/12/2023 8:04 AM  Performed by: Reed Arias CRNA  Authorized by: Gallo Garcia Jr., MD     Intubation:     Induction:  Intravenous    Intubated:  Postinduction    Mask Ventilation:  Easy mask    Attempts:  1    Attempted By:  CRNA    Method of Intubation:  Direct    Blade:  Martin 2    Laryngeal View Grade: Grade IIb - only the arytenoids and epiglottis seen      Difficult Airway Encountered?: No      Complications:  None    Airway Device:  Oral endotracheal tube    Airway Device Size:  7.5    Style/Cuff Inflation:  Cuffed (inflated to minimal occlusive pressure)    Inflation Amount (mL):  6    Tube secured:  22    Secured at:  The lips    Placement Verified By:  Capnometry    Complicating Factors:  Anterior larynx and small mouth    Findings Post-Intubation:  BS equal bilateral and atraumatic/condition of teeth unchanged

## 2023-04-12 NOTE — TRANSFER OF CARE
"Anesthesia Transfer of Care Note    Patient: Deborah Cross    Procedure(s) Performed: Procedure(s) (LRB):  ORIF, FRACTURE, TIBIA (Right)    Patient location: PACU    Anesthesia Type: general    Transport from OR: Transported from OR on room air with adequate spontaneous ventilation    Post pain: adequate analgesia    Post assessment: no apparent anesthetic complications    Post vital signs: stable    Level of consciousness: sedated and responds to stimulation    Nausea/Vomiting: no nausea/vomiting    Complications: none    Transfer of care protocol was followed      Last vitals:   Visit Vitals  /69   Pulse 78   Temp 36.2 °C (97.2 °F)   Resp (!) 31   Ht 5' 2" (1.575 m)   Wt 60.3 kg (132 lb 15 oz)   SpO2 (!) 94%   Breastfeeding No   BMI 24.31 kg/m²     "

## 2023-04-12 NOTE — DISCHARGE SUMMARY
[facility] - Periop Services  Discharge Note  Short Stay    Procedure(s) (LRB):  ORIF, FRACTURE, TIBIA (Right)      OUTCOME: Patient tolerated treatment/procedure well without complication and is now ready for discharge.    DISPOSITION: Home or Self Care    FINAL DIAGNOSIS:  Right tibia nonunion repair    FOLLOWUP: In clinic    DISCHARGE INSTRUCTIONS:    Discharge Procedure Orders   Remove dressing in 48 hours   Order Comments: Replace with large band aids over incisions     Weight bearing restrictions (specify):   Order Comments: WBAT ROMAT RLE         Clinical Reference Documents Added to Patient Instructions         Document    OPEN REDUCTION AND INTERNAL FIXATION SURGERY (ENGLISH)    OPEN REDUCTION AND INTERNAL FIXATION SURGERY DISCHARGE INSTRUCTIONS (ENGLISH)            TIME SPENT ON DISCHARGE: 15 minutes

## 2023-04-14 VITALS
RESPIRATION RATE: 18 BRPM | OXYGEN SATURATION: 97 % | SYSTOLIC BLOOD PRESSURE: 107 MMHG | HEIGHT: 62 IN | TEMPERATURE: 97 F | BODY MASS INDEX: 24.46 KG/M2 | HEART RATE: 80 BPM | DIASTOLIC BLOOD PRESSURE: 71 MMHG | WEIGHT: 132.94 LBS

## 2023-04-24 RX ORDER — OXYCODONE AND ACETAMINOPHEN 10; 325 MG/1; MG/1
1 TABLET ORAL EVERY 6 HOURS PRN
Qty: 28 TABLET | Refills: 0 | Status: SHIPPED | OUTPATIENT
Start: 2023-04-24 | End: 2023-05-01

## 2023-04-29 ENCOUNTER — HOSPITAL ENCOUNTER (EMERGENCY)
Facility: HOSPITAL | Age: 53
Discharge: HOME | End: 2023-04-29
Attending: EMERGENCY MEDICINE
Payer: MEDICAID

## 2023-04-29 VITALS
RESPIRATION RATE: 18 BRPM | SYSTOLIC BLOOD PRESSURE: 118 MMHG | HEART RATE: 72 BPM | TEMPERATURE: 98 F | OXYGEN SATURATION: 97 % | DIASTOLIC BLOOD PRESSURE: 83 MMHG

## 2023-04-29 DIAGNOSIS — G56.01 CARPAL TUNNEL SYNDROME OF RIGHT WRIST: Primary | ICD-10-CM

## 2023-04-29 PROCEDURE — 96372 THER/PROPH/DIAG INJ SC/IM: CPT | Performed by: EMERGENCY MEDICINE

## 2023-04-29 PROCEDURE — 63600175 PHARM REV CODE 636 W HCPCS: Performed by: EMERGENCY MEDICINE

## 2023-04-29 PROCEDURE — 99284 EMERGENCY DEPT VISIT MOD MDM: CPT

## 2023-04-29 RX ORDER — METHYLPREDNISOLONE ACETATE 80 MG/ML
80 INJECTION, SUSPENSION INTRA-ARTICULAR; INTRALESIONAL; INTRAMUSCULAR; SOFT TISSUE
Status: COMPLETED | OUTPATIENT
Start: 2023-04-29 | End: 2023-04-29

## 2023-04-29 RX ADMIN — METHYLPREDNISOLONE ACETATE 80 MG: 80 INJECTION, SUSPENSION INTRA-ARTICULAR; INTRALESIONAL; INTRAMUSCULAR; SOFT TISSUE at 05:04

## 2023-04-29 NOTE — ED PROVIDER NOTES
Encounter Date: 2023       History     Chief Complaint   Patient presents with    Wrist Pain     Hx carpal tunnel rt wrist     This 53-year-old female presents with complaints of carpal tunnel in her right wrist.  She states she is had it for quite a long time and in May of last year she was given an injection of steroid which improved the condition.  She requests a repeat injection of steroids.     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, with nonunion, subsequent encounter      Past Surgical History:   Procedure Laterality Date     SECTION  2005    HYSTERECTOMY  2015    INSERTION OF INTRAMEDULLARY NAIL INTO TIBIA Right 10/24/2022    Procedure: INSERTION, INTRAMEDULLARY JACK, TIBIA;  Surgeon: Dillon Scott DO;  Location: [facility] OR;  Service: Orthopedics;  Laterality: Right;  supine, vascular, bone foam, c-arm, wash stuff    ORIF TIBIA FRACTURE Right 2023    Procedure: ORIF, FRACTURE, TIBIA;  Surgeon: Dillon Scott DO;  Location: [facility] OR;  Service: Orthopedics;  Laterality: Right;    REPAIR OF LIGAMENT OF ANKLE  10/24/2022    Procedure: REPAIR, LIGAMENT, ANKLE;  Surgeon: Dillon Scott DO;  Location: [facility] OR;  Service: Orthopedics;;     Family History   Problem Relation Age of Onset    Diabetes Mother     Heart disease Mother     Diabetes Father      Social History     Tobacco Use    Smoking status: Some Days     Packs/day: 0.50     Years: 15.00     Pack years: 7.50     Types: Cigarettes    Smokeless tobacco: Never   Substance Use Topics    Alcohol use: Not Currently    Drug use: Not Currently     Types: Benzodiazepines, Marijuana     Review of Systems   Constitutional:  Negative for fever.   HENT:  Negative for sore throat.    Respiratory:  Negative for shortness of breath.    Cardiovascular:   Negative for chest pain.   Gastrointestinal:  Negative for nausea.   Genitourinary:  Negative for dysuria.   Musculoskeletal:  Negative for back pain.   Skin:  Negative for rash.   Neurological:  Negative for weakness.   Hematological:  Does not bruise/bleed easily.     Physical Exam     Initial Vitals [04/29/23 1459]   BP Pulse Resp Temp SpO2   118/83 72 18 98.1 °F (36.7 °C) 97 %      MAP       --         Physical Exam    Nursing note and vitals reviewed.  Constitutional: She appears well-developed and well-nourished.   HENT:   Head: Normocephalic and atraumatic.   Eyes: Conjunctivae and EOM are normal. Pupils are equal, round, and reactive to light.   Neck: Neck supple.   Normal range of motion.  Cardiovascular:  Normal rate, regular rhythm, normal heart sounds and intact distal pulses.           Pulmonary/Chest: Breath sounds normal.   Abdominal: Abdomen is soft. Bowel sounds are normal.   Musculoskeletal:         General: Tenderness (Tenderness over the right wrist to tap + tinel) present. Normal range of motion.      Cervical back: Normal range of motion and neck supple.     Neurological: She is alert and oriented to person, place, and time. She has normal strength.   Skin: Skin is warm and dry. Capillary refill takes less than 2 seconds.   Psychiatric: She has a normal mood and affect. Her behavior is normal. Judgment and thought content normal.       ED Course   Procedures  Labs Reviewed - No data to display       Imaging Results    None          Medications   methylPREDNISolone acetate injection 80 mg (has no administration in time range)                              Clinical Impression:   Final diagnoses:  [G56.01] Carpal tunnel syndrome of right wrist (Primary)        ED Disposition Condition    Discharge Stable          ED Prescriptions    None       Follow-up Information       Follow up With Specialties Details Why Contact Info    Myles Del Cid MD Family Medicine   [address]  [place] [address]  [phone]               Davi Raymond MD  04/29/23 7627

## 2023-05-03 ENCOUNTER — HOSPITAL ENCOUNTER (OUTPATIENT)
Dept: RADIOLOGY | Facility: CLINIC | Age: 53
Discharge: HOME | End: 2023-05-03
Attending: ORTHOPAEDIC SURGERY
Payer: MEDICAID

## 2023-05-03 ENCOUNTER — OFFICE VISIT (OUTPATIENT)
Dept: ORTHOPEDICS | Facility: CLINIC | Age: 53
End: 2023-05-03
Payer: MEDICAID

## 2023-05-03 VITALS
HEART RATE: 113 BPM | TEMPERATURE: 97 F | WEIGHT: 132 LBS | BODY MASS INDEX: 24.29 KG/M2 | DIASTOLIC BLOOD PRESSURE: 87 MMHG | SYSTOLIC BLOOD PRESSURE: 130 MMHG | HEIGHT: 62 IN

## 2023-05-03 DIAGNOSIS — S82.201M TYPE I OR II OPEN FRACTURE OF RIGHT TIBIA AND FIBULA WITH NONUNION, SUBSEQUENT ENCOUNTER: ICD-10-CM

## 2023-05-03 DIAGNOSIS — S82.401M TYPE I OR II OPEN FRACTURE OF RIGHT TIBIA AND FIBULA WITH NONUNION, SUBSEQUENT ENCOUNTER: ICD-10-CM

## 2023-05-03 DIAGNOSIS — S82.401M TYPE I OR II OPEN FRACTURE OF RIGHT TIBIA AND FIBULA WITH NONUNION, SUBSEQUENT ENCOUNTER: Primary | ICD-10-CM

## 2023-05-03 DIAGNOSIS — S82.201M TYPE I OR II OPEN FRACTURE OF RIGHT TIBIA AND FIBULA WITH NONUNION, SUBSEQUENT ENCOUNTER: Primary | ICD-10-CM

## 2023-05-03 PROCEDURE — 3075F SYST BP GE 130 - 139MM HG: CPT | Mod: CPTII,,, | Performed by: PHYSICIAN ASSISTANT

## 2023-05-03 PROCEDURE — 1159F MED LIST DOCD IN RCRD: CPT | Mod: CPTII,,, | Performed by: PHYSICIAN ASSISTANT

## 2023-05-03 PROCEDURE — 99024 POSTOP FOLLOW-UP VISIT: CPT | Mod: ,,, | Performed by: PHYSICIAN ASSISTANT

## 2023-05-03 PROCEDURE — 1159F PR MEDICATION LIST DOCUMENTED IN MEDICAL RECORD: ICD-10-PCS | Mod: CPTII,,, | Performed by: PHYSICIAN ASSISTANT

## 2023-05-03 PROCEDURE — 73590 X-RAY EXAM OF LOWER LEG: CPT | Mod: RT,,, | Performed by: ORTHOPAEDIC SURGERY

## 2023-05-03 PROCEDURE — 3075F PR MOST RECENT SYSTOLIC BLOOD PRESS GE 130-139MM HG: ICD-10-PCS | Mod: CPTII,,, | Performed by: PHYSICIAN ASSISTANT

## 2023-05-03 PROCEDURE — 73590 XR TIBIA FIBULA 2 VIEW RIGHT: ICD-10-PCS | Mod: RT,,, | Performed by: ORTHOPAEDIC SURGERY

## 2023-05-03 PROCEDURE — 3008F BODY MASS INDEX DOCD: CPT | Mod: CPTII,,, | Performed by: PHYSICIAN ASSISTANT

## 2023-05-03 PROCEDURE — 3008F PR BODY MASS INDEX (BMI) DOCUMENTED: ICD-10-PCS | Mod: CPTII,,, | Performed by: PHYSICIAN ASSISTANT

## 2023-05-03 PROCEDURE — 99024 PR POST-OP FOLLOW-UP VISIT: ICD-10-PCS | Mod: ,,, | Performed by: PHYSICIAN ASSISTANT

## 2023-05-03 PROCEDURE — 3079F DIAST BP 80-89 MM HG: CPT | Mod: CPTII,,, | Performed by: PHYSICIAN ASSISTANT

## 2023-05-03 PROCEDURE — 3079F PR MOST RECENT DIASTOLIC BLOOD PRESSURE 80-89 MM HG: ICD-10-PCS | Mod: CPTII,,, | Performed by: PHYSICIAN ASSISTANT

## 2023-05-03 RX ORDER — METHOCARBAMOL 750 MG/1
750 TABLET, FILM COATED ORAL 3 TIMES DAILY PRN
Qty: 30 TABLET | Refills: 0 | Status: SHIPPED | OUTPATIENT
Start: 2023-05-03 | End: 2023-05-13

## 2023-05-03 RX ORDER — OXYCODONE AND ACETAMINOPHEN 7.5; 325 MG/1; MG/1
1 TABLET ORAL EVERY 8 HOURS PRN
Qty: 21 TABLET | Refills: 0 | Status: SHIPPED | OUTPATIENT
Start: 2023-05-03 | End: 2023-05-15 | Stop reason: SDUPTHER

## 2023-05-03 NOTE — PROGRESS NOTES
Subjective:       Patient ID: Deborah Cross is a 53 y.o. female.  Chief Complaint   Patient presents with    Right Lower Leg - Post-op Evaluation     3 week f/u from right tibia repair nonunion. Ambulates without assistance. Reports intermittent pain.         HPI  Patient presents for 3 week follow up right tibia nail dynamization. Doing well overall. Ambulating in regular shoes without assistance. States in need of pain medication refill at this time due to pain at the fracture site, otherwise doing well. Sutures remain in place. Denies numbness and tingling distally.     ROS:  Constitutional: Denies fever chills  Eyes: No change in vision  ENT: No ringing or current infections  CV: No chest pain  Resp: No labored breathing  MSK: Pain evident at site of injury located in HPI,   Integ: No signs of abrasions or lacerations  Neuro: No numbness or tingling  Lymphatic: No swelling outside the area of injury     Current Outpatient Medications on File Prior to Visit   Medication Sig Dispense Refill    ALPRAZolam (XANAX) 1 MG tablet Take 1 tablet (1 mg total) by mouth 2 (two) times daily as needed for Anxiety. 40 tablet 5    amLODIPine (NORVASC) 5 MG tablet Take 1 tablet (5 mg total) by mouth once daily. 90 tablet 1    FLUoxetine 40 MG capsule Take 1 capsule (40 mg total) by mouth every morning. 90 capsule 1    fluticasone propionate (FLONASE) 50 mcg/actuation nasal spray use 1 spray in each nostril twice daily 16 g 1    metFORMIN (GLUCOPHAGE) 500 MG tablet Take 1 tablet (500 mg total) by mouth once daily. 90 tablet 1    mupirocin (BACTROBAN) 2 % ointment Apply topically 3 (three) times daily. 30 g 1    rosuvastatin (CRESTOR) 20 MG tablet Take 1 tablet (20 mg total) by mouth once daily. 90 tablet 0    rosuvastatin (CRESTOR) 20 MG tablet Take 20 mg by mouth once daily.      sumatriptan (IMITREX) 25 MG Tab TAKE ONE TABLET BY MOUTH as a single DOSE AND MAY REPEAT DOSE in 2 hours if needed 9 tablet 1    traZODone  "(DESYREL) 100 MG tablet Take 1 tablet (100 mg total) by mouth every evening. 90 tablet 0    aspirin (ECOTRIN) 81 MG EC tablet Take 1 tablet (81 mg total) by mouth 2 (two) times a day. for 14 days 28 tablet 0    promethazine (PHENERGAN) 25 MG tablet Take 1 tablet (25 mg total) by mouth every 6 (six) hours as needed for Nausea. 7 tablet 0    QUEtiapine (SEROQUEL) 50 MG tablet Take 1 tablet (50 mg total) by mouth every evening. 30 tablet 11    simvastatin (ZOCOR) 40 MG tablet Take 1 tablet (40 mg total) by mouth every evening. 90 tablet 1     No current facility-administered medications on file prior to visit.          Objective:      /87   Pulse (!) 113   Temp 97 °F (36.1 °C)   Ht 5' 2" (1.575 m)   Wt 59.9 kg (132 lb)   BMI 24.14 kg/m²   Physical Exam  General the patient is alert and oriented x3 no acute distress nontoxic-appearing appropriate affect.    Constitutional: Vital signs are examined and stable.  Resp: No signs of labored breathing    Musculoskeletal:   Right lower extremity: incision clean and dry without erythema, drainage, signs of dehiscence; sutures removed today without complication; compartments are soft and compressible; No pain with ROM at the hip, knee, or ankle; appropriate tenderness to palpation at surgical site; dorsi/plantar flexes the foot; SILT distally; BCR distally; DP pulse 2+        Body mass index is 24.14 kg/m².  Ideal body weight: 50.1 kg (110 lb 7.2 oz)  Adjusted ideal body weight: 54 kg (119 lb 1.1 oz)  Hemoglobin A1c   Date Value Ref Range Status   04/06/2023 6.0 <=7.0 % Final   12/15/2021 5.9 <<=7.0 % Final     Hgb   Date Value Ref Range Status   04/11/2023 12.8 12.0 - 16.0 g/dL Final   04/06/2023 14.0 12.0 - 16.0 g/dL Final     Hct   Date Value Ref Range Status   04/11/2023 39.4 37.0 - 47.0 % Final   04/06/2023 43.9 37.0 - 47.0 % Final     Iron Level   Date Value Ref Range Status   04/06/2023 90 50 - 170 ug/dL Final     No components found for: FROLATE  Vit D 25 OH "   Date Value Ref Range Status   04/06/2023 63.1 30.0 - 80.0 ng/mL Final   12/15/2021 27.7 (L) 30.0 - 80.0 ng/mL Final     WBC   Date Value Ref Range Status   04/11/2023 7.2 4.5 - 11.5 x10(3)/mcL Final   04/06/2023 8.3 4.5 - 11.5 x10(3)/mcL Final       Radiology: 3 view x ray right tibia and fibula: hardware intact without signs of loosening or failure. Appropriate dynamization of proximal interlocking screw. Some compression noted at the fracture site.         Assessment:         1. 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    Deborah was seen today for post-op evaluation.    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  -     methocarbamoL (ROBAXIN) 750 MG Tab; Take 1 tablet (750 mg total) by mouth 3 (three) times daily as needed (spasms).        -Appears to have dynamized appropriately. Early consolidation noted.  -Refilled pain med and muscle relaxer. Tapered to TID at this time.  -Will continue to taper over the coming weeks.   -Continue WBAT and ROMAT.   -Follow up in 6-8 weeks for repeat x rays and evaluation.   -ED precautions given    The above findings, diagnostics, and treatment plan were discussed with Dr. Scott who is in agreement with the plan of care except as stated in additional documentation.       Nupur Mark PA-C          Future Appointments   Date Time Provider Department Center   6/28/2023  8:45 AM Adamaris Cao MD [facility]  [place]

## 2023-05-15 DIAGNOSIS — S82.201M TYPE I OR II OPEN FRACTURE OF RIGHT TIBIA AND FIBULA WITH NONUNION, SUBSEQUENT ENCOUNTER: ICD-10-CM

## 2023-05-15 DIAGNOSIS — S82.401M TYPE I OR II OPEN FRACTURE OF RIGHT TIBIA AND FIBULA WITH NONUNION, SUBSEQUENT ENCOUNTER: ICD-10-CM

## 2023-05-15 RX ORDER — OXYCODONE AND ACETAMINOPHEN 7.5; 325 MG/1; MG/1
1 TABLET ORAL EVERY 12 HOURS PRN
Qty: 14 TABLET | Refills: 0 | Status: SHIPPED | OUTPATIENT
Start: 2023-05-15 | End: 2023-05-23 | Stop reason: SDUPTHER

## 2023-05-23 DIAGNOSIS — S82.401M TYPE I OR II OPEN FRACTURE OF RIGHT TIBIA AND FIBULA WITH NONUNION, SUBSEQUENT ENCOUNTER: ICD-10-CM

## 2023-05-23 DIAGNOSIS — S82.201M TYPE I OR II OPEN FRACTURE OF RIGHT TIBIA AND FIBULA WITH NONUNION, SUBSEQUENT ENCOUNTER: ICD-10-CM

## 2023-05-23 RX ORDER — OXYCODONE AND ACETAMINOPHEN 7.5; 325 MG/1; MG/1
1 TABLET ORAL
Qty: 7 TABLET | Refills: 0 | Status: SHIPPED | OUTPATIENT
Start: 2023-05-23 | End: 2023-05-30

## 2023-06-12 ENCOUNTER — HOSPITAL ENCOUNTER (OUTPATIENT)
Dept: RADIOLOGY | Facility: CLINIC | Age: 53
Discharge: HOME | End: 2023-06-12
Attending: ORTHOPAEDIC SURGERY
Payer: MEDICAID

## 2023-06-12 ENCOUNTER — ANESTHESIA (OUTPATIENT)
Dept: SURGERY | Facility: HOSPITAL | Age: 53
End: 2023-06-12
Payer: MEDICAID

## 2023-06-12 ENCOUNTER — OFFICE VISIT (OUTPATIENT)
Dept: ORTHOPEDICS | Facility: CLINIC | Age: 53
End: 2023-06-12
Payer: MEDICAID

## 2023-06-12 ENCOUNTER — HOSPITAL ENCOUNTER (OUTPATIENT)
Facility: HOSPITAL | Age: 53
Discharge: HOME | End: 2023-06-12
Attending: ORTHOPAEDIC SURGERY | Admitting: ORTHOPAEDIC SURGERY
Payer: MEDICAID

## 2023-06-12 ENCOUNTER — ANESTHESIA EVENT (OUTPATIENT)
Dept: SURGERY | Facility: HOSPITAL | Age: 53
End: 2023-06-12
Payer: MEDICAID

## 2023-06-12 VITALS
TEMPERATURE: 98 F | RESPIRATION RATE: 18 BRPM | SYSTOLIC BLOOD PRESSURE: 139 MMHG | HEIGHT: 62 IN | BODY MASS INDEX: 24.3 KG/M2 | HEART RATE: 90 BPM | DIASTOLIC BLOOD PRESSURE: 79 MMHG | WEIGHT: 132.06 LBS

## 2023-06-12 DIAGNOSIS — S82.401M TYPE I OR II OPEN FRACTURE OF RIGHT TIBIA AND FIBULA WITH NONUNION, SUBSEQUENT ENCOUNTER: ICD-10-CM

## 2023-06-12 DIAGNOSIS — S82.201M TYPE I OR II OPEN FRACTURE OF RIGHT TIBIA AND FIBULA WITH NONUNION, SUBSEQUENT ENCOUNTER: ICD-10-CM

## 2023-06-12 DIAGNOSIS — S82.401M TYPE I OR II OPEN FRACTURE OF RIGHT TIBIA AND FIBULA WITH NONUNION, SUBSEQUENT ENCOUNTER: Primary | ICD-10-CM

## 2023-06-12 DIAGNOSIS — S82.201A CLOSED FRACTURE OF RIGHT TIBIA AND FIBULA, INITIAL ENCOUNTER: Primary | ICD-10-CM

## 2023-06-12 DIAGNOSIS — S82.201M TYPE I OR II OPEN FRACTURE OF RIGHT TIBIA AND FIBULA WITH NONUNION, SUBSEQUENT ENCOUNTER: Primary | ICD-10-CM

## 2023-06-12 DIAGNOSIS — S91.012A: ICD-10-CM

## 2023-06-12 DIAGNOSIS — S82.401A CLOSED FRACTURE OF RIGHT TIBIA AND FIBULA, INITIAL ENCOUNTER: Primary | ICD-10-CM

## 2023-06-12 LAB — POCT GLUCOSE: 114 MG/DL (ref 70–110)

## 2023-06-12 PROCEDURE — 3008F BODY MASS INDEX DOCD: CPT | Mod: CPTII,,, | Performed by: ORTHOPAEDIC SURGERY

## 2023-06-12 PROCEDURE — 87205 SMEAR GRAM STAIN: CPT | Performed by: ORTHOPAEDIC SURGERY

## 2023-06-12 PROCEDURE — 71000015 HC POSTOP RECOV 1ST HR: Performed by: ORTHOPAEDIC SURGERY

## 2023-06-12 PROCEDURE — 3008F PR BODY MASS INDEX (BMI) DOCUMENTED: ICD-10-PCS | Mod: CPTII,,, | Performed by: ORTHOPAEDIC SURGERY

## 2023-06-12 PROCEDURE — 87075 CULTR BACTERIA EXCEPT BLOOD: CPT | Performed by: ORTHOPAEDIC SURGERY

## 2023-06-12 PROCEDURE — 73610 XR ANKLE COMPLETE 3 VIEW LEFT: ICD-10-PCS | Mod: LT,,, | Performed by: ORTHOPAEDIC SURGERY

## 2023-06-12 PROCEDURE — 99215 OFFICE O/P EST HI 40 MIN: CPT | Mod: 57,,, | Performed by: ORTHOPAEDIC SURGERY

## 2023-06-12 PROCEDURE — 36000704 HC OR TIME LEV I 1ST 15 MIN: Performed by: ORTHOPAEDIC SURGERY

## 2023-06-12 PROCEDURE — 25000003 PHARM REV CODE 250: Performed by: NURSE ANESTHETIST, CERTIFIED REGISTERED

## 2023-06-12 PROCEDURE — 1159F PR MEDICATION LIST DOCUMENTED IN MEDICAL RECORD: ICD-10-PCS | Mod: CPTII,,, | Performed by: ORTHOPAEDIC SURGERY

## 2023-06-12 PROCEDURE — 99215 PR OFFICE/OUTPT VISIT, EST, LEVL V, 40-54 MIN: ICD-10-PCS | Mod: 57,,, | Performed by: ORTHOPAEDIC SURGERY

## 2023-06-12 PROCEDURE — 63600175 PHARM REV CODE 636 W HCPCS: Performed by: ORTHOPAEDIC SURGERY

## 2023-06-12 PROCEDURE — 36000705 HC OR TIME LEV I EA ADD 15 MIN: Performed by: ORTHOPAEDIC SURGERY

## 2023-06-12 PROCEDURE — 63600175 PHARM REV CODE 636 W HCPCS: Performed by: ANESTHESIOLOGY

## 2023-06-12 PROCEDURE — 3075F SYST BP GE 130 - 139MM HG: CPT | Mod: CPTII,,, | Performed by: ORTHOPAEDIC SURGERY

## 2023-06-12 PROCEDURE — 3078F DIAST BP <80 MM HG: CPT | Mod: CPTII,,, | Performed by: ORTHOPAEDIC SURGERY

## 2023-06-12 PROCEDURE — D9220A PRA ANESTHESIA: Mod: ANES,,, | Performed by: ANESTHESIOLOGY

## 2023-06-12 PROCEDURE — 3075F PR MOST RECENT SYSTOLIC BLOOD PRESS GE 130-139MM HG: ICD-10-PCS | Mod: CPTII,,, | Performed by: ORTHOPAEDIC SURGERY

## 2023-06-12 PROCEDURE — 63600175 PHARM REV CODE 636 W HCPCS: Performed by: PHYSICIAN ASSISTANT

## 2023-06-12 PROCEDURE — 71000016 HC POSTOP RECOV ADDL HR: Performed by: ORTHOPAEDIC SURGERY

## 2023-06-12 PROCEDURE — 99499 NO LOS: ICD-10-PCS | Mod: ,,, | Performed by: PHYSICIAN ASSISTANT

## 2023-06-12 PROCEDURE — 3078F PR MOST RECENT DIASTOLIC BLOOD PRESSURE < 80 MM HG: ICD-10-PCS | Mod: CPTII,,, | Performed by: ORTHOPAEDIC SURGERY

## 2023-06-12 PROCEDURE — 25000003 PHARM REV CODE 250: Performed by: PHYSICIAN ASSISTANT

## 2023-06-12 PROCEDURE — 13160 PR SECD CLOS SURG WND EXTEN/COMPLIC: ICD-10-PCS | Mod: 78,LT,, | Performed by: ORTHOPAEDIC SURGERY

## 2023-06-12 PROCEDURE — 37000008 HC ANESTHESIA 1ST 15 MINUTES: Performed by: ORTHOPAEDIC SURGERY

## 2023-06-12 PROCEDURE — 13160 SEC CLSR SURG WND/DEHSN XTN: CPT | Mod: 78,LT,, | Performed by: ORTHOPAEDIC SURGERY

## 2023-06-12 PROCEDURE — 63600175 PHARM REV CODE 636 W HCPCS: Performed by: NURSE ANESTHETIST, CERTIFIED REGISTERED

## 2023-06-12 PROCEDURE — 99499 UNLISTED E&M SERVICE: CPT | Mod: ,,, | Performed by: PHYSICIAN ASSISTANT

## 2023-06-12 PROCEDURE — 73610 X-RAY EXAM OF ANKLE: CPT | Mod: LT,,, | Performed by: ORTHOPAEDIC SURGERY

## 2023-06-12 PROCEDURE — 25000003 PHARM REV CODE 250: Performed by: ORTHOPAEDIC SURGERY

## 2023-06-12 PROCEDURE — 87070 CULTURE OTHR SPECIMN AEROBIC: CPT | Performed by: ORTHOPAEDIC SURGERY

## 2023-06-12 PROCEDURE — D9220A PRA ANESTHESIA: Mod: CRNA,,, | Performed by: NURSE ANESTHETIST, CERTIFIED REGISTERED

## 2023-06-12 PROCEDURE — D9220A PRA ANESTHESIA: ICD-10-PCS | Mod: CRNA,,, | Performed by: NURSE ANESTHETIST, CERTIFIED REGISTERED

## 2023-06-12 PROCEDURE — 37000009 HC ANESTHESIA EA ADD 15 MINS: Performed by: ORTHOPAEDIC SURGERY

## 2023-06-12 PROCEDURE — D9220A PRA ANESTHESIA: ICD-10-PCS | Mod: ANES,,, | Performed by: ANESTHESIOLOGY

## 2023-06-12 PROCEDURE — 1159F MED LIST DOCD IN RCRD: CPT | Mod: CPTII,,, | Performed by: ORTHOPAEDIC SURGERY

## 2023-06-12 PROCEDURE — 71000033 HC RECOVERY, INTIAL HOUR: Performed by: ORTHOPAEDIC SURGERY

## 2023-06-12 PROCEDURE — 82962 GLUCOSE BLOOD TEST: CPT | Performed by: ORTHOPAEDIC SURGERY

## 2023-06-12 RX ORDER — METHOCARBAMOL 750 MG/1
750 TABLET, FILM COATED ORAL 3 TIMES DAILY
Status: DISCONTINUED | OUTPATIENT
Start: 2023-06-12 | End: 2023-06-12 | Stop reason: HOSPADM

## 2023-06-12 RX ORDER — VANCOMYCIN HYDROCHLORIDE 1 G/20ML
INJECTION, POWDER, LYOPHILIZED, FOR SOLUTION INTRAVENOUS
Status: DISCONTINUED
Start: 2023-06-12 | End: 2023-06-12 | Stop reason: HOSPADM

## 2023-06-12 RX ORDER — FENTANYL CITRATE 50 UG/ML
INJECTION, SOLUTION INTRAMUSCULAR; INTRAVENOUS
Status: DISCONTINUED | OUTPATIENT
Start: 2023-06-12 | End: 2023-06-12

## 2023-06-12 RX ORDER — LIDOCAINE HYDROCHLORIDE 20 MG/ML
INJECTION, SOLUTION EPIDURAL; INFILTRATION; INTRACAUDAL; PERINEURAL
Status: DISCONTINUED | OUTPATIENT
Start: 2023-06-12 | End: 2023-06-12

## 2023-06-12 RX ORDER — MUPIROCIN 20 MG/G
OINTMENT TOPICAL
Status: CANCELLED | OUTPATIENT
Start: 2023-06-12

## 2023-06-12 RX ORDER — MUPIROCIN 20 MG/G
OINTMENT TOPICAL
Status: DISCONTINUED | OUTPATIENT
Start: 2023-06-12 | End: 2023-06-12

## 2023-06-12 RX ORDER — PROPOFOL 10 MG/ML
INJECTION, EMULSION INTRAVENOUS
Status: DISCONTINUED | OUTPATIENT
Start: 2023-06-12 | End: 2023-06-12

## 2023-06-12 RX ORDER — MIDAZOLAM HYDROCHLORIDE 1 MG/ML
INJECTION INTRAMUSCULAR; INTRAVENOUS
Status: DISCONTINUED | OUTPATIENT
Start: 2023-06-12 | End: 2023-06-12

## 2023-06-12 RX ORDER — DEXAMETHASONE SODIUM PHOSPHATE 4 MG/ML
INJECTION, SOLUTION INTRA-ARTICULAR; INTRALESIONAL; INTRAMUSCULAR; INTRAVENOUS; SOFT TISSUE
Status: DISCONTINUED | OUTPATIENT
Start: 2023-06-12 | End: 2023-06-12

## 2023-06-12 RX ORDER — KETOROLAC TROMETHAMINE 10 MG/1
10 TABLET, FILM COATED ORAL EVERY 6 HOURS PRN
Qty: 20 TABLET | Refills: 0 | Status: SHIPPED | OUTPATIENT
Start: 2023-06-12 | End: 2023-06-17

## 2023-06-12 RX ORDER — LIDOCAINE HYDROCHLORIDE 10 MG/ML
1 INJECTION, SOLUTION EPIDURAL; INFILTRATION; INTRACAUDAL; PERINEURAL ONCE
Status: DISCONTINUED | OUTPATIENT
Start: 2023-06-12 | End: 2023-06-12 | Stop reason: HOSPADM

## 2023-06-12 RX ORDER — HYDROMORPHONE HYDROCHLORIDE 2 MG/ML
0.4 INJECTION, SOLUTION INTRAMUSCULAR; INTRAVENOUS; SUBCUTANEOUS EVERY 5 MIN PRN
Status: DISCONTINUED | OUTPATIENT
Start: 2023-06-12 | End: 2023-06-12 | Stop reason: HOSPADM

## 2023-06-12 RX ORDER — GLYCOPYRROLATE 0.2 MG/ML
INJECTION INTRAMUSCULAR; INTRAVENOUS
Status: DISCONTINUED | OUTPATIENT
Start: 2023-06-12 | End: 2023-06-12

## 2023-06-12 RX ORDER — HYDROCODONE BITARTRATE AND ACETAMINOPHEN 10; 325 MG/1; MG/1
1 TABLET ORAL EVERY 4 HOURS PRN
Status: DISCONTINUED | OUTPATIENT
Start: 2023-06-12 | End: 2023-06-12 | Stop reason: HOSPADM

## 2023-06-12 RX ORDER — HYDROMORPHONE HYDROCHLORIDE 2 MG/ML
INJECTION, SOLUTION INTRAMUSCULAR; INTRAVENOUS; SUBCUTANEOUS
Status: DISCONTINUED
Start: 2023-06-12 | End: 2023-06-12 | Stop reason: HOSPADM

## 2023-06-12 RX ORDER — HYDROCODONE BITARTRATE AND ACETAMINOPHEN 5; 325 MG/1; MG/1
1 TABLET ORAL EVERY 4 HOURS PRN
Qty: 42 TABLET | Refills: 0 | Status: SHIPPED | OUTPATIENT
Start: 2023-06-12 | End: 2023-06-26 | Stop reason: SDUPTHER

## 2023-06-12 RX ORDER — SODIUM CHLORIDE, SODIUM LACTATE, POTASSIUM CHLORIDE, CALCIUM CHLORIDE 600; 310; 30; 20 MG/100ML; MG/100ML; MG/100ML; MG/100ML
INJECTION, SOLUTION INTRAVENOUS CONTINUOUS
Status: CANCELLED | OUTPATIENT
Start: 2023-06-12

## 2023-06-12 RX ORDER — KETOROLAC TROMETHAMINE 30 MG/ML
15 INJECTION, SOLUTION INTRAMUSCULAR; INTRAVENOUS EVERY 6 HOURS
Status: DISCONTINUED | OUTPATIENT
Start: 2023-06-12 | End: 2023-06-12 | Stop reason: HOSPADM

## 2023-06-12 RX ORDER — ONDANSETRON 2 MG/ML
INJECTION INTRAMUSCULAR; INTRAVENOUS
Status: DISCONTINUED | OUTPATIENT
Start: 2023-06-12 | End: 2023-06-12

## 2023-06-12 RX ORDER — SODIUM CHLORIDE 0.9 % (FLUSH) 0.9 %
10 SYRINGE (ML) INJECTION
Status: DISCONTINUED | OUTPATIENT
Start: 2023-06-12 | End: 2023-07-17

## 2023-06-12 RX ORDER — ONDANSETRON 2 MG/ML
4 INJECTION INTRAMUSCULAR; INTRAVENOUS ONCE AS NEEDED
Status: DISCONTINUED | OUTPATIENT
Start: 2023-06-12 | End: 2023-06-12 | Stop reason: HOSPADM

## 2023-06-12 RX ORDER — SULFAMETHOXAZOLE AND TRIMETHOPRIM 800; 160 MG/1; MG/1
1 TABLET ORAL 2 TIMES DAILY
Qty: 28 TABLET | Refills: 0 | Status: SHIPPED | OUTPATIENT
Start: 2023-06-12 | End: 2023-07-17

## 2023-06-12 RX ORDER — HYDROCODONE BITARTRATE AND ACETAMINOPHEN 5; 325 MG/1; MG/1
1 TABLET ORAL EVERY 4 HOURS PRN
Status: DISCONTINUED | OUTPATIENT
Start: 2023-06-12 | End: 2023-06-12 | Stop reason: HOSPADM

## 2023-06-12 RX ADMIN — HYDROMORPHONE HYDROCHLORIDE 0.4 MG: 2 INJECTION INTRAMUSCULAR; INTRAVENOUS; SUBCUTANEOUS at 11:06

## 2023-06-12 RX ADMIN — GLYCOPYRROLATE 0.1 MG: 0.2 INJECTION INTRAMUSCULAR; INTRAVENOUS at 10:06

## 2023-06-12 RX ADMIN — ONDANSETRON HYDROCHLORIDE 4 MG: 2 SOLUTION INTRAMUSCULAR; INTRAVENOUS at 10:06

## 2023-06-12 RX ADMIN — KETOROLAC TROMETHAMINE 15 MG: 30 INJECTION, SOLUTION INTRAMUSCULAR; INTRAVENOUS at 11:06

## 2023-06-12 RX ADMIN — HYDROCODONE BITARTRATE AND ACETAMINOPHEN 1 TABLET: 5; 325 TABLET ORAL at 12:06

## 2023-06-12 RX ADMIN — PROPOFOL 150 MG: 10 INJECTION, EMULSION INTRAVENOUS at 10:06

## 2023-06-12 RX ADMIN — CEFAZOLIN 2 G: 2 INJECTION, POWDER, FOR SOLUTION INTRAMUSCULAR; INTRAVENOUS at 10:06

## 2023-06-12 RX ADMIN — SODIUM CHLORIDE, SODIUM GLUCONATE, SODIUM ACETATE, POTASSIUM CHLORIDE AND MAGNESIUM CHLORIDE: 526; 502; 368; 37; 30 INJECTION, SOLUTION INTRAVENOUS at 10:06

## 2023-06-12 RX ADMIN — DEXAMETHASONE SODIUM PHOSPHATE 4 MG: 4 INJECTION, SOLUTION INTRA-ARTICULAR; INTRALESIONAL; INTRAMUSCULAR; INTRAVENOUS; SOFT TISSUE at 10:06

## 2023-06-12 RX ADMIN — LIDOCAINE HYDROCHLORIDE 50 MG: 20 INJECTION, SOLUTION EPIDURAL; INFILTRATION; INTRACAUDAL; PERINEURAL at 10:06

## 2023-06-12 RX ADMIN — FENTANYL CITRATE 100 MCG: 50 INJECTION, SOLUTION INTRAMUSCULAR; INTRAVENOUS at 10:06

## 2023-06-12 RX ADMIN — MIDAZOLAM 2 MG: 1 INJECTION INTRAMUSCULAR; INTRAVENOUS at 10:06

## 2023-06-12 NOTE — INTERVAL H&P NOTE
The patient has been examined and the H&P has been reviewed:    I concur with the findings and no changes have occurred since H&P was written.    Surgery risks, benefits and alternative options discussed and understood by patient/family.    I explained that surgery and the nature of their condition are not without risks. These include, but are not limited to, bleeding, infection, neurovascular compromise, malunion, nonunion, hardware complications, wound complications, scarring, cosmetic defects, need for later and/or repeated surgeries, avascular necrosis, bone death due to initial trauma, pain, loss of ROM, loss of function, PTOA, deformity, stance/gait and/or functional abnormalities, thromboembolic complications, compartment syndrome, loss of limb, loss of life, anesthetic complications, and other imponderables. I explained that these can occur despite the adequacy of treatments rendered, and that their risks are heightened given the nature of their condition. They verbalized understanding. They would like to continue with surgery at this time. If appropriate family was involved with surgical discussion.     This note/OR report was created with the assistance of  voice recognition software or phone  dictation.  There may be transcription errors as a result of using this technology however minimal. Effort has been made to assure accuracy of transcription but any obvious errors or omissions should be clarified with the author of the document.       Dillon Scott, DO  Orthopedic Trauma Surgery       There are no hospital problems to display for this patient.

## 2023-06-12 NOTE — DISCHARGE INSTRUCTIONS
Please call your DR. If you have problems with pain, bleeding, any signs of infection such as fever 102 or greater, procedure area redness, swelling, drainage, hard or hot to touch or anything of concern. Keep dressing clean, dry and intact for 3 days then remove dressing. You can gently wash area in shower at that time but do not submerge. Apply dressing  and change every other day. No powders, lotions or creams to incisions. Keep extremity elevated to help with pain and swelling. Use ice 20-30 minute periods with 10 minute breaks  for 2 days to help with pain and swelling and then as needed. No driving or legal decisions for 24 hours. Clear liquid diet and advance to regular as tolerated.

## 2023-06-12 NOTE — ANESTHESIA PROCEDURE NOTES
Intubation    Date/Time: 6/12/2023 10:34 AM  Performed by: Latrell Thomason CRNA  Authorized by: Castillo Fulton MD     Intubation:     Induction:  Intravenous    Intubated:  Postinduction    Mask Ventilation:  Easy with oral airway    Attempts:  1    Attempted By:  CRNA    Difficult Airway Encountered?: No      Complications:  None    Airway Device:  Supraglottic airway/LMA    Airway Device Size:  3.0    Style/Cuff Inflation:  Cuffed    Secured at:  The lips    Placement Verified By:  Capnometry    Complicating Factors:  None

## 2023-06-12 NOTE — ANESTHESIA POSTPROCEDURE EVALUATION
Anesthesia Post Evaluation    Patient: Deborah Cross    Procedure(s) Performed: Procedure(s) (LRB):  INCISION AND DRAINAGE, LOWER EXTREMITY - supine bone foam wash stuff cultures (Left)    Final Anesthesia Type: general      Patient location during evaluation: OPS  Patient participation: Yes- Able to Participate  Level of consciousness: awake and oriented  Post-procedure vital signs: reviewed and stable  Pain management: adequate  Airway patency: patent    PONV status at discharge: No PONV  Anesthetic complications: no      Cardiovascular status: hemodynamically stable  Respiratory status: unassisted and spontaneous ventilation  Hydration status: euvolemic  Follow-up not needed.          Vitals Value Taken Time   /95 06/12/23 1201   Temp 36 06/12/23 1338   Pulse 84 06/12/23 1215   Resp 20 06/12/23 1209   SpO2 99 % 06/12/23 1215   Vitals shown include unvalidated device data.      Event Time   Out of Recovery 11:48:43         Pain/Veronika Score: Pain Rating Prior to Med Admin: 3 (6/12/2023 12:09 PM)  Veronika Score: 10 (6/12/2023 12:38 PM)  Modified Veronika Score: 20 (6/12/2023 12:38 PM)

## 2023-06-12 NOTE — ANESTHESIA PREPROCEDURE EVALUATION
06/12/2023  Deborah Cross is a 53 y.o., female , who presents for the following:    Procedure: INCISION AND DRAINAGE, LOWER EXTREMITY - supine bone foam wash stuff cultures (Left) - supine bone foam wash stuff cultures   Anesthesia type: General   Diagnosis: Laceration of left ankle with complication, initial encounter [S91.012A]   Pre-op diagnosis: Laceration of left ankle with complication, initial encounter [S91.012A]   [facility]: [facility]  / [facility]   Surgeons: Dillon Scott DO     HPI:  Patient presents for 6 Month Follow-up from her open fracture dislocation of her left ankle.  Patient has anchors in her distal fibula but overall no other hardware.  She states she is been doing more than usual tennis shoe rubs right on the incision site in this area.  She is a draining sinus tract was draining purulent matter over the last few days.  She is still smoking nicotine.  She states she is on summer break and will be able to decrease her activity.  No fevers or chills.    04/06 0702 04/11 1219 06/12 0840    HGB 14.0     12.8     13.0       WBC 8.3     7.2     8.59       Platelets 315     290     315            137     140       K+ 4.3     4.2     3.9       Creatinine 0.72     0.71     0.70       Glucose 162 High      107 High      129 High        INR --     --     1.00 Low             Pre-op Assessment    I have reviewed the Patient Summary Reports.     I have reviewed the Nursing Notes. I have reviewed the NPO Status.   I have reviewed the Medications.     Review of Systems  Anesthesia Hx:  No problems with previous Anesthesia  Denies Family Hx of Anesthesia complications.   Denies Personal Hx of Anesthesia complications.   Social:  Smoker    Pulmonary:  Pulmonary Normal    Hepatic/GI:   GERD    Neurological:   Headaches    Endocrine:   Diabetes, type 2    Psych:   anxiety depression ADHD          Physical Exam  General: Alert and Oriented    Airway:  Mallampati: II   Mouth Opening: Normal  TM Distance: Normal  Tongue: Normal  Neck ROM: Normal ROM    Dental:  Intact    Chest/Lungs:  Normal Respiratory Rate    Heart:  Rate: Normal  Rhythm: Regular Rhythm        Anesthesia Plan  Type of Anesthesia, risks & benefits discussed:    Anesthesia Type: Gen Supraglottic Airway  Intra-op Monitoring Plan: Standard ASA Monitors  Post Op Pain Control Plan: IV/PO Opioids PRN and multimodal analgesia  Induction:  IV  Airway Plan: Direct, Post-Induction  Informed Consent: Informed consent signed with the Patient and all parties understand the risks and agree with anesthesia plan.  All questions answered. Patient consented to blood products? Yes  ASA Score: 2  Day of Surgery Review of History & Physical: H&P Update referred to the surgeon/provider.    Ready For Surgery From Anesthesia Perspective.     .

## 2023-06-12 NOTE — ANESTHESIA POSTPROCEDURE EVALUATION
Anesthesia Post Evaluation    Patient: Deborah Cross    Procedure(s) Performed: Procedure(s) (LRB):  INCISION AND DRAINAGE, LOWER EXTREMITY - supine bone foam wash stuff cultures (Left)    [facility] Anesthesia Post Op Evaluation      Vitals Value Taken Time   /95 06/12/23 1201   Temp 36 06/12/23 1337   Pulse 84 06/12/23 1215   Resp 20 06/12/23 1209   SpO2 99 % 06/12/23 1215   Vitals shown include unvalidated device data.      Event Time   Out of Recovery 11:48:43         Pain/Veronika Score: Pain Rating Prior to Med Admin: 3 (6/12/2023 12:09 PM)  Veronika Score: 10 (6/12/2023 12:38 PM)  Modified Veronika Score: 20 (6/12/2023 12:38 PM)

## 2023-06-12 NOTE — H&P (VIEW-ONLY)
Subjective:       Patient ID: Deborah Cross is a 53 y.o. female.  Chief Complaint   Patient presents with    Left Ankle - Follow-up     5.5 month f/u left ankle injury.  Has some drainage to incision over last couple days.         Follow-up    Patient presents for 6 Month Follow-up from her open fracture dislocation of her left ankle.  Patient has anchors in her distal fibula but overall no other hardware.  She states she is been doing more than usual tennis shoe rubs right on the incision site in this area.  She is a draining sinus tract was draining purulent matter over the last few days.  She is still smoking nicotine.  She states she is on summer break and will be able to decrease her activity.  No fevers or chills.    ROS:  Constitutional: Denies fever chills  Eyes: No change in vision  ENT: No ringing or current infections  CV: No chest pain  Resp: No labored breathing  MSK: Pain evident at site of injury located in HPI,   Integ: No signs of abrasions or lacerations  Neuro: No numbness or tingling  Lymphatic: No swelling outside the area of injury     Current Outpatient Medications on File Prior to Visit   Medication Sig Dispense Refill    ALPRAZolam (XANAX) 1 MG tablet TAKE ONE TABLET BY MOUTH TWICE DAILY AS NEEDED for anxiety 40 tablet 5    amLODIPine (NORVASC) 5 MG tablet Take 1 tablet (5 mg total) by mouth once daily. 90 tablet 1    FLUoxetine 40 MG capsule TAKE ONE CAPSULE BY MOUTH EVERY MORNING 90 capsule 1    fluticasone propionate (FLONASE) 50 mcg/actuation nasal spray use 1 spray in each nostril twice daily 16 g 1    metFORMIN (GLUCOPHAGE) 500 MG tablet TAKE ONE TABLET BY MOUTH once daily 90 tablet 1    mupirocin (BACTROBAN) 2 % ointment Apply topically 3 (three) times daily. 30 g 1    rosuvastatin (CRESTOR) 20 MG tablet Take 1 tablet (20 mg total) by mouth once daily. 90 tablet 0    rosuvastatin (CRESTOR) 20 MG tablet Take 20 mg by mouth once daily.      sumatriptan (IMITREX) 25 MG Tab TAKE  "ONE TABLET BY MOUTH as a single DOSE AND MAY REPEAT DOSE in 2 hours if needed 9 tablet 1    traZODone (DESYREL) 100 MG tablet Take 1 tablet (100 mg total) by mouth every evening. 90 tablet 0    aspirin (ECOTRIN) 81 MG EC tablet Take 1 tablet (81 mg total) by mouth 2 (two) times a day. for 14 days 28 tablet 0    promethazine (PHENERGAN) 25 MG tablet Take 1 tablet (25 mg total) by mouth every 6 (six) hours as needed for Nausea. 7 tablet 0    QUEtiapine (SEROQUEL) 50 MG tablet Take 1 tablet (50 mg total) by mouth every evening. 30 tablet 11    simvastatin (ZOCOR) 40 MG tablet Take 1 tablet (40 mg total) by mouth every evening. 90 tablet 1     No current facility-administered medications on file prior to visit.          Objective:      /79   Pulse 90   Temp 98 °F (36.7 °C) (Oral)   Resp 18   Ht 5' 2" (1.575 m)   Wt 59.9 kg (132 lb 0.9 oz)   BMI 24.15 kg/m²   Physical Exam  General the patient is alert and oriented x3 no acute distress nontoxic-appearing appropriate affect.    Constitutional: Vital signs are examined and stable.  Resp: No signs of labored breathing    Musculoskeletal:   LLE:   Patient has a draining sinus tract over the incision on the lateral aspect.  Minimal erythema.  It is draining purulent fluid.  No pain in the subtalar or tibiotalar joint.  Compartments soft and compressible.  Sensation grossly intact.  Capillary refill is brisk        Body mass index is 24.15 kg/m².  Ideal body weight: 50.1 kg (110 lb 7.2 oz)  Adjusted ideal body weight: 54 kg (119 lb 1.5 oz)  Hemoglobin A1c   Date Value Ref Range Status   04/06/2023 6.0 <=7.0 % Final   12/15/2021 5.9 <<=7.0 % Final     Hgb   Date Value Ref Range Status   04/11/2023 12.8 12.0 - 16.0 g/dL Final   04/06/2023 14.0 12.0 - 16.0 g/dL Final     Hct   Date Value Ref Range Status   04/11/2023 39.4 37.0 - 47.0 % Final   04/06/2023 43.9 37.0 - 47.0 % Final     Iron Level   Date Value Ref Range Status   04/06/2023 90 50 - 170 ug/dL Final     No " components found for: LAN  Vit D 25 OH   Date Value Ref Range Status   04/06/2023 63.1 30.0 - 80.0 ng/mL Final   12/15/2021 27.7 (L) 30.0 - 80.0 ng/mL Final     WBC   Date Value Ref Range Status   04/11/2023 7.2 4.5 - 11.5 x10(3)/mcL Final   04/06/2023 8.3 4.5 - 11.5 x10(3)/mcL Final       Radiology:  three views left ankle skeletally mature individual show intact hardware no signs of osteolytic process        Assessment:         1. 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 Laceration of left ankle with complication, initial encounter  Case Request Operating Room: INCISION AND DRAINAGE, LOWER EXTREMITY    C-reactive protein    Sedimentation rate    CBC Auto Differential    Basic Metabolic Panel    HCG, Quantitative    Protime-INR              Plan:         No follow-ups on file.    Deborah was seen today for follow-up.    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, subsequent encounter  -     Cancel: X-Ray Tibia Fibula 2 View Right; Future  -     X-Ray Ankle Complete Left; Future    Laceration of left ankle with complication, initial encounter  -     Case Request Operating Room: INCISION AND DRAINAGE, LOWER EXTREMITY  -     C-reactive protein; Future  -     Sedimentation rate; Future  -     CBC Auto Differential; Future  -     Basic Metabolic Panel; Future  -     HCG, Quantitative; Future  -     Protime-INR; Future    Other orders  -     Vital signs; Standing  -     Cleanse with Chlorhexidine (CHG); Standing  -     Diet NPO; Standing  -     sodium chloride 0.9% flush 10 mL  -     IP VTE LOW RISK PATIENT; Standing  -     Place ESTELLE hose; Standing  -     Place sequential compression device; Standing  -     Chlorohexidine Gluconate Bath; Standing  -     mupirocin 2 % ointment  -     Full code; Standing  -     Place in  Outpatient; Standing  -     ceFAZolin (ANCEF) 2 g in dextrose 5 % (D5W) 50 mL IVPB  -     Cleanse with Chlorhexidine (CHG)  -     Diet NPO  -     IP VTE LOW RISK PATIENT  -     Place ESTELLE hose  -     Place sequential compression device  -     Full code       Patient had an open subtalar dislocation in the left with a large laceration.  She is had relatively no problems in this area throughout the entire postsurgical. .  We recently just dynamize her right tibial nail which she is had excellent growth with.  She is still smoking nicotine.  She reports today NPO at my direction with a draining sinus tract to the left ankle.  This is distal to the tibiotalar joint and distal to metal anchors that were placed to repair her ligamentous structures.  She states she is doing quite well over the last few days and increased drainage and opening of the incision.      She is agreeable today for urgent outpatient surgery due to the draining sinus tract.  She understands risks best procedure including the risks with nicotine use which includes surgical wound dehiscence and infection.    I explained that surgery and the nature of their condition are not without risks. These include, but are not limited to, bleeding, infection, neurovascular compromise, malunion, nonunion, hardware complications, wound complications, scarring, cosmetic defects, need for later and/or repeated surgeries, avascular necrosis, bone death due to initial trauma, pain, loss of ROM, loss of function, PTOA, deformity, stance/gait and/or functional abnormalities, thromboembolic complications, compartment syndrome, loss of limb, loss of life, anesthetic complications, and other imponderables. I explained that these can occur despite the adequacy of treatments rendered, and that their risks are heightened given the nature of their condition. They verbalized understanding. They would like to continue with surgery at this time. If appropriate family was involved  with surgical discussion.         Plan is to take the patient's surgery this morning for incision and drainage and closure of the wound followed by oral antibiotics and cultures.      This note/OR report was created with the assistance of  voice recognition software or phone  dictation.  There may be transcription errors as a result of using this technology however minimal. Effort has been made to assure accuracy of transcription but any obvious errors or omissions should be clarified with the author of the document.       Dillon Scott DO  Orthopedic Trauma Surgery           Future Appointments   Date Time Provider Department [place]   6/28/2023  8:30 AM Dillon Scott DO [facility]   6/28/2023  8:45 AM Adamaris Cao MD [facility] Cao

## 2023-06-12 NOTE — OP NOTE
OPERATIVE REPORT      Patient: Deborah Cross   : 1970    MRN: [id number]  Date: 2023      Surgeon:Dillon Scott DO  Assistant: Kevin Mark was essential, part of the procedure including deep hardware placement and deep closure.  No senior assistant was availible  Preoperative Diagnosis:  Left Ankle draining sinus tract surgical wound dehiscence lateral  Postoperative Diagnosis: Same  Procedure:   excision of draining sinus tract surgical wound dehiscence left ankle CPT 07568  Anesthesiologist: Castillo Fulton MD  OR Staff: Circulator: Pedrito Whitman RN  Scrub Person: Lisandro Mccray  Implants: * No implants in log *  EBL: 20cc  Complications: None  Disposition: To PACU, stable    Indications: Deborah Cross is a 53 y.o. female presenting with the aforementioned injuries/findings.  Patient was seen in office today.  She is been doing quite well with her contralateral side and this side as well.  Over the last 3 or 4 days patient noticed a draining sinus tract on her incision   From her open traumatic ankle dislocation.  She states she is been wearing tennis shoes been rubbing in the area and started knows purulent drainage.  She is still smoking tobacco.  The patient is awake and alert. After thorough discussion of the risks, benefits, expected outcomes, and alternatives to surgical intervention, the patient agreed to proceed with surgical treatment.  Specific risks discussed included, but were not limited to: superficial or deep infection, wound healing complications, DVT/PE, significant bleeding requiring transfusion, damage to named anatomic structures in the immediate area including named neurovascular structures, infection, nonunion, malunion and general risks of anesthesia.  The patient voiced understanding and written as well as verbal consent was obtained by myself prior to the procedure.    Procedure Note:  The patient was brought back to the OR and placed  supine on the OR table. After successful induction of anesthesia by anesthesia staff, the patient was positioned in the supine position and all bony prominences were padded appropriately.  The surgical field was then provisionally cleansed and then prepped and draped in the usual sterile fashion.    At this time a time-out was performed, with the correct patient, site, and procedure identified.  The universal time out as well as sign your site protocols were followed.  Preoperative antibiotics were verified as administered.      Attention is drawn to the left lateral ankle patient has a draining sinus tract to the posterior 3rd of the incision there does appear to be purulence coming from the wound I made a ellipse of this sinus tract and excised it with a 15 blade blunt dissected down and came in contact with the fluid collection appears to be liquid I liquified adipose tissue from initial trauma versus purulence.  Overall minimal cellulitis in this area.  I completely irrigated the area completed excisional debridement and took deep cultures.  Put vancomycin deep in the wound andand then began closing    The incision(s) was/were then irrigated using copious sterile saline and then vancomyocin was added to the wound bed for prophylaxis. The surgical wound was closed in layered fashion.  The surgical site(s) was/were were sterilely cleansed and dressed.    The patient was then subsequently transferred to to PACU in a stable condition.    All sponge and needle counts were correct at the end of the case.  I was present and participated in all aspects of the procedure.    Prognosis:  The patient will be kept WBAT on the ipsilateral extremity .  Patient will receive DVT prophylaxis .      We will order cultures and sensitivities for her.   She will be placed on oral antibiotics at this time Bactrim DS.      This note/OR report was created with the assistance of  voice recognition software or phone  dictation.  There may  be transcription errors as a result of using this technology however minimal. Effort has been made to assure accuracy of transcription but any obvious errors or omissions should be clarified with the author of the document.       Dillon Scott,   Orthopedic Trauma Surgery

## 2023-06-12 NOTE — PROGRESS NOTES
Subjective:       Patient ID: Deborah Cross is a 53 y.o. female.  Chief Complaint   Patient presents with    Left Ankle - Follow-up     5.5 month f/u left ankle injury.  Has some drainage to incision over last couple days.         Follow-up    Patient presents for 6 Month Follow-up from her open fracture dislocation of her left ankle.  Patient has anchors in her distal fibula but overall no other hardware.  She states she is been doing more than usual tennis shoe rubs right on the incision site in this area.  She is a draining sinus tract was draining purulent matter over the last few days.  She is still smoking nicotine.  She states she is on summer break and will be able to decrease her activity.  No fevers or chills.    ROS:  Constitutional: Denies fever chills  Eyes: No change in vision  ENT: No ringing or current infections  CV: No chest pain  Resp: No labored breathing  MSK: Pain evident at site of injury located in HPI,   Integ: No signs of abrasions or lacerations  Neuro: No numbness or tingling  Lymphatic: No swelling outside the area of injury     Current Outpatient Medications on File Prior to Visit   Medication Sig Dispense Refill    ALPRAZolam (XANAX) 1 MG tablet TAKE ONE TABLET BY MOUTH TWICE DAILY AS NEEDED for anxiety 40 tablet 5    amLODIPine (NORVASC) 5 MG tablet Take 1 tablet (5 mg total) by mouth once daily. 90 tablet 1    FLUoxetine 40 MG capsule TAKE ONE CAPSULE BY MOUTH EVERY MORNING 90 capsule 1    fluticasone propionate (FLONASE) 50 mcg/actuation nasal spray use 1 spray in each nostril twice daily 16 g 1    metFORMIN (GLUCOPHAGE) 500 MG tablet TAKE ONE TABLET BY MOUTH once daily 90 tablet 1    mupirocin (BACTROBAN) 2 % ointment Apply topically 3 (three) times daily. 30 g 1    rosuvastatin (CRESTOR) 20 MG tablet Take 1 tablet (20 mg total) by mouth once daily. 90 tablet 0    rosuvastatin (CRESTOR) 20 MG tablet Take 20 mg by mouth once daily.      sumatriptan (IMITREX) 25 MG Tab TAKE  "ONE TABLET BY MOUTH as a single DOSE AND MAY REPEAT DOSE in 2 hours if needed 9 tablet 1    traZODone (DESYREL) 100 MG tablet Take 1 tablet (100 mg total) by mouth every evening. 90 tablet 0    aspirin (ECOTRIN) 81 MG EC tablet Take 1 tablet (81 mg total) by mouth 2 (two) times a day. for 14 days 28 tablet 0    promethazine (PHENERGAN) 25 MG tablet Take 1 tablet (25 mg total) by mouth every 6 (six) hours as needed for Nausea. 7 tablet 0    QUEtiapine (SEROQUEL) 50 MG tablet Take 1 tablet (50 mg total) by mouth every evening. 30 tablet 11    simvastatin (ZOCOR) 40 MG tablet Take 1 tablet (40 mg total) by mouth every evening. 90 tablet 1     No current facility-administered medications on file prior to visit.          Objective:      /79   Pulse 90   Temp 98 °F (36.7 °C) (Oral)   Resp 18   Ht 5' 2" (1.575 m)   Wt 59.9 kg (132 lb 0.9 oz)   BMI 24.15 kg/m²   Physical Exam  General the patient is alert and oriented x3 no acute distress nontoxic-appearing appropriate affect.    Constitutional: Vital signs are examined and stable.  Resp: No signs of labored breathing    Musculoskeletal:   LLE:   Patient has a draining sinus tract over the incision on the lateral aspect.  Minimal erythema.  It is draining purulent fluid.  No pain in the subtalar or tibiotalar joint.  Compartments soft and compressible.  Sensation grossly intact.  Capillary refill is brisk        Body mass index is 24.15 kg/m².  Ideal body weight: 50.1 kg (110 lb 7.2 oz)  Adjusted ideal body weight: 54 kg (119 lb 1.5 oz)  Hemoglobin A1c   Date Value Ref Range Status   04/06/2023 6.0 <=7.0 % Final   12/15/2021 5.9 <<=7.0 % Final     Hgb   Date Value Ref Range Status   04/11/2023 12.8 12.0 - 16.0 g/dL Final   04/06/2023 14.0 12.0 - 16.0 g/dL Final     Hct   Date Value Ref Range Status   04/11/2023 39.4 37.0 - 47.0 % Final   04/06/2023 43.9 37.0 - 47.0 % Final     Iron Level   Date Value Ref Range Status   04/06/2023 90 50 - 170 ug/dL Final     No " components found for: LAN  Vit D 25 OH   Date Value Ref Range Status   04/06/2023 63.1 30.0 - 80.0 ng/mL Final   12/15/2021 27.7 (L) 30.0 - 80.0 ng/mL Final     WBC   Date Value Ref Range Status   04/11/2023 7.2 4.5 - 11.5 x10(3)/mcL Final   04/06/2023 8.3 4.5 - 11.5 x10(3)/mcL Final       Radiology:  three views left ankle skeletally mature individual show intact hardware no signs of osteolytic process        Assessment:         1. 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 Laceration of left ankle with complication, initial encounter  Case Request Operating Room: INCISION AND DRAINAGE, LOWER EXTREMITY    C-reactive protein    Sedimentation rate    CBC Auto Differential    Basic Metabolic Panel    HCG, Quantitative    Protime-INR              Plan:         No follow-ups on file.    Deborah was seen today for follow-up.    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, subsequent encounter  -     Cancel: X-Ray Tibia Fibula 2 View Right; Future  -     X-Ray Ankle Complete Left; Future    Laceration of left ankle with complication, initial encounter  -     Case Request Operating Room: INCISION AND DRAINAGE, LOWER EXTREMITY  -     C-reactive protein; Future  -     Sedimentation rate; Future  -     CBC Auto Differential; Future  -     Basic Metabolic Panel; Future  -     HCG, Quantitative; Future  -     Protime-INR; Future    Other orders  -     Vital signs; Standing  -     Cleanse with Chlorhexidine (CHG); Standing  -     Diet NPO; Standing  -     sodium chloride 0.9% flush 10 mL  -     IP VTE LOW RISK PATIENT; Standing  -     Place ESTELLE hose; Standing  -     Place sequential compression device; Standing  -     Chlorohexidine Gluconate Bath; Standing  -     mupirocin 2 % ointment  -     Full code; Standing  -     Place in  Outpatient; Standing  -     ceFAZolin (ANCEF) 2 g in dextrose 5 % (D5W) 50 mL IVPB  -     Cleanse with Chlorhexidine (CHG)  -     Diet NPO  -     IP VTE LOW RISK PATIENT  -     Place ESTELLE hose  -     Place sequential compression device  -     Full code       Patient had an open subtalar dislocation in the left with a large laceration.  She is had relatively no problems in this area throughout the entire postsurgical. .  We recently just dynamize her right tibial nail which she is had excellent growth with.  She is still smoking nicotine.  She reports today NPO at my direction with a draining sinus tract to the left ankle.  This is distal to the tibiotalar joint and distal to metal anchors that were placed to repair her ligamentous structures.  She states she is doing quite well over the last few days and increased drainage and opening of the incision.      She is agreeable today for urgent outpatient surgery due to the draining sinus tract.  She understands risks best procedure including the risks with nicotine use which includes surgical wound dehiscence and infection.    I explained that surgery and the nature of their condition are not without risks. These include, but are not limited to, bleeding, infection, neurovascular compromise, malunion, nonunion, hardware complications, wound complications, scarring, cosmetic defects, need for later and/or repeated surgeries, avascular necrosis, bone death due to initial trauma, pain, loss of ROM, loss of function, PTOA, deformity, stance/gait and/or functional abnormalities, thromboembolic complications, compartment syndrome, loss of limb, loss of life, anesthetic complications, and other imponderables. I explained that these can occur despite the adequacy of treatments rendered, and that their risks are heightened given the nature of their condition. They verbalized understanding. They would like to continue with surgery at this time. If appropriate family was involved  with surgical discussion.         Plan is to take the patient's surgery this morning for incision and drainage and closure of the wound followed by oral antibiotics and cultures.      This note/OR report was created with the assistance of  voice recognition software or phone  dictation.  There may be transcription errors as a result of using this technology however minimal. Effort has been made to assure accuracy of transcription but any obvious errors or omissions should be clarified with the author of the document.       Dillon Scott DO  Orthopedic Trauma Surgery           Future Appointments   Date Time Provider Department [place]   6/28/2023  8:30 AM Dillon Scott DO [facility]   6/28/2023  8:45 AM Adamaris Cao MD [facility] Cao

## 2023-06-12 NOTE — TRANSFER OF CARE
"Anesthesia Transfer of Care Note    Patient: Deborah Cross    Procedure(s) Performed: Procedure(s) (LRB):  INCISION AND DRAINAGE, LOWER EXTREMITY - supine bone foam wash stuff cultures (Left)    Patient location: PACU    Anesthesia Type: general    Transport from OR: Transported from OR on room air with adequate spontaneous ventilation    Post pain: adequate analgesia    Post assessment: no apparent anesthetic complications    Post vital signs: stable    Level of consciousness: sedated    Nausea/Vomiting: no nausea/vomiting    Complications: none    Transfer of care protocol was followed      Last vitals:   Visit Vitals  /80   Pulse 76   Temp 35.9 °C (96.6 °F) (Tympanic)   Resp 20   Ht 5' 2" (1.575 m)   Wt 62.8 kg (138 lb 7.2 oz)   SpO2 99%   Breastfeeding No   BMI 25.32 kg/m²     "

## 2023-06-13 VITALS
OXYGEN SATURATION: 99 % | DIASTOLIC BLOOD PRESSURE: 95 MMHG | SYSTOLIC BLOOD PRESSURE: 149 MMHG | HEART RATE: 84 BPM | BODY MASS INDEX: 25.48 KG/M2 | RESPIRATION RATE: 20 BRPM | WEIGHT: 138.44 LBS | TEMPERATURE: 97 F | HEIGHT: 62 IN

## 2023-06-13 LAB
GRAM STN SPEC: NORMAL
GRAM STN SPEC: NORMAL

## 2023-06-14 NOTE — DISCHARGE SUMMARY
Discharge Summary    Admit Date: 6/12/2023     Discharge Date: 6/13/2023     Operative Procedure: INCISION AND DRAINAGE, LOWER EXTREMITY - supine bone foam wash stuff cultures (Left)     History of Present Illness/Hospital Course: Deborah Cross is a 53 y.o. female presenting with the aforementioned injuries/findings.  Patient was seen in office today.  She is been doing quite well with her contralateral side and this side as well.  Over the last 3 or 4 days patient noticed a draining sinus tract on her incision   From her open traumatic ankle dislocation.  She states she is been wearing tennis shoes been rubbing in the area and started knows purulent drainage.  She is still smoking tobacco.  The patient is awake and alert. After thorough discussion of the risks, benefits, expected outcomes, and alternatives to surgical intervention, the patient agreed to proceed with surgical treatment.  Specific risks discussed included, but were not limited to: superficial or deep infection, wound healing complications, DVT/PE, significant bleeding requiring transfusion, damage to named anatomic structures in the immediate area including named neurovascular structures, infection, nonunion, malunion and general risks of anesthesia.  The patient voiced understanding and written as well as verbal consent was obtained by myself prior to the procedure.    Discharge Disposition: Home    Activity: WBAT to affected extremity ; ROMAT    Diet: Resume previous home diet    Medications:      Medication List        START taking these medications      HYDROcodone-acetaminophen 5-325 mg per tablet  Commonly known as: NORCO  Take 1 tablet by mouth every 4 (four) hours as needed for Pain.     ketorolac 10 mg tablet  Commonly known as: TORADOL  Take 1 tablet (10 mg total) by mouth every 6 (six) hours as needed for Pain.     sulfamethoxazole-trimethoprim 800-160mg 800-160 mg Tab  Commonly known as: BACTRIM DS  Take 1 tablet by mouth 2 (two)  times daily. for 14 days            CHANGE how you take these medications      rosuvastatin 20 MG tablet  Commonly known as: CRESTOR  Take 1 tablet (20 mg total) by mouth once daily.  What changed: Another medication with the same name was removed. Continue taking this medication, and follow the directions you see here.            CONTINUE taking these medications      ALPRAZolam 1 MG tablet  Commonly known as: XANAX  TAKE ONE TABLET BY MOUTH TWICE DAILY AS NEEDED for anxiety     amLODIPine 5 MG tablet  Commonly known as: NORVASC  Take 1 tablet (5 mg total) by mouth once daily.     FLUoxetine 40 MG capsule  TAKE ONE CAPSULE BY MOUTH EVERY MORNING     fluticasone propionate 50 mcg/actuation nasal spray  Commonly known as: FLONASE  use 1 spray in each nostril twice daily     metFORMIN 500 MG tablet  Commonly known as: GLUCOPHAGE  TAKE ONE TABLET BY MOUTH once daily     mupirocin 2 % ointment  Commonly known as: BACTROBAN  Apply topically 3 (three) times daily.     promethazine 25 MG tablet  Commonly known as: PHENERGAN  Take 1 tablet (25 mg total) by mouth every 6 (six) hours as needed for Nausea.     QUEtiapine 50 MG tablet  Commonly known as: SEROQUEL  Take 1 tablet (50 mg total) by mouth every evening.     simvastatin 40 MG tablet  Commonly known as: ZOCOR  Take 1 tablet (40 mg total) by mouth every evening.     sumatriptan 25 MG Tab  Commonly known as: IMITREX  TAKE ONE TABLET BY MOUTH as a single DOSE AND MAY REPEAT DOSE in 2 hours if needed     traZODone 100 MG tablet  Commonly known as: DESYREL  Take 1 tablet (100 mg total) by mouth every evening.            ASK your doctor about these medications      aspirin 81 MG EC tablet  Commonly known as: ECOTRIN  Take 1 tablet (81 mg total) by mouth 2 (two) times a day. for 14 days               Where to Get Your Medications        These medications were sent to [facility] - [facility]  [address]  [address], [place]  [address]      Phone: [phone]   HYDROcodone-acetaminophen 5-325 mg per tablet  ketorolac 10 mg tablet  sulfamethoxazole-trimethoprim 800-160mg 800-160 mg Tab          Discharge Instructions: If in splint, keep clean and dry until follow up. Otherwise daily dry dressing changes until follow up. Keep incisions clean and dry. Do not apply ointments or creams.    Follow Up: Dr. Scott in approx 3 weeks    The above findings, diagnostics, and treatment plan were discussed with Dr. Scott who is in agreement with the plan of care except as stated in additional documentation.     Nupur Mark PA-C  [facility]   Orthopedic Trauma

## 2023-06-15 LAB
BACTERIA SPEC ANAEROBE CULT: NORMAL
BACTERIA SPEC CULT: NO GROWTH

## 2023-06-19 DIAGNOSIS — S82.401M TYPE I OR II OPEN FRACTURE OF RIGHT TIBIA AND FIBULA WITH NONUNION, SUBSEQUENT ENCOUNTER: Primary | ICD-10-CM

## 2023-06-19 DIAGNOSIS — S82.201M TYPE I OR II OPEN FRACTURE OF RIGHT TIBIA AND FIBULA WITH NONUNION, SUBSEQUENT ENCOUNTER: Primary | ICD-10-CM

## 2023-06-19 RX ORDER — ONDANSETRON 4 MG/1
4 TABLET, ORALLY DISINTEGRATING ORAL 2 TIMES DAILY
Qty: 14 TABLET | Refills: 0 | Status: SHIPPED | OUTPATIENT
Start: 2023-06-19 | End: 2023-06-26

## 2023-06-26 RX ORDER — HYDROCODONE BITARTRATE AND ACETAMINOPHEN 5; 325 MG/1; MG/1
1 TABLET ORAL EVERY 6 HOURS PRN
Qty: 28 TABLET | Refills: 0 | Status: SHIPPED | OUTPATIENT
Start: 2023-06-26 | End: 2023-07-11 | Stop reason: SDUPTHER

## 2023-06-28 ENCOUNTER — OFFICE VISIT (OUTPATIENT)
Dept: ORTHOPEDICS | Facility: CLINIC | Age: 53
End: 2023-06-28
Payer: MEDICAID

## 2023-06-28 VITALS
RESPIRATION RATE: 18 BRPM | BODY MASS INDEX: 25.48 KG/M2 | SYSTOLIC BLOOD PRESSURE: 124 MMHG | TEMPERATURE: 98 F | WEIGHT: 138.44 LBS | HEART RATE: 108 BPM | HEIGHT: 62 IN | DIASTOLIC BLOOD PRESSURE: 83 MMHG

## 2023-06-28 DIAGNOSIS — S91.022D: Primary | ICD-10-CM

## 2023-06-28 PROCEDURE — 1159F PR MEDICATION LIST DOCUMENTED IN MEDICAL RECORD: ICD-10-PCS | Mod: CPTII,,, | Performed by: PHYSICIAN ASSISTANT

## 2023-06-28 PROCEDURE — 3079F PR MOST RECENT DIASTOLIC BLOOD PRESSURE 80-89 MM HG: ICD-10-PCS | Mod: CPTII,,, | Performed by: PHYSICIAN ASSISTANT

## 2023-06-28 PROCEDURE — 3008F PR BODY MASS INDEX (BMI) DOCUMENTED: ICD-10-PCS | Mod: CPTII,,, | Performed by: PHYSICIAN ASSISTANT

## 2023-06-28 PROCEDURE — 99024 PR POST-OP FOLLOW-UP VISIT: ICD-10-PCS | Mod: ,,, | Performed by: PHYSICIAN ASSISTANT

## 2023-06-28 PROCEDURE — 3074F SYST BP LT 130 MM HG: CPT | Mod: CPTII,,, | Performed by: PHYSICIAN ASSISTANT

## 2023-06-28 PROCEDURE — 3079F DIAST BP 80-89 MM HG: CPT | Mod: CPTII,,, | Performed by: PHYSICIAN ASSISTANT

## 2023-06-28 PROCEDURE — 1159F MED LIST DOCD IN RCRD: CPT | Mod: CPTII,,, | Performed by: PHYSICIAN ASSISTANT

## 2023-06-28 PROCEDURE — 3074F PR MOST RECENT SYSTOLIC BLOOD PRESSURE < 130 MM HG: ICD-10-PCS | Mod: CPTII,,, | Performed by: PHYSICIAN ASSISTANT

## 2023-06-28 PROCEDURE — 3008F BODY MASS INDEX DOCD: CPT | Mod: CPTII,,, | Performed by: PHYSICIAN ASSISTANT

## 2023-06-28 PROCEDURE — 99024 POSTOP FOLLOW-UP VISIT: CPT | Mod: ,,, | Performed by: PHYSICIAN ASSISTANT

## 2023-06-28 NOTE — PROGRESS NOTES
Subjective:       Patient ID: Deborah Cross is a 53 y.o. female.  Chief Complaint   Patient presents with    Right Ankle - Post-op Evaluation     2.5 WEEK F/U I&D Left ANKLE WOUND.  IN BOOT, NO COMPLAINTS.         HPI    Patient presents for 2.5 week follow up I&D of the left ankle draining sinus tract. She is doing well overall with no acute complaints. Endorses a lump to the lateral ankle present following the initial surgery which is not painful to palpation. No acute complaints, she is still wearing her CAM boot to protect her incision sites. Denies numbness and tingling distally.     ROS:  Constitutional: Denies fever chills  Eyes: No change in vision  ENT: No ringing or current infections  CV: No chest pain  Resp: No labored breathing  MSK: Pain evident at site of injury located in HPI,   Integ: No signs of abrasions or lacerations  Neuro: No numbness or tingling  Lymphatic: No swelling outside the area of injury     Current Outpatient Medications on File Prior to Visit   Medication Sig Dispense Refill    ALPRAZolam (XANAX) 1 MG tablet TAKE ONE TABLET BY MOUTH TWICE DAILY AS NEEDED for anxiety 40 tablet 5    amLODIPine (NORVASC) 5 MG tablet TAKE ONE TABLET BY MOUTH DAILY 90 tablet 1    FLUoxetine 40 MG capsule TAKE ONE CAPSULE BY MOUTH EVERY MORNING 90 capsule 1    fluticasone propionate (FLONASE) 50 mcg/actuation nasal spray use 1 spray in each nostril twice daily 16 g 1    HYDROcodone-acetaminophen (NORCO) 5-325 mg per tablet Take 1 tablet by mouth every 6 (six) hours as needed for Pain. 28 tablet 0    metFORMIN (GLUCOPHAGE) 500 MG tablet TAKE ONE TABLET BY MOUTH once daily 90 tablet 1    mupirocin (BACTROBAN) 2 % ointment Apply topically 3 (three) times daily. 30 g 1    rosuvastatin (CRESTOR) 20 MG tablet Take 1 tablet (20 mg total) by mouth once daily. 90 tablet 0    sumatriptan (IMITREX) 25 MG Tab TAKE ONE TABLET BY MOUTH as a single DOSE AND MAY REPEAT DOSE in 2 hours if needed 9 tablet 1     "traZODone (DESYREL) 100 MG tablet Take 1 tablet (100 mg total) by mouth every evening. 90 tablet 0    aspirin (ECOTRIN) 81 MG EC tablet Take 1 tablet (81 mg total) by mouth 2 (two) times a day. for 14 days 28 tablet 0     Current Facility-Administered Medications on File Prior to Visit   Medication Dose Route Frequency Provider Last Rate Last Admin    sodium chloride 0.9% flush 10 mL  10 mL Intravenous PRN Dillon Scott DO              Objective:      /83   Pulse 108   Temp 98 °F (36.7 °C) (Oral)   Resp 18   Ht 5' 2" (1.575 m)   Wt 62.8 kg (138 lb 7.2 oz)   BMI 25.32 kg/m²   Physical Exam  General the patient is alert and oriented x3 no acute distress nontoxic-appearing appropriate affect.    Constitutional: Vital signs are examined and stable.  Resp: No signs of labored breathing    Musculoskeletal:     Left lower extremity: incision healing well without erythema,drainage, signs of dehiscence; compartments are soft and compressible; No pain with ROM at the hip, knee, or ankle; no tenderness to palpation; dorsi/plantar flexes the foot; SILT distally; BCR distally; DP pulse 2+      Body mass index is 25.32 kg/m².  Ideal body weight: 50.1 kg (110 lb 7.2 oz)  Adjusted ideal body weight: 55.2 kg (121 lb 10.4 oz)  Hemoglobin A1c   Date Value Ref Range Status   04/06/2023 6.0 <=7.0 % Final   12/15/2021 5.9 <<=7.0 % Final     Hgb   Date Value Ref Range Status   06/12/2023 13.0 12.0 - 16.0 g/dL Final   04/11/2023 12.8 12.0 - 16.0 g/dL Final     Hct   Date Value Ref Range Status   06/12/2023 40.2 37.0 - 47.0 % Final   04/11/2023 39.4 37.0 - 47.0 % Final     Iron Level   Date Value Ref Range Status   04/06/2023 90 50 - 170 ug/dL Final     No components found for: FROLATE  Vit D 25 OH   Date Value Ref Range Status   04/06/2023 63.1 30.0 - 80.0 ng/mL Final   12/15/2021 27.7 (L) 30.0 - 80.0 ng/mL Final     WBC   Date Value Ref Range Status   06/12/2023 8.59 4.50 - 11.50 x10(3)/mcL Final   04/11/2023 7.2 4.5 - 11.5 " x10(3)/mcL Final       Radiology: no x rays taken today        Assessment:         1. Laceration of left ankle with foreign body, subsequent encounter                Plan:         Follow up in about 4 weeks (around 7/26/2023), or if symptoms worsen or fail to improve.    Deborah was seen today for post-op evaluation.    Diagnoses and all orders for this visit:    Laceration of left ankle with foreign body, subsequent encounter        - doing well overall. Incision is healing well. Sutures removed today without complication.   - Pain well controlled. Begin to transition out of boot as incision continues to mature. Likely over the next 2 weeks.   - Pain control as needed. Continue taper. No driving while on prescription pain medication.   -Follow up in approx 4 weeks for repeat evaluation.  -ED precautions given    The above findings, diagnostics, and treatment plan were discussed with Dr. Scott who is in agreement with the plan of care except as stated in additional documentation.       Nupur Mark PA-C          Future Appointments   Date Time Provider Department Center   7/17/2023  9:15 AM Adamaris Cao MD [facility]   7/26/2023  8:00 AM Dillon Scott DO [facility] [place] [facility]

## 2023-07-11 DIAGNOSIS — S91.022A LACERATION OF ANKLE WITH FOREIGN BODY, LEFT, INITIAL ENCOUNTER: Primary | ICD-10-CM

## 2023-07-11 RX ORDER — HYDROCODONE BITARTRATE AND ACETAMINOPHEN 5; 325 MG/1; MG/1
1 TABLET ORAL EVERY 12 HOURS PRN
Qty: 14 TABLET | Refills: 0 | Status: SHIPPED | OUTPATIENT
Start: 2023-07-11 | End: 2023-07-24 | Stop reason: SDUPTHER

## 2023-07-11 NOTE — TELEPHONE ENCOUNTER
Patient called and left voicemail for a refill. Routed request to provider. Called patient back and explained request was routed to provider and they can check with their pharmacy within the next few hours. Patient voiced a clear understanding.  Patient states she also has completed ABX at this time and is still having drainage that worsens with warm compress. Patient told to come into office tomorrow at 8:00 am NPO after midnight.

## 2023-07-12 ENCOUNTER — OFFICE VISIT (OUTPATIENT)
Dept: ORTHOPEDICS | Facility: CLINIC | Age: 53
End: 2023-07-12
Payer: MEDICAID

## 2023-07-12 DIAGNOSIS — S91.022D: Primary | ICD-10-CM

## 2023-07-12 PROCEDURE — 3044F PR MOST RECENT HEMOGLOBIN A1C LEVEL <7.0%: ICD-10-PCS | Mod: CPTII,,, | Performed by: PHYSICIAN ASSISTANT

## 2023-07-12 PROCEDURE — 99212 PR OFFICE/OUTPT VISIT, EST, LEVL II, 10-19 MIN: ICD-10-PCS | Mod: 24,,, | Performed by: PHYSICIAN ASSISTANT

## 2023-07-12 PROCEDURE — 3044F HG A1C LEVEL LT 7.0%: CPT | Mod: CPTII,,, | Performed by: PHYSICIAN ASSISTANT

## 2023-07-12 PROCEDURE — 99212 OFFICE O/P EST SF 10 MIN: CPT | Mod: 24,,, | Performed by: PHYSICIAN ASSISTANT

## 2023-07-12 RX ORDER — CLINDAMYCIN HYDROCHLORIDE 300 MG/1
300 CAPSULE ORAL EVERY 6 HOURS
Qty: 28 CAPSULE | Refills: 0 | Status: SHIPPED | OUTPATIENT
Start: 2023-07-12 | End: 2023-07-19

## 2023-07-12 NOTE — PROGRESS NOTES
Subjective:       Patient ID: Deborah Cross is a 53 y.o. female.  No chief complaint on file.       HPI    Patient presents for wound check to the left ankle. She called yesterday having some drainage to the left ankle. She has been putting hot compresses to the ankle which has increased the drainage. She has completed her prescription for bactrim. Doing well otherwise. She is ambulatory in her boot today. She is doing well otherwise, no fevers, chills, malaise.     ROS:  Constitutional: Denies fever chills  Eyes: No change in vision  ENT: No ringing or current infections  CV: No chest pain  Resp: No labored breathing  MSK: Pain evident at site of injury located in HPI,   Integ: No signs of abrasions or lacerations  Neuro: No numbness or tingling  Lymphatic: No swelling outside the area of injury     Current Outpatient Medications on File Prior to Visit   Medication Sig Dispense Refill    ALPRAZolam (XANAX) 1 MG tablet TAKE ONE TABLET BY MOUTH TWICE DAILY AS NEEDED for anxiety 40 tablet 5    amLODIPine (NORVASC) 5 MG tablet TAKE ONE TABLET BY MOUTH DAILY 90 tablet 1    aspirin (ECOTRIN) 81 MG EC tablet Take 1 tablet (81 mg total) by mouth 2 (two) times a day. for 14 days 28 tablet 0    FLUoxetine 40 MG capsule TAKE ONE CAPSULE BY MOUTH EVERY MORNING 90 capsule 1    fluticasone propionate (FLONASE) 50 mcg/actuation nasal spray use 1 spray in each nostril twice daily 16 g 1    HYDROcodone-acetaminophen (NORCO) 5-325 mg per tablet Take 1 tablet by mouth every 12 (twelve) hours as needed for Pain. 14 tablet 0    metFORMIN (GLUCOPHAGE) 500 MG tablet TAKE ONE TABLET BY MOUTH once daily 90 tablet 1    mupirocin (BACTROBAN) 2 % ointment Apply topically 3 (three) times daily. 30 g 1    rosuvastatin (CRESTOR) 20 MG tablet TAKE ONE TABLET ONCE DAILY 90 tablet 0    sumatriptan (IMITREX) 25 MG Tab TAKE ONE TABLET BY MOUTH as a single DOSE AND MAY REPEAT DOSE in 2 hours if needed 9 tablet 1    traZODone (DESYREL) 100 MG  tablet take one tablet every evening 90 tablet 0     Current Facility-Administered Medications on File Prior to Visit   Medication Dose Route Frequency Provider Last Rate Last Admin    sodium chloride 0.9% flush 10 mL  10 mL Intravenous PRN Dillon Scott DO              Objective:      There were no vitals taken for this visit.  Physical Exam  General the patient is alert and oriented x3 no acute distress nontoxic-appearing appropriate affect.    Constitutional: Vital signs are examined and stable.  Resp: No signs of labored breathing    Musculoskeletal:     Left lower extremity: approx 1 cm x 1 cm area with scabbing to the lateral ankle, there is no expressible drainage; compartments are soft and compressible; No pain with ROM at the hip, knee, or ankle; minimal tenderness to palpation; dorsi/plantar flexes the foot; SILT distally; BCR distally; DP pulse 2+      There is no height or weight on file to calculate BMI.  Patient weight not recorded  Hemoglobin A1c   Date Value Ref Range Status   04/06/2023 6.0 <=7.0 % Final   12/15/2021 5.9 <<=7.0 % Final     Hgb   Date Value Ref Range Status   06/12/2023 13.0 12.0 - 16.0 g/dL Final   04/11/2023 12.8 12.0 - 16.0 g/dL Final     Hct   Date Value Ref Range Status   06/12/2023 40.2 37.0 - 47.0 % Final   04/11/2023 39.4 37.0 - 47.0 % Final     Iron Level   Date Value Ref Range Status   04/06/2023 90 50 - 170 ug/dL Final     No components found for: FROLATE  Vit D 25 OH   Date Value Ref Range Status   04/06/2023 63.1 30.0 - 80.0 ng/mL Final   12/15/2021 27.7 (L) 30.0 - 80.0 ng/mL Final     WBC   Date Value Ref Range Status   06/12/2023 8.59 4.50 - 11.50 x10(3)/mcL Final   04/11/2023 7.2 4.5 - 11.5 x10(3)/mcL Final       Radiology: no x rays taken today.         Assessment:         1. Laceration of left ankle with foreign body, subsequent encounter  clindamycin (CLEOCIN) 300 MG capsule              Plan:         Follow up in about 1 week (around 7/19/2023) for wound  check.    Diagnoses and all orders for this visit:    Laceration of left ankle with foreign body, subsequent encounter  -     clindamycin (CLEOCIN) 300 MG capsule; Take 1 capsule (300 mg total) by mouth every 6 (six) hours. for 7 days      -Trial 7 days clindamycin. Leave open to air when home. No ointments or creams. Discontinue hot compresses at this time.   - she is comfortable WBAT at this time.   - I have low suspicion for recurrent infection at this time although will continue to monitor. She does continue to use nicotine products and has been slow to heal from the I&D.   -We will follow up with her in 1 week for repeat evaluation.   -ED precautions given    The above findings, diagnostics, and treatment plan were discussed with Dr. Scott who is in agreement with the plan of care except as stated in additional documentation.       Nupur Mark PA-C          Future Appointments   Date Time Provider Department Center   7/17/2023  9:15 AM Adamaris Cao MD [facility] [place]   7/26/2023  8:00 AM Dillon Scott DO [facility] [place]

## 2023-07-14 PROBLEM — F41.9 ANXIETY: Status: RESOLVED | Noted: 2022-06-21 | Resolved: 2023-07-14

## 2023-07-14 PROBLEM — R73.02 IMPAIRED GLUCOSE TOLERANCE: Status: RESOLVED | Noted: 2022-06-21 | Resolved: 2023-07-14

## 2023-07-14 PROBLEM — F32.9 MAJOR DEPRESSIVE DISORDER, SINGLE EPISODE, UNSPECIFIED: Status: RESOLVED | Noted: 2022-06-21 | Resolved: 2023-07-14

## 2023-07-24 DIAGNOSIS — S91.022A LACERATION OF ANKLE WITH FOREIGN BODY, LEFT, INITIAL ENCOUNTER: ICD-10-CM

## 2023-07-24 RX ORDER — HYDROCODONE BITARTRATE AND ACETAMINOPHEN 5; 325 MG/1; MG/1
1 TABLET ORAL
Qty: 7 TABLET | Refills: 0 | Status: SHIPPED | OUTPATIENT
Start: 2023-07-24 | End: 2023-07-31

## 2023-07-26 ENCOUNTER — OFFICE VISIT (OUTPATIENT)
Dept: ORTHOPEDICS | Facility: CLINIC | Age: 53
End: 2023-07-26
Payer: MEDICAID

## 2023-07-26 VITALS — SYSTOLIC BLOOD PRESSURE: 134 MMHG | DIASTOLIC BLOOD PRESSURE: 76 MMHG | HEART RATE: 100 BPM | TEMPERATURE: 98 F

## 2023-07-26 DIAGNOSIS — S91.012A: Primary | ICD-10-CM

## 2023-07-26 PROCEDURE — 3075F SYST BP GE 130 - 139MM HG: CPT | Mod: CPTII,,, | Performed by: ORTHOPAEDIC SURGERY

## 2023-07-26 PROCEDURE — 3078F PR MOST RECENT DIASTOLIC BLOOD PRESSURE < 80 MM HG: ICD-10-PCS | Mod: CPTII,,, | Performed by: ORTHOPAEDIC SURGERY

## 2023-07-26 PROCEDURE — 99024 PR POST-OP FOLLOW-UP VISIT: ICD-10-PCS | Mod: ,,, | Performed by: ORTHOPAEDIC SURGERY

## 2023-07-26 PROCEDURE — 99024 POSTOP FOLLOW-UP VISIT: CPT | Mod: ,,, | Performed by: ORTHOPAEDIC SURGERY

## 2023-07-26 PROCEDURE — 3044F HG A1C LEVEL LT 7.0%: CPT | Mod: CPTII,,, | Performed by: ORTHOPAEDIC SURGERY

## 2023-07-26 PROCEDURE — 3044F PR MOST RECENT HEMOGLOBIN A1C LEVEL <7.0%: ICD-10-PCS | Mod: CPTII,,, | Performed by: ORTHOPAEDIC SURGERY

## 2023-07-26 PROCEDURE — 3078F DIAST BP <80 MM HG: CPT | Mod: CPTII,,, | Performed by: ORTHOPAEDIC SURGERY

## 2023-07-26 PROCEDURE — 3075F PR MOST RECENT SYSTOLIC BLOOD PRESS GE 130-139MM HG: ICD-10-PCS | Mod: CPTII,,, | Performed by: ORTHOPAEDIC SURGERY

## 2023-07-26 NOTE — PROGRESS NOTES
Subjective:       Patient ID: Deborah Cross is a 53 y.o. female.  Chief Complaint   Patient presents with    Left Ankle - Follow-up     6 week f/u I&D left ankle wound, reports some pain with small amount of yellowish drainage.        Follow-up      Patient is 6 weeks out from incision and drainage of a draining sinus tract to her left ankle.  She is been doing well.  No fevers no chills no systemic signs of infection.  She is still smoking 4 cigarettes a day.  This area of the incision is right over where her shoe makes contact with the skin.  She has some dull achy pain here but it is minimal.  No fluctuance no redness.    ROS:  Constitutional: Denies fever chills  Eyes: No change in vision  ENT: No ringing or current infections  CV: No chest pain  Resp: No labored breathing  MSK: Pain evident at site of injury located in HPI,   Integ: No signs of abrasions or lacerations  Neuro: No numbness or tingling  Lymphatic: No swelling outside the area of injury     Current Outpatient Medications on File Prior to Visit   Medication Sig Dispense Refill    ALPRAZolam (XANAX) 1 MG tablet Take 1 tablet (1 mg total) by mouth 2 (two) times daily. 45 tablet 5    amLODIPine (NORVASC) 5 MG tablet TAKE ONE TABLET BY MOUTH DAILY 90 tablet 1    FLUoxetine 40 MG capsule TAKE ONE CAPSULE BY MOUTH EVERY MORNING 90 capsule 1    fluticasone propionate (FLONASE) 50 mcg/actuation nasal spray use 1 spray in each nostril twice daily 16 g 1    HYDROcodone-acetaminophen (NORCO) 5-325 mg per tablet Take 1 tablet by mouth every 24 hours as needed for Pain. 7 tablet 0    metFORMIN (GLUCOPHAGE) 500 MG tablet TAKE ONE TABLET BY MOUTH once daily 90 tablet 1    mupirocin (BACTROBAN) 2 % ointment Apply topically 3 (three) times daily. 30 g 1    naloxone (NARCAN) 4 mg/actuation Spry use 1 spray into one nostril. If no response after 2 to 3 minutes, administer additional spray into other nostril. call 911      QUEtiapine (SEROQUEL) 25 MG Tab Take  1 tablet (25 mg total) by mouth every evening. 90 tablet 1    rosuvastatin (CRESTOR) 20 MG tablet TAKE ONE TABLET ONCE DAILY 90 tablet 0    sumatriptan (IMITREX) 25 MG Tab TAKE ONE TABLET BY MOUTH as a single DOSE AND MAY REPEAT DOSE in 2 hours if needed 9 tablet 1    aspirin (ECOTRIN) 81 MG EC tablet Take 1 tablet (81 mg total) by mouth 2 (two) times a day. for 14 days 28 tablet 0     No current facility-administered medications on file prior to visit.          Objective:      /76   Pulse 100   Temp 98 °F (36.7 °C)   Physical Exam  General the patient is alert and oriented x3 no acute distress nontoxic-appearing appropriate affect.    Constitutional: Vital signs are examined and stable.  Resp: No signs of labored breathing    Musculoskeletal:     Left lower extremity: approx 1 cm x 1 cm area with granulation tissue to the lateral ankle, there is no expressible drainage; compartments are soft and compressible; No pain with ROM at the hip, knee, or ankle; minimal tenderness to palpation; dorsi/plantar flexes the foot; SILT distally; BCR distally; DP pulse 2+      There is no height or weight on file to calculate BMI.  Patient weight not recorded  Hemoglobin A1c   Date Value Ref Range Status   04/06/2023 6.0 <=7.0 % Final   12/15/2021 5.9 <<=7.0 % Final     Hgb   Date Value Ref Range Status   06/12/2023 13.0 12.0 - 16.0 g/dL Final   04/11/2023 12.8 12.0 - 16.0 g/dL Final     Hct   Date Value Ref Range Status   06/12/2023 40.2 37.0 - 47.0 % Final   04/11/2023 39.4 37.0 - 47.0 % Final     Iron Level   Date Value Ref Range Status   04/06/2023 90 50 - 170 ug/dL Final     No components found for: FROLATE  Vit D 25 OH   Date Value Ref Range Status   04/06/2023 63.1 30.0 - 80.0 ng/mL Final   12/15/2021 27.7 (L) 30.0 - 80.0 ng/mL Final     WBC   Date Value Ref Range Status   06/12/2023 8.59 4.50 - 11.50 x10(3)/mcL Final   04/11/2023 7.2 4.5 - 11.5 x10(3)/mcL Final       Radiology: no x rays taken today.          Assessment:         1. Laceration of left ankle with complication, initial encounter                  Plan:         No follow-ups on file.    There are no diagnoses linked to this encounter.    Patient here for wound check.  Still having serous drainage from the lateral aspect of the ankle.  Granulation tissue evident.  No purulence no erythema no fluctuant mass.  She is ambulatory today in sandals.  She is still smoking 4 cigarettes a day which decreases wound healing increase his risk of infection.  I have recommend a referral to our wound care specialists.  I believe this was already washed out and she is resistant to surgery at this time.  Recommend weightbearing as tolerated no restrictions.        Future Appointments   Date Time Provider Department Center   7/26/2023  8:00 AM [facility]   1/15/2024  1:15 PM Adamaris Cao MD [facility]

## 2023-07-27 ENCOUNTER — HOSPITAL ENCOUNTER (OUTPATIENT)
Dept: WOUND CARE | Facility: HOSPITAL | Age: 53
Discharge: HOME | End: 2023-07-27
Attending: EMERGENCY MEDICINE
Payer: MEDICAID

## 2023-07-27 VITALS
RESPIRATION RATE: 20 BRPM | SYSTOLIC BLOOD PRESSURE: 116 MMHG | DIASTOLIC BLOOD PRESSURE: 77 MMHG | WEIGHT: 140 LBS | HEIGHT: 62 IN | TEMPERATURE: 99 F | BODY MASS INDEX: 25.76 KG/M2 | HEART RATE: 89 BPM

## 2023-07-27 DIAGNOSIS — F17.210 CIGARETTE SMOKER: ICD-10-CM

## 2023-07-27 DIAGNOSIS — E78.2 MIXED HYPERLIPIDEMIA: ICD-10-CM

## 2023-07-27 DIAGNOSIS — F90.0 ATTENTION DEFICIT HYPERACTIVITY DISORDER, PREDOMINANTLY INATTENTIVE TYPE: ICD-10-CM

## 2023-07-27 DIAGNOSIS — S82.401M TYPE I OR II OPEN FRACTURE OF RIGHT TIBIA AND FIBULA WITH NONUNION, SUBSEQUENT ENCOUNTER: ICD-10-CM

## 2023-07-27 DIAGNOSIS — L98.8 SKIN FISTULA: ICD-10-CM

## 2023-07-27 DIAGNOSIS — S91.012A: ICD-10-CM

## 2023-07-27 DIAGNOSIS — F41.8 MIXED ANXIETY AND DEPRESSIVE DISORDER: ICD-10-CM

## 2023-07-27 DIAGNOSIS — T81.42XA DEEP INCISIONAL SURGICAL SITE INFECTION: ICD-10-CM

## 2023-07-27 DIAGNOSIS — S91.002A OPEN WOUND OF LEFT ANKLE, INITIAL ENCOUNTER: ICD-10-CM

## 2023-07-27 DIAGNOSIS — T81.32XA DISRUPTION OF INTERNAL OPERATION (SURGICAL) WOUND, NOT ELSEWHERE CLASSIFIED, INITIAL ENCOUNTER: ICD-10-CM

## 2023-07-27 DIAGNOSIS — S82.201M TYPE I OR II OPEN FRACTURE OF RIGHT TIBIA AND FIBULA WITH NONUNION, SUBSEQUENT ENCOUNTER: ICD-10-CM

## 2023-07-27 LAB — POCT GLUCOSE: 181 MG/DL (ref 70–110)

## 2023-07-27 PROCEDURE — 17250 CHEM CAUT OF GRANLTJ TISSUE: CPT | Mod: ,,, | Performed by: EMERGENCY MEDICINE

## 2023-07-27 PROCEDURE — 27000999 HC MEDICAL RECORD PHOTO DOCUMENTATION

## 2023-07-27 PROCEDURE — 99204 PR OFFICE/OUTPT VISIT, NEW, LEVL IV, 45-59 MIN: ICD-10-PCS | Mod: 25,,, | Performed by: EMERGENCY MEDICINE

## 2023-07-27 PROCEDURE — 17250 PR CHEM CAUTERY GRANULATN TISSUE: ICD-10-PCS | Mod: ,,, | Performed by: EMERGENCY MEDICINE

## 2023-07-27 PROCEDURE — 99204 OFFICE O/P NEW MOD 45 MIN: CPT | Mod: 25,,, | Performed by: EMERGENCY MEDICINE

## 2023-07-27 PROCEDURE — 17250 CHEM CAUT OF GRANLTJ TISSUE: CPT

## 2023-07-27 NOTE — PROCEDURES
Procedures    Chemical Cauterization      Performed by: Dr South  Time Out Taken: Yes.   Pain Control: topical lidocaine  Procedural Pain: 0  Post Procedural Pain: 0  Procedure was tolerated well.   General notes:used silver nitrate stick on open wound on left lateral ankle/foot with hypergranulation tissue

## 2023-07-27 NOTE — PROGRESS NOTES
"Subjective:       Patient ID: Deborah Cross is a 53 y.o. female.    Chief Complaint: No chief complaint on file.    NEW PATIENT      Cc: nonhealing left lateral ankle post surgical wound      53-year-old WF with a history of chronic cigarette smoking, dyslipidemia and prediabetes had a fall off of a ladder in October of 2022 resulting in an open  right tibia /fibula shaft fracture along with an open left ankle dislocation with severe ligamentous injury . Dr Scott did surgery on RLE as well as a left ankle primary ligamentous reconstruction laterally /ATFL as well as closed treatment of a left 5th metatarsal base fracture. 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    The drainage got worse on left lateral ankle mark after a weekend of being in a friend's pool. Dr Scott took her back to OR in mid June 2023 where it was noted she had a draining sinus tract to the posterior 3rd of the incision with  purulenct drainage. OR noted "ellipse of this sinus tract and excised it with a 15 blade blunt dissected down and came in contact with the fluid collection appears to be liquid I liquified adipose tissue from initial trauma versus purulence.  Overall minimal cellulitis in this area.  I completely irrigated the area completed excisional debridement and took deep cultures.  Put vancomycin deep in the wound andand then began closing". Placed on bactrim for a few weeks. Surgical site again opened up on same location.  Given a week of clindamycin . She saw Dr Scott yesterday on 7/26/23 who decided on the wound care referral.  She comes in today on 7/27/23.  She is still smoking at least 3-4 cigs/day.  She is aware how this inhibits wound healing. She is still wearing tennis shoes from the moment she wakes up until she goes to bed despite being told by Tyler to avoid any " rubbing on the area.   No fever/chills; no particular wound care at this time    Past Medical History:  No date: Diabetes mellitus  No date: Hyperlipemia  No date: Hypertension  No date: Insomnia  No date: Migraine  No date: Right ankle pain  No date: 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, with   nonunion, subsequent encounter  Past Surgical History:  2005:  SECTION  2015: HYSTERECTOMY  2023: INCISION AND DRAINAGE, LOWER EXTREMITY; Left      Comment:  Procedure: INCISION AND DRAINAGE, LOWER EXTREMITY -                supine bone foam wash stuff cultures;  Surgeon: Dillon Scott DO;  Location: [facility];  Service: Orthopedics;                 Laterality: Left;  supine bone foam wash stuff cultures  10/24/2022: INSERTION OF INTRAMEDULLARY NAIL INTO TIBIA; Right      Comment:  Procedure: INSERTION, INTRAMEDULLARY JACK, TIBIA;                 Surgeon: Dillon Scott DO;  Location: [facility];  Service:                Orthopedics;  Laterality: Right;  supine, vascular, bone                foam, c-arm, wash stuff  2023: ORIF TIBIA FRACTURE; Right      Comment:  Procedure: ORIF, FRACTURE, TIBIA;  Surgeon: Dillon Scott DO;  Location: [facility];  Service: Orthopedics;                 Laterality: Right;  10/24/2022: REPAIR OF LIGAMENT OF ANKLE      Comment:  Procedure: REPAIR, LIGAMENT, ANKLE;  Surgeon: Dillon Scott DO;  Location: [facility];  Service: Orthopedics;;           Review of Systems   Constitutional: Negative.    HENT: Negative.     Respiratory: Negative.     Cardiovascular: Negative.    Gastrointestinal: Negative.    Skin:  Positive for wound.   Neurological: Negative.        Objective:      Vitals:    23 1309   BP: 116/77   Pulse: 89   Resp: 20   Temp: 98.9 °F (37.2 °C)     @poctglucose@  Recent Labs   Lab 23  1306   POCTGLUCOSE 181*     Physical Exam  Vitals reviewed.   HENT:      Head: Normocephalic and atraumatic.   Eyes:       Conjunctiva/sclera: Conjunctivae normal.   Cardiovascular:      Pulses:           Dorsalis pedis pulses are 2+ on the right side and 2+ on the left side.   Pulmonary:      Effort: Pulmonary effort is normal.   Musculoskeletal:        Feet:    Skin:     General: Skin is warm and dry.      Capillary Refill: Capillary refill takes less than 2 seconds.   Neurological:      General: No focal deficit present.      Mental Status: She is alert.            Incision/Site 06/12/23 1221 Left Malleolus/Ankle lateral (Active)   06/12/23 1221   Present Prior to Hospital Arrival?: Yes   Side: Left   Location: Malleolus/Ankle   Orientation: lateral   Incision Type:    Closure Method:    Additional Comments:    Removal Indication and Assessment:    Wound Outcome:    Removal Indications:    Wound Image   07/27/23 1319   Dressing Appearance Intact 07/27/23 1319   Drainage Amount Moderate 07/27/23 1319   Drainage Characteristics/Odor Serosanguineous 07/27/23 1319   Appearance Pink;Slough 07/27/23 1319   Periwound Area Intact;Dry 07/27/23 1319   Wound Edges Defined 07/27/23 1319   Wound Length (cm) 0.5 cm 07/27/23 1319   Wound Width (cm) 1 cm 07/27/23 1319   Wound Depth (cm) 0.5 cm 07/27/23 1319   Wound Volume (cm^3) 0.25 cm^3 07/27/23 1319   Wound Surface Area (cm^2) 0.5 cm^2 07/27/23 1319   Undermining (depth (cm)/location) 0.5/circumferential 07/27/23 1319   Care Cleansed with:;Antimicrobial agent;Soap and water 07/27/23 1319   Dressing Applied 07/27/23 1319   Periwound Care Absorptive dressing applied 07/27/23 1319           Assessment:       1. Open wound of left ankle, initial encounter    2. Disruption of internal operation (surgical) wound, not elsewhere classified, initial encounter    3. Deep incisional surgical site infection    4. Laceration of left ankle with complication, initial encounter    5. Skin fistula    6. Cigarette smoker    7. Attention deficit hyperactivity disorder, predominantly inattentive type    8. Mixed  "anxiety and depressive disorder    9. Mixed hyperlipidemia    10. 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  Then excisionally debrided the subcutaneous tissue fascia and capsule.  This was then copiously irrigated.  I then placed an Arthrex anchor in the fibula and repaired the ATFL recent stress the ankle appears to be stable." Subsequent distal incisional site opening with fistula tract:  Exploration and debridement mid June 2023 with closure: Subsequent recurrent surgical wound dehiscence: Given Bactrim and clindamycin orally, referred to wound care: 1st clinic visit 7/27/23  Chronic cigarette smoker  Hypertension  Dyslipidemia  Prediabetes  ADHD, depression        Lab Results   Component Value Date    WBC 8.59 06/12/2023    HGB 13.0 06/12/2023    HCT 40.2 06/12/2023    MCV 87.0 06/12/2023     06/12/2023         CMP  Sodium Level   Date Value Ref Range Status   06/12/2023 140 136 - 145 mmol/L Final     Potassium Level   Date Value Ref Range Status   06/12/2023 3.9 3.5 - 5.1 mmol/L Final     Carbon Dioxide   Date Value Ref Range Status   06/12/2023 26 22 - 29 mmol/L Final     Blood Urea Nitrogen   Date Value Ref Range Status   06/12/2023 9.3 (L) 9.8 - 20.1 mg/dL Final     Creatinine   Date Value Ref Range Status   06/12/2023 0.70 0.55 - 1.02 mg/dL Final     Calcium Level Total   Date Value Ref Range Status   06/12/2023 10.1 8.4 - 10.2 mg/dL Final     Albumin Level   Date Value Ref Range Status   04/06/2023 3.9 3.5 - 5.0 g/dL Final     Bilirubin Total   Date Value Ref Range Status   04/06/2023 0.4 <=1.5 mg/dL Final     Alkaline Phosphatase   Date Value Ref Range Status   04/06/2023 126 40 - 150 " unit/L Final     Aspartate Aminotransferase   Date Value Ref Range Status   04/06/2023 20 5 - 34 unit/L Final     Alanine Aminotransferase   Date Value Ref Range Status   04/06/2023 18 0 - 55 unit/L Final     eGFR   Date Value Ref Range Status   06/12/2023 >60 mls/min/1.73/m2 Final     Lab Results   Component Value Date    HGBA1C 6.0 04/06/2023     Lab Results   Component Value Date    SEDRATE 27 (H) 06/12/2023     Lab Results   Component Value Date    CRP 4.70 04/11/2023     Date: 06/12/2023        Surgeon:Dillon Scott DO  Assistant: Kevin Mark was essential, part of the procedure including deep hardware placement and deep closure.  No senior assistant was availible  Preoperative Diagnosis:  Left Ankle draining sinus tract surgical wound dehiscence lateral  Postoperative Diagnosis: Same  Procedure:   excision of draining sinus tract surgical wound dehiscence left ankle CPT 35693  Anesthesiologist: Castillo Fulton MD  OR Staff: Circulator: Pedrito Whitman RN  Scrub Person: Lisandro Mccary  Implants: * No implants in log *  EBL: 20cc  Complications: None  Disposition: To PACU, stable     Indications: Deborah Cross is a 53 y.o. female presenting with the aforementioned injuries/findings.  Patient was seen in office today.  She is been doing quite well with her contralateral side and this side as well.  Over the last 3 or 4 days patient noticed a draining sinus tract on her incision   From her open traumatic ankle dislocation.  She states she is been wearing tennis shoes been rubbing in the area and started knows purulent drainage.  She is still smoking tobacco.  The patient is awake and alert. After thorough discussion of the risks, benefits, expected outcomes, and alternatives to surgical intervention, the patient agreed to proceed with surgical treatment.  Specific risks discussed included, but were not limited to: superficial or deep infection, wound healing complications, DVT/PE,  significant bleeding requiring transfusion, damage to named anatomic structures in the immediate area including named neurovascular structures, infection, nonunion, malunion and general risks of anesthesia.  The patient voiced understanding and written as well as verbal consent was obtained by myself prior to the procedure.     Procedure Note:  The patient was brought back to the OR and placed supine on the OR table. After successful induction of anesthesia by anesthesia staff, the patient was positioned in the supine position and all bony prominences were padded appropriately.  The surgical field was then provisionally cleansed and then prepped and draped in the usual sterile fashion.     At this time a time-out was performed, with the correct patient, site, and procedure identified.  The universal time out as well as sign your site protocols were followed.  Preoperative antibiotics were verified as administered.       Attention is drawn to the left lateral ankle patient has a draining sinus tract to the posterior 3rd of the incision there does appear to be purulence coming from the wound I made a ellipse of this sinus tract and excised it with a 15 blade blunt dissected down and came in contact with the fluid collection appears to be liquid I liquified adipose tissue from initial trauma versus purulence.  Overall minimal cellulitis in this area.  I completely irrigated the area completed excisional debridement and took deep cultures.  Put vancomycin deep in the wound andand then began closing          The patient was then subsequently transferred to to PACU in a stable condition.     All sponge and needle counts were correct at the end of the case.  I was present and participated in all aspects of the procedure.     Prognosis:  The patient will be kept WBAT on the ipsilateral extremity .  Patient will receive DVT prophylaxis .       We will order cultures and sensitivities for her.   She will be placed on oral  antibiotics at this time Bactrim DS.  Plan:     Plan of Care:    Patient referred to this clinic for a longstanding issue to her left lateral ankle area.  She sustained a severe open dislocation and severe ligamentous injury in October of 2022 for which she underwent repair with Dr Scott (she also broke her right leg and had surgeries on that as well) in early 2023 she began to have recurrent drainage from the distal portion of the lateral ankle incision site.  She was felt to have a fistula tract was taken back to the operating room in mid June 2023 where it was debrided and closed but the skin opening returned.  She is been on Bactrim and clindamycin orally.  She saw ortho yesterday and was referred to this clinic.  She comes in today on 7/27/23  I note a dime-sized open wound with spongy friable hypergranulation tissue.  There is a slit in the middle of the granulation tissue that shows depth of almost 0.5 cm.  No active drainage noted  I used silver nitrate on the wound bed  Began silver alginate dressings and keep padded.  I told her to stop wearing her tennis shoes and to wear some type of slide so that nothing is rubbing on top of the skin in this area  Nutrition: Must have a high protein diet to support wound  healing; (if renal disease, see nephrologist for amount allowed):  this should be over 100g protein /day (if no kidney issues); Also rec MVI along with vit C, vit D, zinc and Kristopher  Diabetes:  Hemoglobin A1c 6.0 Should follow a healthy low carb diet   Smoker:  She says she is well aware that she needs to stop smoking and acknowledges that it has inhibited wound healing and contributing to her current condition.  She says she used to smoke a pack a day and so for her being down to 4 cigarettes a day as a victory but she knows she needs to have complete cessation    Return to clinic 1 week           The time spent including preparing to see the patient, obtaining patient history and assessment, evaluation  of the plan of care, patient/caregiver counseling and education, orders, documentation, coordination of care, and other professional medical management activities for today's encounter was 50 minutes.    Time spent performing procedures during today's encounter was 2 minutes.

## 2023-07-27 NOTE — PATIENT INSTRUCTIONS
Pt seen today by: Dr. South    Self care DRESSING INSTRUCTIONS:    Wound location: Left Lateral Ankle  Dressings to be changed Daily and as needed if soiled or not intact.    Cleanse wound with wound cleanser or saline  Apply silver alginate to the wound bed  Cover with bordered Foam    Return visit: Thursday 08/03/2023 at 9:00 am    Nutrition:  The current daily value (%DV) for protein is 50 grams per day and is meant as a general goal for most people. Further increasing your dietary protein intake is very important for wound healing. Typically one needs over 100g of protein per day to help with wound healing needs.  If you are a dialysis patient or have problems with your kidneys, talk to your Nephrologist about how much protein you can take in with your condition.  Examples of high protein items that can be added to your diet include: eggs, chicken, red meats, almonds, cottage cheese, Greek yogurt, beans, and peanut butter.  Fortified protein bars, shakes and drinks can add 15-30 additional grams of protein per serving.   Also add:   1 daily general multivitamin   Kristopher : 1 packet twice daily   Vitamin C : 500mg twice daily   Zinc 220 mg daily  Vit D : once daily    Offloading   Offload your wound. This means to reduce pressure on and around the wound that reduces blood flow to the wound and prevents healing. Your wound care team will discuss specific ways for you to offload your specific wound. Common offloading strategies include:  Turn or reposition every 2 hours or sooner  Use pillows, wedges, ROHO wheelchair cushions or other special devices like boots and shoes to lift the wound off of hard surfaces  Alternating Low Air-loss (ALAL) mattress may be ordered  Padded dressings can reduce wound pressure      Call our [facility] wound clinic for questions/concerns a [phone] .

## 2023-08-03 ENCOUNTER — HOSPITAL ENCOUNTER (OUTPATIENT)
Dept: WOUND CARE | Facility: HOSPITAL | Age: 53
Discharge: HOME | End: 2023-08-03
Attending: EMERGENCY MEDICINE
Payer: MEDICAID

## 2023-08-03 VITALS
HEIGHT: 62 IN | BODY MASS INDEX: 25.76 KG/M2 | SYSTOLIC BLOOD PRESSURE: 125 MMHG | HEART RATE: 97 BPM | TEMPERATURE: 99 F | RESPIRATION RATE: 18 BRPM | WEIGHT: 140 LBS | DIASTOLIC BLOOD PRESSURE: 80 MMHG

## 2023-08-03 DIAGNOSIS — S91.002A OPEN WOUND OF LEFT ANKLE, INITIAL ENCOUNTER: Primary | ICD-10-CM

## 2023-08-03 DIAGNOSIS — F17.210 CIGARETTE SMOKER: ICD-10-CM

## 2023-08-03 DIAGNOSIS — T81.32XA DISRUPTION OF INTERNAL OPERATION (SURGICAL) WOUND, NOT ELSEWHERE CLASSIFIED, INITIAL ENCOUNTER: ICD-10-CM

## 2023-08-03 DIAGNOSIS — T81.42XA DEEP INCISIONAL SURGICAL SITE INFECTION: ICD-10-CM

## 2023-08-03 DIAGNOSIS — F41.8 MIXED ANXIETY AND DEPRESSIVE DISORDER: ICD-10-CM

## 2023-08-03 DIAGNOSIS — F90.0 ATTENTION DEFICIT HYPERACTIVITY DISORDER, PREDOMINANTLY INATTENTIVE TYPE: ICD-10-CM

## 2023-08-03 LAB — POCT GLUCOSE: 112 MG/DL (ref 70–110)

## 2023-08-03 PROCEDURE — 99499 UNLISTED E&M SERVICE: CPT | Mod: ,,, | Performed by: EMERGENCY MEDICINE

## 2023-08-03 PROCEDURE — 99499 NO LOS: ICD-10-PCS | Mod: ,,, | Performed by: EMERGENCY MEDICINE

## 2023-08-03 PROCEDURE — 27000999 HC MEDICAL RECORD PHOTO DOCUMENTATION

## 2023-08-03 PROCEDURE — 11042 DBRDMT SUBQ TIS 1ST 20SQCM/<: CPT

## 2023-08-03 PROCEDURE — 11042 DEBRIDEMENT: ICD-10-PCS | Mod: ,,, | Performed by: EMERGENCY MEDICINE

## 2023-08-03 PROCEDURE — 11042 DBRDMT SUBQ TIS 1ST 20SQCM/<: CPT | Mod: ,,, | Performed by: EMERGENCY MEDICINE

## 2023-08-03 NOTE — PROCEDURES
Debridement    Date/Time: 8/3/2023 8:48 AM    Performed by: Mindy South MD  Authorized by: Mindy South MD  Associated wounds:        Incision/Site 06/12/23 1221 Left Malleolus/Ankle lateral  Consent Done?:  Yes (Written)  Local anesthesia used?: Yes    Local anesthetic:  Topical anesthetic    Type of Debridement:  Excisional       Length (cm):  0.8       Area (sq cm):  0.8       Width (cm):  1       Percent Debrided (%):  100       Depth (cm):  0.6       Total Area Debrided (sq cm):  0.8    Depth of debridement:  Subcutaneous tissue    Tissue debrided:  Hypergranulation, Dermis, Epidermis and Subcutaneous    Devitalized tissue debrided:  Biofilm and Slough    Instruments:  Curette  Bleeding:  Moderate  Hemostasis Achieved: Yes  Method Used:  Silver Nitrate and Pressure  Patient tolerance:  Patient tolerated the procedure well with no immediate complications

## 2023-08-03 NOTE — PATIENT INSTRUCTIONS
Pt seen today by: Dr. South    Self care DRESSING INSTRUCTIONS:    Wound location: Left Lateral Ankle  Dressings to be changed Daily and as needed if soiled or not intact.    Cleanse wound with wound cleanser or saline  Apply silver alginate to the wound bed  Cover with bordered Foam    Return visit: Thursday 08/10/2023 at 10:00 am    Nutrition:  The current daily value (%DV) for protein is 50 grams per day and is meant as a general goal for most people. Further increasing your dietary protein intake is very important for wound healing. Typically one needs over 100g of protein per day to help with wound healing needs.  If you are a dialysis patient or have problems with your kidneys, talk to your Nephrologist about how much protein you can take in with your condition.  Examples of high protein items that can be added to your diet include: eggs, chicken, red meats, almonds, cottage cheese, Greek yogurt, beans, and peanut butter.  Fortified protein bars, shakes and drinks can add 15-30 additional grams of protein per serving.   Also add:   1 daily general multivitamin   Kristopher : 1 packet twice daily   Vitamin C : 500mg twice daily   Zinc 220 mg daily  Vit D : once daily    Offloading   Offload your wound. This means to reduce pressure on and around the wound that reduces blood flow to the wound and prevents healing. Your wound care team will discuss specific ways for you to offload your specific wound. Common offloading strategies include:  Turn or reposition every 2 hours or sooner  Use pillows, wedges, ROHO wheelchair cushions or other special devices like boots and shoes to lift the wound off of hard surfaces  Alternating Low Air-loss (ALAL) mattress may be ordered  Padded dressings can reduce wound pressure      Call our [facility] wound clinic for questions/concerns a [phone] .

## 2023-08-03 NOTE — PROGRESS NOTES
"Subjective:       Patient ID: Dbeorah Cross is a 53 y.o. female.    Chief Complaint: No chief complaint on file.    Cc: nonhealing left lateral ankle post surgical wound      53-year-old WF with a history of chronic cigarette smoking, dyslipidemia and prediabetes had a fall off of a ladder in October of 2022 resulting in an open  right tibia /fibula shaft fracture along with an open left ankle dislocation with severe ligamentous injury . Dr Scott did surgery on RLE as well as a left ankle primary ligamentous reconstruction laterally /ATFL as well as closed treatment of a left 5th metatarsal base fracture. 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   The drainage got worse on left lateral ankle mark after a weekend of being in a friend's pool. Dr Scott took her back to OR in mid June 2023 where it was noted she had a draining sinus tract. OR noted "ellipse of this sinus tract and excised it with a 15 blade blunt dissected down and came in contact with the fluid collection appears to be liquid I liquified adipose tissue from initial trauma versus purulence.  I completely irrigated the area completed excisional debridement and took deep cultures.  Put vancomycin deep in the wound andand then began closing".  Placed on bactrim for a few weeks.   Surgical site again opened up on same location.  Given a week of clindamycin . She last saw Dr Scott  on 7/26/23 who then referred here. She came in for her first eval here in the wound care clinic on 7/27/23.  I noted she was still smoking 3-4 cigs/day (she is aware how this inhibits wound healing) and she again was wearing tennis shoes from the moment she wakes until she goes to sleep...after being told not to by ortho because of friction/rubbing. I noted a dime-sized open wound with spongy friable hypergranulation " tissue with a slit in the middle of the wound bed with depth of almost 0.5 cm.  No active drainage noted. I applied silver nitrate on the wound bed and ordered silver alginate dressings . I told her to stop smoking, keep this area padded and to stop wearing her tennis shoes that rubbed on this wound. Comes in today for recheck: silver nitrate streaked the posterior ulcer/skin. Wound bed is friable and very hypergranular        Review of Systems   Constitutional: Negative.    HENT: Negative.     Respiratory: Negative.     Cardiovascular: Negative.    Gastrointestinal: Negative.    Skin:  Positive for wound.   Neurological: Negative.          Objective:      Vitals:    08/03/23 0851   BP: 125/80   Pulse: 97   Resp: 18   Temp: 98.8 °F (37.1 °C)     @poctglucose@  Recent Labs   Lab 08/03/23  0858   POCTGLUCOSE 112*     Physical Exam  Vitals reviewed.   HENT:      Head: Normocephalic and atraumatic.   Eyes:      Conjunctiva/sclera: Conjunctivae normal.   Cardiovascular:      Pulses:           Dorsalis pedis pulses are 2+ on the right side and 2+ on the left side.   Pulmonary:      Effort: Pulmonary effort is normal.   Musculoskeletal:        Feet:    Skin:     General: Skin is warm and dry.      Capillary Refill: Capillary refill takes less than 2 seconds.   Neurological:      General: No focal deficit present.      Mental Status: She is alert.              Incision/Site 06/12/23 1221 Left Malleolus/Ankle lateral (Active)   06/12/23 1221   Present Prior to Hospital Arrival?: Yes   Side: Left   Location: Malleolus/Ankle   Orientation: lateral   Incision Type:    Closure Method:    Additional Comments: Doopler biphasic x4   Removal Indication and Assessment:    Wound Outcome:    Removal Indications:    Wound Image    08/03/23 0900   Dressing Appearance Intact;Moist drainage 08/03/23 0900   Drainage Amount Moderate 08/03/23 0900   Drainage Characteristics/Odor Yellow;Serosanguineous 08/03/23 0900   Appearance Pink;Yellow  "08/03/23 0900   Black (%), Wound Tissue Color 0 % 08/03/23 0900   Red (%), Wound Tissue Color 80 % 08/03/23 0900   Yellow (%), Wound Tissue Color 20 % 08/03/23 0900   Periwound Area Intact 08/03/23 0900   Wound Edges Defined 08/03/23 0900   Wound Length (cm) 0.8 cm 08/03/23 0900   Wound Width (cm) 1 cm 08/03/23 0900   Wound Depth (cm) 0.6 cm 08/03/23 0900   Wound Volume (cm^3) 0.48 cm^3 08/03/23 0900   Wound Surface Area (cm^2) 0.8 cm^2 08/03/23 0900   Care Cleansed with:;Wound cleanser;Applied: 08/03/23 0900   Dressing Removed;Changed;Calcium alginate;Silver;Foam 08/03/23 0900           Assessment:       1. Open wound of left ankle, initial encounter    2. Disruption of internal operation (surgical) wound, not elsewhere classified, initial encounter    3. Deep incisional surgical site infection    4. Cigarette smoker    5. Attention deficit hyperactivity disorder, predominantly inattentive type    6. Mixed anxiety and depressive disorder          Left lateral ankle open dislocation October 2022: surgery with Dr Scott: "My attention is now drawn to the left ankle patient contaminated left open ankle injury appears to be a simple fracture dislocation with complete disruption of the lateral ligament complex.  Then excisionally debrided the subcutaneous tissue fascia and capsule.  This was then copiously irrigated.  I then placed an Arthrex anchor in the fibula and repaired the ATFL recent stress the ankle appears to be stable." Subsequent distal incisional site opening with fistula tract and drainage since early 2023:  Exploration and debridement mid June 2023 with closure: Subsequent recurrent surgical wound dehiscence: Given Bactrim and clindamycin orally, referred to wound care: 1st clinic visit 7/27/23  Chronic cigarette smoker  Hypertension  Dyslipidemia  Prediabetes  ADHD, depression        Lab Results   Component Value Date    WBC 8.59 06/12/2023    HGB 13.0 06/12/2023    HCT 40.2 06/12/2023    MCV 87.0 " 06/12/2023     06/12/2023         CMP  Sodium Level   Date Value Ref Range Status   06/12/2023 140 136 - 145 mmol/L Final     Potassium Level   Date Value Ref Range Status   06/12/2023 3.9 3.5 - 5.1 mmol/L Final     Carbon Dioxide   Date Value Ref Range Status   06/12/2023 26 22 - 29 mmol/L Final     Blood Urea Nitrogen   Date Value Ref Range Status   06/12/2023 9.3 (L) 9.8 - 20.1 mg/dL Final     Creatinine   Date Value Ref Range Status   06/12/2023 0.70 0.55 - 1.02 mg/dL Final     Calcium Level Total   Date Value Ref Range Status   06/12/2023 10.1 8.4 - 10.2 mg/dL Final     Albumin Level   Date Value Ref Range Status   04/06/2023 3.9 3.5 - 5.0 g/dL Final     Bilirubin Total   Date Value Ref Range Status   04/06/2023 0.4 <=1.5 mg/dL Final     Alkaline Phosphatase   Date Value Ref Range Status   04/06/2023 126 40 - 150 unit/L Final     Aspartate Aminotransferase   Date Value Ref Range Status   04/06/2023 20 5 - 34 unit/L Final     Alanine Aminotransferase   Date Value Ref Range Status   04/06/2023 18 0 - 55 unit/L Final     eGFR   Date Value Ref Range Status   06/12/2023 >60 mls/min/1.73/m2 Final     Lab Results   Component Value Date    HGBA1C 6.0 04/06/2023     Lab Results   Component Value Date    SEDRATE 27 (H) 06/12/2023     Lab Results   Component Value Date    CRP 4.70 04/11/2023     Date: 06/12/2023        Surgeon:Dillon Scott DO  Assistant: Kevin Mark was essential, part of the procedure including deep hardware placement and deep closure.  No senior assistant was availible  Preoperative Diagnosis:  Left Ankle draining sinus tract surgical wound dehiscence lateral  Postoperative Diagnosis: Same  Procedure:   excision of draining sinus tract surgical wound dehiscence left ankle CPT 85123  Anesthesiologist: Castillo Fulton MD  OR Staff: Circulator: Pedrito Whitman RN  Scrub Person: Lisandro Mccray  Implants: * No implants in log *  EBL: 20cc  Complications: None  Disposition:  To PACU, stable     Indications: Deborah Cross is a 53 y.o. female presenting with the aforementioned injuries/findings.  Patient was seen in office today.  She is been doing quite well with her contralateral side and this side as well.  Over the last 3 or 4 days patient noticed a draining sinus tract on her incision   From her open traumatic ankle dislocation.  She states she is been wearing tennis shoes been rubbing in the area and started knows purulent drainage.  She is still smoking tobacco.  The patient is awake and alert. After thorough discussion of the risks, benefits, expected outcomes, and alternatives to surgical intervention, the patient agreed to proceed with surgical treatment.  Specific risks discussed included, but were not limited to: superficial or deep infection, wound healing complications, DVT/PE, significant bleeding requiring transfusion, damage to named anatomic structures in the immediate area including named neurovascular structures, infection, nonunion, malunion and general risks of anesthesia.  The patient voiced understanding and written as well as verbal consent was obtained by myself prior to the procedure.     Procedure Note:  The patient was brought back to the OR and placed supine on the OR table. After successful induction of anesthesia by anesthesia staff, the patient was positioned in the supine position and all bony prominences were padded appropriately.  The surgical field was then provisionally cleansed and then prepped and draped in the usual sterile fashion.     At this time a time-out was performed, with the correct patient, site, and procedure identified.  The universal time out as well as sign your site protocols were followed.  Preoperative antibiotics were verified as administered.       Attention is drawn to the left lateral ankle patient has a draining sinus tract to the posterior 3rd of the incision there does appear to be purulence coming from the wound I  made a ellipse of this sinus tract and excised it with a 15 blade blunt dissected down and came in contact with the fluid collection appears to be liquid I liquified adipose tissue from initial trauma versus purulence.  Overall minimal cellulitis in this area.  I completely irrigated the area completed excisional debridement and took deep cultures.  Put vancomycin deep in the wound andand then began closing          The patient was then subsequently transferred to to PACU in a stable condition.     All sponge and needle counts were correct at the end of the case.  I was present and participated in all aspects of the procedure.     Prognosis:  The patient will be kept WBAT on the ipsilateral extremity .  Patient will receive DVT prophylaxis .       We will order cultures and sensitivities for her.   She will be placed on oral antibiotics at this time Bactrim DS.  Plan:     Plan of Care:    Patient referred to this clinic for a longstanding issue to her left lateral ankle area.  She sustained a severe open dislocation and severe ligamentous injury in October of 2022 for which she underwent repair with Dr Scott (she also broke her right leg and had surgeries on that as well) in early 2023 she began to have recurrent drainage from the distal portion of the lateral ankle incision site.  She was felt to have a fistula tract was taken back to the operating room in mid June 2023 where it was debrided and closed but the skin opening returned.  She was prescribed  Bactrim and clindamycin orally.  Referred here and I met her last week on  7/27/23 noting recurrent friable hypergranular tisssue for the wound bed which does not ashleigh well for healing. I think she still has a tract. I debrided and cauterized today; will recheck next week but may get another cx/ may need prolonged IV antibiotics, MRI etc  Nutrition: Must have a high protein diet to support wound  healing; (if renal disease, see nephrologist for amount allowed):  this  should be over 100g protein /day (if no kidney issues); Also rec MVI along with vit C, vit D, zinc and Kristopher  Diabetes:  Hemoglobin A1c 6.0 Should follow a healthy low carb diet   Smoker:  She says she is well aware that she needs to stop smoking and acknowledges that it has inhibited wound healing and contributing to her current condition.  She says she used to smoke a pack a day and so for her being down to 4 cigarettes a day as a victory but she knows she needs to have complete cessation    Return to clinic 1 week

## 2023-08-09 ENCOUNTER — HOSPITAL ENCOUNTER (OUTPATIENT)
Dept: WOUND CARE | Facility: HOSPITAL | Age: 53
Discharge: HOME | End: 2023-08-09
Attending: EMERGENCY MEDICINE
Payer: MEDICAID

## 2023-08-09 VITALS
SYSTOLIC BLOOD PRESSURE: 121 MMHG | TEMPERATURE: 98 F | HEART RATE: 105 BPM | RESPIRATION RATE: 20 BRPM | HEIGHT: 62 IN | DIASTOLIC BLOOD PRESSURE: 76 MMHG | BODY MASS INDEX: 25.76 KG/M2 | WEIGHT: 140 LBS

## 2023-08-09 DIAGNOSIS — S91.002D OPEN WOUND OF LEFT ANKLE, SUBSEQUENT ENCOUNTER: Primary | ICD-10-CM

## 2023-08-09 DIAGNOSIS — E78.49 OTHER HYPERLIPIDEMIA: ICD-10-CM

## 2023-08-09 DIAGNOSIS — T81.42XA DEEP INCISIONAL SURGICAL SITE INFECTION: ICD-10-CM

## 2023-08-09 DIAGNOSIS — F90.0 ATTENTION DEFICIT HYPERACTIVITY DISORDER, PREDOMINANTLY INATTENTIVE TYPE: ICD-10-CM

## 2023-08-09 DIAGNOSIS — S82.401M TYPE I OR II OPEN FRACTURE OF RIGHT TIBIA AND FIBULA WITH NONUNION, SUBSEQUENT ENCOUNTER: ICD-10-CM

## 2023-08-09 DIAGNOSIS — F17.210 CIGARETTE SMOKER: ICD-10-CM

## 2023-08-09 DIAGNOSIS — T81.32XA DISRUPTION OF INTERNAL OPERATION (SURGICAL) WOUND, NOT ELSEWHERE CLASSIFIED, INITIAL ENCOUNTER: ICD-10-CM

## 2023-08-09 DIAGNOSIS — F41.8 MIXED ANXIETY AND DEPRESSIVE DISORDER: ICD-10-CM

## 2023-08-09 DIAGNOSIS — S82.201M TYPE I OR II OPEN FRACTURE OF RIGHT TIBIA AND FIBULA WITH NONUNION, SUBSEQUENT ENCOUNTER: ICD-10-CM

## 2023-08-09 LAB — POCT GLUCOSE: 193 MG/DL (ref 70–110)

## 2023-08-09 PROCEDURE — 11042 DBRDMT SUBQ TIS 1ST 20SQCM/<: CPT

## 2023-08-09 PROCEDURE — 87070 CULTURE OTHR SPECIMN AEROBIC: CPT

## 2023-08-09 PROCEDURE — 27000999 HC MEDICAL RECORD PHOTO DOCUMENTATION

## 2023-08-09 NOTE — PATIENT INSTRUCTIONS
Pt seen today by: Dr. Knight      Supplies ordered through [employer]      Culture done today, will call if antibiotics are needed        Self care DRESSING INSTRUCTIONS:    Wound location: Left Lateral Ankle  Dressings to be changed Daily and as needed if soiled or not intact.    Cleanse wound with wound cleanser or saline  Apply silver alginate to the wound bed, pack around undermined area from 9-1  Cover with absorptive pad and tape to secure    Return visit: Wednesday August 16, 2023 at 8:30 am    Nutrition:  The current daily value (%DV) for protein is 50 grams per day and is meant as a general goal for most people. Further increasing your dietary protein intake is very important for wound healing. Typically one needs over 100g of protein per day to help with wound healing needs.  If you are a dialysis patient or have problems with your kidneys, talk to your Nephrologist about how much protein you can take in with your condition.  Examples of high protein items that can be added to your diet include: eggs, chicken, red meats, almonds, cottage cheese, Greek yogurt, beans, and peanut butter.  Fortified protein bars, shakes and drinks can add 15-30 additional grams of protein per serving.   Also add:   1 daily general multivitamin   Kristopher : 1 packet twice daily   Vitamin C : 500mg twice daily   Zinc 220 mg daily  Vit D : once daily    Offloading   Offload your wound. This means to reduce pressure on and around the wound that reduces blood flow to the wound and prevents healing. Your wound care team will discuss specific ways for you to offload your specific wound. Common offloading strategies include:  Turn or reposition every 2 hours or sooner  Use pillows, wedges, ROHO wheelchair cushions or other special devices like boots and shoes to lift the wound off of hard surfaces  Alternating Low Air-loss (ALAL) mattress may be ordered  Padded dressings can reduce wound pressure      Call our [facility] wound clinic for  questions/concerns a [phone] .

## 2023-08-09 NOTE — PROGRESS NOTES
"Subjective:      Patient ID: Deborah Cross is a 53 y.o. female.    Chief Complaint: Non-healing surgical wound left ankle      HPI: 53yr female whom I am seeing for the first time but who has been seen by my colleague, Dr Tam twice now (first visit 5/27/23) for non-healing surgical wound left lateral ankle. The patient's medical history is that she fell off of a ladder in October 2022, she sustained fractures to both lower legs. She broke her right tib/fib and had an open dislocation of her left ankle with ligamental injury in addition to fracture at the base of her left 5th metatarsal base She underwent surgery by Dr Scott who repaired her RLE fractures as well as performing  left lateral  ankle primary ligamentous reconstruction ATFL as well as closed treatment of a left 5th metatarsal base fracture. In early 2023, she began to have periodic drainage from left lateral ankle incisional line but she was also dealing with nonunion on right tibia and had to have further surgery on RLE in April 2023 (nail denymanaztion).  She tells me the drainage never totally resolved along lateral left ankle. She went back to the OR in mid June for a wash out and a draining sinus tract was found within the woundbed that was cut out by Dr Scott per his operative note. She was given Vancomycin while in surgery. On followup visit as outpatient she was noted to still have drainage despite a course of both Bactrim and Cleocin. She was referred to our clinic after her office visit with him on 7/26/23 with hopes we could assist with wound closure.  She does have "anchor" in her distal fibula per an office note but no other hardware from film 6/12/23.  Ms Cross says she is still smoking cigarettes but trying to cut back. She showed up in a shoe that would seem to rub against her skin just over her wound. We spoke about velcro sandals that go around ankle and across midfoot. She tells me she will change to those types of shoes " this week.    No fever/chills    Her past medical history is pertinant for HLD, Nicotine use, Anxiety, and HTN, Prediabetic  Pertinant surgical history leading up to today's visit:  6/12/2023: INCISION AND DRAINAGE, LOWER EXTREMITY;      Comment:  Procedure: INCISION AND DRAINAGE, LOWER Left EXTREMITY -   2.  10/24/2022: INSERTION OF INTRAMEDULLARY NAIL INTO TIBIA; Right      Comment:  Procedure: INSERTION, INTRAMEDULLARY JACK, TIBIA;    3.  4/12/2023: ORIF TIBIA FRACTURE; Right      Comment:  Procedure: ORIF, FRACTURE, TIBIA;    4.  0/24/2022: REPAIR OF LIGAMENT OF ANKLE      Comment:  Procedure: REPAIR, LIGAMENT, ANKLE;  Surgeon: Dillon ARCINIEGA              Review of Systems   Constitutional: Negative.    HENT: Negative.     Eyes: Negative.    Gastrointestinal: Negative.    Endocrine: Negative.    Musculoskeletal:  Positive for arthralgias and gait problem.   Skin:  Positive for wound.   Neurological:         Negative   Hematological: Negative.    Psychiatric/Behavioral: Negative.        Objective:     Recent Labs   Lab 08/09/23  0830   POCTGLUCOSE 193*       Physical Exam  Vitals and nursing note reviewed.   Constitutional:       Appearance: Normal appearance. She is normal weight.   HENT:      Head: Normocephalic and atraumatic.      Nose: Nose normal.      Mouth/Throat:      Mouth: Mucous membranes are moist.   Eyes:      Pupils: Pupils are equal, round, and reactive to light.   Cardiovascular:      Rate and Rhythm: Normal rate and regular rhythm.      Pulses: Normal pulses.      Heart sounds: Normal heart sounds.   Pulmonary:      Effort: Pulmonary effort is normal.      Breath sounds: Normal breath sounds.   Musculoskeletal:      Cervical back: Normal range of motion.        Feet:       Comments: Scar RLE, scar across top left ankle/foot interface, soft tissue edema below wound bed   Skin:     Findings: Lesion (open wound lateral left ankle with undermining from 09:00-13:00, bleeds well, no odor) present.    Neurological:      Mental Status: She is alert and oriented to person, place, and time. Mental status is at baseline.   Psychiatric:         Mood and Affect: Mood normal.         Behavior: Behavior normal.        Assessment:       1. Open wound of left ankle, subsequent encounter    2. Disruption of internal operation (surgical) wound, not elsewhere classified, initial encounter    3. Deep incisional surgical site infection    4. Cigarette smoker    5. Mixed anxiety and depressive disorder    6. Attention deficit hyperactivity disorder, predominantly inattentive type    7. 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 Other hyperlipidemia           Incision/Site 06/12/23 1221 Left Malleolus/Ankle lateral (Active)   06/12/23 1221   Present Prior to Hospital Arrival?: Yes   Side: Left   Location: Malleolus/Ankle   Orientation: lateral   Incision Type:    Closure Method:    Additional Comments: Doppler biphasic x4   palpable x 4   Removal Indication and Assessment:    Wound Outcome:    Removal Indications:    Wound Image    08/09/23 0836   Dressing Appearance Moist drainage 08/09/23 0836   Drainage Amount Moderate 08/09/23 0836   Drainage Characteristics/Odor Creamy;Yellow;No odor 08/09/23 0836   Appearance Pink;Red;Yellow;Moist 08/09/23 0836   Black (%), Wound Tissue Color 0 % 08/09/23 0836   Red (%), Wound Tissue Color 50 % 08/09/23 0836   Yellow (%), Wound Tissue Color 50 % 08/09/23 0836   Periwound Area Intact 08/09/23 0836   Wound Edges Defined 08/09/23 0836   Wound Length (cm) 0.7 cm 08/09/23 0836   Wound Width (cm) 0.6 cm 08/09/23 0836   Wound Depth (cm) 0.4 cm 08/09/23 0836   Wound Volume (cm^3) 0.168 cm^3 08/09/23 0836   Wound Surface Area (cm^2) 0.42 cm^2 08/09/23 0836   Undermining (depth (cm)/location) 1.1cm from 9-1 08/09/23 0836   Care Cleansed with:;Wound cleanser;Applied: 08/09/23 0836   Dressing Removed 08/09/23 0836   Packing packed  "with;hydrofiber/alginate 08/09/23 0836       Plan:           Lab Results   Component Value Date    SEDRATE 27 (H) 06/12/2023     Lab Results   Component Value Date    CRP 4.70 04/11/2023     Lab Results   Component Value Date    WBC 8.59 06/12/2023    HGB 13.0 06/12/2023    HCT 40.2 06/12/2023    MCV 87.0 06/12/2023     06/12/2023     CMP  Sodium Level   Date Value Ref Range Status   06/12/2023 140 136 - 145 mmol/L Final     Potassium Level   Date Value Ref Range Status   06/12/2023 3.9 3.5 - 5.1 mmol/L Final     Carbon Dioxide   Date Value Ref Range Status   06/12/2023 26 22 - 29 mmol/L Final     Blood Urea Nitrogen   Date Value Ref Range Status   06/12/2023 9.3 (L) 9.8 - 20.1 mg/dL Final     Creatinine   Date Value Ref Range Status   06/12/2023 0.70 0.55 - 1.02 mg/dL Final     Calcium Level Total   Date Value Ref Range Status   06/12/2023 10.1 8.4 - 10.2 mg/dL Final     Albumin Level   Date Value Ref Range Status   04/06/2023 3.9 3.5 - 5.0 g/dL Final     Bilirubin Total   Date Value Ref Range Status   04/06/2023 0.4 <=1.5 mg/dL Final     Alkaline Phosphatase   Date Value Ref Range Status   04/06/2023 126 40 - 150 unit/L Final     Aspartate Aminotransferase   Date Value Ref Range Status   04/06/2023 20 5 - 34 unit/L Final     Alanine Aminotransferase   Date Value Ref Range Status   04/06/2023 18 0 - 55 unit/L Final     eGFR   Date Value Ref Range Status   06/12/2023 >60 mls/min/1.73/m2 Final     Problem list:    Left lateral ankle open dislocation October 2022: surgery with Dr Scott: "My attention is now drawn to the left ankle patient contaminated left open ankle injury appears to be a simple fracture dislocation with complete disruption of the lateral ligament complex.  Then excisionally debrided the subcutaneous tissue fascia and capsule.  This was then copiously irrigated.  I then placed an Arthrex anchor in the fibula and repaired the ATFL recent stress the ankle appears to be stable." Subsequent distal " incisional site opening with fistula tract and drainage since early 2023:  Exploration and debridement mid June 2023 with closure: Subsequent recurrent surgical wound dehiscence: Given Bactrim and clindamycin orally, referred to wound care: 1st clinic visit 7/27/23  Chronic cigarette smoker  Hypertension  Dyslipidemia  Prediabetes  ADHD, depression      Plan of care:  I am meeting Ms Cross for the first time today. She is a regular patient for Dr Tam but could not come on her regular Thursday visit this week.  She is pleasant and cooperative but does not wearing sneakers that meet right across her wound line when it is obvious by prior notes she was advised not to. She says ankle always hurts but is bearable. She fully weights bear at this time. I reviewed her most recent labs, x-rays and medication list. Social determinants of health reviewed and on chart.   I debrided her wound which has a lot of red spongy looking tissue that is hypergranulated. Her wound bed has undermining and tunneling. I am going to continue with current dressing.  Good hygiene is important- discussed body wash with dial and other anti-bacterial soap and continuing with local wound care with Home health.  I discussed diet with her, importance of vitamin D, C, zinc and multivitamin as well as of high protein intake.   I discussed the negative effects nicotine has on both overall health as well as wound healing. She is aware but not yet ready to quit.  We will use compression to the area if she can tolerate it.   I advised her to offload area as much as possible and to continue with current wound care. We made her an apt to return next week to see Dr Tam.     The time spent preparing to see the patient, obtaining patient information and developing plan of care, discussing with patient /caregiver and reviewing old chart and new documentation as well as coordination of care for today's encounter was: 40 minutes

## 2023-08-09 NOTE — PROCEDURES
"Debridement    Date/Time: 8/9/2023 8:09 AM    Performed by: Gabrielle Knight DO  Authorized by: Gabrielle Knight DO    Time out: Immediately prior to procedure a "time out" was called to verify the correct patient, procedure, equipment, support staff and site/side marked as required.    Consent Done?:  Yes (Verbal)    Preparation: Patient was prepped and draped in usual sterile fashion    Local anesthesia used?: Yes    Local anesthetic:  Topical anesthetic    Wound Details:    Location:  Left ankle    Type of Debridement:  Excisional       Length (cm):  0.7       Area (sq cm):  0.42       Width (cm):  0.6       Percent Debrided (%):  100       Depth (cm):  0.5       Total Area Debrided (sq cm):  0.42    Depth of debridement:  Subcutaneous tissue    Tissue debrided:  Dermis, Epidermis and Subcutaneous    Devitalized tissue debrided:  Slough, Exudate and Biofilm    Instruments:  Curette and Scissors  Bleeding:  Minimal  Hemostasis Achieved: Yes  Method Used:  Silver Nitrate and Pressure  Patient tolerance:  Patient tolerated the procedure well with no immediate complications  "

## 2023-08-12 LAB — BACTERIA SPEC CULT: NORMAL

## 2023-08-16 ENCOUNTER — HOSPITAL ENCOUNTER (EMERGENCY)
Facility: HOSPITAL | Age: 53
Discharge: HOME | End: 2023-08-16
Attending: STUDENT IN AN ORGANIZED HEALTH CARE EDUCATION/TRAINING PROGRAM
Payer: MEDICAID

## 2023-08-16 ENCOUNTER — HOSPITAL ENCOUNTER (OUTPATIENT)
Dept: WOUND CARE | Facility: HOSPITAL | Age: 53
Discharge: HOME | End: 2023-08-16
Attending: EMERGENCY MEDICINE
Payer: MEDICAID

## 2023-08-16 VITALS
TEMPERATURE: 98 F | HEIGHT: 62 IN | HEART RATE: 108 BPM | SYSTOLIC BLOOD PRESSURE: 128 MMHG | BODY MASS INDEX: 25.76 KG/M2 | RESPIRATION RATE: 17 BRPM | DIASTOLIC BLOOD PRESSURE: 80 MMHG | WEIGHT: 140 LBS

## 2023-08-16 VITALS
HEART RATE: 78 BPM | SYSTOLIC BLOOD PRESSURE: 106 MMHG | DIASTOLIC BLOOD PRESSURE: 62 MMHG | WEIGHT: 140 LBS | TEMPERATURE: 98 F | OXYGEN SATURATION: 98 % | RESPIRATION RATE: 16 BRPM | BODY MASS INDEX: 25.76 KG/M2 | HEIGHT: 62 IN

## 2023-08-16 DIAGNOSIS — T81.42XA DEEP INCISIONAL SURGICAL SITE INFECTION: ICD-10-CM

## 2023-08-16 DIAGNOSIS — S82.201M TYPE I OR II OPEN FRACTURE OF RIGHT TIBIA AND FIBULA WITH NONUNION, SUBSEQUENT ENCOUNTER: ICD-10-CM

## 2023-08-16 DIAGNOSIS — G56.02 CARPAL TUNNEL SYNDROME ON LEFT: Primary | ICD-10-CM

## 2023-08-16 DIAGNOSIS — S82.401M TYPE I OR II OPEN FRACTURE OF RIGHT TIBIA AND FIBULA WITH NONUNION, SUBSEQUENT ENCOUNTER: ICD-10-CM

## 2023-08-16 DIAGNOSIS — F17.210 CIGARETTE SMOKER: ICD-10-CM

## 2023-08-16 DIAGNOSIS — E78.49 OTHER HYPERLIPIDEMIA: ICD-10-CM

## 2023-08-16 DIAGNOSIS — T81.32XA DISRUPTION OF INTERNAL OPERATION (SURGICAL) WOUND, NOT ELSEWHERE CLASSIFIED, INITIAL ENCOUNTER: ICD-10-CM

## 2023-08-16 DIAGNOSIS — S91.002D OPEN WOUND OF LEFT ANKLE, SUBSEQUENT ENCOUNTER: Primary | ICD-10-CM

## 2023-08-16 DIAGNOSIS — F41.8 MIXED ANXIETY AND DEPRESSIVE DISORDER: ICD-10-CM

## 2023-08-16 LAB — POCT GLUCOSE: 228 MG/DL (ref 70–110)

## 2023-08-16 PROCEDURE — 63600175 PHARM REV CODE 636 W HCPCS: Performed by: STUDENT IN AN ORGANIZED HEALTH CARE EDUCATION/TRAINING PROGRAM

## 2023-08-16 PROCEDURE — 11042 DBRDMT SUBQ TIS 1ST 20SQCM/<: CPT

## 2023-08-16 PROCEDURE — 27000999 HC MEDICAL RECORD PHOTO DOCUMENTATION

## 2023-08-16 PROCEDURE — 99283 EMERGENCY DEPT VISIT LOW MDM: CPT | Mod: 25

## 2023-08-16 PROCEDURE — 96372 THER/PROPH/DIAG INJ SC/IM: CPT | Performed by: STUDENT IN AN ORGANIZED HEALTH CARE EDUCATION/TRAINING PROGRAM

## 2023-08-16 RX ORDER — NAPROXEN 500 MG/1
500 TABLET ORAL 2 TIMES DAILY WITH MEALS
Qty: 60 TABLET | Refills: 0 | Status: ON HOLD | OUTPATIENT
Start: 2023-08-16 | End: 2023-10-16 | Stop reason: HOSPADM

## 2023-08-16 RX ORDER — TRAZODONE HYDROCHLORIDE 100 MG/1
100 TABLET ORAL NIGHTLY
COMMUNITY
Start: 2023-07-31 | End: 2023-09-25

## 2023-08-16 RX ORDER — KETOROLAC TROMETHAMINE 30 MG/ML
60 INJECTION, SOLUTION INTRAMUSCULAR; INTRAVENOUS
Status: COMPLETED | OUTPATIENT
Start: 2023-08-16 | End: 2023-08-16

## 2023-08-16 RX ORDER — DEXAMETHASONE SODIUM PHOSPHATE 4 MG/ML
8 INJECTION, SOLUTION INTRA-ARTICULAR; INTRALESIONAL; INTRAMUSCULAR; INTRAVENOUS; SOFT TISSUE
Status: COMPLETED | OUTPATIENT
Start: 2023-08-16 | End: 2023-08-16

## 2023-08-16 RX ADMIN — DEXAMETHASONE SODIUM PHOSPHATE 8 MG: 4 INJECTION, SOLUTION INTRA-ARTICULAR; INTRALESIONAL; INTRAMUSCULAR; INTRAVENOUS; SOFT TISSUE at 02:08

## 2023-08-16 RX ADMIN — KETOROLAC TROMETHAMINE 60 MG: 60 INJECTION, SOLUTION INTRAMUSCULAR at 02:08

## 2023-08-16 NOTE — PATIENT INSTRUCTIONS
Pt seen today by: Dr. Knight      Supplies ordered through [facility]        Self care DRESSING INSTRUCTIONS:    Wound location: Left Lateral Ankle  Dressings to be changed Daily and as needed if soiled or not intact.    Cleanse wound with wound cleanser or saline  Apply silver alginate to the wound bed, pack around undermined area from 9:00-10:00 oclock  Cover with bordered foam dressing  Change daily    Return visit: Wednesday August 30, 2023 at 8:00 am    Nutrition:  The current daily value (%DV) for protein is 50 grams per day and is meant as a general goal for most people. Further increasing your dietary protein intake is very important for wound healing. Typically one needs over 100g of protein per day to help with wound healing needs.  If you are a dialysis patient or have problems with your kidneys, talk to your Nephrologist about how much protein you can take in with your condition.  Examples of high protein items that can be added to your diet include: eggs, chicken, red meats, almonds, cottage cheese, Greek yogurt, beans, and peanut butter.  Fortified protein bars, shakes and drinks can add 15-30 additional grams of protein per serving.   Also add:   1 daily general multivitamin   Kristopher : 1 packet twice daily   Vitamin C : 500mg twice daily   Zinc 220 mg daily  Vit D : once daily    Offloading   Offload your wound. This means to reduce pressure on and around the wound that reduces blood flow to the wound and prevents healing. Your wound care team will discuss specific ways for you to offload your specific wound. Common offloading strategies include:  Turn or reposition every 2 hours or sooner  Use pillows, wedges, ROHO wheelchair cushions or other special devices like boots and shoes to lift the wound off of hard surfaces  Alternating Low Air-loss (ALAL) mattress may be ordered  Padded dressings can reduce wound pressure      Call our [facility] wound clinic for questions/concerns a [phone] .

## 2023-08-16 NOTE — ED PROVIDER NOTES
Encounter Date: 2023       History     Chief Complaint   Patient presents with    Wrist Pain     Patient c/o right wrist pain. She reports that she has carpel tunnel.      HPI  Patient is a 53 year old female past medical history of diabetes, hyperlipidemia hypertension presents to ED complaining right wrist pain.  Patient states she has history of carpal tunnel syndrome, and she just started a new job using her right hand very often now. States steroids, NSAIDs help.  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, with nonunion, subsequent encounter      Past Surgical History:   Procedure Laterality Date     SECTION  2005    HYSTERECTOMY  2015    INCISION AND DRAINAGE, LOWER EXTREMITY Left 2023    Procedure: INCISION AND DRAINAGE, LOWER EXTREMITY - supine bone foam wash stuff cultures;  Surgeon: Dillon Scott DO;  Location: [facility];  Service: Orthopedics;  Laterality: Left;  supine bone foam wash stuff cultures    INSERTION OF INTRAMEDULLARY NAIL INTO TIBIA Right 10/24/2022    Procedure: INSERTION, INTRAMEDULLARY JACK, TIBIA;  Surgeon: Dillon Scott DO;  Location: [facility];  Service: Orthopedics;  Laterality: Right;  supine, vascular, bone foam, c-arm, wash stuff    ORIF TIBIA FRACTURE Right 2023    Procedure: ORIF, FRACTURE, TIBIA;  Surgeon: Dillon Scott DO;  Location: [facility] OR;  Service: Orthopedics;  Laterality: Right;    REPAIR OF LIGAMENT OF ANKLE  10/24/2022    Procedure: REPAIR, LIGAMENT, ANKLE;  Surgeon: Dillon Scott DO;  Location: [facility];  Service: Orthopedics;;     Family History   Problem Relation Age of Onset    Diabetes Mother     Heart disease Mother     Diabetes Father      Social History     Tobacco Use    Smoking status: Some Days      Current packs/day: 0.50     Average packs/day: 0.5 packs/day for 15.0 years (7.5 ttl pk-yrs)     Types: Cigarettes    Smokeless tobacco: Former   Substance Use Topics    Alcohol use: Not Currently    Drug use: Not Currently     Types: Benzodiazepines, Marijuana     Review of Systems   Constitutional:  Negative for fever.   HENT:  Negative for sore throat.    Respiratory:  Negative for shortness of breath.    Cardiovascular:  Negative for chest pain.   Gastrointestinal:  Negative for nausea.   Genitourinary:  Negative for dysuria.   Musculoskeletal:  Negative for back pain.        Right wrist pain   Skin:  Negative for rash.   Neurological:  Negative for weakness.   Hematological:  Does not bruise/bleed easily.   All other systems reviewed and are negative.      Physical Exam     Initial Vitals [08/16/23 0234]   BP Pulse Resp Temp SpO2   106/62 78 16 98 °F (36.7 °C) 98 %      MAP       --         Physical Exam    Nursing note and vitals reviewed.  Constitutional: Vital signs are normal. She appears well-developed and well-nourished. She is not diaphoretic. She is active.  Non-toxic appearance. She does not appear ill. No distress.   HENT:   Head: Normocephalic and atraumatic.   Eyes: Conjunctivae are normal. Pupils are equal, round, and reactive to light. Right conjunctiva is not injected. Left conjunctiva is not injected.   Neck: Trachea normal. Neck supple.   Normal range of motion.   Full passive range of motion without pain.     Cardiovascular:  Normal rate, regular rhythm, S1 normal, S2 normal, intact distal pulses and normal pulses.           Pulmonary/Chest: Breath sounds normal. No respiratory distress. She has no wheezes.   Abdominal: Abdomen is soft. Bowel sounds are normal. There is no abdominal tenderness.   Musculoskeletal:         General: Tenderness present. No edema. Normal range of motion.      Cervical back: Full passive range of motion without pain, normal range of motion and neck supple. No  rigidity.      Right lower leg: No swelling. No edema.      Left lower leg: No swelling. No edema.      Comments: No significant swelling noted to the right wrist or hand.  Mild tenderness to palpation over right wrist.  No crepitus palpation.  Compartments are soft within the hand, forearm.     Neurological: She is alert and oriented to person, place, and time.   Skin: Skin is warm and dry. Capillary refill takes less than 2 seconds.         ED Course   Procedures  Labs Reviewed - No data to display       Imaging Results    None          Medications   dexAMETHasone injection 8 mg (has no administration in time range)   ketorolac injection 60 mg (has no administration in time range)     Medical Decision Making:   Initial Assessment:   Patient is a 53 year old female past medical history of diabetes, hyperlipidemia hypertension presents to ED complaining right wrist pain.  Differential Diagnosis:   Carpal tunnel syndrome,, rheumatoid arthritis, tenosynovitis  ED Management:  As This is a chronic issue, patient has no new trauma onto the wrist or hand, no new imaging obtained.  Patient given Toradol, dexamethasone here in the ER.  Will discharge patient with naproxen.  Patient instructed to wear brace on the wrist swallow work, minimize use as she can.  Encouraged to follow-up with PCP for close observation.  Patient stable for discharge and to amenable plan as noted.    Alex Grier M.D.  Emergency Medicine                              Clinical Impression:   Final diagnoses:  [G56.02] Carpal tunnel syndrome on left (Primary)        ED Disposition Condition    Discharge Stable          ED Prescriptions       Medication Sig Dispense Start Date End Date Auth. Provider    naproxen (NAPROSYN) 500 MG tablet Take 1 tablet (500 mg total) by mouth 2 (two) times daily with meals. 60 tablet 8/16/2023 -- Alex Grier MD          Follow-up Information       Follow up With Specialties Details Why Contact Adamaris Cool  [facility] Schedule an appointment as soon as possible for a visit in 2 days For follow up [address] [facility] [address]  [phone]      [facility] - Emergency Dept Emergency Medicine  If symptoms worsen [address] [facility] [id number] [phone]             Alex Grier MD  08/16/23 0254

## 2023-08-18 NOTE — PROGRESS NOTES
"Subjective:      Patient ID: Deborah Cross is a 53 y.o. female.    Chief Complaint: Open wound of left ankle, subsequent encounter    HPI    53yr female whom  I saw for the first time last week, but who has been seen by my colleague, Dr Tam twice now (first visit 5/27/23) for non-healing surgical wound left lateral ankle. The patient's medical history is that she fell off of a ladder in October 2022, she sustained fractures to both lower legs. She broke her right tib/fib and had an open dislocation of her left ankle with ligamental injury in addition to fracture at the base of her left 5th metatarsal base She underwent surgery by Dr Scott who repaired her RLE fractures as well as performing  left lateral  ankle primary ligamentous reconstruction ATFL as well as closed treatment of a left 5th metatarsal base fracture. In early 2023, she began to have periodic drainage from left lateral ankle incisional line but she was also dealing with nonunion on right tibia and had to have further surgery on RLE in April 2023 (nail denymanaztion).  She tells me the drainage never totally resolved along lateral left ankle. She went back to the OR in mid June for a wash out and a draining sinus tract was found within the woundbed that was cut out by Dr Scott per his operative note. She was given Vancomycin while in surgery. On followup visit as outpatient she was noted to still have drainage despite a course of both Bactrim and Cleocin. She was referred to our clinic after her office visit with him on 7/26/23 with hopes we could assist with wound closure.  She does have "anchor" in her distal fibula per an office note but no other hardware from film 6/12/23.  Ms Cross says she is still smoking cigarettes but trying to cut back. She showed up in a shoe that would seem to rub against her skin just over her wound. We spoke about velcro sandals that go around ankle and across midfoot.   On today's visit she is again in her " sneakers. WE had a rakesh talk about the fact that both her surgeon and my colleague have asked her to modify her foot wear to help this wound heal but she has not been compliant. She now states she will get a pair of sandals. Her wound looks a little  smaller today but still has little lip of undermining medially.  No fever/chills             Review of Systems   Constitutional: Negative.    HENT: Negative.     Eyes: Negative.    Gastrointestinal: Negative.    Endocrine: Negative.    Musculoskeletal:  Positive for arthralgias and gait problem.   Skin:  Positive for wound.   Neurological:         Negative   Hematological: Negative.    Psychiatric/Behavioral: Negative.          Objective:     Physical Exam  Constitutional:       Appearance: Normal appearance. She is normal weight.   HENT:      Head: Normocephalic.      Nose: Nose normal.      Mouth/Throat:      Mouth: Mucous membranes are moist.   Eyes:      Extraocular Movements: Extraocular movements intact.      Pupils: Pupils are equal, round, and reactive to light.   Cardiovascular:      Pulses: Normal pulses.   Pulmonary:      Effort: Pulmonary effort is normal.   Musculoskeletal:         General: Normal range of motion.      Cervical back: Normal range of motion.        Feet:       Comments: Stiff joint left ankle   Skin:     General: Skin is warm.      Capillary Refill: Capillary refill takes less than 2 seconds.      Findings: Lesion (open wound lateral anterior left anlke inferior to lateral malleoli) present.   Neurological:      Mental Status: She is alert and oriented to person, place, and time. Mental status is at baseline.   Psychiatric:         Mood and Affect: Mood normal.         Behavior: Behavior normal.        Assessment:     1. Open wound of left ankle, subsequent encounter    2. Disruption of internal operation (surgical) wound, not elsewhere classified, initial encounter    3. Deep incisional surgical site infection    4. Cigarette smoker    5.  Mixed anxiety and depressive disorder    6. Other hyperlipidemia    7. 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, subsequent encounter           Incision/Site 06/12/23 1221 Left Malleolus/Ankle lateral (Active)   06/12/23 1221   Present Prior to Hospital Arrival?: Yes   Side: Left   Location: Malleolus/Ankle   Orientation: lateral   Incision Type:    Closure Method:    Additional Comments: Doppler biphasic x4   palpable x 4   Removal Indication and Assessment:    Wound Outcome:    Removal Indications:    Wound Image    08/16/23 0847   Dressing Appearance Intact;Moist drainage 08/16/23 0847   Drainage Amount Moderate 08/16/23 0847   Drainage Characteristics/Odor Yellow;Serosanguineous;No odor 08/16/23 0847   Appearance Red;Yellow 08/16/23 0847   Black (%), Wound Tissue Color 0 % 08/16/23 0847   Red (%), Wound Tissue Color 90 % 08/16/23 0847   Yellow (%), Wound Tissue Color 10 % 08/16/23 0847   Periwound Area Intact;Macerated;Pale white 08/16/23 0847   Wound Edges Defined 08/16/23 0847   Wound Length (cm) 0.7 cm 08/16/23 0847   Wound Width (cm) 0.9 cm 08/16/23 0847   Wound Depth (cm) 0.2 cm 08/16/23 0847   Wound Volume (cm^3) 0.126 cm^3 08/16/23 0847   Wound Surface Area (cm^2) 0.63 cm^2 08/16/23 0847   Care Cleansed with:;Antimicrobial agent;Applied: 08/16/23 0847   Dressing Applied;Calcium alginate;Silver;Foam 08/16/23 0915       Plan:     Lab Results   Component Value Date    SEDRATE 27 (H) 06/12/2023     Lab Results   Component Value Date    CRP 4.70 04/11/2023     Lab Results   Component Value Date    WBC 8.59 06/12/2023    HGB 13.0 06/12/2023    HCT 40.2 06/12/2023    MCV 87.0 06/12/2023     06/12/2023     CMP  Sodium Level   Date Value Ref Range Status   06/12/2023 140 136 - 145 mmol/L Final     Potassium Level   Date Value Ref Range Status   06/12/2023 3.9 3.5 - 5.1 mmol/L Final     Carbon Dioxide   Date Value Ref Range Status   06/12/2023 26 22 - 29 mmol/L Final     Blood Urea  "Nitrogen   Date Value Ref Range Status   06/12/2023 9.3 (L) 9.8 - 20.1 mg/dL Final     Creatinine   Date Value Ref Range Status   06/12/2023 0.70 0.55 - 1.02 mg/dL Final     Calcium Level Total   Date Value Ref Range Status   06/12/2023 10.1 8.4 - 10.2 mg/dL Final     Albumin Level   Date Value Ref Range Status   04/06/2023 3.9 3.5 - 5.0 g/dL Final     Bilirubin Total   Date Value Ref Range Status   04/06/2023 0.4 <=1.5 mg/dL Final     Alkaline Phosphatase   Date Value Ref Range Status   04/06/2023 126 40 - 150 unit/L Final     Aspartate Aminotransferase   Date Value Ref Range Status   04/06/2023 20 5 - 34 unit/L Final     Alanine Aminotransferase   Date Value Ref Range Status   04/06/2023 18 0 - 55 unit/L Final     eGFR   Date Value Ref Range Status   06/12/2023 >60 mls/min/1.73/m2 Final     Problem list:    Left lateral ankle open dislocation October 2022: surgery with Dr Scott: "My attention is now drawn to the left ankle patient contaminated left open ankle injury appears to be a simple fracture dislocation with complete disruption of the lateral ligament complex.  Then excisionally debrided the subcutaneous tissue fascia and capsule.  This was then copiously irrigated.  I then placed an Arthrex anchor in the fibula and repaired the ATFL recent stress the ankle appears to be stable." Subsequent distal incisional site opening with fistula tract and drainage since early 2023:  Exploration and debridement mid June 2023 with closure: Subsequent recurrent surgical wound dehiscence: Given Bactrim and clindamycin orally, referred to wound care: 1st clinic visit 7/27/23  Chronic cigarette smoker  Hypertension  Dyslipidemia  Prediabetes  ADHD, depression      Plan of care:  I met Ms Cross for the first time last week. She is a regular patient for Dr Tam but could not come on her regular Thursday visit. She is pleasant and cooperative but is again wearing sneakers that meet right across her wound line -we had a " rakesh conversation about this today and she agrees to try to change shoes.   I debrided her wound which has a lot of red spongy looking tissue that is hypergranulated. Her wound bed has undermining and tunneling but it is less than last weeks.   Good hygiene is important- discussed body wash with dial and other anti-bacterial soap and continuing with local wound care with Home health.  I discussed diet with her, importance of vitamin D, C, zinc and multivitamin as well as of high protein intake.   We will use compression to the area if she can tolerate it.   I advised her to offload area as much as possible and to continue with current wound care. We made her an apt to return  in 2 weeks on whatever day works for her.

## 2023-08-18 NOTE — PROCEDURES
"Debridement    Date/Time: 8/16/2023 8:24 AM    Performed by: Gabrielle Knight DO  Authorized by: Gabrielle Knight DO    Time out: Immediately prior to procedure a "time out" was called to verify the correct patient, procedure, equipment, support staff and site/side marked as required.    Consent Done?:  Yes (Verbal)    Preparation: Patient was prepped and draped in usual sterile fashion    Local anesthesia used?: Yes    Local anesthetic:  Topical anesthetic    Wound Details:    Location:  Left ankle    Type of Debridement:  Excisional       Length (cm):  0.7       Area (sq cm):  0.63       Width (cm):  0.9       Percent Debrided (%):  100       Depth (cm):  0.4       Total Area Debrided (sq cm):  0.63    Depth of debridement:  Subcutaneous tissue    Tissue debrided:  Dermis, Epidermis, Subcutaneous and Hypergranulation    Devitalized tissue debrided:  Slough and Exudate    Instruments:  Blade, Forceps and Scissors  Bleeding:  Minimal  Hemostasis Achieved: Yes  Method Used:  Pressure and Silver Nitrate  Patient tolerance:  Patient tolerated the procedure well with no immediate complications     I cut out hypergranulation tissue to bleeding bed, used silver nitrate to stop bleeding.  "

## 2023-08-30 ENCOUNTER — HOSPITAL ENCOUNTER (OUTPATIENT)
Dept: WOUND CARE | Facility: HOSPITAL | Age: 53
Discharge: HOME | End: 2023-08-30
Attending: EMERGENCY MEDICINE
Payer: MEDICAID

## 2023-08-30 VITALS
WEIGHT: 140 LBS | TEMPERATURE: 98 F | RESPIRATION RATE: 17 BRPM | DIASTOLIC BLOOD PRESSURE: 95 MMHG | BODY MASS INDEX: 25.76 KG/M2 | HEART RATE: 97 BPM | SYSTOLIC BLOOD PRESSURE: 153 MMHG | HEIGHT: 62 IN

## 2023-08-30 DIAGNOSIS — T81.42XA DEEP INCISIONAL SURGICAL SITE INFECTION: ICD-10-CM

## 2023-08-30 DIAGNOSIS — T81.32XA DISRUPTION OF INTERNAL OPERATION (SURGICAL) WOUND, NOT ELSEWHERE CLASSIFIED, INITIAL ENCOUNTER: ICD-10-CM

## 2023-08-30 DIAGNOSIS — F41.8 MIXED ANXIETY AND DEPRESSIVE DISORDER: ICD-10-CM

## 2023-08-30 DIAGNOSIS — S91.002D OPEN WOUND OF LEFT ANKLE, SUBSEQUENT ENCOUNTER: Primary | ICD-10-CM

## 2023-08-30 DIAGNOSIS — F17.210 CIGARETTE SMOKER: ICD-10-CM

## 2023-08-30 DIAGNOSIS — S82.401M TYPE I OR II OPEN FRACTURE OF RIGHT TIBIA AND FIBULA WITH NONUNION, SUBSEQUENT ENCOUNTER: ICD-10-CM

## 2023-08-30 DIAGNOSIS — E78.49 OTHER HYPERLIPIDEMIA: ICD-10-CM

## 2023-08-30 DIAGNOSIS — S82.201M TYPE I OR II OPEN FRACTURE OF RIGHT TIBIA AND FIBULA WITH NONUNION, SUBSEQUENT ENCOUNTER: ICD-10-CM

## 2023-08-30 LAB — POCT GLUCOSE: 236 MG/DL (ref 70–110)

## 2023-08-30 PROCEDURE — 99213 OFFICE O/P EST LOW 20 MIN: CPT | Mod: 25,,, | Performed by: EMERGENCY MEDICINE

## 2023-08-30 PROCEDURE — 17250 CHEM CAUT OF GRANLTJ TISSUE: CPT

## 2023-08-30 PROCEDURE — 17250 PR CHEM CAUTERY GRANULATN TISSUE: ICD-10-PCS | Mod: ,,, | Performed by: EMERGENCY MEDICINE

## 2023-08-30 PROCEDURE — 17250 CHEM CAUT OF GRANLTJ TISSUE: CPT | Mod: ,,, | Performed by: EMERGENCY MEDICINE

## 2023-08-30 PROCEDURE — 99213 PR OFFICE/OUTPT VISIT, EST, LEVL III, 20-29 MIN: ICD-10-PCS | Mod: 25,,, | Performed by: EMERGENCY MEDICINE

## 2023-08-30 PROCEDURE — 27000999 HC MEDICAL RECORD PHOTO DOCUMENTATION

## 2023-08-30 RX ORDER — MUPIROCIN 20 MG/G
OINTMENT TOPICAL 2 TIMES DAILY
Qty: 30 G | Refills: 0 | Status: SHIPPED | OUTPATIENT
Start: 2023-08-30 | End: 2024-01-13

## 2023-08-30 RX ORDER — MINOCYCLINE HYDROCHLORIDE 100 MG/1
100 CAPSULE ORAL EVERY 12 HOURS
Qty: 28 CAPSULE | Refills: 0 | Status: SHIPPED | OUTPATIENT
Start: 2023-08-30 | End: 2023-09-13

## 2023-08-30 NOTE — PATIENT INSTRUCTIONS
Pt seen today by: Dr. South      Supplies ordered through [facility]    Make sure to  antibiotics        Self care DRESSING INSTRUCTIONS:    Wound location: Left Lateral Ankle  Dressings to be changed Daily and as needed if soiled or not intact.    Cleanse wound with wound cleanser or saline  Apply mupirocin oint to the wound  Apply silver alginate to the wound bed, pack around undermined area from 9:00-10:00 oclock  Cover with bordered foam dressing  Change daily    Return visit: Friday Sept 8th , 2023 at 8:30 am    Nutrition:  The current daily value (%DV) for protein is 50 grams per day and is meant as a general goal for most people. Further increasing your dietary protein intake is very important for wound healing. Typically one needs over 100g of protein per day to help with wound healing needs.  If you are a dialysis patient or have problems with your kidneys, talk to your Nephrologist about how much protein you can take in with your condition.  Examples of high protein items that can be added to your diet include: eggs, chicken, red meats, almonds, cottage cheese, Greek yogurt, beans, and peanut butter.  Fortified protein bars, shakes and drinks can add 15-30 additional grams of protein per serving.   Also add:   1 daily general multivitamin   Kristopher : 1 packet twice daily   Vitamin C : 500mg twice daily   Zinc 220 mg daily  Vit D : once daily    Offloading   Offload your wound. This means to reduce pressure on and around the wound that reduces blood flow to the wound and prevents healing. Your wound care team will discuss specific ways for you to offload your specific wound. Common offloading strategies include:  Turn or reposition every 2 hours or sooner  Use pillows, wedges, ROHO wheelchair cushions or other special devices like boots and shoes to lift the wound off of hard surfaces  Alternating Low Air-loss (ALAL) mattress may be ordered  Padded dressings can reduce wound pressure      Call our [facility]  wound clinic for questions/concerns a [phone] .

## 2023-08-30 NOTE — PROGRESS NOTES
"Subjective:       Patient ID: Deborah Cross is a 53 y.o. female.    Chief Complaint: No chief complaint on file.    Cc: nonhealing left lateral ankle post surgical wound      53-year-old WF who is a  chronic cigarette smoker along with h/o dyslipidemia and prediabetes had a fall off of a ladder in October of 2022 resulting in an open right tibia /fibula shaft fracture along with an open left ankle dislocation with severe ligamentous injury . Dr Scott did surgery on RLE as well as a left ankle primary ligamentous reconstruction laterally /ATFL as well as closed treatment of a left 5th metatarsal base fracture. 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   The drainage got worse on left lateral ankle mark after a weekend of being in a friend's pool. Dr Scott took her back to OR in mid June 2023 where it was noted she had a draining sinus tract. OR noted "ellipse of this sinus tract and excised it with a 15 blade blunt dissected down and came in contact with the fluid collection appears to be liquid I liquified adipose tissue from initial trauma versus purulence.  I completely irrigated the area completed excisional debridement and took deep cultures.  Put vancomycin deep in the wound andand then began closing".  Placed on bactrim for a few weeks.   Surgical site again opened up on same location.  Given a week of clindamycin . She last saw Dr Scott on 7/26/23 who then referred here. She came in for her first eval here in the wound care clinic on 7/27/23.  I noted she was still smoking 3-4 cigs/day (she is aware how this inhibits wound healing) and she reported still wearing tennis shoes from the moment she wakes until she goes to sleep...after being told not to by ortho because of friction/rubbing. I noted a dime-sized open wound with spongy friable " hypergranulation tissue with a slit in the middle of the wound bed with depth of almost 0.5 cm.  No active drainage noted. I applied silver nitrate on the wound bed and ordered silver alginate dressings . I told her to stop smoking, keep this area padded and to stop wearing her tennis shoes that rubbed on this wound. She has switched to Wednesday clinic with Dr Knight because of her work schedule and presented to her still wearing regular tennis shoes that rub this wound.  I am seeing her today on 8/30/23 on Dr Knight clinic day. She is a bit emotional today saying she is now off her norco and both her legs/feet ache.        Review of Systems   Constitutional: Negative.    HENT: Negative.     Respiratory: Negative.     Cardiovascular: Negative.    Gastrointestinal: Negative.    Skin:  Positive for wound.   Neurological: Negative.          Objective:      Vitals:    08/30/23 0801   BP: (!) 153/95   Pulse: 97   Resp: 17   Temp: 98 °F (36.7 °C)     @poctglucose@  Recent Labs   Lab 08/30/23  0807   POCTGLUCOSE 236*     Physical Exam  Vitals reviewed.   Cardiovascular:      Pulses:           Dorsalis pedis pulses are 2+ on the right side and 2+ on the left side.   Pulmonary:      Effort: Pulmonary effort is normal.   Musculoskeletal:        Feet:    Skin:     General: Skin is warm and dry.      Capillary Refill: Capillary refill takes less than 2 seconds.   Neurological:      General: No focal deficit present.      Mental Status: She is alert.              Incision/Site 06/12/23 1221 Left Malleolus/Ankle lateral (Active)   06/12/23 1221   Present Prior to Hospital Arrival?: Yes   Side: Left   Location: Malleolus/Ankle   Orientation: lateral   Incision Type:    Closure Method:    Additional Comments: Doppler biphasic x4   palpable x 4   Removal Indication and Assessment:    Wound Outcome:    Removal Indications:    Wound Image    08/30/23 0810   Dressing Appearance Intact;Moist drainage 08/30/23 0810   Drainage Amount Small  "08/30/23 0810   Drainage Characteristics/Odor Yellow;Purulent;Serosanguineous 08/30/23 0810   Appearance Pink;Yellow;Epithelialization 08/30/23 0810   Black (%), Wound Tissue Color 0 % 08/30/23 0810   Red (%), Wound Tissue Color 50 % 08/30/23 0810   Yellow (%), Wound Tissue Color 50 % 08/30/23 0810   Periwound Area Intact 08/30/23 0810   Wound Edges Defined 08/30/23 0810   Wound Length (cm) 0.7 cm 08/30/23 0810   Wound Width (cm) 1.3 cm 08/30/23 0810   Wound Depth (cm) 0.6 cm 08/30/23 0810   Wound Volume (cm^3) 0.546 cm^3 08/30/23 0810   Wound Surface Area (cm^2) 0.91 cm^2 08/30/23 0810   Care Wound cleanser;Applied:;Cleansed with: 08/30/23 0810   Dressing Removed;Changed;Calcium alginate;Silver;Foam 08/30/23 0810           Assessment:       1. Open wound of left ankle, subsequent encounter    2. Disruption of internal operation (surgical) wound, not elsewhere classified, initial encounter    3. Deep incisional surgical site infection    4. Cigarette smoker    5. Mixed anxiety and depressive disorder    6. Other hyperlipidemia    7. 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  Then excisionally debrided the subcutaneous tissue fascia and capsule.  This was then copiously irrigated.  I then placed an Arthrex anchor in the fibula and repaired the ATFL recent stress the ankle appears to be stable." Subsequent distal incisional site opening with fistula tract and drainage since early 2023:  Exploration and debridement mid June 2023 with closure: Subsequent recurrent surgical wound dehiscence: Given Bactrim and clindamycin orally, referred to wound care: 1st clinic visit 7/27/23  Chronic cigarette " smoker  Hypertension  Dyslipidemia  Prediabetes  ADHD, depression        Lab Results   Component Value Date    WBC 8.59 06/12/2023    HGB 13.0 06/12/2023    HCT 40.2 06/12/2023    MCV 87.0 06/12/2023     06/12/2023         CMP  Sodium Level   Date Value Ref Range Status   06/12/2023 140 136 - 145 mmol/L Final     Potassium Level   Date Value Ref Range Status   06/12/2023 3.9 3.5 - 5.1 mmol/L Final     Carbon Dioxide   Date Value Ref Range Status   06/12/2023 26 22 - 29 mmol/L Final     Blood Urea Nitrogen   Date Value Ref Range Status   06/12/2023 9.3 (L) 9.8 - 20.1 mg/dL Final     Creatinine   Date Value Ref Range Status   06/12/2023 0.70 0.55 - 1.02 mg/dL Final     Calcium Level Total   Date Value Ref Range Status   06/12/2023 10.1 8.4 - 10.2 mg/dL Final     Albumin Level   Date Value Ref Range Status   04/06/2023 3.9 3.5 - 5.0 g/dL Final     Bilirubin Total   Date Value Ref Range Status   04/06/2023 0.4 <=1.5 mg/dL Final     Alkaline Phosphatase   Date Value Ref Range Status   04/06/2023 126 40 - 150 unit/L Final     Aspartate Aminotransferase   Date Value Ref Range Status   04/06/2023 20 5 - 34 unit/L Final     Alanine Aminotransferase   Date Value Ref Range Status   04/06/2023 18 0 - 55 unit/L Final     eGFR   Date Value Ref Range Status   06/12/2023 >60 mls/min/1.73/m2 Final     Lab Results   Component Value Date    HGBA1C 6.0 04/06/2023     Lab Results   Component Value Date    SEDRATE 27 (H) 06/12/2023     Lab Results   Component Value Date    CRP 4.70 04/11/2023     Date: 06/12/2023        Surgeon:Dillon Scott DO  Assistant: Kevin Mark was essential, part of the procedure including deep hardware placement and deep closure.  No senior assistant was availible  Preoperative Diagnosis:  Left Ankle draining sinus tract surgical wound dehiscence lateral  Postoperative Diagnosis: Same  Procedure:   excision of draining sinus tract surgical wound dehiscence left ankle CPT  24114  Anesthesiologist: Castillo Fulton MD  OR Staff: Circulator: Pedrito Whitman RN  Scrub Person: Lisandro Mccray  Implants: * No implants in log *  EBL: 20cc  Complications: None  Disposition: To PACU, stable     Indications: Deborah Cross is a 53 y.o. female presenting with the aforementioned injuries/findings.  Patient was seen in office today.  She is been doing quite well with her contralateral side and this side as well.  Over the last 3 or 4 days patient noticed a draining sinus tract on her incision   From her open traumatic ankle dislocation.  She states she is been wearing tennis shoes been rubbing in the area and started knows purulent drainage.  She is still smoking tobacco.  The patient is awake and alert. After thorough discussion of the risks, benefits, expected outcomes, and alternatives to surgical intervention, the patient agreed to proceed with surgical treatment.  Specific risks discussed included, but were not limited to: superficial or deep infection, wound healing complications, DVT/PE, significant bleeding requiring transfusion, damage to named anatomic structures in the immediate area including named neurovascular structures, infection, nonunion, malunion and general risks of anesthesia.  The patient voiced understanding and written as well as verbal consent was obtained by myself prior to the procedure.     Procedure Note:  The patient was brought back to the OR and placed supine on the OR table. After successful induction of anesthesia by anesthesia staff, the patient was positioned in the supine position and all bony prominences were padded appropriately.  The surgical field was then provisionally cleansed and then prepped and draped in the usual sterile fashion.     At this time a time-out was performed, with the correct patient, site, and procedure identified.  The universal time out as well as sign your site protocols were followed.  Preoperative antibiotics were  verified as administered.       Attention is drawn to the left lateral ankle patient has a draining sinus tract to the posterior 3rd of the incision there does appear to be purulence coming from the wound I made a ellipse of this sinus tract and excised it with a 15 blade blunt dissected down and came in contact with the fluid collection appears to be liquid I liquified adipose tissue from initial trauma versus purulence.  Overall minimal cellulitis in this area.  I completely irrigated the area completed excisional debridement and took deep cultures.  Put vancomycin deep in the wound andand then began closing          The patient was then subsequently transferred to to PACU in a stable condition.     All sponge and needle counts were correct at the end of the case.  I was present and participated in all aspects of the procedure.     Prognosis:  The patient will be kept WBAT on the ipsilateral extremity .  Patient will receive DVT prophylaxis .       We will order cultures and sensitivities for her.   She will be placed on oral antibiotics at this time Bactrim DS.  Plan:     Plan of Care:    Patient referred to this clinic for a longstanding issue to her left lateral ankle area.  She sustained a severe open dislocation and severe ligamentous injury in October of 2022 for which she underwent repair with Dr Scott (she also broke her right leg and had surgeries on that as well) in early 2023 she began to have recurrent drainage from the distal portion of the lateral ankle incision site.  She was felt to have a fistula tract was taken back to the operating room in mid June 2023 where it was debrided and closed but the skin opening returned.  She was prescribed  Bactrim and clindamycin orally.  Referred here and I met her on her first visit here on 7/27/23 noting friable hypergranular tissue based wound bed that tracts medially.   She ended up switching clinic days but I am seeing her today on 8/30/23: note scant purulent  drainage and it still is a hypergranular wound bed that tracts medially. I cauterized it; I will rx minocycline 100mg bid x 14 days and topical antimicrobrial ointment (ordered gent but over $50 so will change to mupirocin). If not improving, will consider MRI/discussion with her ortho: she sees Dr Scott In a few weeks  Nutrition: Must have a high protein diet to support wound  healing; (if renal disease, see nephrologist for amount allowed):  this should be over 100g protein /day (if no kidney issues); Also rec MVI along with vit C, vit D, zinc and Kristopher  Diabetes:  Hemoglobin A1c 6.0 Should follow a healthy low carb diet   Smoker:  She says she is well aware that she needs to stop smoking and acknowledges that it has inhibited wound healing and contributing to her current condition.  She says she used to smoke a pack a day and so for her being down to 4 cigarettes a day as a victory but she knows she needs to have complete cessation    Avoid shoes that rub on wound  Return to clinic 1 week         The time spent including preparing to see the patient, obtaining patient history and assessment, evaluation of the plan of care, patient/caregiver counseling and education, orders, documentation, coordination of care, and other professional medical management activities for today's encounter was 25 minutes.    Time spent performing procedures during today's encounter was 5 minutes.

## 2023-08-30 NOTE — PROCEDURES
Procedures    Chemical Cauterization      Performed by: Dr South  Time Out Taken: Yes.   Pain Control: topical lidocaine  Procedural Pain: 0  Post Procedural Pain: 0  Procedure was tolerated well.   General notes:used silver nitrate stick on open wound with hypergranulation

## 2023-09-07 ENCOUNTER — TELEPHONE (OUTPATIENT)
Dept: ORTHOPEDICS | Facility: CLINIC | Age: 53
End: 2023-09-07
Payer: MEDICAID

## 2023-09-08 ENCOUNTER — HOSPITAL ENCOUNTER (OUTPATIENT)
Dept: WOUND CARE | Facility: HOSPITAL | Age: 53
Discharge: HOME | End: 2023-09-08
Attending: EMERGENCY MEDICINE
Payer: MEDICAID

## 2023-09-08 VITALS
TEMPERATURE: 98 F | HEIGHT: 62 IN | SYSTOLIC BLOOD PRESSURE: 138 MMHG | HEART RATE: 82 BPM | DIASTOLIC BLOOD PRESSURE: 80 MMHG | RESPIRATION RATE: 16 BRPM | WEIGHT: 140 LBS | BODY MASS INDEX: 25.76 KG/M2

## 2023-09-08 DIAGNOSIS — E78.49 OTHER HYPERLIPIDEMIA: ICD-10-CM

## 2023-09-08 DIAGNOSIS — F41.8 MIXED ANXIETY AND DEPRESSIVE DISORDER: ICD-10-CM

## 2023-09-08 DIAGNOSIS — F17.210 CIGARETTE SMOKER: ICD-10-CM

## 2023-09-08 DIAGNOSIS — T81.42XA DEEP INCISIONAL SURGICAL SITE INFECTION: ICD-10-CM

## 2023-09-08 DIAGNOSIS — T81.32XA DISRUPTION OF INTERNAL OPERATION (SURGICAL) WOUND, NOT ELSEWHERE CLASSIFIED, INITIAL ENCOUNTER: ICD-10-CM

## 2023-09-08 DIAGNOSIS — S91.002D OPEN WOUND OF LEFT ANKLE, SUBSEQUENT ENCOUNTER: ICD-10-CM

## 2023-09-08 LAB — POCT GLUCOSE: 139 MG/DL (ref 70–110)

## 2023-09-08 PROCEDURE — 11042 DEBRIDEMENT: ICD-10-PCS | Mod: ,,, | Performed by: EMERGENCY MEDICINE

## 2023-09-08 PROCEDURE — 11042 DBRDMT SUBQ TIS 1ST 20SQCM/<: CPT

## 2023-09-08 PROCEDURE — 27000999 HC MEDICAL RECORD PHOTO DOCUMENTATION

## 2023-09-08 PROCEDURE — 99499 UNLISTED E&M SERVICE: CPT | Mod: ,,, | Performed by: EMERGENCY MEDICINE

## 2023-09-08 PROCEDURE — 11042 DBRDMT SUBQ TIS 1ST 20SQCM/<: CPT | Mod: ,,, | Performed by: EMERGENCY MEDICINE

## 2023-09-08 PROCEDURE — 87070 CULTURE OTHR SPECIMN AEROBIC: CPT

## 2023-09-08 PROCEDURE — 99499 NO LOS: ICD-10-PCS | Mod: ,,, | Performed by: EMERGENCY MEDICINE

## 2023-09-08 NOTE — PROCEDURES
"Debridement    Date/Time: 9/8/2023 7:58 AM    Performed by: Mindy South MD  Authorized by: Mindy South MD  Associated wounds:        Incision/Site 06/12/23 1221 Left Malleolus/Ankle lateral  Time out: Immediately prior to procedure a "time out" was called to verify the correct patient, procedure, equipment, support staff and site/side marked as required.    Consent Done?:  Yes (Written)  Local anesthesia used?: Yes    Local anesthetic:  Topical anesthetic    Wound Details:    Location:  Left ankle    Type of Debridement:  Excisional       Length (cm):  0.4       Area (sq cm):  0.28       Width (cm):  0.7       Percent Debrided (%):  100       Depth (cm):  1       Total Area Debrided (sq cm):  0.28    Depth of debridement:  Subcutaneous tissue    Tissue debrided:  Dermis, Epidermis, Hypergranulation and Subcutaneous    Devitalized tissue debrided:  Slough, Exudate and Biofilm    Instruments:  Curette  Bleeding:  Moderate  Hemostasis Achieved: Yes  Method Used:  Pressure and Silver Nitrate  Patient tolerance:  Patient tolerated the procedure well with no immediate complications    "

## 2023-09-08 NOTE — PROGRESS NOTES
"Subjective:       Patient ID: Deborah Cross is a 53 y.o. female.    Chief Complaint: Non-healing Wound Follow Up    Cc: nonhealing left lateral ankle post surgical wound      53-year-old WF   chronic cigarette smoker along with h/o dyslipidemia and prediabetes had a fall off of a ladder in October of 2022 resulting in an open right tibia /fibula shaft fracture along with an open left ankle dislocation with severe ligamentous injury . Dr Scott did surgery on RLE as well as a left ankle primary ligamentous reconstruction laterally /ATFL as well as closed treatment of a left 5th metatarsal base fracture. 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   The drainage got worse on left lateral ankle mark after a weekend of being in a friend's pool. Dr Scott took her back to OR in mid June 2023 where it was noted she had a draining sinus tract. OR noted "ellipse of this sinus tract and excised it with a 15 blade blunt dissected down and came in contact with the fluid collection appears to be liquid I liquified adipose tissue from initial trauma versus purulence.  I completely irrigated the area completed excisional debridement and took deep cultures.  Put vancomycin deep in the wound andand then began closing".  Placed on bactrim for a few weeks.   Surgical site again opened up on same location.  Given a week of clindamycin . She last saw Dr Scott on 7/26/23 who then referred here. She came in for her first eval here in the wound care clinic on 7/27/23.  I noted she was still smoking 3-4 cigs/day (she is aware how this inhibits wound healing) and she reported still wearing tennis shoes from the moment she wakes until she goes to sleep...after being told not to by ortho because of friction/rubbing. I noted a dime-sized open wound with spongy friable hypergranulation " tissue with a slit in the middle of the wound bed with depth of almost 0.5 cm.  No active drainage noted. I applied silver nitrate on the wound bed and ordered silver alginate dressings . I told her to stop smoking, keep this area padded and to stop wearing her tennis shoes that rubbed on this wound.  Wound typically still has drainage and deeper on medial/more anterior side of wound bed. She switched clinic days to Dr Knight but now back to my Friday clinic day. I saw her on 8/30/23 and ordered 2 weeks of bid minocycline but pt tells me today on 9/8/23 that she only taking daily as that is what she thought.   Tyler lamin for next week moved back by their office to 9/18/23  I feel she will ultimately need MRI/another OR debridement and/or IV antibiotics for resolution.        Review of Systems   Constitutional: Negative.    HENT: Negative.     Respiratory: Negative.     Cardiovascular: Negative.    Gastrointestinal: Negative.    Skin:  Positive for wound.   Neurological: Negative.          Objective:      Vitals:    09/08/23 0836   BP: 138/80   Pulse: 82   Resp: 16   Temp: 97.5 °F (36.4 °C)     @poctglucose@  Recent Labs   Lab 09/08/23  0830   POCTGLUCOSE 139*     Physical Exam  Vitals reviewed.   Cardiovascular:      Pulses:           Dorsalis pedis pulses are 2+ on the right side and 2+ on the left side.   Pulmonary:      Effort: Pulmonary effort is normal.   Musculoskeletal:        Feet:    Skin:     General: Skin is warm and dry.      Capillary Refill: Capillary refill takes less than 2 seconds.   Neurological:      General: No focal deficit present.      Mental Status: She is alert.              Incision/Site 06/12/23 1221 Left Malleolus/Ankle lateral (Active)   06/12/23 1221   Present Prior to Hospital Arrival?: Yes   Side: Left   Location: Malleolus/Ankle   Orientation: lateral   Incision Type:    Closure Method:    Additional Comments: Doppler biphasic x4   palpable x 4   Removal Indication and Assessment:   "  Wound Outcome:    Removal Indications:    Wound Image   09/08/23 0851   Dressing Appearance Dry;Moist drainage 09/08/23 0851   Drainage Amount Moderate 09/08/23 0851   Drainage Characteristics/Odor Serosanguineous;Malodorous 09/08/23 0851   Appearance Pink 09/08/23 0851   Black (%), Wound Tissue Color 0 % 09/08/23 0851   Red (%), Wound Tissue Color 100 % 09/08/23 0851   Yellow (%), Wound Tissue Color 0 % 09/08/23 0851   Periwound Area Intact;Dry 09/08/23 0851   Wound Edges Irregular 09/08/23 0851   Wound Length (cm) 0.4 cm 09/08/23 0851   Wound Width (cm) 0.7 cm 09/08/23 0851   Wound Depth (cm) 0.2 cm 09/08/23 0851   Wound Volume (cm^3) 0.056 cm^3 09/08/23 0851   Wound Surface Area (cm^2) 0.28 cm^2 09/08/23 0851   Care Cleansed with:;Wound cleanser;Applied: 09/08/23 0851   Dressing Applied 09/08/23 0851           Assessment:       1. Open wound of left ankle, subsequent encounter    2. Disruption of internal operation (surgical) wound, not elsewhere classified, initial encounter    3. Deep incisional surgical site infection    4. Cigarette smoker    5. Mixed anxiety and depressive disorder    6. Other hyperlipidemia          Left lateral ankle open dislocation October 2022: surgery with Dr Scott: "My attention is now drawn to the left ankle patient contaminated left open ankle injury appears to be a simple fracture dislocation with complete disruption of the lateral ligament complex.  Then excisionally debrided the subcutaneous tissue fascia and capsule.  This was then copiously irrigated.  I then placed an Arthrex anchor in the fibula and repaired the ATFL recent stress the ankle appears to be stable." Subsequent distal incisional site opening with fistula tract and drainage since early 2023:  Exploration and debridement mid June 2023 with closure: Subsequent recurrent surgical wound dehiscence: Given Bactrim and clindamycin orally, referred to wound care: 1st clinic visit 7/27/23: recalcitrant  Chronic cigarette " smoker  Hypertension  Dyslipidemia  Prediabetes  ADHD, depression      Date: 06/12/2023        Surgeon:Dillon Scott DO  Assistant: Kevin Mark was essential, part of the procedure including deep hardware placement and deep closure.  No senior assistant was availible  Preoperative Diagnosis:  Left Ankle draining sinus tract surgical wound dehiscence lateral  Postoperative Diagnosis: Same  Procedure:   excision of draining sinus tract surgical wound dehiscence left ankle CPT 01530  Anesthesiologist: Castillo Fulton MD  OR Staff: Circulator: Pedrito Whitman RN  Scrub Person: Lisandro Mccray  Implants: * No implants in log *  EBL: 20cc  Complications: None  Disposition: To PACU, stable     Indications: Deborah Cross is a 53 y.o. female presenting with the aforementioned injuries/findings.  Patient was seen in office today.  She is been doing quite well with her contralateral side and this side as well.  Over the last 3 or 4 days patient noticed a draining sinus tract on her incision   From her open traumatic ankle dislocation.  She states she is been wearing tennis shoes been rubbing in the area and started knows purulent drainage.  She is still smoking tobacco.  The patient is awake and alert. After thorough discussion of the risks, benefits, expected outcomes, and alternatives to surgical intervention, the patient agreed to proceed with surgical treatment.  Specific risks discussed included, but were not limited to: superficial or deep infection, wound healing complications, DVT/PE, significant bleeding requiring transfusion, damage to named anatomic structures in the immediate area including named neurovascular structures, infection, nonunion, malunion and general risks of anesthesia.  The patient voiced understanding and written as well as verbal consent was obtained by myself prior to the procedure.     Procedure Note:  The patient was brought back to the OR and placed supine on  the OR table. After successful induction of anesthesia by anesthesia staff, the patient was positioned in the supine position and all bony prominences were padded appropriately.  The surgical field was then provisionally cleansed and then prepped and draped in the usual sterile fashion.     At this time a time-out was performed, with the correct patient, site, and procedure identified.  The universal time out as well as sign your site protocols were followed.  Preoperative antibiotics were verified as administered.       Attention is drawn to the left lateral ankle patient has a draining sinus tract to the posterior 3rd of the incision there does appear to be purulence coming from the wound I made a ellipse of this sinus tract and excised it with a 15 blade blunt dissected down and came in contact with the fluid collection appears to be liquid I liquified adipose tissue from initial trauma versus purulence.  Overall minimal cellulitis in this area.  I completely irrigated the area completed excisional debridement and took deep cultures.  Put vancomycin deep in the wound andand then began closing          The patient was then subsequently transferred to to PACU in a stable condition.     All sponge and needle counts were correct at the end of the case.  I was present and participated in all aspects of the procedure.     Prognosis:  The patient will be kept WBAT on the ipsilateral extremity .  Patient will receive DVT prophylaxis .       We will order cultures and sensitivities for her.   She will be placed on oral antibiotics at this time Bactrim DS.  Plan:     Plan of Care:    Patient referred to this clinic for a longstanding issue to her left lateral ankle area.  She sustained a severe open dislocation and severe ligamentous injury in October of 2022 for which she underwent repair with Dr Scott (she also broke her right leg and had surgeries on that as well) in early 2023 she began to have recurrent drainage from  the distal portion of the lateral ankle incision site.  She was felt to have a fistula tract was taken back to the operating room in mid June 2023 where it was debrided and closed but the skin opening returned.  She was prescribed  Bactrim and clindamycin orally.  Referred here and I met her on her first visit here on 7/27/23 noting friable hypergranular tissue based wound bed that tracts medially and deeper on that side .  I prescribed minocycline 100mg bid x 14 days on 8/30/23 but pt only taking daily: I corrected her on proper timing. She still has drainage and wound still deeper to 1cm on anterior/medial side of wound bed. Cx sent  She is seeing Tyler soon: I do feel she will need further surgical eval and/or MRI and /or IV antibiotics. There is something in the subsurface that is the nidus of infection. ?osteo  Nutrition: Must have a high protein diet to support wound  healing; (if renal disease, see nephrologist for amount allowed):  this should be over 100g protein /day (if no kidney issues); Also rec MVI along with vit C, vit D, zinc and Kristopher  Diabetes:  Hemoglobin A1c 6.0 Should follow a healthy low carb diet   Smoker:  She says she is well aware that she needs to stop smoking and acknowledges that it has inhibited wound healing and contributing to her current condition.  She says she used to smoke a pack a day and so for her being down to 4 cigarettes a day as a victory but she knows she needs to have complete cessation    Avoid shoes that rub on wound  Return to clinic 1.5 weeks

## 2023-09-08 NOTE — PATIENT INSTRUCTIONS
Pt seen today by: Dr. South    Culture done on 9/8/23- We will call with abnormal results.    Dressings to be changed Daily and as needed if soiled or not intact.    Supplies ordered through [employer]    Take antibiotics until complete.    Self care DRESSING INSTRUCTIONS:    Wound location: Left Lateral Ankle  Cleanse wound with wound cleanser or saline  Apply mupirocin oint to the wound  Apply silver alginate to the wound bed, pack around undermined area from 9:00-10:00 oclock  Cover with bordered foam dressing    Return visit:  Tuesday, September 19, 2023 at 8:00 am    Nutrition:  The current daily value (%DV) for protein is 50 grams per day and is meant as a general goal for most people. Further increasing your dietary protein intake is very important for wound healing. Typically one needs over 100g of protein per day to help with wound healing needs.  If you are a dialysis patient or have problems with your kidneys, talk to your Nephrologist about how much protein you can take in with your condition.  Examples of high protein items that can be added to your diet include: eggs, chicken, red meats, almonds, cottage cheese, Greek yogurt, beans, and peanut butter.  Fortified protein bars, shakes and drinks can add 15-30 additional grams of protein per serving.   Also add:   1 daily general multivitamin   Kristopher : 1 packet twice daily   Vitamin C : 500mg twice daily   Zinc 220 mg daily  Vit D : once daily    Offloading   Offload your wound. This means to reduce pressure on and around the wound that reduces blood flow to the wound and prevents healing. Your wound care team will discuss specific ways for you to offload your specific wound. Common offloading strategies include:  Turn or reposition every 2 hours or sooner  Use pillows, wedges, ROHO wheelchair cushions or other special devices like boots and shoes to lift the wound off of hard surfaces  Alternating Low Air-loss (ALAL) mattress may be ordered  Padded  dressings can reduce wound pressure      Call our [facility] wound clinic for questions/concerns a [phone] .

## 2023-09-09 ENCOUNTER — HOSPITAL ENCOUNTER (EMERGENCY)
Facility: HOSPITAL | Age: 53
Discharge: HOME | End: 2023-09-09
Attending: FAMILY MEDICINE
Payer: MEDICAID

## 2023-09-09 VITALS
TEMPERATURE: 100 F | BODY MASS INDEX: 25.76 KG/M2 | OXYGEN SATURATION: 96 % | RESPIRATION RATE: 20 BRPM | SYSTOLIC BLOOD PRESSURE: 130 MMHG | HEART RATE: 99 BPM | WEIGHT: 140 LBS | HEIGHT: 62 IN | DIASTOLIC BLOOD PRESSURE: 77 MMHG

## 2023-09-09 DIAGNOSIS — U07.1 COVID: Primary | ICD-10-CM

## 2023-09-09 LAB
FLUAV AG UPPER RESP QL IA.RAPID: NOT DETECTED
FLUBV AG UPPER RESP QL IA.RAPID: NOT DETECTED
SARS-COV-2 RNA RESP QL NAA+PROBE: DETECTED

## 2023-09-09 PROCEDURE — 99283 EMERGENCY DEPT VISIT LOW MDM: CPT

## 2023-09-09 PROCEDURE — 0240U COVID/FLU A&B PCR: CPT | Performed by: FAMILY MEDICINE

## 2023-09-09 NOTE — ED PROVIDER NOTES
Encounter Date: 2023       History     Chief Complaint   Patient presents with    Generalized Body Aches     Complains of body aches since last night. Denies any other symptoms. 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, with nonunion, subsequent encounter      Past Surgical History:   Procedure Laterality Date     SECTION  2005    HYSTERECTOMY  2015    INCISION AND DRAINAGE, LOWER EXTREMITY Left 2023    Procedure: INCISION AND DRAINAGE, LOWER EXTREMITY - supine bone foam wash stuff cultures;  Surgeon: Dillon Scott DO;  Location: [facility];  Service: Orthopedics;  Laterality: Left;  supine bone foam wash stuff cultures    INSERTION OF INTRAMEDULLARY NAIL INTO TIBIA Right 10/24/2022    Procedure: INSERTION, INTRAMEDULLARY JACK, TIBIA;  Surgeon: Dillon Scott DO;  Location: [facility];  Service: Orthopedics;  Laterality: Right;  supine, vascular, bone foam, c-arm, wash stuff    ORIF TIBIA FRACTURE Right 2023    Procedure: ORIF, FRACTURE, TIBIA;  Surgeon: Dillon Scott DO;  Location: [facility];  Service: Orthopedics;  Laterality: Right;    REPAIR OF LIGAMENT OF ANKLE  10/24/2022    Procedure: REPAIR, LIGAMENT, ANKLE;  Surgeon: Dillon Scott DO;  Location: [facility];  Service: Orthopedics;;     Family History   Problem Relation Age of Onset    Diabetes Mother     Heart disease Mother     Diabetes Father      Social History     Tobacco Use    Smoking status: Some Days     Current packs/day: 0.50     Average packs/day: 0.5 packs/day  for 15.0 years (7.5 ttl pk-yrs)     Types: Cigarettes    Smokeless tobacco: Former    Tobacco comments:     Pt currently smoking 3 cigarettes daily.   Substance Use Topics    Alcohol use: Not Currently    Drug use: Not Currently     Types: Benzodiazepines, Marijuana     Review of Systems   Constitutional:  Negative for fever.   HENT:  Negative for sore throat.    Respiratory:  Negative for shortness of breath.    Cardiovascular:  Negative for chest pain.   Gastrointestinal:  Negative for nausea.   Genitourinary:  Negative for dysuria.   Musculoskeletal:  Negative for back pain.   Skin:  Negative for rash.   Neurological:  Positive for weakness.   Hematological:  Does not bruise/bleed easily.   All other systems reviewed and are negative.      Physical Exam     Initial Vitals [09/09/23 0752]   BP Pulse Resp Temp SpO2   130/77 99 20 100 °F (37.8 °C) 96 %      MAP       --         Physical Exam    Nursing note and vitals reviewed.  Constitutional: She appears well-developed.   HENT:   Head: Normocephalic and atraumatic.   Right Ear: External ear normal.   Left Ear: External ear normal.   Nose: Nose normal.   Mouth/Throat: Oropharynx is clear and moist. No oropharyngeal exudate.   Eyes: Conjunctivae and EOM are normal. Pupils are equal, round, and reactive to light. Right eye exhibits no discharge. Left eye exhibits no discharge.   Neck: Neck supple. No tracheal deviation present. No JVD present.   Normal range of motion.  Cardiovascular:  Normal rate, regular rhythm, normal heart sounds and intact distal pulses.     Exam reveals no gallop and no friction rub.       No murmur heard.  Pulmonary/Chest: Breath sounds normal. No stridor. No respiratory distress. She has no wheezes. She has no rhonchi. She has no rales.   Abdominal: Abdomen is soft. Bowel sounds are normal. She exhibits no distension and no mass. There is no abdominal tenderness. There is no rebound and no guarding.   Musculoskeletal:         General: Normal  range of motion.      Cervical back: Normal range of motion and neck supple.     Neurological: She is alert and oriented to person, place, and time. She has normal strength. No cranial nerve deficit.   Skin: Skin is warm and dry. No rash and no abscess noted. No erythema.   Psychiatric: She has a normal mood and affect. Her behavior is normal. Judgment and thought content normal.         ED Course   Procedures  Labs Reviewed   COVID/FLU A&B PCR - Abnormal; Notable for the following components:       Result Value    SARS-CoV-2 PCR Detected (*)     All other components within normal limits    Narrative:     The Xpert Xpress SARS-CoV-2/FLU/RSV plus is a rapid, multiplexed real-time PCR test intended for the simultaneous qualitative detection and differentiation of SARS-CoV-2, Influenza A, Influenza B, and respiratory syncytial virus (RSV) viral RNA in either nasopharyngeal swab or nasal swab specimens.                Imaging Results    None          Medications - No data to display  Medical Decision Making  COVID flu pneumonia bronchitis    Risk  Prescription drug management.                               Clinical Impression:   Final diagnoses:  [U07.1] COVID (Primary)        ED Disposition Condition    Discharge Stable          ED Prescriptions       Medication Sig Dispense Start Date End Date Auth. Provider    nirmatrelvir-ritonavir 300 mg (150 mg x 2)-100 mg copackaged tablets (EUA) Take 3 tablets by mouth 2 (two) times daily. Each dose contains 2 nirmatrelvir (pink tablets) and 1 ritonavir (white tablet). Take all 3 tablets together 30 tablet 9/9/2023 -- Zaid Sky MD          Follow-up Information    None          Zaid Sky MD  09/09/23 7259

## 2023-09-11 LAB — BACTERIA WND CULT: NO GROWTH

## 2023-09-18 ENCOUNTER — OFFICE VISIT (OUTPATIENT)
Dept: ORTHOPEDICS | Facility: CLINIC | Age: 53
End: 2023-09-18
Payer: MEDICAID

## 2023-09-18 VITALS
WEIGHT: 140 LBS | HEIGHT: 62 IN | SYSTOLIC BLOOD PRESSURE: 131 MMHG | HEART RATE: 81 BPM | BODY MASS INDEX: 25.76 KG/M2 | DIASTOLIC BLOOD PRESSURE: 87 MMHG | TEMPERATURE: 98 F

## 2023-09-18 DIAGNOSIS — S91.012A: Primary | ICD-10-CM

## 2023-09-18 PROCEDURE — 3044F HG A1C LEVEL LT 7.0%: CPT | Mod: CPTII,,, | Performed by: ORTHOPAEDIC SURGERY

## 2023-09-18 PROCEDURE — 99213 OFFICE O/P EST LOW 20 MIN: CPT | Mod: ,,, | Performed by: ORTHOPAEDIC SURGERY

## 2023-09-18 PROCEDURE — 3079F PR MOST RECENT DIASTOLIC BLOOD PRESSURE 80-89 MM HG: ICD-10-PCS | Mod: CPTII,,, | Performed by: ORTHOPAEDIC SURGERY

## 2023-09-18 PROCEDURE — 3044F PR MOST RECENT HEMOGLOBIN A1C LEVEL <7.0%: ICD-10-PCS | Mod: CPTII,,, | Performed by: ORTHOPAEDIC SURGERY

## 2023-09-18 PROCEDURE — 3075F SYST BP GE 130 - 139MM HG: CPT | Mod: CPTII,,, | Performed by: ORTHOPAEDIC SURGERY

## 2023-09-18 PROCEDURE — 3079F DIAST BP 80-89 MM HG: CPT | Mod: CPTII,,, | Performed by: ORTHOPAEDIC SURGERY

## 2023-09-18 PROCEDURE — 1159F PR MEDICATION LIST DOCUMENTED IN MEDICAL RECORD: ICD-10-PCS | Mod: CPTII,,, | Performed by: ORTHOPAEDIC SURGERY

## 2023-09-18 PROCEDURE — 3008F PR BODY MASS INDEX (BMI) DOCUMENTED: ICD-10-PCS | Mod: CPTII,,, | Performed by: ORTHOPAEDIC SURGERY

## 2023-09-18 PROCEDURE — 3008F BODY MASS INDEX DOCD: CPT | Mod: CPTII,,, | Performed by: ORTHOPAEDIC SURGERY

## 2023-09-18 PROCEDURE — 99213 PR OFFICE/OUTPT VISIT, EST, LEVL III, 20-29 MIN: ICD-10-PCS | Mod: ,,, | Performed by: ORTHOPAEDIC SURGERY

## 2023-09-18 PROCEDURE — 3075F PR MOST RECENT SYSTOLIC BLOOD PRESS GE 130-139MM HG: ICD-10-PCS | Mod: CPTII,,, | Performed by: ORTHOPAEDIC SURGERY

## 2023-09-18 PROCEDURE — 1159F MED LIST DOCD IN RCRD: CPT | Mod: CPTII,,, | Performed by: ORTHOPAEDIC SURGERY

## 2023-09-18 NOTE — PROGRESS NOTES
Subjective:       Patient ID: Deborah Cross is a 53 y.o. female.  Chief Complaint   Patient presents with    Right Ankle - Follow-up     3 month f/u from I&D right ankle wound. Ambulates without assistance in regular shoe. Denies increase in pain or discomfort.         Follow-up        Patient is 3mo out from incision and drainage of a draining sinus tract to her left ankle.  She is been doing well.  No fevers no chills no systemic signs of infection.  She is still smoking.  She is seen wound care.  No drainage on today's dressing.  No fevers or chills.  In normal tennis shoes walking without assist normal gait    ROS:  Constitutional: Denies fever chills  Eyes: No change in vision  ENT: No ringing or current infections  CV: No chest pain  Resp: No labored breathing  MSK: Pain evident at site of injury located in HPI,   Integ: No signs of abrasions or lacerations  Neuro: No numbness or tingling  Lymphatic: No swelling outside the area of injury     Current Outpatient Medications on File Prior to Visit   Medication Sig Dispense Refill    ALPRAZolam (XANAX) 1 MG tablet Take 1 tablet (1 mg total) by mouth 2 (two) times daily. 45 tablet 5    amLODIPine (NORVASC) 5 MG tablet TAKE ONE TABLET BY MOUTH DAILY 90 tablet 1    FLUoxetine 40 MG capsule TAKE ONE CAPSULE BY MOUTH EVERY MORNING 90 capsule 1    metFORMIN (GLUCOPHAGE) 500 MG tablet TAKE ONE TABLET BY MOUTH once daily 90 tablet 1    naproxen (NAPROSYN) 500 MG tablet Take 1 tablet (500 mg total) by mouth 2 (two) times daily with meals. 60 tablet 0    QUEtiapine (SEROQUEL) 25 MG Tab Take 1 tablet (25 mg total) by mouth every evening. 90 tablet 1    rosuvastatin (CRESTOR) 20 MG tablet TAKE ONE TABLET ONCE DAILY 90 tablet 0    sumatriptan (IMITREX) 25 MG Tab TAKE ONE TABLET BY MOUTH as a single DOSE AND MAY REPEAT DOSE in 2 hours if needed 9 tablet 1    traZODone (DESYREL) 100 MG tablet Take 100 mg by mouth every evening.      aspirin (ECOTRIN) 81 MG EC tablet  "Take 1 tablet (81 mg total) by mouth 2 (two) times a day. for 14 days 28 tablet 0    fluticasone propionate (FLONASE) 50 mcg/actuation nasal spray use 1 spray in each nostril twice daily (Patient not taking: Reported on 9/18/2023) 16 g 1    methylPREDNISolone (MEDROL DOSEPACK) 4 mg tablet use as directed (Patient not taking: Reported on 9/18/2023) 21 each 0    mupirocin (BACTROBAN) 2 % ointment Apply topically 2 (two) times a day. (Patient not taking: Reported on 9/18/2023) 30 g 0    naloxone (NARCAN) 4 mg/actuation Spry use 1 spray into one nostril. If no response after 2 to 3 minutes, administer additional spray into other nostril. call 911      nirmatrelvir-ritonavir 300 mg (150 mg x 2)-100 mg copackaged tablets (EUA) Take 3 tablets by mouth 2 (two) times daily. Each dose contains 2 nirmatrelvir (pink tablets) and 1 ritonavir (white tablet). Take all 3 tablets together (Patient not taking: Reported on 9/18/2023) 30 tablet 0     No current facility-administered medications on file prior to visit.          Objective:      /87   Pulse 81   Temp 97.6 °F (36.4 °C)   Ht 5' 2" (1.575 m)   Wt 63.5 kg (140 lb)   BMI 25.61 kg/m²   Physical Exam  General the patient is alert and oriented x3 no acute distress nontoxic-appearing appropriate affect.    Constitutional: Vital signs are examined and stable.  Resp: No signs of labored breathing    Musculoskeletal:     Left lower extremity: approx [id number] area w on lateral ankle.  No problems., there is no expressible drainage; compartments are soft and compressible; No pain with ROM at the hip, knee, or ankle; minimal tenderness to palpation; dorsi/plantar flexes the foot; SILT distally; BCR distally; DP pulse 2+      Body mass index is 25.61 kg/m².  Ideal body weight: 50.1 kg (110 lb 7.2 oz)  Adjusted ideal body weight: 55.5 kg (122 lb 4.3 oz)  Hemoglobin A1c   Date Value Ref Range Status   04/06/2023 6.0 <=7.0 % Final   12/15/2021 5.9 <<=7.0 % Final     Hgb   Date " "Value Ref Range Status   06/12/2023 13.0 12.0 - 16.0 g/dL Final   04/11/2023 12.8 12.0 - 16.0 g/dL Final     Hct   Date Value Ref Range Status   06/12/2023 40.2 37.0 - 47.0 % Final   04/11/2023 39.4 37.0 - 47.0 % Final     Iron Level   Date Value Ref Range Status   04/06/2023 90 50 - 170 ug/dL Final     No components found for: "FROLATE"  Vit D 25 OH   Date Value Ref Range Status   04/06/2023 63.1 30.0 - 80.0 ng/mL Final   12/15/2021 27.7 (L) 30.0 - 80.0 ng/mL Final     WBC   Date Value Ref Range Status   06/12/2023 8.59 4.50 - 11.50 x10(3)/mcL Final   04/11/2023 7.2 4.5 - 11.5 x10(3)/mcL Final       Radiology: no x rays taken today.         Assessment:         1. Laceration of left ankle with complication, initial encounter                    Plan:         No follow-ups on file.    There are no diagnoses linked to this encounter.    Patient here for wound check.  Having minimal drainage No purulence no erythema no fluctuant mass.  She is ambulatory today in normal tennis shoe.  She is still smoking which decreases wound healing increase his risk of infection.  I have recommend a referral to our wound care specialists.  I believe this was already washed out and she is resistant to surgery at this time.  Recommend weightbearing as tolerated no restrictions.        Future Appointments   Date Time Provider Department Center   9/19/2023  8:00 [facility]   12/19/2023  8:00 AM [facility]   1/15/2024  1:15 PM Adamaris Cao MD [facility]               "

## 2023-09-19 ENCOUNTER — HOSPITAL ENCOUNTER (OUTPATIENT)
Dept: WOUND CARE | Facility: HOSPITAL | Age: 53
Discharge: HOME | End: 2023-09-19
Attending: EMERGENCY MEDICINE
Payer: MEDICAID

## 2023-09-19 DIAGNOSIS — F17.210 CIGARETTE SMOKER: ICD-10-CM

## 2023-09-19 DIAGNOSIS — T81.42XA DEEP INCISIONAL SURGICAL SITE INFECTION: ICD-10-CM

## 2023-09-19 DIAGNOSIS — S91.002D OPEN WOUND OF LEFT ANKLE, SUBSEQUENT ENCOUNTER: Primary | ICD-10-CM

## 2023-09-19 DIAGNOSIS — E78.49 OTHER HYPERLIPIDEMIA: ICD-10-CM

## 2023-09-19 DIAGNOSIS — F41.8 MIXED ANXIETY AND DEPRESSIVE DISORDER: ICD-10-CM

## 2023-09-19 DIAGNOSIS — T81.32XA DISRUPTION OF INTERNAL OPERATION (SURGICAL) WOUND, NOT ELSEWHERE CLASSIFIED, INITIAL ENCOUNTER: ICD-10-CM

## 2023-09-19 LAB — POCT GLUCOSE: 258 MG/DL (ref 70–110)

## 2023-09-19 PROCEDURE — 99213 OFFICE O/P EST LOW 20 MIN: CPT

## 2023-09-19 PROCEDURE — 27000999 HC MEDICAL RECORD PHOTO DOCUMENTATION

## 2023-09-19 PROCEDURE — 99213 OFFICE O/P EST LOW 20 MIN: CPT | Mod: ,,, | Performed by: EMERGENCY MEDICINE

## 2023-09-19 PROCEDURE — 99213 PR OFFICE/OUTPT VISIT, EST, LEVL III, 20-29 MIN: ICD-10-PCS | Mod: ,,, | Performed by: EMERGENCY MEDICINE

## 2023-09-19 RX ORDER — MUPIROCIN 20 MG/G
OINTMENT TOPICAL DAILY
Qty: 1 G | Refills: 0 | Status: SHIPPED | OUTPATIENT
Start: 2023-09-19 | End: 2023-10-10 | Stop reason: ALTCHOICE

## 2023-09-19 RX ORDER — MINOCYCLINE HYDROCHLORIDE 100 MG/1
100 CAPSULE ORAL EVERY 12 HOURS
Qty: 60 CAPSULE | Refills: 0 | Status: SHIPPED | OUTPATIENT
Start: 2023-09-19 | End: 2023-10-19

## 2023-09-19 NOTE — PROGRESS NOTES
"Subjective:       Patient ID: Deborah Cross is a 53 y.o. female.    Chief Complaint: No chief complaint on file.    Cc: nonhealing left lateral ankle post surgical wound      53-year-old WF   chronic cigarette smoker along with h/o dyslipidemia and prediabetes had a fall off of a ladder in October of 2022 resulting in an open right tibia /fibula shaft fracture along with an open left ankle dislocation with severe ligamentous injury . Dr Scott did surgery on RLE as well as a left ankle primary ligamentous reconstruction laterally /ATFL as well as closed treatment of a left 5th metatarsal base fracture. 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   The drainage got worse on left lateral ankle mark after a weekend of being in a friend's pool. Dr Scott took her back to OR in mid June 2023 where it was noted she had a draining sinus tract. OR noted "ellipse of this sinus tract and excised it with a 15 blade blunt dissected down and came in contact with the fluid collection appears to be liquid I liquified adipose tissue from initial trauma versus purulence.  I completely irrigated the area completed excisional debridement and took deep cultures.  Put vancomycin deep in the wound andand then began closing".  Placed on bactrim for a few weeks.   Surgical site again opened up on same location.  Given a week of clindamycin . She last saw Dr Scott on 7/26/23 who then referred here. She came in for her first eval here in the wound care clinic on 7/27/23.  I noted she was still smoking 3-4 cigs/day (she is aware how this inhibits wound healing) and she reported still wearing tennis shoes from the moment she wakes until she goes to sleep...after being told not to by ortho because of friction/rubbing. I noted a dime-sized open wound with spongy friable hypergranulation " tissue with a slit in the middle of the wound bed with depth of almost 0.5 cm.  No active drainage noted. I applied silver nitrate on the wound bed and ordered silver alginate dressings . I told her to stop smoking, keep this area padded and to stop wearing her tennis shoes that rubbed on this wound.  Wound typically still has drainage and deeper on medial/more anterior side of wound bed.  On 8/30/23, I ordered 2 weeks of  minocycline but pt was not taking it correctly (daily instead of the instructed bid)    Since last here, she tested +for Covid and PCP rx medrol dose pack. She saw Tyler yesterday on 9/18/23: noted pt still smoking and wearing regular tennis shoes.   Today she reports it seems the wound has closed but with scant drainage; much less pain        Review of Systems   Constitutional: Negative.    HENT: Negative.     Respiratory: Negative.     Cardiovascular: Negative.    Gastrointestinal: Negative.    Skin:  Positive for wound.   Neurological: Negative.          Objective:      There were no vitals filed for this visit.    @poctglucose@  Recent Labs   Lab 09/19/23  0806   POCTGLUCOSE 258*     Physical Exam  Vitals reviewed.   Cardiovascular:      Pulses:           Dorsalis pedis pulses are 2+ on the right side and 2+ on the left side.   Pulmonary:      Effort: Pulmonary effort is normal.   Musculoskeletal:        Feet:    Skin:     General: Skin is warm and dry.      Capillary Refill: Capillary refill takes less than 2 seconds.   Neurological:      General: No focal deficit present.      Mental Status: She is alert.              Incision/Site 06/12/23 1221 Left Malleolus/Ankle lateral (Active)   06/12/23 1221   Present Prior to Hospital Arrival?: Yes   Side: Left   Location: Malleolus/Ankle   Orientation: lateral   Incision Type:    Closure Method:    Additional Comments: Doppler biphasic x4   palpable x 4   Removal Indication and Assessment:    Wound Outcome:    Removal Indications:    Wound Image    "09/19/23 0822   Dressing Appearance Moist drainage 09/19/23 0822   Drainage Amount Small 09/19/23 0822   Drainage Characteristics/Odor Serosanguineous 09/19/23 0822   Appearance Pink;Epithelialization 09/19/23 0822   Black (%), Wound Tissue Color 0 % 09/19/23 0822   Red (%), Wound Tissue Color 100 % 09/19/23 0822   Yellow (%), Wound Tissue Color 0 % 09/19/23 0822   Periwound Area Intact 09/19/23 0822   Wound Edges Undefined 09/19/23 0822   Wound Length (cm) 0.1 cm 09/19/23 0822   Wound Width (cm) 0.1 cm 09/19/23 0822   Wound Depth (cm) 0.1 cm 09/19/23 0822   Wound Volume (cm^3) 0.001 cm^3 09/19/23 0822   Wound Surface Area (cm^2) 0.01 cm^2 09/19/23 0822   Care Cleansed with:;Wound cleanser;Applied: 09/19/23 0822   Dressing Removed;Applied 09/19/23 0822           Assessment:       1. Open wound of left ankle, subsequent encounter    2. Disruption of internal operation (surgical) wound, not elsewhere classified, initial encounter    3. Deep incisional surgical site infection    4. Cigarette smoker    5. Mixed anxiety and depressive disorder    6. Other hyperlipidemia          Left lateral ankle open dislocation October 2022: surgery with Dr Scott: "My attention is now drawn to the left ankle patient contaminated left open ankle injury appears to be a simple fracture dislocation with complete disruption of the lateral ligament complex.  Then excisionally debrided the subcutaneous tissue fascia and capsule.  This was then copiously irrigated.  I then placed an Arthrex anchor in the fibula and repaired the ATFL recent stress the ankle appears to be stable." Subsequent distal incisional site opening with fistula tract and drainage since early 2023:  Exploration and debridement mid June 2023 with closure: Subsequent recurrent surgical wound dehiscence: Given Bactrim and clindamycin orally, referred to wound care: 1st clinic visit 7/27/23: recalcitrant; almost closed by 9/18/23 but still with pinpoint opening and scant " drainage  Chronic cigarette smoker  Hypertension  Dyslipidemia  Prediabetes, hba1c 6.0 April 2023  ADHD, depression      Date: 06/12/2023        Surgeon:Dillon Scott DO  Assistant: Kevin Mark was essential, part of the procedure including deep hardware placement and deep closure.  No senior assistant was availible  Preoperative Diagnosis:  Left Ankle draining sinus tract surgical wound dehiscence lateral  Postoperative Diagnosis: Same  Procedure:   excision of draining sinus tract surgical wound dehiscence left ankle CPT 93744  Anesthesiologist: Castillo Fulton MD  OR Staff: Circulator: Pedrito Whitman RN  Scrub Person: Lisandro Zhengaux  Implants: * No implants in log *  EBL: 20cc  Complications: None  Disposition: To PACU, stable     Indications: Deborah Cross is a 53 y.o. female presenting with the aforementioned injuries/findings.  Patient was seen in office today.  She is been doing quite well with her contralateral side and this side as well.  Over the last 3 or 4 days patient noticed a draining sinus tract on her incision   From her open traumatic ankle dislocation.  She states she is been wearing tennis shoes been rubbing in the area and started knows purulent drainage.  She is still smoking tobacco.  The patient is awake and alert. After thorough discussion of the risks, benefits, expected outcomes, and alternatives to surgical intervention, the patient agreed to proceed with surgical treatment.  Specific risks discussed included, but were not limited to: superficial or deep infection, wound healing complications, DVT/PE, significant bleeding requiring transfusion, damage to named anatomic structures in the immediate area including named neurovascular structures, infection, nonunion, malunion and general risks of anesthesia.  The patient voiced understanding and written as well as verbal consent was obtained by myself prior to the procedure.     Procedure Note:  The patient  was brought back to the OR and placed supine on the OR table. After successful induction of anesthesia by anesthesia staff, the patient was positioned in the supine position and all bony prominences were padded appropriately.  The surgical field was then provisionally cleansed and then prepped and draped in the usual sterile fashion.     At this time a time-out was performed, with the correct patient, site, and procedure identified.  The universal time out as well as sign your site protocols were followed.  Preoperative antibiotics were verified as administered.       Attention is drawn to the left lateral ankle patient has a draining sinus tract to the posterior 3rd of the incision there does appear to be purulence coming from the wound I made a ellipse of this sinus tract and excised it with a 15 blade blunt dissected down and came in contact with the fluid collection appears to be liquid I liquified adipose tissue from initial trauma versus purulence.  Overall minimal cellulitis in this area.  I completely irrigated the area completed excisional debridement and took deep cultures.  Put vancomycin deep in the wound andand then began closing          The patient was then subsequently transferred to to PACU in a stable condition.     All sponge and needle counts were correct at the end of the case.  I was present and participated in all aspects of the procedure.     Prognosis:  The patient will be kept WBAT on the ipsilateral extremity .  Patient will receive DVT prophylaxis .       We will order cultures and sensitivities for her.   She will be placed on oral antibiotics at this time Bactrim DS.  Plan:     Plan of Care:    Patient referred to this clinic for a longstanding issue to her left lateral ankle area.  She sustained a severe open dislocation and severe ligamentous injury in October of 2022 for which she underwent repair with Dr Scott (she also broke her right leg and had surgeries on that as well) in early  "2023 she began to have recurrent drainage from the distal portion of the lateral ankle incision site.  She was felt to have a fistula tract was taken back to the operating room in mid June 2023 where it was debrided and closed but the skin opening returned.  She was prescribed  Bactrim and clindamycin orally.  Referred here and I met her on her first visit here on 7/27/23 noting friable hypergranular tissue based wound bed that tracts medially and deeper on that side .  I prescribed minocycline 100mg bid x 14 days on 8/30/23 but pt only taking daily for about a week ; she was corrected.  Comes in today on 9/18/23 and seems closed but actually has 2 pinpoint openings with scant purulent drainage with squeezing the periwound  Doesn't sound like Dr Scott plans any further surgically  I told her options: chronic oral minocycline for another 4 weeks vs MRI and possibly IV antibiotics. She choose the former for now   Nutrition: Must have a high protein diet to support wound  healing; (if renal disease, see nephrologist for amount allowed):  this should be over 100g protein /day (if no kidney issues); Also rec MVI along with vit C, vit D, zinc and Kristopher  'PreDiabetes":  Hemoglobin A1c 6.0  in April but hyperglycemic today; need recheck; Should follow a healthy low carb diet   Smoker:  She says she is well aware that she needs to stop smoking and acknowledges that it has inhibited wound healing and contributing to her current condition.  She says she used to smoke a pack a day and so for her being down to 4 cigarettes a day as a victory but she knows she needs to have complete cessation    Avoid shoes that rub on wound  Return to clinic 1 weeks       The time spent including preparing to see the patient, obtaining patient history and assessment, evaluation of the plan of care, patient/caregiver counseling and education, orders, documentation, coordination of care, and other professional medical management activities for " today's encounter was 25 minutes.

## 2023-09-19 NOTE — PATIENT INSTRUCTIONS
Pt seen today by: Dr. South    Dressings to be changed Daily and as needed if soiled or not intact.    Supplies ordered through [facility]     antibiotic refill and take as directed.       Self care DRESSING INSTRUCTIONS:    Wound location: Left Lateral Ankle  Cleanse wound with wound cleanser or saline  Apply mupirocin oint to the wound  Cover with bandaid    Return visit:  Tuesday, September 26, 2023 at 8:30am    Nutrition:  The current daily value (%DV) for protein is 50 grams per day and is meant as a general goal for most people. Further increasing your dietary protein intake is very important for wound healing. Typically one needs over 100g of protein per day to help with wound healing needs.  If you are a dialysis patient or have problems with your kidneys, talk to your Nephrologist about how much protein you can take in with your condition.  Examples of high protein items that can be added to your diet include: eggs, chicken, red meats, almonds, cottage cheese, Greek yogurt, beans, and peanut butter.  Fortified protein bars, shakes and drinks can add 15-30 additional grams of protein per serving.   Also add:   1 daily general multivitamin   Kristopher : 1 packet twice daily   Vitamin C : 500mg twice daily   Zinc 220 mg daily  Vit D : once daily    Offloading   Offload your wound. This means to reduce pressure on and around the wound that reduces blood flow to the wound and prevents healing. Your wound care team will discuss specific ways for you to offload your specific wound. Common offloading strategies include:  Turn or reposition every 2 hours or sooner  Use pillows, wedges, ROHO wheelchair cushions or other special devices like boots and shoes to lift the wound off of hard surfaces  Alternating Low Air-loss (ALAL) mattress may be ordered  Padded dressings can reduce wound pressure      Call our [facility] wound clinic for questions/concerns a [phone] .

## 2023-09-26 ENCOUNTER — HOSPITAL ENCOUNTER (OUTPATIENT)
Dept: WOUND CARE | Facility: HOSPITAL | Age: 53
Discharge: HOME | End: 2023-09-26
Attending: EMERGENCY MEDICINE
Payer: MEDICAID

## 2023-09-26 VITALS
BODY MASS INDEX: 25.76 KG/M2 | DIASTOLIC BLOOD PRESSURE: 94 MMHG | HEART RATE: 99 BPM | WEIGHT: 140 LBS | HEIGHT: 62 IN | TEMPERATURE: 98 F | RESPIRATION RATE: 18 BRPM | SYSTOLIC BLOOD PRESSURE: 147 MMHG

## 2023-09-26 DIAGNOSIS — S91.002D OPEN WOUND OF LEFT ANKLE, SUBSEQUENT ENCOUNTER: Primary | ICD-10-CM

## 2023-09-26 DIAGNOSIS — F17.210 CIGARETTE SMOKER: ICD-10-CM

## 2023-09-26 DIAGNOSIS — T81.32XA DISRUPTION OF INTERNAL OPERATION (SURGICAL) WOUND, NOT ELSEWHERE CLASSIFIED, INITIAL ENCOUNTER: ICD-10-CM

## 2023-09-26 DIAGNOSIS — T81.42XA DEEP INCISIONAL SURGICAL SITE INFECTION: ICD-10-CM

## 2023-09-26 LAB
ALBUMIN SERPL-MCNC: 3.7 G/DL (ref 3.5–5)
ALBUMIN/GLOB SERPL: 1.1 RATIO (ref 1.1–2)
ALP SERPL-CCNC: 118 UNIT/L (ref 40–150)
ALT SERPL-CCNC: 21 UNIT/L (ref 0–55)
AST SERPL-CCNC: 22 UNIT/L (ref 5–34)
BASOPHILS # BLD AUTO: 0.07 X10(3)/MCL
BASOPHILS NFR BLD AUTO: 0.9 %
BILIRUB SERPL-MCNC: 0.3 MG/DL
BUN SERPL-MCNC: 9.8 MG/DL (ref 9.8–20.1)
CALCIUM SERPL-MCNC: 9.5 MG/DL (ref 8.4–10.2)
CHLORIDE SERPL-SCNC: 104 MMOL/L (ref 98–107)
CO2 SERPL-SCNC: 25 MMOL/L (ref 22–29)
CREAT SERPL-MCNC: 0.67 MG/DL (ref 0.55–1.02)
CRP SERPL-MCNC: 2.1 MG/L
EOSINOPHIL # BLD AUTO: 0.22 X10(3)/MCL (ref 0–0.9)
EOSINOPHIL NFR BLD AUTO: 2.9 %
ERYTHROCYTE [DISTWIDTH] IN BLOOD BY AUTOMATED COUNT: 12.8 % (ref 11.5–17)
ERYTHROCYTE [SEDIMENTATION RATE] IN BLOOD: 27 MM/HR (ref 0–20)
EST. AVERAGE GLUCOSE BLD GHB EST-MCNC: 159.9 MG/DL
GFR SERPLBLD CREATININE-BSD FMLA CKD-EPI: >60 MLS/MIN/1.73/M2
GLOBULIN SER-MCNC: 3.4 GM/DL (ref 2.4–3.5)
GLUCOSE SERPL-MCNC: 147 MG/DL (ref 74–100)
HBA1C MFR BLD: 7.2 %
HCT VFR BLD AUTO: 39.3 % (ref 37–47)
HGB BLD-MCNC: 13.2 G/DL (ref 12–16)
IMM GRANULOCYTES # BLD AUTO: 0.03 X10(3)/MCL (ref 0–0.04)
IMM GRANULOCYTES NFR BLD AUTO: 0.4 %
LYMPHOCYTES # BLD AUTO: 2.52 X10(3)/MCL (ref 0.6–4.6)
LYMPHOCYTES NFR BLD AUTO: 33 %
MCH RBC QN AUTO: 28.3 PG (ref 27–31)
MCHC RBC AUTO-ENTMCNC: 33.6 G/DL (ref 33–36)
MCV RBC AUTO: 84.3 FL (ref 80–94)
MONOCYTES # BLD AUTO: 0.6 X10(3)/MCL (ref 0.1–1.3)
MONOCYTES NFR BLD AUTO: 7.9 %
NEUTROPHILS # BLD AUTO: 4.19 X10(3)/MCL (ref 2.1–9.2)
NEUTROPHILS NFR BLD AUTO: 54.9 %
NRBC BLD AUTO-RTO: 0 %
PLATELET # BLD AUTO: 255 X10(3)/MCL (ref 130–400)
PMV BLD AUTO: 9.9 FL (ref 7.4–10.4)
POCT GLUCOSE: 160 MG/DL (ref 70–110)
POTASSIUM SERPL-SCNC: 4.2 MMOL/L (ref 3.5–5.1)
PREALB SERPL-MCNC: 28.7 MG/DL (ref 16–38)
PROT SERPL-MCNC: 7.1 GM/DL (ref 6.4–8.3)
RBC # BLD AUTO: 4.66 X10(6)/MCL (ref 4.2–5.4)
SODIUM SERPL-SCNC: 141 MMOL/L (ref 136–145)
WBC # SPEC AUTO: 7.63 X10(3)/MCL (ref 4.5–11.5)

## 2023-09-26 PROCEDURE — 84134 ASSAY OF PREALBUMIN: CPT | Performed by: EMERGENCY MEDICINE

## 2023-09-26 PROCEDURE — 17250 CHEM CAUT OF GRANLTJ TISSUE: CPT

## 2023-09-26 PROCEDURE — 80053 COMPREHEN METABOLIC PANEL: CPT | Performed by: EMERGENCY MEDICINE

## 2023-09-26 PROCEDURE — 85025 COMPLETE CBC W/AUTO DIFF WBC: CPT | Performed by: EMERGENCY MEDICINE

## 2023-09-26 PROCEDURE — 86140 C-REACTIVE PROTEIN: CPT | Performed by: EMERGENCY MEDICINE

## 2023-09-26 PROCEDURE — 83036 HEMOGLOBIN GLYCOSYLATED A1C: CPT | Performed by: EMERGENCY MEDICINE

## 2023-09-26 PROCEDURE — 27000999 HC MEDICAL RECORD PHOTO DOCUMENTATION

## 2023-09-26 PROCEDURE — 85652 RBC SED RATE AUTOMATED: CPT | Performed by: EMERGENCY MEDICINE

## 2023-09-26 PROCEDURE — 99213 PR OFFICE/OUTPT VISIT, EST, LEVL III, 20-29 MIN: ICD-10-PCS | Mod: 25,,, | Performed by: EMERGENCY MEDICINE

## 2023-09-26 PROCEDURE — 17250 CHEM CAUT OF GRANLTJ TISSUE: CPT | Mod: ,,, | Performed by: EMERGENCY MEDICINE

## 2023-09-26 PROCEDURE — 99213 OFFICE O/P EST LOW 20 MIN: CPT | Mod: 25,,, | Performed by: EMERGENCY MEDICINE

## 2023-09-26 PROCEDURE — 17250 PR CHEM CAUTERY GRANULATN TISSUE: ICD-10-PCS | Mod: ,,, | Performed by: EMERGENCY MEDICINE

## 2023-09-26 NOTE — LETTER
[facility] - [facility]ound [facility]  [address]   [place] [address]  Phone: [phone]  Fax: [phone]    Contact Name: Pallavi  Email: [email or web address] September 28, 2023     Jean Paul Calle MD  [address] [place] [address] Dr Marquis [place] [id number]    Patient: Deborah Cross   MR Number: [id number]   YOB: 1970   Date of Visit: 9/26/2023       Dear :    I am referring my patient, Deborah Cross, to you for evaluation of left lateral ankle wound.     I appreciate your assistance in her care and look forward to your findings and recommendations.    Sincerely,                        [facility]            CC  MD Pallavi Link LPN

## 2023-09-26 NOTE — PATIENT INSTRUCTIONS
Pt seen today by: Dr. South    Dressings to be changed Daily and as needed if soiled or not intact.    Supplies ordered through [facility] 9/26/23      Self care DRESSING INSTRUCTIONS:    Wound location: Left Lateral Ankle  Cleanse wound with wound cleanser or saline  Apply mupirocin oint to the wound  Pack wound with a small piece of aquacell ag   Cover with Foam and cover roll tape    Return visit:  Tuesday, Oct 3rd, 2023 at 9:00 am    Nutrition:  The current daily value (%DV) for protein is 50 grams per day and is meant as a general goal for most people. Further increasing your dietary protein intake is very important for wound healing. Typically one needs over 100g of protein per day to help with wound healing needs.  If you are a dialysis patient or have problems with your kidneys, talk to your Nephrologist about how much protein you can take in with your condition.  Examples of high protein items that can be added to your diet include: eggs, chicken, red meats, almonds, cottage cheese, Greek yogurt, beans, and peanut butter.  Fortified protein bars, shakes and drinks can add 15-30 additional grams of protein per serving.   Also add:   1 daily general multivitamin   Kristopher : 1 packet twice daily   Vitamin C : 500mg twice daily   Zinc 220 mg daily  Vit D : once daily    Offloading   Offload your wound. This means to reduce pressure on and around the wound that reduces blood flow to the wound and prevents healing. Your wound care team will discuss specific ways for you to offload your specific wound. Common offloading strategies include:  Turn or reposition every 2 hours or sooner  Use pillows, wedges, ROHO wheelchair cushions or other special devices like boots and shoes to lift the wound off of hard surfaces  Alternating Low Air-loss (ALAL) mattress may be ordered  Padded dressings can reduce wound pressure      Call our [facility] wound clinic for questions/concerns a [phone] .

## 2023-09-26 NOTE — PROGRESS NOTES
"Subjective:       Patient ID: Deborah Cross is a 53 y.o. female.    Chief Complaint: No chief complaint on file.    Cc: nonhealing left lateral ankle post surgical wound      53-year-old WF   chronic cigarette smoker along with h/o dyslipidemia and prediabetes had a fall off of a ladder in October of 2022 resulting in an open right tibia /fibula shaft fracture along with an open left ankle dislocation with severe ligamentous injury . Dr Scott did surgery on RLE as well as a left ankle primary ligamentous reconstruction laterally /ATFL as well as closed treatment of a left 5th metatarsal base fracture. 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   The drainage got worse on left lateral ankle mark after a weekend of being in a friend's pool. Dr Scott took her back to OR in mid June 2023 where it was noted she had a draining sinus tract. OR noted "ellipse of this sinus tract and excised it with a 15 blade blunt dissected down and came in contact with the fluid collection appears to be liquid I liquified adipose tissue from initial trauma versus purulence.  I completely irrigated the area completed excisional debridement and took deep cultures.  Put vancomycin deep in the wound andand then began closing".  Placed on bactrim for a few weeks.   Surgical site again opened up on same location.  Given a week of clindamycin . She last saw Dr Scott on 7/26/23 who then referred here. She came in for her first eval here in the wound care clinic on 7/27/23.  I noted she was still smoking 3-4 cigs/day (she is aware how this inhibits wound healing) and she reported still wearing tennis shoes from the moment she wakes until she goes to sleep...after being told not to by ortho because of friction/rubbing. I noted a dime-sized open wound with spongy friable hypergranulation " "tissue with a slit in the middle of the wound bed with depth of almost 0.5 cm.  No active drainage noted. I applied silver nitrate on the wound bed and ordered silver alginate dressings . I told her to stop smoking, keep this area padded and to stop wearing her tennis shoes that rubbed on this wound.  Wound typically still has drainage and deeper on medial/more anterior side of wound bed.  On 8/30/23, I ordered 2 weeks of  minocycline but pt was not taking it correctly (daily instead of the instructed bid)    Since last here, she tested +for Covid and PCP rx medrol dose pack. She saw Tyler yesterday on 9/18/23: noted pt still smoking and wearing regular tennis shoes.   Last week the wound appeared to be closed but I was able to squeeze drainage out /doubted it had closed. I continued the oral minocycline.  Today it is open once again  Today she reports it seems the wound has closed but with scant drainage; much less pain        Review of Systems   Constitutional: Negative.    HENT: Negative.     Respiratory: Negative.     Cardiovascular: Negative.    Gastrointestinal: Negative.    Skin:  Positive for wound.   Neurological: Negative.          Objective:      Vitals:    09/26/23 0826   BP: (!) 147/94   Pulse: 99   Resp: 18   Temp: 97.9 °F (36.6 °C)       @poctglucose@  No results for input(s): "POCTGLUCOSE" in the last 24 hours.  Physical Exam  Vitals reviewed.   Cardiovascular:      Pulses:           Dorsalis pedis pulses are 2+ on the right side and 2+ on the left side.   Pulmonary:      Effort: Pulmonary effort is normal.   Musculoskeletal:        Feet:    Skin:     General: Skin is warm and dry.      Capillary Refill: Capillary refill takes less than 2 seconds.   Neurological:      General: No focal deficit present.      Mental Status: She is alert.              Incision/Site 06/12/23 1221 Left Malleolus/Ankle lateral (Active)   06/12/23 1221   Present Prior to Hospital Arrival?: Yes   Side: Left   Location: " "Malleolus/Ankle   Orientation: lateral   Incision Type:    Closure Method:    Additional Comments: Doppler biphasic x4   palpable x 4    ABIs done on 9/26/23 L dp 0.94 pt 0.95   Removal Indication and Assessment:    Wound Outcome:    Removal Indications:    Wound Image   09/26/23 0836   Dressing Appearance Intact;Moist drainage 09/26/23 0836   Drainage Amount Moderate 09/26/23 0836   Drainage Characteristics/Odor Yellow;Serosanguineous 09/26/23 0836   Appearance Pink;Red;Yellow;Hypergranulation 09/26/23 0836   Black (%), Wound Tissue Color 0 % 09/26/23 0836   Red (%), Wound Tissue Color 80 % 09/26/23 0836   Yellow (%), Wound Tissue Color 20 % 09/26/23 0836   Periwound Area Intact;Macerated 09/26/23 0836   Wound Edges Irregular 09/26/23 0836   Wound Length (cm) 0.7 cm 09/26/23 0836   Wound Width (cm) 0.4 cm 09/26/23 0836   Wound Depth (cm) 0.8 cm 09/26/23 0836   Wound Volume (cm^3) 0.224 cm^3 09/26/23 0836   Wound Surface Area (cm^2) 0.28 cm^2 09/26/23 0836   Care Cleansed with:;Wound cleanser;Applied: 09/26/23 0836   Dressing Removed;Changed;Calcium alginate;Silver;Foam 09/26/23 0836           Assessment:       1. Open wound of left ankle, subsequent encounter    2. Disruption of internal operation (surgical) wound, not elsewhere classified, initial encounter    3. Deep incisional surgical site infection    4. Cigarette smoker          Left lateral ankle open dislocation October 2022: surgery with Dr Scott: "My attention is now drawn to the left ankle patient contaminated left open ankle injury appears to be a simple fracture dislocation with complete disruption of the lateral ligament complex.  Then excisionally debrided the subcutaneous tissue fascia and capsule.  This was then copiously irrigated.  I then placed an Arthrex anchor in the fibula and repaired the ATFL recent stress the ankle appears to be stable." Subsequent distal incisional site opening with fistula tract and drainage since early 2023:  Exploration " and debridement mid June 2023 with closure: Subsequent recurrent surgical wound dehiscence: Given Bactrim and clindamycin orally, referred to wound care: 1st clinic visit 7/27/23: recalcitrant  Chronic cigarette smoker  Hypertension  Dyslipidemia  Prediabetes, hba1c 6.0 April 2023  ADHD, depression      Date: 06/12/2023        Surgeon:Dillon Scott DO  Assistant: Kevin Mark was essential, part of the procedure including deep hardware placement and deep closure.  No senior assistant was availible  Preoperative Diagnosis:  Left Ankle draining sinus tract surgical wound dehiscence lateral  Postoperative Diagnosis: Same  Procedure:   excision of draining sinus tract surgical wound dehiscence left ankle CPT 53698  Anesthesiologist: Castillo Fulton MD  OR Staff: Circulator: Pedrito Whitman RN  Scrub Person: Lisandro Mccray  Implants: * No implants in log *  EBL: 20cc  Complications: None  Disposition: To PACU, stable     Indications: Deborah Cross is a 53 y.o. female presenting with the aforementioned injuries/findings.  Patient was seen in office today.  She is been doing quite well with her contralateral side and this side as well.  Over the last 3 or 4 days patient noticed a draining sinus tract on her incision   From her open traumatic ankle dislocation.  She states she is been wearing tennis shoes been rubbing in the area and started knows purulent drainage.  She is still smoking tobacco.  The patient is awake and alert. After thorough discussion of the risks, benefits, expected outcomes, and alternatives to surgical intervention, the patient agreed to proceed with surgical treatment.  Specific risks discussed included, but were not limited to: superficial or deep infection, wound healing complications, DVT/PE, significant bleeding requiring transfusion, damage to named anatomic structures in the immediate area including named neurovascular structures, infection, nonunion, malunion  and general risks of anesthesia.  The patient voiced understanding and written as well as verbal consent was obtained by myself prior to the procedure.     Procedure Note:  The patient was brought back to the OR and placed supine on the OR table. After successful induction of anesthesia by anesthesia staff, the patient was positioned in the supine position and all bony prominences were padded appropriately.  The surgical field was then provisionally cleansed and then prepped and draped in the usual sterile fashion.     At this time a time-out was performed, with the correct patient, site, and procedure identified.  The universal time out as well as sign your site protocols were followed.  Preoperative antibiotics were verified as administered.       Attention is drawn to the left lateral ankle patient has a draining sinus tract to the posterior 3rd of the incision there does appear to be purulence coming from the wound I made a ellipse of this sinus tract and excised it with a 15 blade blunt dissected down and came in contact with the fluid collection appears to be liquid I liquified adipose tissue from initial trauma versus purulence.  Overall minimal cellulitis in this area.  I completely irrigated the area completed excisional debridement and took deep cultures.  Put vancomycin deep in the wound andand then began closing          The patient was then subsequently transferred to to PACU in a stable condition.     All sponge and needle counts were correct at the end of the case.  I was present and participated in all aspects of the procedure.     Prognosis:  The patient will be kept WBAT on the ipsilateral extremity .  Patient will receive DVT prophylaxis .       We will order cultures and sensitivities for her.   She will be placed on oral antibiotics at this time Bactrim DS.  Plan:     Plan of Care:    Wound remains and still tracks deeper on more anterior side of wound  Will order labs and plan MRI  Doubt the  "skin will heal as there is still a tract /nidus of infection causing ongoing drainage and the open wound.    Nutrition: Must have a high protein diet to support wound  healing; (if renal disease, see nephrologist for amount allowed):  this should be over 100g protein /day (if no kidney issues); Also rec MVI along with vit C, vit D, zinc and Kristopher  'PreDiabetes":  but on metformin; Hemoglobin A1c 6.0  in April but hyperglycemic most days; will check a1c again today  Smoker:  She says she is well aware that she needs to stop smoking and acknowledges that it has inhibited wound healing and contributing to her current condition.  She says she used to smoke a pack a day and so for her being down to 4 cigarettes a day as a victory but she knows she needs to have complete cessation    Avoid shoes that rub on wound  Return to clinic 1 week       The time spent including preparing to see the patient, obtaining patient history and assessment, evaluation of the plan of care, patient/caregiver counseling and education, orders, documentation, coordination of care, and other professional medical management activities for today's encounter was 20 minutes.    Time spent performing procedures during today's encounter was 3 minutes.         "

## 2023-09-27 DIAGNOSIS — M86.179 OTHER ACUTE OSTEOMYELITIS, UNSPECIFIED ANKLE AND FOOT: Primary | ICD-10-CM

## 2023-10-03 ENCOUNTER — HOSPITAL ENCOUNTER (OUTPATIENT)
Dept: WOUND CARE | Facility: HOSPITAL | Age: 53
Discharge: HOME | End: 2023-10-03
Attending: EMERGENCY MEDICINE
Payer: MEDICAID

## 2023-10-03 VITALS
TEMPERATURE: 98 F | WEIGHT: 140 LBS | HEART RATE: 92 BPM | BODY MASS INDEX: 25.76 KG/M2 | SYSTOLIC BLOOD PRESSURE: 111 MMHG | RESPIRATION RATE: 18 BRPM | HEIGHT: 62 IN | DIASTOLIC BLOOD PRESSURE: 74 MMHG

## 2023-10-03 DIAGNOSIS — S91.002D OPEN WOUND OF LEFT ANKLE, SUBSEQUENT ENCOUNTER: Primary | ICD-10-CM

## 2023-10-03 DIAGNOSIS — T81.32XA DISRUPTION OF INTERNAL OPERATION (SURGICAL) WOUND, NOT ELSEWHERE CLASSIFIED, INITIAL ENCOUNTER: ICD-10-CM

## 2023-10-03 DIAGNOSIS — T81.42XA DEEP INCISIONAL SURGICAL SITE INFECTION: ICD-10-CM

## 2023-10-03 DIAGNOSIS — F17.210 CIGARETTE SMOKER: ICD-10-CM

## 2023-10-03 DIAGNOSIS — F41.8 MIXED ANXIETY AND DEPRESSIVE DISORDER: ICD-10-CM

## 2023-10-03 DIAGNOSIS — E78.49 OTHER HYPERLIPIDEMIA: ICD-10-CM

## 2023-10-03 LAB — POCT GLUCOSE: 147 MG/DL (ref 70–110)

## 2023-10-03 PROCEDURE — 17250 CHEM CAUT OF GRANLTJ TISSUE: CPT

## 2023-10-03 PROCEDURE — 27000999 HC MEDICAL RECORD PHOTO DOCUMENTATION

## 2023-10-03 NOTE — PROGRESS NOTES
"Subjective:       Patient ID: Deborah Cross is a 53 y.o. female.    Chief Complaint: No chief complaint on file.    Cc: nonhealing left lateral ankle post surgical wound      53-year-old WF   chronic cigarette smoker along with h/o dyslipidemia and prediabetes had a fall off of a ladder in October of 2022 resulting in an open right tibia /fibula shaft fracture along with an open left ankle dislocation with severe ligamentous injury . Dr Scott did surgery on RLE as well as a left ankle primary ligamentous reconstruction laterally /ATFL as well as closed treatment of a left 5th metatarsal base fracture. 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   The drainage got worse on left lateral ankle mark after a weekend of being in a friend's pool. Dr Scott took her back to OR in mid June 2023 where it was noted she had a draining sinus tract. OR noted "ellipse of this sinus tract and excised it with a 15 blade blunt dissected down and came in contact with the fluid collection appears to be liquid I liquified adipose tissue from initial trauma versus purulence.  I completely irrigated the area completed excisional debridement and took deep cultures.  Put vancomycin deep in the wound andand then began closing".  Placed on bactrim for a few weeks.   Surgical site again opened up on same location.  Given a week of clindamycin . She last saw Dr Scott on 7/26/23 who then referred here. She came in for her first eval here in the wound care clinic on 7/27/23.  I noted she was still smoking 3-4 cigs/day (she is aware how this inhibits wound healing) and she reported still wearing tennis shoes from the moment she wakes until she goes to sleep...after being told not to by ortho because of friction/rubbing. I noted a dime-sized open wound with spongy friable hypergranulation " tissue with a slit in the middle of the wound bed with depth of almost 0.5 cm.  No active drainage noted. I applied silver nitrate on the wound bed and ordered silver alginate dressings . I told her to stop smoking, keep this area padded and to stop wearing her tennis shoes that rubbed on this wound.  Wound typically still has drainage and deeper on medial/more anterior side of wound bed.  On 8/30/23, I ordered 2 weeks of  minocycline but pt was not taking it correctly (daily instead of the instructed bid)    Since last here, she tested +for Covid and PCP rx medrol dose pack. She saw Tyler yesterday on 9/18/23: noted pt still smoking and wearing regular tennis shoes.   Last week the wound appeared to be closed but I was able to squeeze drainage out /doubted it had closed. I continued the oral minocycline.  1. Wound remains and still tracks deeper on more anterior side of wound  2. Will order labs and plan MRI  3. Doubt the skin will heal as there is still a tract /nidus of infection causing ongoing drainage and the open wound.          Review of Systems   Constitutional: Negative.    HENT: Negative.     Respiratory: Negative.     Cardiovascular: Negative.    Gastrointestinal: Negative.    Skin:  Positive for wound.   Neurological: Negative.          Objective:      Vitals:    10/03/23 0828   BP: 111/74   Pulse: 92   Resp: 18   Temp: 98 °F (36.7 °C)       @poctglucose@  Recent Labs   Lab 10/03/23  0825   POCTGLUCOSE 147*     Physical Exam  Vitals reviewed.   Cardiovascular:      Pulses:           Dorsalis pedis pulses are 2+ on the right side and 2+ on the left side.   Pulmonary:      Effort: Pulmonary effort is normal.   Musculoskeletal:        Feet:    Skin:     General: Skin is warm and dry.      Capillary Refill: Capillary refill takes less than 2 seconds.   Neurological:      General: No focal deficit present.      Mental Status: She is alert.              Incision/Site 06/12/23 1221 Left Malleolus/Ankle lateral  "(Active)   06/12/23 1221   Present Prior to Hospital Arrival?: Yes   Side: Left   Location: Malleolus/Ankle   Orientation: lateral   Incision Type:    Closure Method:    Additional Comments: Doppler on 10/3: biphasic x4   palpable x 4    ABIs done on 9/26/23 L dp 0.94 pt 0.95   Removal Indication and Assessment:    Wound Outcome:    Removal Indications:    Wound Image   10/03/23 0847   Dressing Appearance Dried drainage 10/03/23 0840   Drainage Amount Moderate 10/03/23 0840   Drainage Characteristics/Odor Bleeding controlled;Tan 10/03/23 0840   Appearance Pink;Moist 10/03/23 0840   Black (%), Wound Tissue Color 0 % 10/03/23 0840   Red (%), Wound Tissue Color 100 % 10/03/23 0840   Yellow (%), Wound Tissue Color 0 % 10/03/23 0840   Periwound Area Moist;Denuded 10/03/23 0840   Wound Edges Defined 10/03/23 0840   Wound Length (cm) 0.6 cm 10/03/23 0840   Wound Width (cm) 0.5 cm 10/03/23 0840   Wound Depth (cm) 0.8 cm 10/03/23 0840   Wound Volume (cm^3) 0.24 cm^3 10/03/23 0840   Wound Surface Area (cm^2) 0.3 cm^2 10/03/23 0840   Tunneling (depth (cm)/location) 0.7cm at 12 oclock 10/03/23 0840   Care Cleansed with:;Antimicrobial agent;Applied: 10/03/23 0840           Assessment:       1. Open wound of left ankle, subsequent encounter    2. Disruption of internal operation (surgical) wound, not elsewhere classified, initial encounter    3. Deep incisional surgical site infection    4. Cigarette smoker    5. Mixed anxiety and depressive disorder    6. Other hyperlipidemia          Left lateral ankle open dislocation October 2022: surgery with Dr Scott: "My attention is now drawn to the left ankle patient contaminated left open ankle injury appears to be a simple fracture dislocation with complete disruption of the lateral ligament complex.  Then excisionally debrided the subcutaneous tissue fascia and capsule.  This was then copiously irrigated.  I then placed an Arthrex anchor in the fibula and repaired the ATFL recent " "stress the ankle appears to be stable." Subsequent distal incisional site opening with fistula tract and drainage since early 2023:  Exploration and debridement mid June 2023 with closure: Subsequent recurrent surgical wound dehiscence: Given Bactrim and clindamycin orally, referred to wound care: 1st clinic visit 7/27/23: recalcitrant  Chronic cigarette smoker  Hypertension  Dyslipidemia  "Prediabetes", hba1c 6.0 April 2023, up to 7.2 late Sept 2023/diabetes   ADHD, depression      Date: 06/12/2023        Surgeon:Dillon Scott DO  Assistant: Kevin Mark was essential, part of the procedure including deep hardware placement and deep closure.  No senior assistant was availible  Preoperative Diagnosis:  Left Ankle draining sinus tract surgical wound dehiscence lateral  Postoperative Diagnosis: Same  Procedure:   excision of draining sinus tract surgical wound dehiscence left ankle CPT 86509  Anesthesiologist: Castillo Fulton MD  OR Staff: Circulator: Pedrito Whitman RN  Scrub Person: Lisandro Mccray  Implants: * No implants in log *  EBL: 20cc  Complications: None  Disposition: To PACU, stable     Indications: Deborah Cross is a 53 y.o. female presenting with the aforementioned injuries/findings.  Patient was seen in office today.  She is been doing quite well with her contralateral side and this side as well.  Over the last 3 or 4 days patient noticed a draining sinus tract on her incision   From her open traumatic ankle dislocation.  She states she is been wearing tennis shoes been rubbing in the area and started knows purulent drainage.  She is still smoking tobacco.  The patient is awake and alert. After thorough discussion of the risks, benefits, expected outcomes, and alternatives to surgical intervention, the patient agreed to proceed with surgical treatment.  Specific risks discussed included, but were not limited to: superficial or deep infection, wound healing complications, " DVT/PE, significant bleeding requiring transfusion, damage to named anatomic structures in the immediate area including named neurovascular structures, infection, nonunion, malunion and general risks of anesthesia.  The patient voiced understanding and written as well as verbal consent was obtained by myself prior to the procedure.     Procedure Note:  The patient was brought back to the OR and placed supine on the OR table. After successful induction of anesthesia by anesthesia staff, the patient was positioned in the supine position and all bony prominences were padded appropriately.  The surgical field was then provisionally cleansed and then prepped and draped in the usual sterile fashion.     At this time a time-out was performed, with the correct patient, site, and procedure identified.  The universal time out as well as sign your site protocols were followed.  Preoperative antibiotics were verified as administered.       Attention is drawn to the left lateral ankle patient has a draining sinus tract to the posterior 3rd of the incision there does appear to be purulence coming from the wound I made a ellipse of this sinus tract and excised it with a 15 blade blunt dissected down and came in contact with the fluid collection appears to be liquid I liquified adipose tissue from initial trauma versus purulence.  Overall minimal cellulitis in this area.  I completely irrigated the area completed excisional debridement and took deep cultures.  Put vancomycin deep in the wound andand then began closing          The patient was then subsequently transferred to to PACU in a stable condition.     All sponge and needle counts were correct at the end of the case.  I was present and participated in all aspects of the procedure.     Prognosis:  The patient will be kept WBAT on the ipsilateral extremity .  Patient will receive DVT prophylaxis .       We will order cultures and sensitivities for her.   She will be placed on  oral antibiotics at this time Bactrim DS.     Latest Reference Range & Units 09/26/23 09:22   WBC 4.50 - 11.50 x10(3)/mcL 7.63   RBC 4.20 - 5.40 x10(6)/mcL 4.66   Hemoglobin 12.0 - 16.0 g/dL 13.2   Hematocrit 37.0 - 47.0 % 39.3   MCV 80.0 - 94.0 fL 84.3   MCH 27.0 - 31.0 pg 28.3   MCHC 33.0 - 36.0 g/dL 33.6   RDW 11.5 - 17.0 % 12.8   Platelets 130 - 400 x10(3)/mcL 255   MPV 7.4 - 10.4 fL 9.9   Neut % % 54.9   LYMPH % % 33.0   Mono % % 7.9   Eosinophil % % 2.9   Basophil % % 0.9   Immature Granulocytes % 0.4   Neut # 2.1 - 9.2 x10(3)/mcL 4.19   Lymph # 0.6 - 4.6 x10(3)/mcL 2.52   Mono # 0.1 - 1.3 x10(3)/mcL 0.60   Eos # 0 - 0.9 x10(3)/mcL 0.22   Baso # <=0.2 x10(3)/mcL 0.07   Immature Grans (Abs) 0 - 0.04 x10(3)/mcL 0.03   nRBC % 0.0   Sed Rate 0 - 20 mm/hr 27 (H)   Sodium 136 - 145 mmol/L 141   Potassium 3.5 - 5.1 mmol/L 4.2   Chloride 98 - 107 mmol/L 104   CO2 22 - 29 mmol/L 25   BUN 9.8 - 20.1 mg/dL 9.8   Creatinine 0.55 - 1.02 mg/dL 0.67   eGFR mls/min/1.73/m2 >60   Glucose 74 - 100 mg/dL 147 (H)   Calcium 8.4 - 10.2 mg/dL 9.5   Alkaline Phosphatase 40 - 150 unit/L 118   PROTEIN TOTAL 6.4 - 8.3 gm/dL 7.1   Albumin 3.5 - 5.0 g/dL 3.7   Albumin/Globulin Ratio 1.1 - 2.0 ratio 1.1   Prealbumin 16.0 - 38.0 mg/dL 28.7   BILIRUBIN TOTAL <=1.5 mg/dL 0.3   AST 5 - 34 unit/L 22   ALT 0 - 55 unit/L 21   CRP <5.00 mg/L 2.10   Globulin, Total 2.4 - 3.5 gm/dL 3.4   Hemoglobin A1C External <=7.0 % 7.2 (H)   Estimated Avg Glucose mg/dL 159.9   (H): Data is abnormally high    Plan:     Plan of Care:    Wound remains and still tracks deeper on more anterior side of wound  Will order labs and plan MRI  Doubt the skin will heal as there is still a tract /nidus of infection causing ongoing drainage and the open wound.    Nutrition: Must have a high protein diet to support wound  healing; (if renal disease, see nephrologist for amount allowed):  this should be over 100g protein /day (if no kidney issues); Also rec MVI along with vit  "C, vit D, zinc and Kristopher  'PreDiabetes":  but on metformin; Hemoglobin A1c 6.0  in April but hyperglycemic most days; will check a1c again today  Smoker:  She says she is well aware that she needs to stop smoking and acknowledges that it has inhibited wound healing and contributing to her current condition.  She says she used to smoke a pack a day and so for her being down to 4 cigarettes a day as a victory but she knows she needs to have complete cessation    Avoid shoes that rub on wound  Return to clinic 1 week       The time spent including preparing to see the patient, obtaining patient history and assessment, evaluation of the plan of care, patient/caregiver counseling and education, orders, documentation, coordination of care, and other professional medical management activities for today's encounter was 20 minutes.    Time spent performing procedures during today's encounter was 3 minutes.         "

## 2023-10-03 NOTE — PATIENT INSTRUCTIONS
Pt seen today by: Dr. South    Dressings to be changed Daily and as needed if soiled or not intact.    Supplies ordered through [facility] 10/3/23      Self care DRESSING INSTRUCTIONS:    Wound location: Left Lateral Ankle  Cleanse wound with wound cleanser or saline  Apply mupirocin oint to the wound  Pack wound with a small piece of aquacell ag   Cover with Foam and cover roll tape    Return visit:  Tuesday, Oct 10th, 2023 at 8:30 am    Nutrition:  The current daily value (%DV) for protein is 50 grams per day and is meant as a general goal for most people. Further increasing your dietary protein intake is very important for wound healing. Typically one needs over 100g of protein per day to help with wound healing needs.  If you are a dialysis patient or have problems with your kidneys, talk to your Nephrologist about how much protein you can take in with your condition.  Examples of high protein items that can be added to your diet include: eggs, chicken, red meats, almonds, cottage cheese, Greek yogurt, beans, and peanut butter.  Fortified protein bars, shakes and drinks can add 15-30 additional grams of protein per serving.   Also add:   1 daily general multivitamin   Kristopher : 1 packet twice daily   Vitamin C : 500mg twice daily   Zinc 220 mg daily  Vit D : once daily    Offloading   Offload your wound. This means to reduce pressure on and around the wound that reduces blood flow to the wound and prevents healing. Your wound care team will discuss specific ways for you to offload your specific wound. Common offloading strategies include:  Turn or reposition every 2 hours or sooner  Use pillows, wedges, ROHO wheelchair cushions or other special devices like boots and shoes to lift the wound off of hard surfaces  Alternating Low Air-loss (ALAL) mattress may be ordered  Padded dressings can reduce wound pressure      Call our [facility] wound clinic for questions/concerns a [phone] .

## 2023-10-03 NOTE — PROCEDURES
Procedures  Chemical Cauterization      Performed by: Dr South  Time Out Taken: Yes.   Pain Control: topical lidocaine  Procedural Pain: 0  Post Procedural Pain: 0  Procedure was tolerated well.   General notes:used silver nitrate stick on the foot/ankle open wound with hypergranulation tissue.

## 2023-10-06 ENCOUNTER — HOSPITAL ENCOUNTER (OUTPATIENT)
Dept: RADIOLOGY | Facility: HOSPITAL | Age: 53
Discharge: HOME | End: 2023-10-06
Attending: EMERGENCY MEDICINE
Payer: MEDICAID

## 2023-10-06 DIAGNOSIS — M86.179 OTHER ACUTE OSTEOMYELITIS, UNSPECIFIED ANKLE AND FOOT: ICD-10-CM

## 2023-10-06 PROCEDURE — 25500020 PHARM REV CODE 255: Performed by: EMERGENCY MEDICINE

## 2023-10-06 PROCEDURE — 73723 MRI JOINT LWR EXTR W/O&W/DYE: CPT | Mod: TC,LT

## 2023-10-06 PROCEDURE — A9577 INJ MULTIHANCE: HCPCS | Performed by: EMERGENCY MEDICINE

## 2023-10-06 RX ADMIN — GADOBENATE DIMEGLUMINE 15 ML: 529 INJECTION, SOLUTION INTRAVENOUS at 09:10

## 2023-10-10 ENCOUNTER — HOSPITAL ENCOUNTER (OUTPATIENT)
Dept: WOUND CARE | Facility: HOSPITAL | Age: 53
Discharge: HOME | DRG: 464 | End: 2023-10-10
Attending: EMERGENCY MEDICINE
Payer: MEDICAID

## 2023-10-10 ENCOUNTER — ANESTHESIA EVENT (OUTPATIENT)
Dept: SURGERY | Facility: HOSPITAL | Age: 53
DRG: 464 | End: 2023-10-10
Payer: MEDICAID

## 2023-10-10 ENCOUNTER — OFFICE VISIT (OUTPATIENT)
Dept: ORTHOPEDICS | Facility: CLINIC | Age: 53
End: 2023-10-10
Payer: MEDICAID

## 2023-10-10 VITALS
WEIGHT: 140 LBS | HEART RATE: 105 BPM | SYSTOLIC BLOOD PRESSURE: 139 MMHG | BODY MASS INDEX: 25.76 KG/M2 | DIASTOLIC BLOOD PRESSURE: 94 MMHG | HEIGHT: 62 IN

## 2023-10-10 VITALS
SYSTOLIC BLOOD PRESSURE: 149 MMHG | RESPIRATION RATE: 18 BRPM | DIASTOLIC BLOOD PRESSURE: 86 MMHG | BODY MASS INDEX: 25.76 KG/M2 | HEIGHT: 62 IN | TEMPERATURE: 98 F | WEIGHT: 140 LBS | HEART RATE: 96 BPM

## 2023-10-10 DIAGNOSIS — F41.8 MIXED ANXIETY AND DEPRESSIVE DISORDER: ICD-10-CM

## 2023-10-10 DIAGNOSIS — S91.002D OPEN WOUND OF LEFT ANKLE, SUBSEQUENT ENCOUNTER: Primary | ICD-10-CM

## 2023-10-10 DIAGNOSIS — T81.42XA DEEP INCISIONAL SURGICAL SITE INFECTION: ICD-10-CM

## 2023-10-10 DIAGNOSIS — E78.49 OTHER HYPERLIPIDEMIA: ICD-10-CM

## 2023-10-10 DIAGNOSIS — F17.210 CIGARETTE SMOKER: ICD-10-CM

## 2023-10-10 DIAGNOSIS — T81.32XA DISRUPTION OF INTERNAL OPERATION (SURGICAL) WOUND, NOT ELSEWHERE CLASSIFIED, INITIAL ENCOUNTER: ICD-10-CM

## 2023-10-10 LAB — POCT GLUCOSE: 189 MG/DL (ref 70–110)

## 2023-10-10 PROCEDURE — 3075F SYST BP GE 130 - 139MM HG: CPT | Mod: CPTII,,, | Performed by: ORTHOPAEDIC SURGERY

## 2023-10-10 PROCEDURE — 99213 OFFICE O/P EST LOW 20 MIN: CPT | Mod: ,,, | Performed by: EMERGENCY MEDICINE

## 2023-10-10 PROCEDURE — 99214 OFFICE O/P EST MOD 30 MIN: CPT | Mod: 57,ICN,, | Performed by: ORTHOPAEDIC SURGERY

## 2023-10-10 PROCEDURE — 99214 PR OFFICE/OUTPT VISIT, EST, LEVL IV, 30-39 MIN: ICD-10-PCS | Mod: 57,ICN,, | Performed by: ORTHOPAEDIC SURGERY

## 2023-10-10 PROCEDURE — 3051F PR MOST RECENT HEMOGLOBIN A1C LEVEL 7.0 - < 8.0%: ICD-10-PCS | Mod: CPTII,,, | Performed by: ORTHOPAEDIC SURGERY

## 2023-10-10 PROCEDURE — 27000999 HC MEDICAL RECORD PHOTO DOCUMENTATION

## 2023-10-10 PROCEDURE — 3080F DIAST BP >= 90 MM HG: CPT | Mod: CPTII,,, | Performed by: ORTHOPAEDIC SURGERY

## 2023-10-10 PROCEDURE — 99213 OFFICE O/P EST LOW 20 MIN: CPT

## 2023-10-10 PROCEDURE — 99213 PR OFFICE/OUTPT VISIT, EST, LEVL III, 20-29 MIN: ICD-10-PCS | Mod: ,,, | Performed by: EMERGENCY MEDICINE

## 2023-10-10 PROCEDURE — 3008F PR BODY MASS INDEX (BMI) DOCUMENTED: ICD-10-PCS | Mod: CPTII,,, | Performed by: ORTHOPAEDIC SURGERY

## 2023-10-10 PROCEDURE — 3075F PR MOST RECENT SYSTOLIC BLOOD PRESS GE 130-139MM HG: ICD-10-PCS | Mod: CPTII,,, | Performed by: ORTHOPAEDIC SURGERY

## 2023-10-10 PROCEDURE — 3080F PR MOST RECENT DIASTOLIC BLOOD PRESSURE >= 90 MM HG: ICD-10-PCS | Mod: CPTII,,, | Performed by: ORTHOPAEDIC SURGERY

## 2023-10-10 PROCEDURE — 3008F BODY MASS INDEX DOCD: CPT | Mod: CPTII,,, | Performed by: ORTHOPAEDIC SURGERY

## 2023-10-10 PROCEDURE — 3051F HG A1C>EQUAL 7.0%<8.0%: CPT | Mod: CPTII,,, | Performed by: ORTHOPAEDIC SURGERY

## 2023-10-10 RX ORDER — QUETIAPINE FUMARATE 50 MG/1
TABLET, FILM COATED ORAL
Status: ON HOLD | COMMUNITY
Start: 2023-10-09 | End: 2023-10-16 | Stop reason: HOSPADM

## 2023-10-10 RX ORDER — SODIUM CHLORIDE 0.9 % (FLUSH) 0.9 %
10 SYRINGE (ML) INJECTION
Status: DISCONTINUED | OUTPATIENT
Start: 2023-10-10 | End: 2024-01-13

## 2023-10-10 RX ORDER — MUPIROCIN 20 MG/G
OINTMENT TOPICAL
Status: CANCELLED | OUTPATIENT
Start: 2023-10-10

## 2023-10-10 NOTE — PROGRESS NOTES
Subjective:       Patient ID: Deborah Cross is a 53 y.o. female.  No chief complaint on file.       Follow-up        Patient is 4mo out from incision and drainage of a draining sinus tract to her left ankle.  She is been doing well.  Recent MRI shows fluid collection possible abscess.  Patient not having fevers or chills still smoking.  She is back to work.  Currently in wound care today  ROS:  Constitutional: Denies fever chills  Eyes: No change in vision  ENT: No ringing or current infections  CV: No chest pain  Resp: No labored breathing  MSK: Pain evident at site of injury located in HPI,   Integ: No signs of abrasions or lacerations  Neuro: No numbness or tingling  Lymphatic: No swelling outside the area of injury     Current Outpatient Medications on File Prior to Visit   Medication Sig Dispense Refill    ALPRAZolam (XANAX) 1 MG tablet Take 1 tablet (1 mg total) by mouth 2 (two) times daily. 45 tablet 5    amLODIPine (NORVASC) 5 MG tablet TAKE ONE TABLET BY MOUTH DAILY 90 tablet 1    FLUoxetine 40 MG capsule TAKE ONE CAPSULE BY MOUTH EVERY MORNING 90 capsule 1    fluticasone propionate (FLONASE) 50 mcg/actuation nasal spray use 1 spray in each nostril twice daily 16 g 1    metFORMIN (GLUCOPHAGE-XR) 750 MG ER 24hr tablet Take 2 tablets (1,500 mg total) by mouth daily with breakfast. 60 tablet 3    minocycline (MINOCIN,DYNACIN) 100 MG capsule Take 1 capsule (100 mg total) by mouth every 12 (twelve) hours. 60 capsule 0    mupirocin (BACTROBAN) 2 % ointment Apply topically 2 (two) times a day. 30 g 0    QUEtiapine (SEROQUEL) 50 MG tablet       rosuvastatin (CRESTOR) 20 MG tablet Take one tablet by mouth once daily 90 tablet 0    sumatriptan (IMITREX) 25 MG Tab TAKE ONE TABLET BY MOUTH as a single DOSE AND MAY REPEAT DOSE in 2 hours if needed 9 tablet 1    traZODone (DESYREL) 100 MG tablet Take one tablet by mouth every evening 90 tablet 0    aspirin (ECOTRIN) 81 MG EC tablet Take 1 tablet (81 mg total) by  mouth 2 (two) times a day. for 14 days 28 tablet 0    methylPREDNISolone (MEDROL DOSEPACK) 4 mg tablet use as directed (Patient not taking: Reported on 10/10/2023) 21 each 0    naloxone (NARCAN) 4 mg/actuation Spry use 1 spray into one nostril. If no response after 2 to 3 minutes, administer additional spray into other nostril. call 911      naproxen (NAPROSYN) 500 MG tablet Take 1 tablet (500 mg total) by mouth 2 (two) times daily with meals. (Patient not taking: Reported on 10/10/2023) 60 tablet 0    nirmatrelvir-ritonavir 300 mg (150 mg x 2)-100 mg copackaged tablets (EUA) Take 3 tablets by mouth 2 (two) times daily. Each dose contains 2 nirmatrelvir (pink tablets) and 1 ritonavir (white tablet). Take all 3 tablets together (Patient not taking: Reported on 10/10/2023) 30 tablet 0    [DISCONTINUED] metFORMIN (GLUCOPHAGE) 500 MG tablet TAKE ONE TABLET BY MOUTH once daily 90 tablet 1    [DISCONTINUED] mupirocin (BACTROBAN) 2 % ointment Apply topically once daily. With wound care 1 g 0    [DISCONTINUED] QUEtiapine (SEROQUEL) 25 MG Tab Take 1 tablet (25 mg total) by mouth every evening. 90 tablet 1     No current facility-administered medications on file prior to visit.          Objective:      There were no vitals taken for this visit.  Physical Exam  General the patient is alert and oriented x3 no acute distress nontoxic-appearing appropriate affect.    Constitutional: Vital signs are examined and stable.  Resp: No signs of labored breathing    Musculoskeletal:     Left lower extremity: approx [id number] area w on lateral ankle.  compartments are soft and compressible; No pain with ROM at the hip, knee, or ankle; minimal tenderness to palpation; dorsi/plantar flexes the foot; SILT distally; BCR distally; DP pulse 2+      There is no height or weight on file to calculate BMI.  Patient weight not recorded  Hemoglobin A1c   Date Value Ref Range Status   09/26/2023 7.2 (H) <=7.0 % Final   04/06/2023 6.0 <=7.0 % Final  "    Hgb   Date Value Ref Range Status   09/26/2023 13.2 12.0 - 16.0 g/dL Final   06/12/2023 13.0 12.0 - 16.0 g/dL Final     Hct   Date Value Ref Range Status   09/26/2023 39.3 37.0 - 47.0 % Final   06/12/2023 40.2 37.0 - 47.0 % Final     Iron Level   Date Value Ref Range Status   04/06/2023 90 50 - 170 ug/dL Final     No components found for: "FROLATE"  Vit D 25 OH   Date Value Ref Range Status   04/06/2023 63.1 30.0 - 80.0 ng/mL Final   12/15/2021 27.7 (L) 30.0 - 80.0 ng/mL Final     WBC   Date Value Ref Range Status   09/26/2023 7.63 4.50 - 11.50 x10(3)/mcL Final   06/12/2023 8.59 4.50 - 11.50 x10(3)/mcL Final       Radiology: no x rays taken today.         Assessment:         1. Open wound of left ankle, subsequent encounter                      Plan:         No follow-ups on file.    There are no diagnoses linked to this encounter.      Patient here after MRI shows fluid collection possible abscess draining sinus tract.  Unfortunately the patient has had chronic infection and wound problems this area.  After discussion with the wound care physician we have decided that surgery is in her best option.  We will plan for exploring the wound likely removal of all hardware in that area if able and possible stay for IV antibiotics.  We will place her on the OR schedule for tomorrow.  Patient understands that nicotine use does lead to infection and surgical wound problems    I explained that surgery and the nature of their condition are not without risks. These include, but are not limited to, bleeding, infection, neurovascular compromise, malunion, nonunion, hardware complications, wound complications, scarring, cosmetic defects, need for later and/or repeated surgeries, avascular necrosis, bone death due to initial trauma, pain, loss of ROM, loss of function, PTOA, deformity, stance/gait and/or functional abnormalities, thromboembolic complications, compartment syndrome, loss of limb, loss of life, anesthetic " complications, and other imponderables. I explained that these can occur despite the adequacy of treatments rendered, and that their risks are heightened given the nature of their condition. They verbalized understanding. They would like to continue with surgery at this time. If appropriate family was involved with surgical discussion.     This note/OR report was created with the assistance of  voice recognition software or phone  dictation.  There may be transcription errors as a result of using this technology however minimal. Effort has been made to assure accuracy of transcription but any obvious errors or omissions should be clarified with the author of the document.       Dillon Scott DO  Orthopedic Trauma Surgery         Future Appointments   Date Time Provider Department Center   12/7/2023  9:00 AM [facility] Cal[facility] [place]   12/19/2023  8:00 AM [facility] [place]   1/15/2024  1:15 PM Adamaris Cao MD [facility] [place]

## 2023-10-10 NOTE — PROGRESS NOTES
"Subjective:       Patient ID: Deborah Cross is a 53 y.o. female.    Chief Complaint: No chief complaint on file.    Cc: nonhealing left lateral ankle post surgical wound      53-year-old WF diabetic and chronic cigarette smoker suffered a fall in October 2022 resulting in an open right tibia /fibula shaft fracture along with an open left ankle dislocation with severe ligamentous injury. Dr Scott did surgery on RLE as well as a left ankle primary ligamentous reconstruction laterally /ATFL and closed treatment of a left 5th metatarsal base fracture. 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   The drainage continued to worsen mark after a weekend of being in a friend's pool. Dr Scott took her back to OR in mid June 2023 where it was noted she had a draining sinus tract. OR noted "ellipse of this sinus tract and excised it with a 15 blade blunt dissected down and came in contact with the fluid collection appears to be liquid I liquified adipose tissue from initial trauma versus purulence.  I completely irrigated the area completed excisional debridement and took deep cultures.  Put vancomycin deep in the wound andand then began closing".  Placed on bactrim for a few weeks.   Surgical site again opened up on this same location.  Ortho rx  clindamycin . Referred here in late July and I met her on 7/27/23.  I noted a dime-sized open wound with spongy friable hypergranulation tissue with a slit in the middle of the wound bed with depth of almost 0.5 cm.  I told her I suspected that this would never heal if there is something going on in the sub surface.  I also told her that she needed to stop smoking and avoid any shoes that rub on this area. We tried 4 weeks of minocycline.   Unfortunately this area has continued to have purulent drainage with tracks /tunnel.  In " addition, still smoking and hemoglobin A1c has increased from 6.0 in April of 2023 up to 7.2 in September of 2023.  MRI + for underlying subsurface issues /infection process still going on: I notified Dr Scott and he will bring her into office today on 10/10/23 to arrange further explorative surgery. I also had previously referred her to ID for help: that lamin not until November. She has reported more purulent drainage          Review of Systems   Constitutional: Negative.    HENT: Negative.     Respiratory: Negative.     Cardiovascular: Negative.    Gastrointestinal: Negative.    Skin:  Positive for wound.   Neurological: Negative.          Objective:      Vitals:    10/10/23 0825   BP: (!) 149/86   Pulse: 96   Resp: 18   Temp: 98.1 °F (36.7 °C)       @poctglucose@  Recent Labs   Lab 10/10/23  0823   POCTGLUCOSE 189*     Physical Exam  Vitals reviewed.   Cardiovascular:      Pulses:           Dorsalis pedis pulses are 2+ on the right side and 2+ on the left side.   Pulmonary:      Effort: Pulmonary effort is normal.   Musculoskeletal:        Feet:    Skin:     General: Skin is warm and dry.      Capillary Refill: Capillary refill takes less than 2 seconds.   Neurological:      General: No focal deficit present.      Mental Status: She is alert.              Incision/Site 06/12/23 1221 Left Malleolus/Ankle lateral (Active)   06/12/23 1221   Present Prior to Hospital Arrival?: Yes   Side: Left   Location: Malleolus/Ankle   Orientation: lateral   Incision Type:    Closure Method:    Additional Comments: Doppler on 10/3: biphasic x4   palpable x 4    ABIs done on 9/26/23 L dp 0.94 pt 0.95   Removal Indication and Assessment:    Wound Outcome:    Removal Indications:    Wound Image   10/10/23 0832   Dressing Appearance Intact 10/10/23 0832   Drainage Amount Moderate 10/10/23 0832   Drainage Characteristics/Odor Serosanguineous;Purulent 10/10/23 0832   Appearance Orwin 10/10/23 0832   Periwound Area Intact;Dry 10/10/23 0832  "  Wound Edges Defined 10/10/23 0832   Wound Length (cm) 0.2 cm 10/10/23 0832   Wound Width (cm) 0.3 cm 10/10/23 0832   Wound Depth (cm) 0.1 cm 10/10/23 0832   Wound Volume (cm^3) 0.006 cm^3 10/10/23 0832   Wound Surface Area (cm^2) 0.06 cm^2 10/10/23 0832   Care Cleansed with:;Antimicrobial agent 10/10/23 0832   Dressing Applied 10/10/23 0832   Periwound Care Absorptive dressing applied 10/10/23 0832           Assessment:       1. Open wound of left ankle, subsequent encounter    2. Disruption of internal operation (surgical) wound, not elsewhere classified, initial encounter    3. Deep incisional surgical site infection    4. Cigarette smoker    5. Mixed anxiety and depressive disorder    6. Other hyperlipidemia          Left lateral ankle open dislocation October 2022: surgery with Dr Scott: "My attention is now drawn to the left ankle patient contaminated left open ankle injury appears to be a simple fracture dislocation with complete disruption of the lateral ligament complex.  Then excisionally debrided the subcutaneous tissue fascia and capsule.  This was then copiously irrigated.  I then placed an Arthrex anchor in the fibula and repaired the ATFL recent stress the ankle appears to be stable." Subsequent distal incisional site opening with fistula tract and drainage since early 2023:  Exploration and debridement mid June 2023 with closure: Subsequent recurrent surgical wound dehiscence: Given Bactrim and clindamycin orally, referred to wound care: 1st clinic visit 7/27/23: recalcitrant (neg cx Aug and Sept 2023, rx minocycline x 4 weeks)  Chronic cigarette smoker  Hypertension  Dyslipidemia  Diabetes,  hba1c 6.0 April 2023, up to 7.2 late Sept 2023/diabetes   ADHD, depression      Date: 06/12/2023        Surgeon:Dillon Scott DO  Assistant: Kevin Mark was essential, part of the procedure including deep hardware placement and deep closure.  No senior assistant was availible  Preoperative " Diagnosis:  Left Ankle draining sinus tract surgical wound dehiscence lateral  Postoperative Diagnosis: Same  Procedure:   excision of draining sinus tract surgical wound dehiscence left ankle CPT 30931  Anesthesiologist: Castillo Fulton MD  OR Staff: Circulator: Pedrito Whitman RN  Scrub Person: Lisandro Mccray  Implants: * No implants in log *  EBL: 20cc  Complications: None  Disposition: To PACU, stable     Indications: Deborah Cross is a 53 y.o. female presenting with the aforementioned injuries/findings.  Patient was seen in office today.  She is been doing quite well with her contralateral side and this side as well.  Over the last 3 or 4 days patient noticed a draining sinus tract on her incision   From her open traumatic ankle dislocation.  She states she is been wearing tennis shoes been rubbing in the area and started knows purulent drainage.  She is still smoking tobacco.  The patient is awake and alert. After thorough discussion of the risks, benefits, expected outcomes, and alternatives to surgical intervention, the patient agreed to proceed with surgical treatment.  Specific risks discussed included, but were not limited to: superficial or deep infection, wound healing complications, DVT/PE, significant bleeding requiring transfusion, damage to named anatomic structures in the immediate area including named neurovascular structures, infection, nonunion, malunion and general risks of anesthesia.  The patient voiced understanding and written as well as verbal consent was obtained by myself prior to the procedure.     Procedure Note:  The patient was brought back to the OR and placed supine on the OR table. After successful induction of anesthesia by anesthesia staff, the patient was positioned in the supine position and all bony prominences were padded appropriately.  The surgical field was then provisionally cleansed and then prepped and draped in the usual sterile fashion.     At this time  a time-out was performed, with the correct patient, site, and procedure identified.  The universal time out as well as sign your site protocols were followed.  Preoperative antibiotics were verified as administered.       Attention is drawn to the left lateral ankle patient has a draining sinus tract to the posterior 3rd of the incision there does appear to be purulence coming from the wound I made a ellipse of this sinus tract and excised it with a 15 blade blunt dissected down and came in contact with the fluid collection appears to be liquid I liquified adipose tissue from initial trauma versus purulence.  Overall minimal cellulitis in this area.  I completely irrigated the area completed excisional debridement and took deep cultures.  Put vancomycin deep in the wound andand then began closing          The patient was then subsequently transferred to to PACU in a stable condition.     All sponge and needle counts were correct at the end of the case.  I was present and participated in all aspects of the procedure.     Prognosis:  The patient will be kept WBAT on the ipsilateral extremity .  Patient will receive DVT prophylaxis .       We will order cultures and sensitivities for her.   She will be placed on oral antibiotics at this time Bactrim DS.     Latest Reference Range & Units 09/26/23 09:22   WBC 4.50 - 11.50 x10(3)/mcL 7.63   RBC 4.20 - 5.40 x10(6)/mcL 4.66   Hemoglobin 12.0 - 16.0 g/dL 13.2   Hematocrit 37.0 - 47.0 % 39.3   MCV 80.0 - 94.0 fL 84.3   MCH 27.0 - 31.0 pg 28.3   MCHC 33.0 - 36.0 g/dL 33.6   RDW 11.5 - 17.0 % 12.8   Platelets 130 - 400 x10(3)/mcL 255   MPV 7.4 - 10.4 fL 9.9   Neut % % 54.9   LYMPH % % 33.0   Mono % % 7.9   Eosinophil % % 2.9   Basophil % % 0.9   Immature Granulocytes % 0.4   Neut # 2.1 - 9.2 x10(3)/mcL 4.19   Lymph # 0.6 - 4.6 x10(3)/mcL 2.52   Mono # 0.1 - 1.3 x10(3)/mcL 0.60   Eos # 0 - 0.9 x10(3)/mcL 0.22   Baso # <=0.2 x10(3)/mcL 0.07   Immature Grans (Abs) 0 - 0.04  x10(3)/mcL 0.03   nRBC % 0.0   Sed Rate 0 - 20 mm/hr 27 (H)   Sodium 136 - 145 mmol/L 141   Potassium 3.5 - 5.1 mmol/L 4.2   Chloride 98 - 107 mmol/L 104   CO2 22 - 29 mmol/L 25   BUN 9.8 - 20.1 mg/dL 9.8   Creatinine 0.55 - 1.02 mg/dL 0.67   eGFR mls/min/1.73/m2 >60   Glucose 74 - 100 mg/dL 147 (H)   Calcium 8.4 - 10.2 mg/dL 9.5   Alkaline Phosphatase 40 - 150 unit/L 118   PROTEIN TOTAL 6.4 - 8.3 gm/dL 7.1   Albumin 3.5 - 5.0 g/dL 3.7   Albumin/Globulin Ratio 1.1 - 2.0 ratio 1.1   Prealbumin 16.0 - 38.0 mg/dL 28.7   BILIRUBIN TOTAL <=1.5 mg/dL 0.3   AST 5 - 34 unit/L 22   ALT 0 - 55 unit/L 21   CRP <5.00 mg/L 2.10   Globulin, Total 2.4 - 3.5 gm/dL 3.4   Hemoglobin A1C External <=7.0 % 7.2 (H)   Estimated Avg Glucose mg/dL 159.9   (H): Data is abnormally high        MRI ANKLE W WO CONTRAST LEFT     FINDINGS:  Postoperative changes noted at the lateral compartment including a suture anchor screw within the anterolateral aspect of the distal fibula, possibly ATFL repair.  There is a fluid tract which extends from the postoperative site at the level of the suture anchor inferiorly approximately 3.5 cm to skin surface laterally.  Possible wound in this area.  There is mild intensity suspected osseous edema within the lateral malleolus, this is a questionable finding with prominent regional artifact from the postoperative hardware.  Regional soft tissue edema noted.  The calcaneofibular ligament is intact.  The peroneus brevis tendon demonstrates C-shaped contour at the lateral malleolus which could indicate a partial-thickness C-shaped tear posteriorly, trace peritendinous fluid noted.     Distal tib-fib ligaments grossly intact.     There is a localized area of possibly degenerative related edema and suspected subchondral cystic change of the inferior aspect of the medial malleolus.  Deltoid ligament likely intact.  There is increased intra tendinous signal and prominent peritendinous fluid around the posterior tibial  tendon consistent with tendinopathy and tenosynovitis.  Spring ligament grossly intact.     Achilles tendon normal.     Acute extensor tendons normal.     Sinus tarsi and plantar fascia normal.     Talar dome intact and demonstrates normal signal and contour.  No joint effusion.     Impression:     Postoperative changes lateral compartment possibly indicating ATFL repair with suture anchor at the anterior lateral aspect of the distal fibula.  Suspected soft tissue infection with a fluid signal tract extending from the postoperative site at the distal fibula, inferiorly 3.5 cm to the skin surface with a possible associated skin wound in this area.  Abscess formation suspected.  Severe regional soft tissue edema noted.  There is suspected patchy edema within the distal fibula which could indicate osteomyelitis, partially obscured by artifact.     Suspected chronic appearing partial-thickness C-shaped tear of the peroneus brevis tendon within the lateral compartment.     Tendinitis, tenosynovitis of the posterior tibial tendon the medial compartment medial malleolus demonstrates likely degenerative related subchondral edematous change and cystic change.  Possible deltoid ligament sprain.        Electronically signed by: Vonda Bains MD  Date:                                            10/06/2023  Plan:     Plan of Care:    Wound continue to  have purulent drainage; +mri as noted above  Needs to go back to surgery with Dr Scott: I notified him of the  MRI and he will be seeing her today as well  Nutrition: Must have a high protein diet to support wound  healing; (if renal disease, see nephrologist for amount allowed):  this should be over 100g protein /day (if no kidney issues); Also rec MVI along with vit C, vit D, zinc and Kristopher  Diabetes: Hemoglobin A1c 6.0  in April but up over 7 now. Needs strict control  Smoker:  She says she is well aware that she needs to stop smoking and acknowledges that it has inhibited  wound healing and contributing to her current condition.  She says she used to smoke a pack a day and so for her being down to 4 cigarettes a day as a victory but she knows she needs to have complete cessation    Avoid shoes that rub on wound  No f/u at this time as going back to surgery with Tyler       The time spent including preparing to see the patient, obtaining patient history and assessment, evaluation of the plan of care, patient/caregiver counseling and education, orders, documentation, coordination of care, and other professional medical management activities for today's encounter was 20 minutes.

## 2023-10-10 NOTE — PATIENT INSTRUCTIONS
Pt seen today by: Dr. South    Expect a call from Dr. Gannon office    Dressings to be changed Daily and as needed if soiled or not intact.    Supplies ordered through [facility] 10/3/23    Self care DRESSING INSTRUCTIONS:    Wound location: Left Lateral Ankle  Cleanse wound with wound cleanser or saline  Apply mupirocin oint to the wound  Pack wound with a small piece of aquacell ag   Cover with Foam and cover roll tape    Return visit:  Call if you need in future    Nutrition:  The current daily value (%DV) for protein is 50 grams per day and is meant as a general goal for most people. Further increasing your dietary protein intake is very important for wound healing. Typically one needs over 100g of protein per day to help with wound healing needs.  If you are a dialysis patient or have problems with your kidneys, talk to your Nephrologist about how much protein you can take in with your condition.  Examples of high protein items that can be added to your diet include: eggs, chicken, red meats, almonds, cottage cheese, Greek yogurt, beans, and peanut butter.  Fortified protein bars, shakes and drinks can add 15-30 additional grams of protein per serving.   Also add:   1 daily general multivitamin   Kristopher : 1 packet twice daily   Vitamin C : 500mg twice daily   Zinc 220 mg daily  Vit D : once daily    Offloading   Offload your wound. This means to reduce pressure on and around the wound that reduces blood flow to the wound and prevents healing. Your wound care team will discuss specific ways for you to offload your specific wound. Common offloading strategies include:  Turn or reposition every 2 hours or sooner  Use pillows, wedges, ROHO wheelchair cushions or other special devices like boots and shoes to lift the wound off of hard surfaces  Alternating Low Air-loss (ALAL) mattress may be ordered  Padded dressings can reduce wound pressure      Call our [facility] wound clinic for questions/concerns a [phone] .

## 2023-10-10 NOTE — PRE-PROCEDURE INSTRUCTIONS
[facility]: Outpatient Surgery   Preprocedure Check-In Instructions       Your arrival time for your surgery or procedure is 5:00am.     We ask patients to arrive about 2 hours before surgery to allow for enough time to review your health history & medications, start your IV, complete any outstanding labwork or tests, and meet your Anesthesiologist.     You will arrive at [facility], [address] [place] [address]. Enter through the [place] entrance next to the Emergency Room, and come to the 6th floor to the Outpatient Surgery Department. If you need a wheelchair, please call [phone] for an attendant to meet you at the [place] entrance with a wheelchair.    Visitory Policy:   You are allowed 2 adult visitors to be with you in the hospital. All hospital visitors should be in good current health. No small children.     What to Bring:   Please have your ID, insurance cards, and all home medication bottles with you at check in. Bring your CPAP machine if one is used at home.     Fasting:   Nothing to eat or drink after midnight the night before your procedure. This includes no ice, gum, hard candies, and/or tobacco products.   Follow your doctor's instructions for taking any medications on the morning of your procedure. If no instructions for taking medications were given, do not take any medications but bring your medications in their bottles to your procedure check in.     Follow your doctor's preoperative instructions regarding skin prep, bowel prep, bathing, or showering prior to your procedure. If any special soaps were provided to you, please use according to your doctor's instructions. If no instructions were given from your doctor, take a good bath or shower with antibacterial soap the night before and the morning of your procedure. On the morning of procedure, wear loose, comfortable clothing. No lotions, makeup, perfumes, colognes, deodorant, or jewelry to  your procedure. Removable items (glasses, contact lenses, dentures, retainers, hearing aids) need to be removed for your procedure. Bring your storage containers for these items if you wear them.     Artificial nails, body jewelry, eyelash extensions, and/or hair extensions with metal clips are not allowed during your surgery. If you currently wear any of these items, please arrange for them to be removed prior to your arrival to the hospital.     Outpatient or Same Day Surgeries:   Any patients receiving sedation/anesthesia are advised not to drive for 24 hours after their procedure. We do not allow patients to drive themselves home after discharge. If you are going home after your procedure, please have someone available to drive you home from the hospital.     You may call the Outpatient Surgery Department at [phone] with any questions or concerns. We are looking forward to meeting you and taking great care of you for your procedure. Thank you for choosing [facility] for your surgical needs.       Status: complete  Spoke with: patient  Call Time: [phone]

## 2023-10-10 NOTE — H&P (VIEW-ONLY)
Subjective:       Patient ID: Deborah Cross is a 53 y.o. female.  No chief complaint on file.       Follow-up        Patient is 4mo out from incision and drainage of a draining sinus tract to her left ankle.  She is been doing well.  Recent MRI shows fluid collection possible abscess.  Patient not having fevers or chills still smoking.  She is back to work.  Currently in wound care today  ROS:  Constitutional: Denies fever chills  Eyes: No change in vision  ENT: No ringing or current infections  CV: No chest pain  Resp: No labored breathing  MSK: Pain evident at site of injury located in HPI,   Integ: No signs of abrasions or lacerations  Neuro: No numbness or tingling  Lymphatic: No swelling outside the area of injury     Current Outpatient Medications on File Prior to Visit   Medication Sig Dispense Refill    ALPRAZolam (XANAX) 1 MG tablet Take 1 tablet (1 mg total) by mouth 2 (two) times daily. 45 tablet 5    amLODIPine (NORVASC) 5 MG tablet TAKE ONE TABLET BY MOUTH DAILY 90 tablet 1    FLUoxetine 40 MG capsule TAKE ONE CAPSULE BY MOUTH EVERY MORNING 90 capsule 1    fluticasone propionate (FLONASE) 50 mcg/actuation nasal spray use 1 spray in each nostril twice daily 16 g 1    metFORMIN (GLUCOPHAGE-XR) 750 MG ER 24hr tablet Take 2 tablets (1,500 mg total) by mouth daily with breakfast. 60 tablet 3    minocycline (MINOCIN,DYNACIN) 100 MG capsule Take 1 capsule (100 mg total) by mouth every 12 (twelve) hours. 60 capsule 0    mupirocin (BACTROBAN) 2 % ointment Apply topically 2 (two) times a day. 30 g 0    QUEtiapine (SEROQUEL) 50 MG tablet       rosuvastatin (CRESTOR) 20 MG tablet Take one tablet by mouth once daily 90 tablet 0    sumatriptan (IMITREX) 25 MG Tab TAKE ONE TABLET BY MOUTH as a single DOSE AND MAY REPEAT DOSE in 2 hours if needed 9 tablet 1    traZODone (DESYREL) 100 MG tablet Take one tablet by mouth every evening 90 tablet 0    aspirin (ECOTRIN) 81 MG EC tablet Take 1 tablet (81 mg total) by  mouth 2 (two) times a day. for 14 days 28 tablet 0    methylPREDNISolone (MEDROL DOSEPACK) 4 mg tablet use as directed (Patient not taking: Reported on 10/10/2023) 21 each 0    naloxone (NARCAN) 4 mg/actuation Spry use 1 spray into one nostril. If no response after 2 to 3 minutes, administer additional spray into other nostril. call 911      naproxen (NAPROSYN) 500 MG tablet Take 1 tablet (500 mg total) by mouth 2 (two) times daily with meals. (Patient not taking: Reported on 10/10/2023) 60 tablet 0    nirmatrelvir-ritonavir 300 mg (150 mg x 2)-100 mg copackaged tablets (EUA) Take 3 tablets by mouth 2 (two) times daily. Each dose contains 2 nirmatrelvir (pink tablets) and 1 ritonavir (white tablet). Take all 3 tablets together (Patient not taking: Reported on 10/10/2023) 30 tablet 0    [DISCONTINUED] metFORMIN (GLUCOPHAGE) 500 MG tablet TAKE ONE TABLET BY MOUTH once daily 90 tablet 1    [DISCONTINUED] mupirocin (BACTROBAN) 2 % ointment Apply topically once daily. With wound care 1 g 0    [DISCONTINUED] QUEtiapine (SEROQUEL) 25 MG Tab Take 1 tablet (25 mg total) by mouth every evening. 90 tablet 1     No current facility-administered medications on file prior to visit.          Objective:      There were no vitals taken for this visit.  Physical Exam  General the patient is alert and oriented x3 no acute distress nontoxic-appearing appropriate affect.    Constitutional: Vital signs are examined and stable.  Resp: No signs of labored breathing    Musculoskeletal:     Left lower extremity: approx [id number] area w on lateral ankle.  compartments are soft and compressible; No pain with ROM at the hip, knee, or ankle; minimal tenderness to palpation; dorsi/plantar flexes the foot; SILT distally; BCR distally; DP pulse 2+      There is no height or weight on file to calculate BMI.  Patient weight not recorded  Hemoglobin A1c   Date Value Ref Range Status   09/26/2023 7.2 (H) <=7.0 % Final   04/06/2023 6.0 <=7.0 % Final  "    Hgb   Date Value Ref Range Status   09/26/2023 13.2 12.0 - 16.0 g/dL Final   06/12/2023 13.0 12.0 - 16.0 g/dL Final     Hct   Date Value Ref Range Status   09/26/2023 39.3 37.0 - 47.0 % Final   06/12/2023 40.2 37.0 - 47.0 % Final     Iron Level   Date Value Ref Range Status   04/06/2023 90 50 - 170 ug/dL Final     No components found for: "FROLATE"  Vit D 25 OH   Date Value Ref Range Status   04/06/2023 63.1 30.0 - 80.0 ng/mL Final   12/15/2021 27.7 (L) 30.0 - 80.0 ng/mL Final     WBC   Date Value Ref Range Status   09/26/2023 7.63 4.50 - 11.50 x10(3)/mcL Final   06/12/2023 8.59 4.50 - 11.50 x10(3)/mcL Final       Radiology: no x rays taken today.         Assessment:         1. Open wound of left ankle, subsequent encounter                      Plan:         No follow-ups on file.    There are no diagnoses linked to this encounter.      Patient here after MRI shows fluid collection possible abscess draining sinus tract.  Unfortunately the patient has had chronic infection and wound problems this area.  After discussion with the wound care physician we have decided that surgery is in her best option.  We will plan for exploring the wound likely removal of all hardware in that area if able and possible stay for IV antibiotics.  We will place her on the OR schedule for tomorrow.  Patient understands that nicotine use does lead to infection and surgical wound problems    I explained that surgery and the nature of their condition are not without risks. These include, but are not limited to, bleeding, infection, neurovascular compromise, malunion, nonunion, hardware complications, wound complications, scarring, cosmetic defects, need for later and/or repeated surgeries, avascular necrosis, bone death due to initial trauma, pain, loss of ROM, loss of function, PTOA, deformity, stance/gait and/or functional abnormalities, thromboembolic complications, compartment syndrome, loss of limb, loss of life, anesthetic " complications, and other imponderables. I explained that these can occur despite the adequacy of treatments rendered, and that their risks are heightened given the nature of their condition. They verbalized understanding. They would like to continue with surgery at this time. If appropriate family was involved with surgical discussion.     This note/OR report was created with the assistance of  voice recognition software or phone  dictation.  There may be transcription errors as a result of using this technology however minimal. Effort has been made to assure accuracy of transcription but any obvious errors or omissions should be clarified with the author of the document.       Dillon Scott DO  Orthopedic Trauma Surgery         Future Appointments   Date Time Provider Department Center   12/7/2023  9:00 AM [facility] [place]   12/19/2023  8:00 AM [facility] [place]   1/15/2024  1:15 PM Adamaris Cao MD [facility] [place]

## 2023-10-11 ENCOUNTER — HOSPITAL ENCOUNTER (INPATIENT)
Facility: HOSPITAL | Age: 53
LOS: 5 days | Discharge: HOME HEALTH | DRG: 464 | End: 2023-10-16
Attending: ORTHOPAEDIC SURGERY | Admitting: ORTHOPAEDIC SURGERY
Payer: MEDICAID

## 2023-10-11 ENCOUNTER — ANESTHESIA (OUTPATIENT)
Dept: SURGERY | Facility: HOSPITAL | Age: 53
DRG: 464 | End: 2023-10-11
Payer: MEDICAID

## 2023-10-11 DIAGNOSIS — S91.002D OPEN WOUND OF LEFT ANKLE, SUBSEQUENT ENCOUNTER: ICD-10-CM

## 2023-10-11 DIAGNOSIS — M86.9: Primary | ICD-10-CM

## 2023-10-11 LAB
CREAT SERPL-MCNC: 0.66 MG/DL (ref 0.55–1.02)
GFR SERPLBLD CREATININE-BSD FMLA CKD-EPI: >60 MLS/MIN/1.73/M2
POCT GLUCOSE: 121 MG/DL (ref 70–110)

## 2023-10-11 PROCEDURE — 71000039 HC RECOVERY, EACH ADD'L HOUR: Performed by: ORTHOPAEDIC SURGERY

## 2023-10-11 PROCEDURE — 20680 PR REMOVAL DEEP IMPLANT: ICD-10-PCS | Mod: 51,,, | Performed by: ORTHOPAEDIC SURGERY

## 2023-10-11 PROCEDURE — D9220A PRA ANESTHESIA: ICD-10-PCS | Mod: ANES,,, | Performed by: ANESTHESIOLOGY

## 2023-10-11 PROCEDURE — 63600175 PHARM REV CODE 636 W HCPCS: Performed by: PHYSICIAN ASSISTANT

## 2023-10-11 PROCEDURE — 36000704 HC OR TIME LEV I 1ST 15 MIN: Performed by: ORTHOPAEDIC SURGERY

## 2023-10-11 PROCEDURE — 87206 SMEAR FLUORESCENT/ACID STAI: CPT | Performed by: ORTHOPAEDIC SURGERY

## 2023-10-11 PROCEDURE — 63600175 PHARM REV CODE 636 W HCPCS: Performed by: REGISTERED NURSE

## 2023-10-11 PROCEDURE — 20680 REMOVAL OF IMPLANT DEEP: CPT | Mod: AS,,, | Performed by: PHYSICIAN ASSISTANT

## 2023-10-11 PROCEDURE — 20680 PR REMOVAL DEEP IMPLANT: ICD-10-PCS | Mod: AS,,, | Performed by: PHYSICIAN ASSISTANT

## 2023-10-11 PROCEDURE — 11000001 HC ACUTE MED/SURG PRIVATE ROOM

## 2023-10-11 PROCEDURE — 25000003 PHARM REV CODE 250: Performed by: REGISTERED NURSE

## 2023-10-11 PROCEDURE — D9220A PRA ANESTHESIA: ICD-10-PCS | Mod: CRNA,,, | Performed by: REGISTERED NURSE

## 2023-10-11 PROCEDURE — 71000033 HC RECOVERY, INTIAL HOUR: Performed by: ORTHOPAEDIC SURGERY

## 2023-10-11 PROCEDURE — 25000003 PHARM REV CODE 250: Performed by: PHYSICIAN ASSISTANT

## 2023-10-11 PROCEDURE — 87116 MYCOBACTERIA CULTURE: CPT | Performed by: ORTHOPAEDIC SURGERY

## 2023-10-11 PROCEDURE — 63600175 PHARM REV CODE 636 W HCPCS: Performed by: ORTHOPAEDIC SURGERY

## 2023-10-11 PROCEDURE — 25000003 PHARM REV CODE 250: Performed by: ANESTHESIOLOGY

## 2023-10-11 PROCEDURE — 63600175 PHARM REV CODE 636 W HCPCS: Performed by: ANESTHESIOLOGY

## 2023-10-11 PROCEDURE — 25000003 PHARM REV CODE 250: Performed by: ORTHOPAEDIC SURGERY

## 2023-10-11 PROCEDURE — 87102 FUNGUS ISOLATION CULTURE: CPT | Performed by: ORTHOPAEDIC SURGERY

## 2023-10-11 PROCEDURE — 27000221 HC OXYGEN, UP TO 24 HOURS

## 2023-10-11 PROCEDURE — 37000008 HC ANESTHESIA 1ST 15 MINUTES: Performed by: ORTHOPAEDIC SURGERY

## 2023-10-11 PROCEDURE — 13160 SEC CLSR SURG WND/DEHSN XTN: CPT | Mod: ,,, | Performed by: ORTHOPAEDIC SURGERY

## 2023-10-11 PROCEDURE — 71000015 HC POSTOP RECOV 1ST HR: Performed by: ORTHOPAEDIC SURGERY

## 2023-10-11 PROCEDURE — D9220A PRA ANESTHESIA: Mod: ANES,,, | Performed by: ANESTHESIOLOGY

## 2023-10-11 PROCEDURE — 87205 SMEAR GRAM STAIN: CPT | Performed by: ORTHOPAEDIC SURGERY

## 2023-10-11 PROCEDURE — 87075 CULTR BACTERIA EXCEPT BLOOD: CPT | Performed by: ORTHOPAEDIC SURGERY

## 2023-10-11 PROCEDURE — 37000009 HC ANESTHESIA EA ADD 15 MINS: Performed by: ORTHOPAEDIC SURGERY

## 2023-10-11 PROCEDURE — 87070 CULTURE OTHR SPECIMN AEROBIC: CPT | Performed by: ORTHOPAEDIC SURGERY

## 2023-10-11 PROCEDURE — 13160 PR SECD CLOS SURG WND EXTEN/COMPLIC: ICD-10-PCS | Mod: ,,, | Performed by: ORTHOPAEDIC SURGERY

## 2023-10-11 PROCEDURE — D9220A PRA ANESTHESIA: Mod: CRNA,,, | Performed by: REGISTERED NURSE

## 2023-10-11 PROCEDURE — 20680 REMOVAL OF IMPLANT DEEP: CPT | Mod: 51,,, | Performed by: ORTHOPAEDIC SURGERY

## 2023-10-11 PROCEDURE — 82565 ASSAY OF CREATININE: CPT | Performed by: ORTHOPAEDIC SURGERY

## 2023-10-11 PROCEDURE — 36000705 HC OR TIME LEV I EA ADD 15 MIN: Performed by: ORTHOPAEDIC SURGERY

## 2023-10-11 RX ORDER — ROCURONIUM BROMIDE 10 MG/ML
INJECTION, SOLUTION INTRAVENOUS
Status: DISCONTINUED | OUTPATIENT
Start: 2023-10-11 | End: 2023-10-11

## 2023-10-11 RX ORDER — SODIUM CHLORIDE, SODIUM GLUCONATE, SODIUM ACETATE, POTASSIUM CHLORIDE AND MAGNESIUM CHLORIDE 30; 37; 368; 526; 502 MG/100ML; MG/100ML; MG/100ML; MG/100ML; MG/100ML
INJECTION, SOLUTION INTRAVENOUS CONTINUOUS
Status: DISCONTINUED | OUTPATIENT
Start: 2023-10-11 | End: 2023-10-11

## 2023-10-11 RX ORDER — MIDAZOLAM HYDROCHLORIDE 1 MG/ML
2 INJECTION INTRAMUSCULAR; INTRAVENOUS ONCE AS NEEDED
Status: COMPLETED | OUTPATIENT
Start: 2023-10-11 | End: 2023-10-11

## 2023-10-11 RX ORDER — LIDOCAINE HYDROCHLORIDE 10 MG/ML
1 INJECTION, SOLUTION EPIDURAL; INFILTRATION; INTRACAUDAL; PERINEURAL ONCE
Status: DISCONTINUED | OUTPATIENT
Start: 2023-10-11 | End: 2023-10-11 | Stop reason: HOSPADM

## 2023-10-11 RX ORDER — FENTANYL CITRATE 50 UG/ML
INJECTION, SOLUTION INTRAMUSCULAR; INTRAVENOUS
Status: DISCONTINUED | OUTPATIENT
Start: 2023-10-11 | End: 2023-10-11

## 2023-10-11 RX ORDER — ENOXAPARIN SODIUM 100 MG/ML
40 INJECTION SUBCUTANEOUS EVERY 24 HOURS
Status: DISCONTINUED | OUTPATIENT
Start: 2023-10-11 | End: 2023-10-16 | Stop reason: HOSPADM

## 2023-10-11 RX ORDER — MORPHINE SULFATE 10 MG/ML
4 INJECTION INTRAMUSCULAR; INTRAVENOUS; SUBCUTANEOUS EVERY 6 HOURS PRN
Status: DISCONTINUED | OUTPATIENT
Start: 2023-10-11 | End: 2023-10-16 | Stop reason: HOSPADM

## 2023-10-11 RX ORDER — POLYETHYLENE GLYCOL 3350 17 G/17G
17 POWDER, FOR SOLUTION ORAL DAILY
Status: DISCONTINUED | OUTPATIENT
Start: 2023-10-11 | End: 2023-10-16 | Stop reason: HOSPADM

## 2023-10-11 RX ORDER — LIDOCAINE HYDROCHLORIDE 20 MG/ML
INJECTION, SOLUTION EPIDURAL; INFILTRATION; INTRACAUDAL; PERINEURAL
Status: DISCONTINUED | OUTPATIENT
Start: 2023-10-11 | End: 2023-10-11

## 2023-10-11 RX ORDER — GABAPENTIN 300 MG/1
300 CAPSULE ORAL
Status: COMPLETED | OUTPATIENT
Start: 2023-10-11 | End: 2023-10-11

## 2023-10-11 RX ORDER — SODIUM CHLORIDE 9 MG/ML
INJECTION, SOLUTION INTRAVENOUS
Status: DISCONTINUED | OUTPATIENT
Start: 2023-10-11 | End: 2023-10-16 | Stop reason: HOSPADM

## 2023-10-11 RX ORDER — ONDANSETRON 2 MG/ML
4 INJECTION INTRAMUSCULAR; INTRAVENOUS EVERY 6 HOURS PRN
Status: DISCONTINUED | OUTPATIENT
Start: 2023-10-11 | End: 2023-10-16 | Stop reason: HOSPADM

## 2023-10-11 RX ORDER — HYDROCODONE BITARTRATE AND ACETAMINOPHEN 10; 325 MG/1; MG/1
1 TABLET ORAL EVERY 4 HOURS PRN
Status: DISCONTINUED | OUTPATIENT
Start: 2023-10-11 | End: 2023-10-12

## 2023-10-11 RX ORDER — MUPIROCIN 20 MG/G
OINTMENT TOPICAL
Status: DISCONTINUED | OUTPATIENT
Start: 2023-10-11 | End: 2023-10-11 | Stop reason: HOSPADM

## 2023-10-11 RX ORDER — ONDANSETRON HYDROCHLORIDE 2 MG/ML
INJECTION, SOLUTION INTRAMUSCULAR; INTRAVENOUS
Status: DISCONTINUED | OUTPATIENT
Start: 2023-10-11 | End: 2023-10-11

## 2023-10-11 RX ORDER — ONDANSETRON 2 MG/ML
4 INJECTION INTRAMUSCULAR; INTRAVENOUS DAILY PRN
Status: DISCONTINUED | OUTPATIENT
Start: 2023-10-11 | End: 2023-10-11 | Stop reason: HOSPADM

## 2023-10-11 RX ORDER — ACETAMINOPHEN 500 MG
1000 TABLET ORAL
Status: COMPLETED | OUTPATIENT
Start: 2023-10-11 | End: 2023-10-11

## 2023-10-11 RX ORDER — VANCOMYCIN HCL IN 5 % DEXTROSE 1G/250ML
1000 PLASTIC BAG, INJECTION (ML) INTRAVENOUS
Status: DISCONTINUED | OUTPATIENT
Start: 2023-10-11 | End: 2023-10-12

## 2023-10-11 RX ORDER — DIPHENHYDRAMINE HYDROCHLORIDE 50 MG/ML
25 INJECTION INTRAMUSCULAR; INTRAVENOUS EVERY 6 HOURS PRN
Status: DISCONTINUED | OUTPATIENT
Start: 2023-10-11 | End: 2023-10-11 | Stop reason: HOSPADM

## 2023-10-11 RX ORDER — METHOCARBAMOL 750 MG/1
750 TABLET, FILM COATED ORAL 3 TIMES DAILY
Status: DISCONTINUED | OUTPATIENT
Start: 2023-10-11 | End: 2023-10-16 | Stop reason: HOSPADM

## 2023-10-11 RX ORDER — METHOCARBAMOL 100 MG/ML
1000 INJECTION, SOLUTION INTRAMUSCULAR; INTRAVENOUS ONCE
Status: COMPLETED | OUTPATIENT
Start: 2023-10-11 | End: 2023-10-11

## 2023-10-11 RX ORDER — ONDANSETRON 4 MG/1
4 TABLET, ORALLY DISINTEGRATING ORAL ONCE
Status: COMPLETED | OUTPATIENT
Start: 2023-10-11 | End: 2023-10-11

## 2023-10-11 RX ORDER — PROPOFOL 10 MG/ML
VIAL (ML) INTRAVENOUS
Status: DISCONTINUED | OUTPATIENT
Start: 2023-10-11 | End: 2023-10-11

## 2023-10-11 RX ORDER — METOCLOPRAMIDE HYDROCHLORIDE 5 MG/ML
10 INJECTION INTRAMUSCULAR; INTRAVENOUS EVERY 10 MIN PRN
Status: DISCONTINUED | OUTPATIENT
Start: 2023-10-11 | End: 2023-10-11 | Stop reason: HOSPADM

## 2023-10-11 RX ORDER — VANCOMYCIN HYDROCHLORIDE 1 G/20ML
INJECTION, POWDER, LYOPHILIZED, FOR SOLUTION INTRAVENOUS
Status: DISCONTINUED | OUTPATIENT
Start: 2023-10-11 | End: 2023-10-11 | Stop reason: HOSPADM

## 2023-10-11 RX ORDER — LORAZEPAM 2 MG/ML
0.25 INJECTION INTRAMUSCULAR ONCE AS NEEDED
Status: DISCONTINUED | OUTPATIENT
Start: 2023-10-11 | End: 2023-10-11

## 2023-10-11 RX ORDER — HYDROMORPHONE HYDROCHLORIDE 2 MG/ML
0.4 INJECTION, SOLUTION INTRAMUSCULAR; INTRAVENOUS; SUBCUTANEOUS EVERY 5 MIN PRN
Status: DISCONTINUED | OUTPATIENT
Start: 2023-10-11 | End: 2023-10-11 | Stop reason: HOSPADM

## 2023-10-11 RX ORDER — CEFAZOLIN SODIUM 1 G/3ML
INJECTION, POWDER, FOR SOLUTION INTRAMUSCULAR; INTRAVENOUS
Status: DISCONTINUED | OUTPATIENT
Start: 2023-10-11 | End: 2023-10-11

## 2023-10-11 RX ORDER — HYDROCODONE BITARTRATE AND ACETAMINOPHEN 5; 325 MG/1; MG/1
1 TABLET ORAL EVERY 4 HOURS PRN
Status: DISCONTINUED | OUTPATIENT
Start: 2023-10-11 | End: 2023-10-12

## 2023-10-11 RX ORDER — DEXMEDETOMIDINE HYDROCHLORIDE 100 UG/ML
INJECTION, SOLUTION INTRAVENOUS
Status: DISCONTINUED | OUTPATIENT
Start: 2023-10-11 | End: 2023-10-11

## 2023-10-11 RX ADMIN — LIDOCAINE HYDROCHLORIDE 80 MG: 20 INJECTION, SOLUTION EPIDURAL; INFILTRATION; INTRACAUDAL; PERINEURAL at 07:10

## 2023-10-11 RX ADMIN — MIDAZOLAM HYDROCHLORIDE 2 MG: 1 INJECTION, SOLUTION INTRAMUSCULAR; INTRAVENOUS at 07:10

## 2023-10-11 RX ADMIN — FENTANYL CITRATE 50 MCG: 50 INJECTION, SOLUTION INTRAMUSCULAR; INTRAVENOUS at 07:10

## 2023-10-11 RX ADMIN — ONDANSETRON 4 MG: 4 TABLET, ORALLY DISINTEGRATING ORAL at 06:10

## 2023-10-11 RX ADMIN — HYDROMORPHONE HYDROCHLORIDE 0.4 MG: 2 INJECTION, SOLUTION INTRAMUSCULAR; INTRAVENOUS; SUBCUTANEOUS at 08:10

## 2023-10-11 RX ADMIN — GABAPENTIN 300 MG: 300 CAPSULE ORAL at 06:10

## 2023-10-11 RX ADMIN — METHOCARBAMOL 1000 MG: 100 INJECTION, SOLUTION INTRAMUSCULAR; INTRAVENOUS at 09:10

## 2023-10-11 RX ADMIN — ROCURONIUM BROMIDE 50 MG: 10 SOLUTION INTRAVENOUS at 07:10

## 2023-10-11 RX ADMIN — ONDANSETRON 4 MG: 2 INJECTION INTRAMUSCULAR; INTRAVENOUS at 07:10

## 2023-10-11 RX ADMIN — VANCOMYCIN HYDROCHLORIDE 1000 MG: 1 INJECTION, POWDER, LYOPHILIZED, FOR SOLUTION INTRAVENOUS at 09:10

## 2023-10-11 RX ADMIN — SODIUM CHLORIDE: 9 INJECTION, SOLUTION INTRAVENOUS at 05:10

## 2023-10-11 RX ADMIN — PIPERACILLIN AND TAZOBACTAM 4.5 G: 4; .5 INJECTION, POWDER, LYOPHILIZED, FOR SOLUTION INTRAVENOUS; PARENTERAL at 05:10

## 2023-10-11 RX ADMIN — HYDROCODONE BITARTRATE AND ACETAMINOPHEN 1 TABLET: 10; 325 TABLET ORAL at 09:10

## 2023-10-11 RX ADMIN — DEXMEDETOMIDINE 4 MCG: 200 INJECTION, SOLUTION INTRAVENOUS at 07:10

## 2023-10-11 RX ADMIN — PROPOFOL 120 MG: 10 INJECTION, EMULSION INTRAVENOUS at 07:10

## 2023-10-11 RX ADMIN — SODIUM CHLORIDE, SODIUM GLUCONATE, SODIUM ACETATE, POTASSIUM CHLORIDE AND MAGNESIUM CHLORIDE 100 ML/HR: 526; 502; 368; 37; 30 INJECTION, SOLUTION INTRAVENOUS at 07:10

## 2023-10-11 RX ADMIN — HYDROCODONE BITARTRATE AND ACETAMINOPHEN 1 TABLET: 10; 325 TABLET ORAL at 01:10

## 2023-10-11 RX ADMIN — HYDROMORPHONE HYDROCHLORIDE 0.4 MG: 2 INJECTION, SOLUTION INTRAMUSCULAR; INTRAVENOUS; SUBCUTANEOUS at 10:10

## 2023-10-11 RX ADMIN — ACETAMINOPHEN 1000 MG: 500 TABLET ORAL at 06:10

## 2023-10-11 RX ADMIN — METHOCARBAMOL 750 MG: 750 TABLET ORAL at 09:10

## 2023-10-11 RX ADMIN — ENOXAPARIN SODIUM 40 MG: 40 INJECTION SUBCUTANEOUS at 05:10

## 2023-10-11 RX ADMIN — SUGAMMADEX 123 MG: 100 INJECTION, SOLUTION INTRAVENOUS at 08:10

## 2023-10-11 RX ADMIN — HYDROMORPHONE HYDROCHLORIDE 0.4 MG: 2 INJECTION, SOLUTION INTRAMUSCULAR; INTRAVENOUS; SUBCUTANEOUS at 09:10

## 2023-10-11 RX ADMIN — CEFAZOLIN 2 G: 330 INJECTION, POWDER, FOR SOLUTION INTRAMUSCULAR; INTRAVENOUS at 08:10

## 2023-10-11 RX ADMIN — METHOCARBAMOL 750 MG: 750 TABLET ORAL at 05:10

## 2023-10-11 RX ADMIN — HYDROCODONE BITARTRATE AND ACETAMINOPHEN 1 TABLET: 10; 325 TABLET ORAL at 05:10

## 2023-10-11 NOTE — ANESTHESIA PROCEDURE NOTES
Intubation    Date/Time: 10/11/2023 7:15 AM    Performed by: Shanna Everett CRNA  Authorized by: Adi Junior MD    Intubation:     Induction:  Rapid sequence induction    Intubated:  Postinduction    Mask Ventilation:  Not attempted    Attempts:  1    Attempted By:  CRNA and student    Method of Intubation:  Direct    Blade:  Sujata 3    Laryngeal View Grade: Grade IIA - cords partially seen      Difficult Airway Encountered?: No      Complications:  None    Airway Device:  Oral endotracheal tube    Airway Device Size:  7.0    Style/Cuff Inflation:  Cuffed (inflated to minimal occlusive pressure)    Inflation Amount (mL):  7    Tube secured:  22    Secured at:  The lips    Placement Verified By:  Capnometry    Complicating Factors:  None    Findings Post-Intubation:  BS equal bilateral and atraumatic/condition of teeth unchanged  Notes:      Cricoid pressure held by Dr. Almodovar

## 2023-10-11 NOTE — ANESTHESIA PREPROCEDURE EVALUATION
"                                                                                                             10/11/2023  Deborah Cross is a 53 y.o., female who presents with complication / open non helaing left ankle wound from a fall in 10/2022 .  53-year-old WF diabetic and chronic cigarette smoker suffered a fall in 2022 resulting in an open right tibia /fibula shaft fracture along with an open left ankle dislocation with severe ligamentous injury. Dr Scott did surgery on RLE as well as a left ankle primary ligamentous reconstruction laterally /ATFL and closed treatment of a left 5th metatarsal base fracture. 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   The drainage continued to worsen mark after a weekend of being in a friend's pool. Dr Scott took her back to OR in mid 2023 where it was noted she had a draining sinus tract.  Diagnosis:   Open wound of left ankle, subsequent encounter [S91.002D]   Pre-op diagnosis: Open wound of left ankle, subsequent encounter [S91.002D]           The pt. comes to [facility] for the noted procedure under GA/LMA vs GETA.  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, with nonunion, subsequent encounter   Migraine      Surgical History:  HYSTERECTOMY INSERTION OF INTRAMEDULLARY NAIL INTO TIBIA   REPAIR OF LIGAMENT OF ANKLE  SECTION   ORIF TIBIA FRACTURE INCISION AND DRAINAGE, LOWER EXTREMITY           Vital signs:  Pre Vitals     Current as of 10/11/23 0535  BP: 139/94 Pulse: 105   Resp:  SpO2:    Temp:    Height: 5' 2" (1.575 m) (10/10/23) Weight: 63.5 kg (140 lb) (10/10/23) "   BMI: 25.6 IBW: 50.1 kg (110 lb 7.8 oz)   Last edited 10/10/23 1013 by KG          Lab Data:      EKG:              Pre-op Assessment    I have reviewed the Patient Summary Reports.     I have reviewed the Nursing Notes. I have reviewed the NPO Status.   I have reviewed the Medications.     Review of Systems  Anesthesia Hx:  No problems with previous Anesthesia    Social:  Non-Smoker    Hematology/Oncology:  Hematology Normal   Oncology Normal     EENT/Dental:EENT/Dental Normal   Cardiovascular:   Exercise tolerance: good Hypertension  Functional Capacity good / => 4 METS    Pulmonary:  Pulmonary Normal    Renal/:  Renal/ Normal     Hepatic/GI:   GERD    Musculoskeletal:  Musculoskeletal Normal    Neurological:   Neuromuscular Disease, Headaches    Endocrine:   Diabetes    Dermatological:  Skin Normal    Psych:   Psychiatric History             Anesthesia Plan  Type of Anesthesia, risks & benefits discussed:    Anesthesia Type: Gen ETT  Intra-op Monitoring Plan: Standard ASA Monitors  Post Op Pain Control Plan: IV/PO Opioids PRN  Induction:  IV and Inhalation  Airway Plan: Direct  Informed Consent: Informed consent signed with the Patient and all parties understand the risks and agree with anesthesia plan.  All questions answered. Patient consented to blood products? Yes  ASA Score: 3  Day of Surgery Review of History & Physical: H&P Update referred to the surgeon/provider.    Ready For Surgery From Anesthesia Perspective.     .

## 2023-10-11 NOTE — PLAN OF CARE
10/11/23 1543   Discharge Assessment   Assessment Type Discharge Planning Assessment   Confirmed/corrected address, phone number and insurance Yes   Confirmed Demographics Correct on Facesheet   Source of Information patient   When was your last doctors appointment? 08/15/23   Communicated MARY with patient/caregiver Date not available/Unable to determine   Reason For Admission Lt ankle drainage from old surgical site (Dec 2022)   People in Home child(umm), dependent  (18 yo son)   Do you expect to return to your current living situation? Yes  (Or will stay with her dgt who is a NP in [place] for a week)   Do you have help at home or someone to help you manage your care at home? Yes   Who are your caregiver(s) and their phone number(s)? Daniela Zunigah [phone]   Prior to hospitilization cognitive status: Alert/Oriented   Current cognitive status: Alert/Oriented   Home Layout Able to live on 1st floor   Equipment Currently Used at Home none   Readmission within 30 days? No   Patient currently being followed by outpatient case management? No   Do you currently have service(s) that help you manage your care at home? No   Do you take prescription medications? Yes   Do you have prescription coverage? No   Do you have any problems affording any of your prescribed medications? No   Is the patient taking medications as prescribed? yes   Who is going to help you get home at discharge? dgt   How do you get to doctors appointments? car, drives self   Are you on dialysis? No   Do you take coumadin? No   DME Needed Upon Discharge  other (see comments)  (tbd)   Discharge Plan discussed with: Patient   Transition of Care Barriers None   Discharge Plan A Home with family   Discharge Plan B Home Health   Financial Resource Strain   How hard is it for you to pay for the very basics like food, housing, medical care, and heating? Not hard   Housing Stability   In the last 12 months, was there a time when you were not able to pay  the mortgage or rent on time? N   In the last 12 months, was there a time when you did not have a steady place to sleep or slept in a shelter (including now)? N   Transportation Needs   In the past 12 months, has lack of transportation kept you from medical appointments or from getting medications? no   In the past 12 months, has lack of transportation kept you from meetings, work, or from getting things needed for daily living? No   Food Insecurity   Within the past 12 months, you worried that your food would run out before you got the money to buy more. Never true   Within the past 12 months, the food you bought just didn't last and you didn't have money to get more. Never true   Social Connections   In a typical week, how many times do you talk on the phone with family, friends, or neighbors? More than 3   How often do you get together with friends or relatives? More than 3   Are you , , , , never , or living with a partner? Never marrie   Alcohol Use   Q1: How often do you have a drink containing alcohol? Never   Q2: How many drinks containing alcohol do you have on a typical day when you are drinking? None   Q3: How often do you have six or more drinks on one occasion? Never     Pt states she is indep in adl's prior to admit. Has a 18 yo son who lives in her  home. States her mom lives close and her sister with 4 children live with her mom. Pt states at [place] she will either return home or stay with her dgt Serena who is a nurse at [employer] for a week.  Pt states she was told she will be here 3 days and ID will be consulted, She states the original surgery was done in Dec 2022 after a fall from a ladder.  CM continue to follow for any needs.

## 2023-10-11 NOTE — NURSING
Nurses Note -- 4 Eyes      10/11/2023   3:35 PM      Skin assessed during: Admit      [x] No Altered Skin Integrity Present    []Prevention Measures Documented      [] Yes- Altered Skin Integrity Present or Discovered   [] LDA Added if Not in Epic (Describe Wound)   [] New Altered Skin Integrity was Present on Admit and Documented in LDA   [] Wound Image Taken    Wound Care Consulted? No    Attending Nurse:  Abdirahman Smith LPN    Second RN/Staff Member:   Renae Palma RN

## 2023-10-11 NOTE — OP NOTE
OPERATIVE REPORT      Patient: Deborah Cross   : 1970    MRN: [id number]  Date: 10/11/2023      Surgeon:Dillon Scott DO  Assistant: Kevin Mark was essential, part of the procedure including deep hardware placement and deep closure.  No senior assistant was availible  Preoperative Diagnosis:  Left ankle draining sinus tract over previous surgical incision, nicotine abuse  Postoperative Diagnosis: Same  Procedure:  Excision of draining sinus tract over previous surgical excision left ankle-CPT 02850  Removal Of hardware deep left fibula 51991  Anesthesiologist: Adi Junior MD  OR Staff: Circulator: Chioma Santizo RN; Kit Deutsch RN  Scrub Person: Shana Mendez ST  X-Ray Technologist: Kiara Schulz RT  Implants: * No implants in log *  EBL: 20cc  Complications: None  Disposition: To PACU, stable    Indications: Deborah Cross is a 53 y.o. female presenting with the aforementioned injuries/findings. The procedure is indicated to decrease infection burden provide skin closure and cultures for antibiotic.  The patient is awake and alert. After thorough discussion of the risks, benefits, expected outcomes, and alternatives to surgical intervention, the patient agreed to proceed with surgical treatment.  Specific risks discussed included, but were not limited to: superficial or deep infection, wound healing complications, DVT/PE, significant bleeding requiring transfusion, damage to named anatomic structures in the immediate area including named neurovascular structures, infection, nonunion, malunion and general risks of anesthesia.  The patient voiced understanding and written as well as verbal consent was obtained by myself prior to the procedure.  Patient has chronic wound to the left ankle.  She would a previous severe open fracture dislocation with ATFL repair.  She is had a chronic wound for some time MRI shows abscess with draining sinus tract which may involve  the hardware.  Plan today is to excise this area culture deep and remove hardware.  Patient has continued to use tobacco throughout her surgical journey even with understanding the risks of infection surgical complications poor healing potential      Procedure Note:  The patient was brought back to the OR and placed supine on the OR table. After successful induction of anesthesia by anesthesia staff, the patient was positioned in the supine position and all bony prominences were padded appropriately.  The surgical field was then provisionally cleansed and then prepped and draped in the usual sterile fashion.    At this time a time-out was performed, with the correct patient, site, and procedure identified.  The universal time out as well as sign your site protocols were followed.  Preoperative antibiotics were verified as administered.     Attention is drawn to the left ankle patient has a draining sinus tract over previous traumatic open wound.  Completed excisional debridement of this area we ellipsed the sinus tract full-thickness blunt dissection down to by pocket of purulent fluid we then cultured this area.  I then tracked the fluid extending from the metal implant.  I then was able to remove the metal implant with a rongeur without complication and the suture associated.  We then controlled bleeding with he Bovie cautery.  We copiously irrigated excisionally debrided the necrotic tissue with a 15 blade rongeur and curette.    The incision(s) was/were then irrigated using copious sterile saline and then vancomyocin was added to the wound bed for prophylaxis. The surgical wound was closed in layered fashion.  The surgical site(s) was/were were sterilely cleansed and dressed.    The patient was then subsequently transferred to to PACU in a stable condition.    All sponge and needle counts were correct at the end of the case.  I was present and participated in all aspects of the procedure.    Prognosis:  The  patient will be kept NWB on the ipsilateral extremity for approximately 3-4weeks .  Patient will receive DVT prophylaxis .  Cultures are taken.  Patient has significant amount of purulence the distal fibula.  Due to her chronic infection recommend patient stay for IV antibiotics cultures and sensitivities.  Patient continues to use nicotine products she is likely continue to have complications.      This note/OR report was created with the assistance of  voice recognition software or phone  dictation.  There may be transcription errors as a result of using this technology however minimal. Effort has been made to assure accuracy of transcription but any obvious errors or omissions should be clarified with the author of the document.       Dillon Scott, DO  Orthopedic Trauma Surgery

## 2023-10-11 NOTE — TRANSFER OF CARE
"Anesthesia Transfer of Care Note    Patient: Deborah Cross    Procedure(s) Performed: Procedure(s) (LRB):  INCISION AND DRAINAGE, LOWER EXTREMITY (Left)    Patient location: PACU    Anesthesia Type: general    Transport from OR: Transported from OR on room air with adequate spontaneous ventilation    Post pain: adequate analgesia    Post assessment: no apparent anesthetic complications    Post vital signs: stable    Level of consciousness: awake and alert    Nausea/Vomiting: no nausea/vomiting    Complications: none          Last vitals:   Visit Vitals  /83   Pulse 84   Temp 36.6 °C (97.9 °F) (Oral)   Resp 17   Ht 5' 2" (1.575 m)   Wt 61.4 kg (135 lb 5.8 oz)   SpO2 98%   Breastfeeding No   BMI 24.76 kg/m²     "

## 2023-10-11 NOTE — PROGRESS NOTES
"Pharmacokinetic Initial Assessment: IV Vancomycin    Assessment/Plan:    Initiate intravenous vancomycin with loading dose of 1500 mg once followed by a maintenance dose of vancomycin 1000mg IV every 12 hours  Desired empiric serum trough concentration is 15 to 20 mcg/mL  Draw vancomycin trough level 60 min prior to fourth dose on 10/12 at approximately 2000.  Pharmacy will continue to follow and monitor vancomycin.      Please contact pharmacy at extension [phone] with any questions regarding this assessment.     Thank you for the consult,   Tye Roman       Patient brief summary:  Deborah Cross is a 53 y.o. female initiated on antimicrobial therapy with IV Vancomycin for treatment of suspected bone/joint infection    Drug Allergies:   Review of patient's allergies indicates:  No Known Allergies    Actual Body Weight:   61.4kg    Renal Function:   Estimated Creatinine Clearance: 85.9 mL/min (based on SCr of 0.66 mg/dL).,     Dialysis Method (if applicable):  N/A    CBC (last 72 hours):  Recent Labs   Lab Result Units 10/10/23  1045   WBC x10(3)/mcL 7.79   Hgb g/dL 13.8   Hct % 40.9   Platelet x10(3)/mcL 260   Mono % % 6.4   Eos % % 2.8   Basophil % % 1.0       Metabolic Panel (last 72 hours):  Recent Labs   Lab Result Units 10/10/23  1045   Creatinine mg/dL 0.66       Drug levels (last 3 results):  No results for input(s): "VANCOMYCINRA", "VANCORANDOM", "VANCOMYCINPE", "VANCOPEAK", "VANCOMYCINTR", "VANCOTROUGH" in the last 72 hours.    Microbiologic Results:  Microbiology Results (last 7 days)       Procedure Component Value Units Date/Time    AFB Smear [[id number]] Collected: 10/11/23 0731    Order Status: Sent Specimen: Wound from Ankle, Left Updated: 10/11/23 0743    Anaerobic Culture [[id number]] Collected: 10/11/23 0731    Order Status: Sent Specimen: Wound from Ankle, Left Updated: 10/11/23 0743    Fungal Culture [[id number]] Collected: 10/11/23 0731    Order Status: Sent Specimen: Wound from Ankle, " Left Updated: 10/11/23 0743    Wound Culture [[id number]] Collected: 10/11/23 0731    Order Status: Sent Specimen: Wound from Ankle, Left Updated: 10/11/23 0743    Mycobacteria and Nocardia Culture [[id number]] Collected: 10/11/23 0731    Order Status: Sent Specimen: Wound from Ankle, Left Updated: 10/11/23 0743    Gram Stain [[id number]] Collected: 10/11/23 0731    Order Status: Sent Specimen: Wound from Ankle, Left Updated: 10/11/23 0743

## 2023-10-12 PROBLEM — M86.9: Status: ACTIVE | Noted: 2023-10-12

## 2023-10-12 LAB
ANION GAP SERPL CALC-SCNC: 8 MEQ/L
BASOPHILS # BLD AUTO: 0.06 X10(3)/MCL
BASOPHILS NFR BLD AUTO: 0.9 %
BUN SERPL-MCNC: 11.1 MG/DL (ref 9.8–20.1)
CALCIUM SERPL-MCNC: 8.4 MG/DL (ref 8.4–10.2)
CHLORIDE SERPL-SCNC: 106 MMOL/L (ref 98–107)
CO2 SERPL-SCNC: 26 MMOL/L (ref 22–29)
CREAT SERPL-MCNC: 0.7 MG/DL (ref 0.55–1.02)
CREAT/UREA NIT SERPL: 16
EOSINOPHIL # BLD AUTO: 0.47 X10(3)/MCL (ref 0–0.9)
EOSINOPHIL NFR BLD AUTO: 6.7 %
ERYTHROCYTE [DISTWIDTH] IN BLOOD BY AUTOMATED COUNT: 13.2 % (ref 11.5–17)
GFR SERPLBLD CREATININE-BSD FMLA CKD-EPI: >60 MLS/MIN/1.73/M2
GLUCOSE SERPL-MCNC: 138 MG/DL (ref 74–100)
GRAM STN SPEC: NORMAL
HCT VFR BLD AUTO: 33 % (ref 37–47)
HGB BLD-MCNC: 10.9 G/DL (ref 12–16)
IMM GRANULOCYTES # BLD AUTO: 0.03 X10(3)/MCL (ref 0–0.04)
IMM GRANULOCYTES NFR BLD AUTO: 0.4 %
LYMPHOCYTES # BLD AUTO: 2.44 X10(3)/MCL (ref 0.6–4.6)
LYMPHOCYTES NFR BLD AUTO: 34.8 %
M AVIUM PARATB TISS QL ZN STN: NORMAL
MCH RBC QN AUTO: 28.8 PG (ref 27–31)
MCHC RBC AUTO-ENTMCNC: 33 G/DL (ref 33–36)
MCV RBC AUTO: 87.1 FL (ref 80–94)
MONOCYTES # BLD AUTO: 0.57 X10(3)/MCL (ref 0.1–1.3)
MONOCYTES NFR BLD AUTO: 8.1 %
NEUTROPHILS # BLD AUTO: 3.45 X10(3)/MCL (ref 2.1–9.2)
NEUTROPHILS NFR BLD AUTO: 49.1 %
NRBC BLD AUTO-RTO: 0 %
PLATELET # BLD AUTO: 201 X10(3)/MCL (ref 130–400)
PMV BLD AUTO: 10 FL (ref 7.4–10.4)
POCT GLUCOSE: 114 MG/DL (ref 70–110)
POCT GLUCOSE: 115 MG/DL (ref 70–110)
POTASSIUM SERPL-SCNC: 3.7 MMOL/L (ref 3.5–5.1)
RBC # BLD AUTO: 3.79 X10(6)/MCL (ref 4.2–5.4)
SODIUM SERPL-SCNC: 140 MMOL/L (ref 136–145)
VANCOMYCIN TROUGH SERPL-MCNC: 8.6 UG/ML (ref 15–20)
WBC # SPEC AUTO: 7.02 X10(3)/MCL (ref 4.5–11.5)

## 2023-10-12 PROCEDURE — 94799 UNLISTED PULMONARY SVC/PX: CPT

## 2023-10-12 PROCEDURE — 25000003 PHARM REV CODE 250: Performed by: PHYSICIAN ASSISTANT

## 2023-10-12 PROCEDURE — 25000003 PHARM REV CODE 250: Performed by: INTERNAL MEDICINE

## 2023-10-12 PROCEDURE — 63600175 PHARM REV CODE 636 W HCPCS: Performed by: INTERNAL MEDICINE

## 2023-10-12 PROCEDURE — 63600175 PHARM REV CODE 636 W HCPCS: Performed by: PHYSICIAN ASSISTANT

## 2023-10-12 PROCEDURE — 25000003 PHARM REV CODE 250: Performed by: ORTHOPAEDIC SURGERY

## 2023-10-12 PROCEDURE — 85025 COMPLETE CBC W/AUTO DIFF WBC: CPT | Performed by: PHYSICIAN ASSISTANT

## 2023-10-12 PROCEDURE — 63600175 PHARM REV CODE 636 W HCPCS: Performed by: ORTHOPAEDIC SURGERY

## 2023-10-12 PROCEDURE — 80202 ASSAY OF VANCOMYCIN: CPT | Performed by: ORTHOPAEDIC SURGERY

## 2023-10-12 PROCEDURE — 97161 PT EVAL LOW COMPLEX 20 MIN: CPT

## 2023-10-12 PROCEDURE — 80048 BASIC METABOLIC PNL TOTAL CA: CPT | Performed by: PHYSICIAN ASSISTANT

## 2023-10-12 PROCEDURE — 11000001 HC ACUTE MED/SURG PRIVATE ROOM

## 2023-10-12 RX ORDER — IBUPROFEN 200 MG
16 TABLET ORAL
Status: DISCONTINUED | OUTPATIENT
Start: 2023-10-12 | End: 2023-10-16 | Stop reason: HOSPADM

## 2023-10-12 RX ORDER — OXYCODONE HYDROCHLORIDE 10 MG/1
10 TABLET ORAL EVERY 4 HOURS PRN
Status: DISCONTINUED | OUTPATIENT
Start: 2023-10-12 | End: 2023-10-16 | Stop reason: HOSPADM

## 2023-10-12 RX ORDER — METFORMIN HYDROCHLORIDE 750 MG/1
750 TABLET, EXTENDED RELEASE ORAL
Status: DISCONTINUED | OUTPATIENT
Start: 2023-10-12 | End: 2023-10-12

## 2023-10-12 RX ORDER — METFORMIN HYDROCHLORIDE 750 MG/1
1500 TABLET, EXTENDED RELEASE ORAL
Status: DISCONTINUED | OUTPATIENT
Start: 2023-10-12 | End: 2023-10-16 | Stop reason: HOSPADM

## 2023-10-12 RX ORDER — OXYCODONE HYDROCHLORIDE 10 MG/1
10 TABLET ORAL ONCE
Status: COMPLETED | OUTPATIENT
Start: 2023-10-12 | End: 2023-10-12

## 2023-10-12 RX ORDER — IBUPROFEN 200 MG
24 TABLET ORAL
Status: DISCONTINUED | OUTPATIENT
Start: 2023-10-12 | End: 2023-10-16 | Stop reason: HOSPADM

## 2023-10-12 RX ORDER — GLUCAGON 1 MG
1 KIT INJECTION
Status: DISCONTINUED | OUTPATIENT
Start: 2023-10-12 | End: 2023-10-16 | Stop reason: HOSPADM

## 2023-10-12 RX ORDER — OXYCODONE HYDROCHLORIDE 5 MG/1
5 TABLET ORAL EVERY 4 HOURS PRN
Status: DISCONTINUED | OUTPATIENT
Start: 2023-10-12 | End: 2023-10-16 | Stop reason: HOSPADM

## 2023-10-12 RX ADMIN — CEFEPIME 2 G: 2 INJECTION, POWDER, FOR SOLUTION INTRAVENOUS at 10:10

## 2023-10-12 RX ADMIN — METFORMIN HYDROCHLORIDE 1500 MG: 750 TABLET, EXTENDED RELEASE ORAL at 09:10

## 2023-10-12 RX ADMIN — METHOCARBAMOL 750 MG: 750 TABLET ORAL at 09:10

## 2023-10-12 RX ADMIN — OXYCODONE HYDROCHLORIDE 10 MG: 10 TABLET ORAL at 11:10

## 2023-10-12 RX ADMIN — HYDROCODONE BITARTRATE AND ACETAMINOPHEN 1 TABLET: 10; 325 TABLET ORAL at 03:10

## 2023-10-12 RX ADMIN — CEFEPIME 2 G: 2 INJECTION, POWDER, FOR SOLUTION INTRAVENOUS at 03:10

## 2023-10-12 RX ADMIN — VANCOMYCIN HYDROCHLORIDE 1000 MG: 1 INJECTION, POWDER, LYOPHILIZED, FOR SOLUTION INTRAVENOUS at 10:10

## 2023-10-12 RX ADMIN — METHOCARBAMOL 750 MG: 750 TABLET ORAL at 03:10

## 2023-10-12 RX ADMIN — CEFEPIME 2 G: 2 INJECTION, POWDER, FOR SOLUTION INTRAVENOUS at 12:10

## 2023-10-12 RX ADMIN — METHOCARBAMOL 750 MG: 750 TABLET ORAL at 08:10

## 2023-10-12 RX ADMIN — VANCOMYCIN HYDROCHLORIDE 1500 MG: 1.5 INJECTION, POWDER, LYOPHILIZED, FOR SOLUTION INTRAVENOUS at 11:10

## 2023-10-12 RX ADMIN — OXYCODONE HYDROCHLORIDE 10 MG: 10 TABLET ORAL at 03:10

## 2023-10-12 RX ADMIN — OXYCODONE HYDROCHLORIDE 10 MG: 10 TABLET ORAL at 07:10

## 2023-10-12 RX ADMIN — CEFEPIME 2 G: 2 INJECTION, POWDER, FOR SOLUTION INTRAVENOUS at 06:10

## 2023-10-12 RX ADMIN — ENOXAPARIN SODIUM 40 MG: 40 INJECTION SUBCUTANEOUS at 04:10

## 2023-10-12 NOTE — PROGRESS NOTES
"[facility]  Orthopedics  Progress Note    Patient Name: Deborah Cross  MRN: [id number]  Admission Date: 10/11/2023  Hospital Length of Stay: 1 days  Attending Provider: Dillon Scott DO  Primary Care Provider: Adamaris Cao MD  Follow-up For: Procedure(s) (LRB):  INCISION AND DRAINAGE, LOWER EXTREMITY (Left)    Post-Operative Day: 1 Day Post-Op  Subjective:     Principal Problem:<principal problem not specified>    Principal Orthopedic Problem: 1 Day Post-Op   Excision draining sinus tract left ankle, removal of hardware deep    Interval History:  Patient is experiencing moderate pain this morning.  Her pain medication is due in 1 hour.  She has dull achy pain left ankle worse with range of motion better rest.  No numbness or tingling    Review of patient's allergies indicates:  No Known Allergies    Current Facility-Administered Medications   Medication    0.9%  NaCl infusion    ceFEPIme (MAXIPIME) 2 g in dextrose 5 % in water (D5W) 100 mL IVPB (MB+)    enoxaparin injection 40 mg    methocarbamoL tablet 750 mg    morphine injection 4 mg    ondansetron injection 4 mg    oxyCODONE immediate release tablet 5 mg    oxyCODONE immediate release tablet Tab 10 mg    polyethylene glycol packet 17 g    vancomycin - pharmacy to dose    vancomycin 1.5 g in dextrose 5 % 250 mL IVPB (ready to mix)    vancomycin in dextrose 5 % 1 gram/250 mL IVPB 1,000 mg     Objective:     Vital Signs (Most Recent):  Temp: 97.8 °F (36.6 °C) (10/12/23 0747)  Pulse: 78 (10/12/23 0747)  Resp: 18 (10/12/23 0747)  BP: (!) 126/91 (10/12/23 0747)  SpO2: 95 % (10/12/23 0747) Vital Signs (24h Range):  Temp:  [97.2 °F (36.2 °C)-98.1 °F (36.7 °C)] 97.8 °F (36.6 °C)  Pulse:  [75-86] 78  Resp:  [10-23] 18  SpO2:  [92 %-100 %] 95 %  BP: (105-151)/() 126/91     Weight: 61.4 kg (135 lb 5.8 oz)  Height: 5' 2" (157.5 cm)  Body mass index is 24.76 kg/m².      Intake/Output Summary (Last 24 hours) at 10/12/2023 " 0747  Last data filed at 10/12/2023 0638  Gross per 24 hour   Intake 380.57 ml   Output 800 ml   Net -419.43 ml       Physical Exam:     General the patient is alert and oriented x3 no acute distress nontoxic-appearing appropriate affect.    Constitutional: Vital signs are examined and stable.  Resp: No signs of labored breathing               LLE: -Skin: Dressing CDI, No signs of new abrasions or lacerations, no scars           -MSK: Hip and Knee F/E, EHL/FHL, Gastroc/Tib anterior Strength 5/5           -Neuro:  Sensation intact to light touch L3-S1 dermatomes           -Lymphatic: No signs of lymphadenopathy           -CV: Capillary refill is less than 2 seconds. DP/PT pulses 2/4. Compartments soft and compressible          Diagnostic Findings:   Significant Labs:   Recent Lab Results  (Last 5 results in the past 72 hours)        10/12/23  0448   10/11/23  1943   10/11/23  0731   10/11/23  0538   10/10/23  1045        Anion Gap 8.0               Baso # 0.06         0.08       Basophil % 0.9         1.0       BUN 11.1               BUN/CREAT RATIO 16               Calcium 8.4               Chloride 106               CO2 26               Creatinine 0.70         0.66       CRP, High Sensitivity         2.17       Wound Culture     No Growth At 24 Hours  [P]           eGFR >60         >60       Eos # 0.47         0.22       Eosinophil % 6.7         2.8       Glucose 138               Hematocrit 33.0         40.9       Hemoglobin 10.9         13.8       Immature Grans (Abs) 0.03         0.03       Immature Granulocytes 0.4         0.4       Lymph # 2.44         1.92       LYMPH % 34.8         24.6       MCH 28.8         28.6       MCHC 33.0         33.7       MCV 87.1         84.7       Mono # 0.57         0.50       Mono % 8.1         6.4       MPV 10.0         9.7       Neut # 3.45         5.04       Neut % 49.1         64.8       nRBC 0.0         0.0       Platelet Count 201         260       POCT Glucose   115      121         Potassium 3.7               RBC 3.79         4.83       RDW 13.2         13.0       Sed Rate         13       Sodium 140               WBC 7.02         7.79                               [P] - Preliminary Result                Significant Imaging: I have reviewed all pertinent imaging results/findings.    Assessment/Plan:     Active Diagnoses:    Diagnosis Date Noted POA    PRINCIPAL PROBLEM:  Infection of bone of left ankle [M86.9] 10/12/2023 Unknown      Problems Resolved During this Admission:       Patient is status post excisional debridement left ankle.  Well she is having some moderate pain this morning.  We will adjust her pain medication accordingly.  Likely wait for cultures and sensitivities.  Likely a candidate for long-term antibiotics.      This note/OR report was created with the assistance of  voice recognition software or phone  dictation.  There may be transcription errors as a result of using this technology however minimal. Effort has been made to assure accuracy of transcription but any obvious errors or omissions should be clarified with the author of the document.           Dillon Scott,   Orthopedic Trauma Surgery  [facility]

## 2023-10-12 NOTE — PT/OT/SLP EVAL
Physical Therapy Evaluation    Patient Name:  Deborah Cross   MRN:  [id number]    Recommendations:     Discharge Recommendations: outpatient PT   Discharge Equipment Recommendations: none   Barriers to discharge: None    Assessment:     Deborah Cross is a 53 y.o. female admitted with a medical diagnosis of Infection of bone of left ankle.  She presents with the following impairments/functional limitations: impaired endurance, impaired functional mobility, decreased lower extremity function, pain . Pt is in a lot of pain but fxn'lly is doing well, Her endurance is low because of intense ankle pain.    Rehab Prognosis: Good; patient would benefit from acute skilled PT services to address these deficits and reach maximum level of function.    Recent Surgery: Procedure(s) (LRB):  INCISION AND DRAINAGE, LOWER EXTREMITY (Left) 1 Day Post-Op    Plan:     During this hospitalization, patient to be seen 6 x/week to address the identified rehab impairments via gait training, therapeutic activities and progress toward the following goals:    Plan of Care Expires:       Subjective     Chief Complaint:  Patient/Family Comments/goals:   Pain/Comfort:  Pain Rating 1: 10/10  Location - Side 1: Left  Location 1: ankle    Patients cultural, spiritual, Jain conflicts given the current situation:      Living Environment:  Pt lives with son in a house with no steps  Prior to admission, patients level of function was ind.  Equipment used at home: walker, rolling.  DME owned (not currently used): rolling walker.  Upon discharge, patient will have assistance from son.    Objective:     Communicated with nurse prior to session.  Patient found supine with PureWick, peripheral IV  upon PT entry to room.    General Precautions: Standard, fall  Orthopedic Precautions:LLE weight bearing as tolerated   Braces:    Respiratory Status: Room air  Blood Pressure:       Exams:  RLE ROM: WFL  RLE Strength: WFL  LLE ROM: WFL except left  ankle not tested  LLE Strength: WFL except left ankle NT  Skin integrity: Visible skin intact      Functional Mobility:  Bed Mobility:     Scooting: independence  Supine to Sit: independence  Sit to Supine: independence  Pt went sit to stand and bed to chair with rw and cga. Pt was only putting partial to TTWB through LLE  Pt ambulated with a rw WBAT LLE for 20 feet with cga      AM-PAC 6 CLICK MOBILITY  Total Score:        Treatment & Education:      Patient provided with verbal education education regarding POC.  Understanding was verbalized.     Patient left supine with all lines intact and call button in reach.    GOALS:   Multidisciplinary Problems       Physical Therapy Goals          Problem: Physical Therapy    Goal Priority Disciplines Outcome Goal Variances Interventions   Physical Therapy Goal     PT, PT/OT      Description: Pt will be seen for the following goals  1. Pt will be mod ind with all transfers with a rw  2. 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, with nonunion, subsequent encounter        Past Surgical History:   Procedure Laterality Date     SECTION  2005    HYSTERECTOMY  2015    INCISION AND DRAINAGE, LOWER EXTREMITY Left 2023    Procedure: INCISION AND DRAINAGE, LOWER EXTREMITY - supine bone foam wash stuff cultures;  Surgeon: Dillon Scott DO;  Location: [facility] OR;  Service: Orthopedics;  Laterality: Left;  supine bone foam wash stuff cultures    INCISION AND DRAINAGE, LOWER EXTREMITY Left 10/11/2023    Procedure: INCISION AND DRAINAGE, LOWER EXTREMITY;  Surgeon: Dillon Scott DO;  Location: [facility] OR;  Service: Orthopedics;  Laterality: Left;  supine vascular bed c arm wash stuff cultures  FIRST CASE    INSERTION OF INTRAMEDULLARY NAIL  INTO TIBIA Right 10/24/2022    Procedure: INSERTION, INTRAMEDULLARY JACK, TIBIA;  Surgeon: Dillon Scott DO;  Location: [facility] OR;  Service: Orthopedics;  Laterality: Right;  supine, vascular, bone foam, c-arm, wash stuff    ORIF TIBIA FRACTURE Right 4/12/2023    Procedure: ORIF, FRACTURE, TIBIA;  Surgeon: Dillon Scott DO;  Location: [facility] OR;  Service: Orthopedics;  Laterality: Right;    REPAIR OF LIGAMENT OF ANKLE  10/24/2022    Procedure: REPAIR, LIGAMENT, ANKLE;  Surgeon: Dillon Scott DO;  Location: [facility] OR;  Service: Orthopedics;;       Time Tracking:     PT Received On:    PT Start Time: 1350     PT Stop Time: 1408  PT Total Time (min): 18 min     Billable Minutes: Evaluation 18      10/12/2023

## 2023-10-13 LAB
ANION GAP SERPL CALC-SCNC: 10 MEQ/L
BASOPHILS # BLD AUTO: 0.05 X10(3)/MCL
BASOPHILS NFR BLD AUTO: 0.7 %
BUN SERPL-MCNC: 5.3 MG/DL (ref 9.8–20.1)
CALCIUM SERPL-MCNC: 9.3 MG/DL (ref 8.4–10.2)
CHLORIDE SERPL-SCNC: 104 MMOL/L (ref 98–107)
CO2 SERPL-SCNC: 27 MMOL/L (ref 22–29)
CREAT SERPL-MCNC: 0.62 MG/DL (ref 0.55–1.02)
CREAT/UREA NIT SERPL: 9
EOSINOPHIL # BLD AUTO: 0.31 X10(3)/MCL (ref 0–0.9)
EOSINOPHIL NFR BLD AUTO: 4.4 %
ERYTHROCYTE [DISTWIDTH] IN BLOOD BY AUTOMATED COUNT: 13 % (ref 11.5–17)
GFR SERPLBLD CREATININE-BSD FMLA CKD-EPI: >60 MLS/MIN/1.73/M2
GLUCOSE SERPL-MCNC: 108 MG/DL (ref 74–100)
HCT VFR BLD AUTO: 34.5 % (ref 37–47)
HGB BLD-MCNC: 11.8 G/DL (ref 12–16)
IMM GRANULOCYTES # BLD AUTO: 0.02 X10(3)/MCL (ref 0–0.04)
IMM GRANULOCYTES NFR BLD AUTO: 0.3 %
LYMPHOCYTES # BLD AUTO: 2.15 X10(3)/MCL (ref 0.6–4.6)
LYMPHOCYTES NFR BLD AUTO: 30.2 %
MCH RBC QN AUTO: 28.9 PG (ref 27–31)
MCHC RBC AUTO-ENTMCNC: 34.2 G/DL (ref 33–36)
MCV RBC AUTO: 84.6 FL (ref 80–94)
MONOCYTES # BLD AUTO: 0.59 X10(3)/MCL (ref 0.1–1.3)
MONOCYTES NFR BLD AUTO: 8.3 %
NEUTROPHILS # BLD AUTO: 3.99 X10(3)/MCL (ref 2.1–9.2)
NEUTROPHILS NFR BLD AUTO: 56.1 %
NRBC BLD AUTO-RTO: 0 %
PLATELET # BLD AUTO: 286 X10(3)/MCL (ref 130–400)
PMV BLD AUTO: 11.6 FL (ref 7.4–10.4)
POCT GLUCOSE: 108 MG/DL (ref 70–110)
POCT GLUCOSE: 115 MG/DL (ref 70–110)
POCT GLUCOSE: 130 MG/DL (ref 70–110)
POTASSIUM SERPL-SCNC: 3.8 MMOL/L (ref 3.5–5.1)
RBC # BLD AUTO: 4.08 X10(6)/MCL (ref 4.2–5.4)
SODIUM SERPL-SCNC: 141 MMOL/L (ref 136–145)
WBC # SPEC AUTO: 7.11 X10(3)/MCL (ref 4.5–11.5)

## 2023-10-13 PROCEDURE — 63600175 PHARM REV CODE 636 W HCPCS: Performed by: ORTHOPAEDIC SURGERY

## 2023-10-13 PROCEDURE — 11000001 HC ACUTE MED/SURG PRIVATE ROOM

## 2023-10-13 PROCEDURE — 63600175 PHARM REV CODE 636 W HCPCS: Performed by: INTERNAL MEDICINE

## 2023-10-13 PROCEDURE — 25000003 PHARM REV CODE 250: Performed by: ORTHOPAEDIC SURGERY

## 2023-10-13 PROCEDURE — 94799 UNLISTED PULMONARY SVC/PX: CPT

## 2023-10-13 PROCEDURE — 85025 COMPLETE CBC W/AUTO DIFF WBC: CPT | Performed by: PHYSICIAN ASSISTANT

## 2023-10-13 PROCEDURE — 25000003 PHARM REV CODE 250: Performed by: PHYSICIAN ASSISTANT

## 2023-10-13 PROCEDURE — 97116 GAIT TRAINING THERAPY: CPT | Mod: CQ

## 2023-10-13 PROCEDURE — 80048 BASIC METABOLIC PNL TOTAL CA: CPT | Performed by: PHYSICIAN ASSISTANT

## 2023-10-13 PROCEDURE — 25000003 PHARM REV CODE 250: Performed by: INTERNAL MEDICINE

## 2023-10-13 PROCEDURE — 63600175 PHARM REV CODE 636 W HCPCS: Performed by: PHYSICIAN ASSISTANT

## 2023-10-13 PROCEDURE — 94761 N-INVAS EAR/PLS OXIMETRY MLT: CPT

## 2023-10-13 RX ADMIN — METHOCARBAMOL 750 MG: 750 TABLET ORAL at 04:10

## 2023-10-13 RX ADMIN — ENOXAPARIN SODIUM 40 MG: 40 INJECTION SUBCUTANEOUS at 05:10

## 2023-10-13 RX ADMIN — CEFEPIME 2 G: 2 INJECTION, POWDER, FOR SOLUTION INTRAVENOUS at 04:10

## 2023-10-13 RX ADMIN — METHOCARBAMOL 750 MG: 750 TABLET ORAL at 08:10

## 2023-10-13 RX ADMIN — METHOCARBAMOL 750 MG: 750 TABLET ORAL at 09:10

## 2023-10-13 RX ADMIN — OXYCODONE HYDROCHLORIDE 10 MG: 10 TABLET ORAL at 04:10

## 2023-10-13 RX ADMIN — OXYCODONE HYDROCHLORIDE 10 MG: 10 TABLET ORAL at 09:10

## 2023-10-13 RX ADMIN — OXYCODONE HYDROCHLORIDE 10 MG: 10 TABLET ORAL at 08:10

## 2023-10-13 RX ADMIN — METFORMIN HYDROCHLORIDE 1500 MG: 750 TABLET, EXTENDED RELEASE ORAL at 08:10

## 2023-10-13 RX ADMIN — CEFEPIME 2 G: 2 INJECTION, POWDER, FOR SOLUTION INTRAVENOUS at 11:10

## 2023-10-13 RX ADMIN — OXYCODONE HYDROCHLORIDE 10 MG: 10 TABLET ORAL at 12:10

## 2023-10-13 RX ADMIN — OXYCODONE HYDROCHLORIDE 10 MG: 10 TABLET ORAL at 05:10

## 2023-10-13 RX ADMIN — CEFEPIME 2 G: 2 INJECTION, POWDER, FOR SOLUTION INTRAVENOUS at 07:10

## 2023-10-13 RX ADMIN — VANCOMYCIN HYDROCHLORIDE 1500 MG: 1.5 INJECTION, POWDER, LYOPHILIZED, FOR SOLUTION INTRAVENOUS at 11:10

## 2023-10-13 NOTE — ANESTHESIA POSTPROCEDURE EVALUATION
Anesthesia Post Evaluation    Patient: Deborah Cross    Procedure(s) Performed: Procedure(s) (LRB):  INCISION AND DRAINAGE, LOWER EXTREMITY (Left)    Final Anesthesia Type: general      Patient location during evaluation: PACU  Patient participation: Yes- Able to Participate  Level of consciousness: awake and alert and oriented  Post-procedure vital signs: reviewed and stable  Pain management: adequate  Airway patency: patent    PONV status at discharge: No PONV  Anesthetic complications: no      Cardiovascular status: blood pressure returned to baseline and stable  Respiratory status: unassisted  Hydration status: euvolemic  Follow-up not needed.          Vitals Value Taken Time   /84 10/11/23 1110   Temp 36.2 °C (97.2 °F) 10/11/23 0814   Pulse 76 10/11/23 1110   Resp 12 10/11/23 1110   SpO2 93 % 10/11/23 1110         Event Time   Out of Recovery 14:40:00         Pain/Veronika Score: Pain Rating Prior to Med Admin: 8 (10/13/2023  8:45 AM)  Pain Rating Post Med Admin: 2 (10/13/2023  4:47 AM)

## 2023-10-13 NOTE — PROGRESS NOTES
Pharmacokinetic Assessment Follow Up: IV Vancomycin    Vancomycin serum concentration assessment(s):    The trough level was drawn correctly and can be used to guide therapy at this time. The measurement is below the desired definitive target range of 15 to 20 mcg/mL.    Vancomycin Regimen Plan:    Change regimen to Vancomycin 150 mg IV every 12 hours with next serum trough concentration measured at 1000 prior to 4th dose on 10/14    Drug levels (last 3 results):  Recent Labs   Lab Result Units 10/12/23  2108   Vancomycin Trough ug/ml 8.6*       Pharmacy will continue to follow and monitor vancomycin.    Please contact pharmacy at extension [phone] for questions regarding this assessment.    Thank you for the consult,   Dasia Paredes       Patient brief summary:  Deborah Cross is a 53 y.o. female initiated on antimicrobial therapy with IV Vancomycin for treatment of bone/joint infection    The patient's current regimen is 61.4 kg    Drug Allergies:   Review of patient's allergies indicates:  No Known Allergies    Actual Body Weight:   61.4 kg    Renal Function:   Estimated Creatinine Clearance: 81 mL/min (based on SCr of 0.7 mg/dL).,     Dialysis Method (if applicable):  N/A    CBC (last 72 hours):  Recent Labs   Lab Result Units 10/10/23  1045 10/12/23  0448   WBC x10(3)/mcL 7.79 7.02   Hgb g/dL 13.8 10.9*   Hct % 40.9 33.0*   Platelet x10(3)/mcL 260 201   Mono % % 6.4 8.1   Eos % % 2.8 6.7   Basophil % % 1.0 0.9       Metabolic Panel (last 72 hours):  Recent Labs   Lab Result Units 10/10/23  1045 10/12/23  0448   Sodium Level mmol/L  --  140   Potassium Level mmol/L  --  3.7   Chloride mmol/L  --  106   Carbon Dioxide mmol/L  --  26   Glucose Level mg/dL  --  138*   Blood Urea Nitrogen mg/dL  --  11.1   Creatinine mg/dL 0.66 0.70       Vancomycin Administrations:  vancomycin given in the last 96 hours                     vancomycin in dextrose 5 % 1 gram/250 mL IVPB 1,000 mg (mg) 1,000 mg New Bag 10/12/23 1020      1,000 mg New Bag 10/11/23 2156    vancomycin injection (g) 1 g Given 10/11/23 0739                    Microbiologic Results:  Microbiology Results (last 7 days)       Procedure Component Value Units Date/Time    AFB Smear [[id number]] Collected: 10/11/23 0731    Order Status: Completed Specimen: Wound from Ankle, Left Updated: 10/12/23 0914     AFB Smear No AFB seen (Direct smear only) - Concentration to follow    Gram Stain [[id number]] Collected: 10/11/23 0731    Order Status: Completed Specimen: Wound from Ankle, Left Updated: 10/12/23 0901     GRAM STAIN No WBCs, No bacteria seen    Wound Culture [[id number]] Collected: 10/11/23 0731    Order Status: Completed Specimen: Wound from Ankle, Left Updated: 10/12/23 0709     Wound Culture No Growth At 24 Hours    Anaerobic Culture [[id number]] Collected: 10/11/23 0731    Order Status: Sent Specimen: Wound from Ankle, Left Updated: 10/11/23 0743    Fungal Culture [[id number]] Collected: 10/11/23 0731    Order Status: Sent Specimen: Wound from Ankle, Left Updated: 10/11/23 0743    Mycobacteria and Nocardia Culture [[id number]] Collected: 10/11/23 0731    Order Status: Sent Specimen: Wound from Ankle, Left Updated: 10/11/23 0743

## 2023-10-13 NOTE — PLAN OF CARE
Discussed with pt that chart indicates plan for possible IV ATB once final cx is resulted. Pt confirms that she thinks a dr talked with her but it was late and she was unsure. This sounds like a good plan to her. She lives with her 17 year old son who will be of no assist. Her daughter Nadia Martin is a nurse who lives 15 minutes from pt and is involved. We discussed the above via speaker phone with Serena  [phone]. They select [facility] (pt has [facility]) and [employer] FOC signed and referrals sent with not that we are waiting on final cx but pt is presently on cefepime and vanc, no PICC line and daughter wants to be involved in education. Leonie with [employer] aware of above. They have the  on call phone number if needed. I anticipate it will be Monday before insurer etc can be arranged and pt will know of a copay if any and the ATB determined.   Pt plans to return home when above is arranged. S   10/13/23 9677   Discharge Assessment   Assessment Type Discharge Planning Assessment   Confirmed/corrected address, phone number and insurance Yes   Confirmed Demographics Correct on Facesheet   Source of Information patient;family  (daughter serena Martin via phone )   When was your last doctors appointment?   (Adamaris Cao)   Reason For Admission infection   People in Home child(umm), dependent  (17 year old son)   Facility Arrived From: home   Do you expect to return to your current living situation? Yes   Do you have help at home or someone to help you manage your care at home? No   Who are your caregiver(s) and their phone number(s)? daughter Serena Martin is a nurse who lives 15 minutes from pt    Prior to hospitilization cognitive status: Alert/Oriented   Current cognitive status: Alert/Oriented   Home Layout Able to live on 1st floor   Equipment Currently Used at Home none   Do you currently have service(s) that help you manage your care at home? No   Do you take prescription  medications? Yes   Do you have prescription coverage? Yes   Coverage [facility]   Do you have any problems affording any of your prescribed medications? No   Who is going to help you get home at discharge? family   How do you get to doctors appointments? family or friend will provide;car, drives self   Are you on dialysis? No   DME Needed Upon Discharge    (TBD)   Discharge Plan discussed with: Adult children;Patient  (daughter Serena Martin)   Discharge Plan A Home Health;Other;Home with family  (home infusion)   Discharge Plan B Home Health  (home health and home infusion)   Financial Resource Strain   How hard is it for you to pay for the very basics like food, housing, medical care, and heating? Not hard   Housing Stability   In the last 12 months, was there a time when you were not able to pay the mortgage or rent on time? N   In the last 12 months, was there a time when you did not have a steady place to sleep or slept in a shelter (including now)? N   Transportation Needs   In the past 12 months, has lack of transportation kept you from medical appointments or from getting medications? no   In the past 12 months, has lack of transportation kept you from meetings, work, or from getting things needed for daily living? No   Food Insecurity   Within the past 12 months, you worried that your food would run out before you got the money to buy more. Never true   Within the past 12 months, the food you bought just didn't last and you didn't have money to get more. Never true   Social Connections   In a typical week, how many times do you talk on the phone with family, friends, or neighbors? More than 3   How often do you get together with friends or relatives? More than 3   OTHER   Name(s) of People in Home 17 year old son

## 2023-10-13 NOTE — PROGRESS NOTES
[facility]  [facility]    Patient Name: Deborah Cross  MRN: [id number]  Admission Date: 10/11/2023  Hospital Length of Stay: 2 days  Attending Provider: Dillon Scott DO  Primary Care Provider: Adamaris Cao MD  Follow-up For: Procedure(s) (LRB):  INCISION AND DRAINAGE, LOWER EXTREMITY (Left)    Post-Operative Day: 2 Day Post-Op  Subjective:     Principal Problem:Infection of bone of left ankle    Principal Orthopedic Problem: 2 Days Post-Op   Excision draining sinus tract left ankle, removal of hardware deep    Interval History:  Patient is resting comfortably this morning. Nurses state no acute events overnight. No new complaints. Soft dressing remains in place.    Review of patient's allergies indicates:  No Known Allergies    Current Facility-Administered Medications   Medication    0.9%  NaCl infusion    ceFEPIme (MAXIPIME) 2 g in dextrose 5 % in water (D5W) 100 mL IVPB (MB+)    dextrose 10% bolus 125 mL 125 mL    dextrose 10% bolus 250 mL 250 mL    enoxaparin injection 40 mg    glucagon (human recombinant) injection 1 mg    glucose chewable tablet 16 g    glucose chewable tablet 24 g    metFORMIN ER 24hr tablet 1,500 mg    methocarbamoL tablet 750 mg    morphine injection 4 mg    ondansetron injection 4 mg    oxyCODONE immediate release tablet 5 mg    oxyCODONE immediate release tablet Tab 10 mg    polyethylene glycol packet 17 g    vancomycin - pharmacy to dose    vancomycin 1.5 g in dextrose 5 % 250 mL IVPB (ready to mix)     Objective:     Vital Signs (Most Recent):  Temp: 98.2 °F (36.8 °C) (10/13/23 0248)  Pulse: 74 (10/13/23 0248)  Resp: 16 (10/13/23 0401)  BP: (!) 151/87 (10/13/23 0248)  SpO2: 98 % (10/13/23 0248) Vital Signs (24h Range):  Temp:  [97.9 °F (36.6 °C)-98.5 °F (36.9 °C)] 98.2 °F (36.8 °C)  Pulse:  [74-82] 74  Resp:  [16-19] 16  SpO2:  [96 %-98 %] 98 %  BP: (122-156)/(73-87) 151/87     Weight: 61.4 kg (135 lb 5.8 oz)  Height: 5' 2" (157.5 " cm)  Body mass index is 24.76 kg/m².      Intake/Output Summary (Last 24 hours) at 10/13/2023 0747  Last data filed at 10/13/2023 0401  Gross per 24 hour   Intake 960 ml   Output 3300 ml   Net -2340 ml         Physical Exam:     General the patient is alert and oriented x3 no acute distress nontoxic-appearing appropriate affect.    Constitutional: Vital signs are examined and stable.  Resp: No signs of labored breathing               LLE: -Skin: Dressing CDI, No signs of new abrasions or lacerations, no scars           -MSK: Hip and Knee F/E, EHL/FHL, Gastroc/Tib anterior Strength 5/5           -Neuro:  Sensation intact to light touch L3-S1 dermatomes           -Lymphatic: No signs of lymphadenopathy           -CV: Capillary refill is less than 2 seconds. DP/PT pulses 2/4. Compartments soft and compressible          Diagnostic Findings:   Significant Labs:   Recent Lab Results  (Last 5 results in the past 72 hours)        10/13/23  0408   10/12/23  2108   10/12/23  1901   10/12/23  0448   10/11/23  1943        Anion Gap 10.0       8.0         Baso # 0.05       0.06         Basophil % 0.7       0.9         BUN 5.3       11.1         BUN/CREAT RATIO 9       16         Calcium 9.3       8.4         Chloride 104       106         CO2 27       26         Cotinine               Creatinine 0.62       0.70         CRP, High Sensitivity               Wound Culture               Direct Acid Fast               eGFR >60       >60         Eos # 0.31       0.47         Eosinophil % 4.4       6.7         Glucose 108       138         Gram Stain Result               Hematocrit 34.5       33.0         Hemoglobin 11.8       10.9         Immature Grans (Abs) 0.02       0.03         Immature Granulocytes 0.3       0.4         Lymph # 2.15       2.44         LYMPH % 30.2       34.8         MCH 28.9       28.8         MCHC 34.2       33.0         MCV 84.6       87.1         Mono # 0.59       0.57         Mono % 8.3       8.1         MPV  11.6       10.0         Neut # 3.99       3.45         Neut % 56.1       49.1         Nicotine               nRBC 0.0       0.0         Platelet Count 286       201         POCT Glucose     114     115       Potassium 3.8       3.7         RBC 4.08       3.79         RDW 13.0       13.2         Sed Rate               Sodium 141       140         Vancomycin-Trough   8.6             WBC 7.11       7.02                                 Significant Imaging: I have reviewed all pertinent imaging results/findings.    Assessment/Plan:     Active Diagnoses:    Diagnosis Date Noted POA    PRINCIPAL PROBLEM:  Infection of bone of left ankle [M86.9] 10/12/2023 Yes      Problems Resolved During this Admission:       Patient is status post excisional debridement left ankle.  Continue multimodal pain control ID consulted, recommending 6 week course IV abx therapy.   Daily dressing changes today.   NWB LLE while incisions are healing, ROMAT  Lovenox during stay followed by aspirin for DVT ppx at discharge.   Will be stable for DC home once sensitivities and iv abx therapy is ready.   Will continue to follow   Follow up with Dr. Scott in 3 weeks for wound check.    The above findings, diagnostics, and treatment plan were discussed with Dr. Scott who is in agreement with the plan of care except as stated in additional documentation.     Nupur Mark PA-C  [facility]   Orthopedic Trauma

## 2023-10-13 NOTE — PT/OT/SLP PROGRESS
Physical Therapy Treatment    Patient Name:  Deborah Cross   MRN:  [id number]    Recommendations:     Discharge Recommendations: outpatient PT  Discharge Equipment Recommendations: none  Barriers to discharge: None    Assessment:     Deborah Cross is a 53 y.o. female admitted with a medical diagnosis of Infection of bone of left ankle.  She presents with the following impairments/functional limitations: impaired endurance, impaired functional mobility, decreased lower extremity function, pain.    Rehab Prognosis: Good; patient would benefit from acute skilled PT services to address these deficits and reach maximum level of function.    Recent Surgery: Procedure(s) (LRB):  INCISION AND DRAINAGE, LOWER EXTREMITY (Left) 2 Days Post-Op    Plan:     During this hospitalization, patient to be seen 6 x/week to address the identified rehab impairments via gait training, therapeutic activities and progress toward the following goals:    Plan of Care Expires:       Subjective     Chief Complaint: pain of LLE  Patient/Family Comments/goals: 'to get out of here'  Pain/Comfort:         Objective:     Communicated with nursing prior to session.  Patient found HOB elevated with peripheral IV upon PT entry to room.     General Precautions: Standard, fall  Orthopedic Precautions: LLE non weight bearing  Braces:    Respiratory Status: Room air  Blood Pressure:   Skin Integrity: Visible skin intact    Functional Mobility:  Bed Mobility:     Supine to Sit: stand by assistance w/HOB elevated  Sit to Supine: stand by assistance w/HOB elevated  Transfers:     Bed to & from Chair: SBA w/RW using  Stand Pivot  X2 trails  Gait: 80' w/RW, SBA  Pt. Able to follow weight bearing precautions throughout tx.    Education:  Patient provided with verbal education education regarding weight bearing precautions.  Understanding was verbalized.     Patient left HOB elevated with all lines intact and call button in reach..    GOALS:    Multidisciplinary Problems       Physical Therapy Goals          Problem: Physical Therapy    Goal Priority Disciplines Outcome Goal Variances Interventions   Physical Therapy Goal     PT, PT/OT      Description: Pt will be seen for the following goals  1. Pt will be mod ind with all transfers with a rw  2. Pt will be mod ind with rw 100ft with WBAT LLE                       Time Tracking:     PT Received On: 10/13/23  PT Start Time: 1530     PT Stop Time: 1553  PT Total Time (min): 23 min     Billable Minutes: Gait Training 23    Treatment Type: Treatment  PT/PTA: PTA     Number of PTA visits since last PT visit: 1     10/13/2023

## 2023-10-14 LAB
ANION GAP SERPL CALC-SCNC: 8 MEQ/L
BACTERIA SPEC ANAEROBE CULT: NORMAL
BACTERIA WND CULT: NO GROWTH
BASOPHILS # BLD AUTO: 0.08 X10(3)/MCL
BASOPHILS NFR BLD AUTO: 1.2 %
BUN SERPL-MCNC: 9.3 MG/DL (ref 9.8–20.1)
CALCIUM SERPL-MCNC: 9.1 MG/DL (ref 8.4–10.2)
CHLORIDE SERPL-SCNC: 104 MMOL/L (ref 98–107)
CO2 SERPL-SCNC: 29 MMOL/L (ref 22–29)
CREAT SERPL-MCNC: 0.6 MG/DL (ref 0.55–1.02)
CREAT/UREA NIT SERPL: 16
EOSINOPHIL # BLD AUTO: 0.37 X10(3)/MCL (ref 0–0.9)
EOSINOPHIL NFR BLD AUTO: 5.7 %
ERYTHROCYTE [DISTWIDTH] IN BLOOD BY AUTOMATED COUNT: 12.9 % (ref 11.5–17)
GFR SERPLBLD CREATININE-BSD FMLA CKD-EPI: >60 MLS/MIN/1.73/M2
GLUCOSE SERPL-MCNC: 119 MG/DL (ref 74–100)
HCT VFR BLD AUTO: 32.7 % (ref 37–47)
HGB BLD-MCNC: 11.1 G/DL (ref 12–16)
IMM GRANULOCYTES # BLD AUTO: 0.02 X10(3)/MCL (ref 0–0.04)
IMM GRANULOCYTES NFR BLD AUTO: 0.3 %
LYMPHOCYTES # BLD AUTO: 2.61 X10(3)/MCL (ref 0.6–4.6)
LYMPHOCYTES NFR BLD AUTO: 40.4 %
MCH RBC QN AUTO: 28.7 PG (ref 27–31)
MCHC RBC AUTO-ENTMCNC: 33.9 G/DL (ref 33–36)
MCV RBC AUTO: 84.5 FL (ref 80–94)
MONOCYTES # BLD AUTO: 0.67 X10(3)/MCL (ref 0.1–1.3)
MONOCYTES NFR BLD AUTO: 10.4 %
NEUTROPHILS # BLD AUTO: 2.71 X10(3)/MCL (ref 2.1–9.2)
NEUTROPHILS NFR BLD AUTO: 42 %
NRBC BLD AUTO-RTO: 0 %
PLATELET # BLD AUTO: 226 X10(3)/MCL (ref 130–400)
PMV BLD AUTO: 9.9 FL (ref 7.4–10.4)
POTASSIUM SERPL-SCNC: 3.6 MMOL/L (ref 3.5–5.1)
RBC # BLD AUTO: 3.87 X10(6)/MCL (ref 4.2–5.4)
SODIUM SERPL-SCNC: 141 MMOL/L (ref 136–145)
VANCOMYCIN TROUGH SERPL-MCNC: 14.8 UG/ML (ref 15–20)
WBC # SPEC AUTO: 6.46 X10(3)/MCL (ref 4.5–11.5)

## 2023-10-14 PROCEDURE — 25000003 PHARM REV CODE 250: Performed by: ORTHOPAEDIC SURGERY

## 2023-10-14 PROCEDURE — 11000001 HC ACUTE MED/SURG PRIVATE ROOM

## 2023-10-14 PROCEDURE — 85025 COMPLETE CBC W/AUTO DIFF WBC: CPT | Performed by: PHYSICIAN ASSISTANT

## 2023-10-14 PROCEDURE — 97110 THERAPEUTIC EXERCISES: CPT | Mod: CQ

## 2023-10-14 PROCEDURE — 25000003 PHARM REV CODE 250: Performed by: INTERNAL MEDICINE

## 2023-10-14 PROCEDURE — 63600175 PHARM REV CODE 636 W HCPCS: Performed by: ORTHOPAEDIC SURGERY

## 2023-10-14 PROCEDURE — 94761 N-INVAS EAR/PLS OXIMETRY MLT: CPT

## 2023-10-14 PROCEDURE — 80048 BASIC METABOLIC PNL TOTAL CA: CPT | Performed by: PHYSICIAN ASSISTANT

## 2023-10-14 PROCEDURE — 25000003 PHARM REV CODE 250: Performed by: PHYSICIAN ASSISTANT

## 2023-10-14 PROCEDURE — 63600175 PHARM REV CODE 636 W HCPCS: Performed by: PHYSICIAN ASSISTANT

## 2023-10-14 PROCEDURE — 80202 ASSAY OF VANCOMYCIN: CPT | Performed by: ORTHOPAEDIC SURGERY

## 2023-10-14 PROCEDURE — 63600175 PHARM REV CODE 636 W HCPCS: Performed by: INTERNAL MEDICINE

## 2023-10-14 PROCEDURE — 97116 GAIT TRAINING THERAPY: CPT | Mod: CQ

## 2023-10-14 RX ADMIN — OXYCODONE HYDROCHLORIDE 10 MG: 10 TABLET ORAL at 05:10

## 2023-10-14 RX ADMIN — POLYETHYLENE GLYCOL 3350 17 G: 17 POWDER, FOR SOLUTION ORAL at 09:10

## 2023-10-14 RX ADMIN — METHOCARBAMOL 750 MG: 750 TABLET ORAL at 09:10

## 2023-10-14 RX ADMIN — OXYCODONE HYDROCHLORIDE 10 MG: 10 TABLET ORAL at 01:10

## 2023-10-14 RX ADMIN — OXYCODONE HYDROCHLORIDE 10 MG: 10 TABLET ORAL at 10:10

## 2023-10-14 RX ADMIN — VANCOMYCIN HYDROCHLORIDE 1750 MG: 500 INJECTION, POWDER, LYOPHILIZED, FOR SOLUTION INTRAVENOUS at 01:10

## 2023-10-14 RX ADMIN — METFORMIN HYDROCHLORIDE 1500 MG: 750 TABLET, EXTENDED RELEASE ORAL at 09:10

## 2023-10-14 RX ADMIN — OXYCODONE HYDROCHLORIDE 10 MG: 10 TABLET ORAL at 09:10

## 2023-10-14 RX ADMIN — ENOXAPARIN SODIUM 40 MG: 40 INJECTION SUBCUTANEOUS at 05:10

## 2023-10-14 RX ADMIN — OXYCODONE HYDROCHLORIDE 10 MG: 10 TABLET ORAL at 12:10

## 2023-10-14 RX ADMIN — METHOCARBAMOL 750 MG: 750 TABLET ORAL at 05:10

## 2023-10-14 RX ADMIN — VANCOMYCIN HYDROCHLORIDE 1500 MG: 1.5 INJECTION, POWDER, LYOPHILIZED, FOR SOLUTION INTRAVENOUS at 12:10

## 2023-10-14 RX ADMIN — CEFEPIME 2 G: 2 INJECTION, POWDER, FOR SOLUTION INTRAVENOUS at 09:10

## 2023-10-14 RX ADMIN — CEFEPIME 2 G: 2 INJECTION, POWDER, FOR SOLUTION INTRAVENOUS at 05:10

## 2023-10-14 RX ADMIN — CEFEPIME 2 G: 2 INJECTION, POWDER, FOR SOLUTION INTRAVENOUS at 10:10

## 2023-10-14 NOTE — PROGRESS NOTES
Pharmacokinetic Assessment Follow Up: IV Vancomycin    Vancomycin serum concentration assessment(s):    The trough level was drawn correctly and can be used to guide therapy at this time. The measurement is below the desired definitive target range of 15 to 20 mcg/mL.    Vancomycin Regimen Plan:    Change regimen to Vancomycin 1750 mg IV every 12 hours with next serum trough concentration measured at 2300 prior to the dose on 10/15    Drug levels (last 3 results):  Recent Labs   Lab Result Units 10/12/23  2108 10/14/23  1015   Vancomycin Trough ug/ml 8.6* 14.8*       Pharmacy will continue to follow and monitor vancomycin.    Please contact pharmacy at extension [phone] for questions regarding this assessment.    Thank you for the consult,   Juan Edgar, PharmD       Patient brief summary:  Deborah Cross is a 53 y.o. female initiated on antimicrobial therapy with IV Vancomycin for treatment of bone/joint infection    The patient's current regimen is vancomycin 1750 mg Q12H    Drug Allergies:   Review of patient's allergies indicates:  No Known Allergies    Actual Body Weight:   61.4 kg    Renal Function:   Estimated Creatinine Clearance: 94.5 mL/min (based on SCr of 0.6 mg/dL).,     Dialysis Method (if applicable):  N/A    CBC (last 72 hours):  Recent Labs   Lab Result Units 10/12/23  0448 10/13/23  0408 10/14/23  0433   WBC x10(3)/mcL 7.02 7.11 6.46   Hgb g/dL 10.9* 11.8* 11.1*   Hct % 33.0* 34.5* 32.7*   Platelet x10(3)/mcL 201 286 226   Mono % % 8.1 8.3 10.4   Eos % % 6.7 4.4 5.7   Basophil % % 0.9 0.7 1.2       Metabolic Panel (last 72 hours):  Recent Labs   Lab Result Units 10/12/23  0448 10/13/23  0408 10/14/23  0433   Sodium Level mmol/L 140 141 141   Potassium Level mmol/L 3.7 3.8 3.6   Chloride mmol/L 106 104 104   Carbon Dioxide mmol/L 26 27 29   Glucose Level mg/dL 138* 108* 119*   Blood Urea Nitrogen mg/dL 11.1 5.3* 9.3*   Creatinine mg/dL 0.70 0.62 0.60       Vancomycin Administrations:  vancomycin  given in the last 96 hours                     vancomycin 1.5 g in dextrose 5 % 250 mL IVPB (ready to mix) (mg) 1,500 mg New Bag 10/14/23 0050     1,500 mg New Bag 10/13/23 1120     1,500 mg New Bag 10/12/23 2341    vancomycin in dextrose 5 % 1 gram/250 mL IVPB 1,000 mg (mg) 1,000 mg New Bag 10/12/23 1020     1,000 mg New Bag 10/11/23 2156    vancomycin injection (g) 1 g Given 10/11/23 0739                    Microbiologic Results:  Microbiology Results (last 7 days)       Procedure Component Value Units Date/Time    Wound Culture [[id number]] Collected: 10/11/23 0731    Order Status: Completed Specimen: Wound from Ankle, Left Updated: 10/14/23 1121     Wound Culture No Growth    Anaerobic Culture [[id number]] Collected: 10/11/23 0731    Order Status: Completed Specimen: Wound from Ankle, Left Updated: 10/14/23 0948     Anaerobe Culture No Anaerobes Isolated    AFB Smear [[id number]] Collected: 10/11/23 0731    Order Status: Completed Specimen: Wound from Ankle, Left Updated: 10/12/23 0914     AFB Smear No AFB seen (Direct smear only) - Concentration to follow    Gram Stain [[id number]] Collected: 10/11/23 0731    Order Status: Completed Specimen: Wound from Ankle, Left Updated: 10/12/23 0901     GRAM STAIN No WBCs, No bacteria seen    Fungal Culture [[id number]] Collected: 10/11/23 0731    Order Status: Sent Specimen: Wound from Ankle, Left Updated: 10/11/23 0743    Mycobacteria and Nocardia Culture [[id number]] Collected: 10/11/23 0731    Order Status: Sent Specimen: Wound from Ankle, Left Updated: 10/11/23 0743

## 2023-10-14 NOTE — PROGRESS NOTES
"[facility]  [facility]    Patient Name: Deborah Cross  MRN: [id number]  Admission Date: 10/11/2023  Hospital Length of Stay: 3 days  Attending Provider: Dillon Scott DO  Primary Care Provider: Adamaris Cao MD  Follow-up For: Procedure(s) (LRB):  INCISION AND DRAINAGE, LOWER EXTREMITY (Left)    Post-Operative Day: 3 Day Post-Op  Subjective:     Principal Problem:Infection of bone of left ankle    Principal Orthopedic Problem: 3 Days Post-Op   Excision draining sinus tract left ankle, removal of hardware deep    Interval History:  Patient is resting comfortably this morning. Nurses state no acute events overnight. No new complaints. Soft dressing remains in place.      Review of patient's allergies indicates:  No Known Allergies    Current Facility-Administered Medications   Medication    0.9%  NaCl infusion    ceFEPIme (MAXIPIME) 2 g in dextrose 5 % in water (D5W) 100 mL IVPB (MB+)    dextrose 10% bolus 125 mL 125 mL    dextrose 10% bolus 250 mL 250 mL    enoxaparin injection 40 mg    glucagon (human recombinant) injection 1 mg    glucose chewable tablet 16 g    glucose chewable tablet 24 g    metFORMIN ER 24hr tablet 1,500 mg    methocarbamoL tablet 750 mg    morphine injection 4 mg    ondansetron injection 4 mg    oxyCODONE immediate release tablet 5 mg    oxyCODONE immediate release tablet Tab 10 mg    polyethylene glycol packet 17 g    vancomycin - pharmacy to dose    vancomycin 1.5 g in dextrose 5 % 250 mL IVPB (ready to mix)     Objective:     Vital Signs (Most Recent):  Temp: 97.7 °F (36.5 °C) (10/14/23 0756)  Pulse: 74 (10/14/23 0756)  Resp: 18 (10/14/23 0935)  BP: 122/79 (10/14/23 0756)  SpO2: 97 % (10/14/23 0756) Vital Signs (24h Range):  Temp:  [97.7 °F (36.5 °C)-98.5 °F (36.9 °C)] 97.7 °F (36.5 °C)  Pulse:  [72-87] 74  Resp:  [17-20] 18  SpO2:  [96 %-98 %] 97 %  BP: (122-139)/(79-88) 122/79     Weight: 61.4 kg (135 lb 5.8 oz)  Height: 5' 2" (157.5 " cm)  Body mass index is 24.76 kg/m².      Intake/Output Summary (Last 24 hours) at 10/14/2023 0944  Last data filed at 10/13/2023 2227  Gross per 24 hour   Intake 480 ml   Output --   Net 480 ml       Physical Exam:     General the patient is alert and oriented x3 no acute distress nontoxic-appearing appropriate affect.    Constitutional: Vital signs are examined and stable.  Resp: No signs of labored breathing         LLE: -Skin: Dressing CDI, No signs of new abrasions or lacerations, no scars           -MSK: Hip and Knee F/E, EHL/FHL, Gastroc/Tib anterior Strength 5/5           -Neuro:  Sensation intact to light touch L3-S1 dermatomes           -Lymphatic: No signs of lymphadenopathy           -CV: Capillary refill is less than 2 seconds. DP/PT pulses 2/4. Compartments soft and compressible          Diagnostic Findings:   Significant Labs:   Recent Lab Results  (Last 5 results in the past 72 hours)        10/14/23  0433   10/13/23  1120   10/13/23  0616   10/13/23  0408   10/12/23  2108        Anion Gap 8.0       10.0         Baso # 0.08       0.05         Basophil % 1.2       0.7         BUN 9.3       5.3         BUN/CREAT RATIO 16       9         Calcium 9.1       9.3         Chloride 104       104         CO2 29       27         Creatinine 0.60       0.62         eGFR >60       >60         Eos # 0.37       0.31         Eosinophil % 5.7       4.4         Glucose 119       108         Hematocrit 32.7       34.5         Hemoglobin 11.1       11.8         Immature Grans (Abs) 0.02       0.02         Immature Granulocytes 0.3       0.3         Lymph # 2.61       2.15         LYMPH % 40.4       30.2         MCH 28.7       28.9         MCHC 33.9       34.2         MCV 84.5       84.6         Mono # 0.67       0.59         Mono % 10.4       8.3         MPV 9.9       11.6         Neut # 2.71       3.99         Neut % 42.0       56.1         nRBC 0.0       0.0         Platelet Count 226       286         POCT Glucose    130   115           Potassium 3.6       3.8         RBC 3.87       4.08         RDW 12.9       13.0         Sodium 141       141         Vancomycin-Trough         8.6       WBC 6.46       7.11                                 Significant Imaging: I have reviewed all pertinent imaging results/findings.    Assessment/Plan:     Active Diagnoses:    Diagnosis Date Noted POA    PRINCIPAL PROBLEM:  Infection of bone of left ankle [M86.9] 10/12/2023 Yes      Problems Resolved During this Admission:       -Patient is status post excisional debridement left ankle.    -Continue multimodal pain control .  -Intraoperative cultures pending, no growth at 48 hours.  ID consulted, recommending 6 week course IV abx therapy.   -Daily dressing changes.   -NWB LLE while incisions are healing, ROMAT.  -Lovenox during stay followed by aspirin for DVT ppx at discharge.   -Will be stable for DC home once sensitivities and iv abx therapy is ready.   -Will continue to follow  -Follow up with Dr. Scott in 3 weeks for wound check.      KEZIA Aguila  [facility]   Orthopedic Trauma

## 2023-10-14 NOTE — PT/OT/SLP PROGRESS
Physical Therapy Treatment    Patient Name:  Deborah Cross   MRN:  [id number]    Recommendations:     Discharge Recommendations: outpatient PT  Discharge Equipment Recommendations: none  Barriers to discharge: None    Assessment:     Deborah Cross is a 53 y.o. female admitted with a medical diagnosis of Infection of bone of left ankle.  She presents with the following impairments/functional limitations: impaired endurance, impaired functional mobility, decreased lower extremity function, pain .    Rehab Prognosis: Good; patient would benefit from acute skilled PT services to address these deficits and reach maximum level of function.    Recent Surgery: Procedure(s) (LRB):  INCISION AND DRAINAGE, LOWER EXTREMITY (Left) 3 Days Post-Op    Plan:     During this hospitalization, patient to be seen 6 x/week to address the identified rehab impairments via gait training, therapeutic activities, therapeutic exercises and progress toward the following goals:    Plan of Care Expires:       Subjective     Chief Complaint: none  Patient/Family Comments/goals: to return home and obtain PLOF  Pain/Comfort:  Pain Rating 1: 3/10  Location - Side 1: Left  Location 1: ankle  Pain Addressed 1: Pre-medicate for activity      Objective:     Communicated with pts nurse prior to session.  Patient found HOB elevated with peripheral IV upon PT entry to room.     General Precautions: Standard, fall  Orthopedic Precautions: LLE non weight bearing  Braces: N/A  Respiratory Status: Room air  Blood Pressure: NT  Skin Integrity: Visible skin intact      Functional Mobility:  Bed Mobility:     Supine to Sit: independence  Transfers:     Sit to Stand:  supervision with rolling walker  Gait: Pt educted on upper body mechanics ambulating w/ RW, hop to gait f/b pt demosntration. Pt performed short distance in room with 2 u turns and in hallway 150 ft, CGA.  Pt demonstrated good mechanics, no LOB.    Therapeutic Activities/Exercises:  Pt  directed in seated and standing LE ROM and isometric exercises, 10 to 15 reps, all planes.     Education:  Patient provided with verbal education education regarding PT POC.  Understanding was verbalized.     Patient left sitting edge of bed with all lines intact, call button in reach, and mother present..    GOALS:   Multidisciplinary Problems       Physical Therapy Goals          Problem: Physical Therapy    Goal Priority Disciplines Outcome Goal Variances Interventions   Physical Therapy Goal     PT, PT/OT      Description: Pt will be seen for the following goals  1. Pt will be mod ind with all transfers with a rw  2. Pt will be mod ind with rw 100ft with WBAT LLE                       Time Tracking:     PT Received On: 10/14/23  PT Start Time: 1500     PT Stop Time: 1523  PT Total Time (min): 23 min     Billable Minutes: Gait Training 10 and Therapeutic Exercise 13    Treatment Type: Treatment  PT/PTA: PTA     Number of PTA visits since last PT visit: 2     10/14/2023

## 2023-10-15 LAB
POCT GLUCOSE: 111 MG/DL (ref 70–110)
VANCOMYCIN TROUGH SERPL-MCNC: 9.4 UG/ML (ref 15–20)

## 2023-10-15 PROCEDURE — 63600175 PHARM REV CODE 636 W HCPCS: Performed by: PHYSICIAN ASSISTANT

## 2023-10-15 PROCEDURE — C1751 CATH, INF, PER/CENT/MIDLINE: HCPCS

## 2023-10-15 PROCEDURE — 25000003 PHARM REV CODE 250: Performed by: PHYSICIAN ASSISTANT

## 2023-10-15 PROCEDURE — 36569 INSJ PICC 5 YR+ W/O IMAGING: CPT

## 2023-10-15 PROCEDURE — 25000003 PHARM REV CODE 250: Performed by: ORTHOPAEDIC SURGERY

## 2023-10-15 PROCEDURE — 25000003 PHARM REV CODE 250: Performed by: INTERNAL MEDICINE

## 2023-10-15 PROCEDURE — 63600175 PHARM REV CODE 636 W HCPCS: Performed by: INTERNAL MEDICINE

## 2023-10-15 PROCEDURE — 11000001 HC ACUTE MED/SURG PRIVATE ROOM

## 2023-10-15 PROCEDURE — 80202 ASSAY OF VANCOMYCIN: CPT | Performed by: PHYSICIAN ASSISTANT

## 2023-10-15 RX ADMIN — VANCOMYCIN HYDROCHLORIDE 1750 MG: 500 INJECTION, POWDER, LYOPHILIZED, FOR SOLUTION INTRAVENOUS at 01:10

## 2023-10-15 RX ADMIN — POLYETHYLENE GLYCOL 3350 17 G: 17 POWDER, FOR SOLUTION ORAL at 08:10

## 2023-10-15 RX ADMIN — METHOCARBAMOL 750 MG: 750 TABLET ORAL at 08:10

## 2023-10-15 RX ADMIN — SODIUM CHLORIDE: 9 INJECTION, SOLUTION INTRAVENOUS at 11:10

## 2023-10-15 RX ADMIN — OXYCODONE HYDROCHLORIDE 10 MG: 10 TABLET ORAL at 11:10

## 2023-10-15 RX ADMIN — OXYCODONE HYDROCHLORIDE 10 MG: 10 TABLET ORAL at 12:10

## 2023-10-15 RX ADMIN — ENOXAPARIN SODIUM 40 MG: 40 INJECTION SUBCUTANEOUS at 08:10

## 2023-10-15 RX ADMIN — METFORMIN HYDROCHLORIDE 1500 MG: 750 TABLET, EXTENDED RELEASE ORAL at 08:10

## 2023-10-15 RX ADMIN — CEFEPIME 2 G: 2 INJECTION, POWDER, FOR SOLUTION INTRAVENOUS at 11:10

## 2023-10-15 RX ADMIN — OXYCODONE HYDROCHLORIDE 10 MG: 10 TABLET ORAL at 03:10

## 2023-10-15 RX ADMIN — METHOCARBAMOL 750 MG: 750 TABLET ORAL at 03:10

## 2023-10-15 RX ADMIN — OXYCODONE HYDROCHLORIDE 10 MG: 10 TABLET ORAL at 08:10

## 2023-10-15 NOTE — PROGRESS NOTES
[facility] Neuro  Orthopedics  Progress Note    Patient Name: Deborah Corss  MRN: [id number]  Admission Date: 10/11/2023  Hospital Length of Stay: 4 days  Attending Provider: Dillon Scott DO  Primary Care Provider: Adamaris Cao MD  Follow-up For: Procedure(s) (LRB):  INCISION AND DRAINAGE, LOWER EXTREMITY (Left)    Post-Operative Day: 4 Day Post-Op  Subjective:     Principal Problem:Infection of bone of left ankle    Principal Orthopedic Problem: 4 Days Post-Op   Excision draining sinus tract left ankle, removal of hardware deep    Interval History:  Patient is resting comfortably this morning. Nurses state no acute events overnight. Patient c/o pain and waiting on her pain meds. Soft dressing remains in place.      Review of patient's allergies indicates:  No Known Allergies    Current Facility-Administered Medications   Medication    0.9%  NaCl infusion    ceFEPIme (MAXIPIME) 2 g in dextrose 5 % in water (D5W) 100 mL IVPB (MB+)    dextrose 10% bolus 125 mL 125 mL    dextrose 10% bolus 250 mL 250 mL    enoxaparin injection 40 mg    glucagon (human recombinant) injection 1 mg    glucose chewable tablet 16 g    glucose chewable tablet 24 g    metFORMIN ER 24hr tablet 1,500 mg    methocarbamoL tablet 750 mg    morphine injection 4 mg    ondansetron injection 4 mg    oxyCODONE immediate release tablet 5 mg    oxyCODONE immediate release tablet Tab 10 mg    polyethylene glycol packet 17 g    vancomycin (VANCOCIN) 1,750 mg in dextrose 5 % (D5W) 500 mL IVPB    vancomycin - pharmacy to dose     Objective:     Vital Signs (Most Recent):  Temp: 98 °F (36.7 °C) (10/15/23 0006)  Pulse: 67 (10/15/23 0006)  Resp: 17 (10/15/23 0359)  BP: 126/80 (10/15/23 0006)  SpO2: 96 % (10/15/23 0006) Vital Signs (24h Range):  Temp:  [97.7 °F (36.5 °C)-98.2 °F (36.8 °C)] 98 °F (36.7 °C)  Pulse:  [67-77] 67  Resp:  [17-18] 17  SpO2:  [94 %-97 %] 96 %  BP: (121-132)/(78-81) 126/80     Weight: 61.4 kg (135 lb  "5.8 oz)  Height: 5' 2" (157.5 cm)  Body mass index is 24.76 kg/m².    No intake or output data in the 24 hours ending 10/15/23 0749      Physical Exam:     General the patient is alert and oriented x3 no acute distress nontoxic-appearing appropriate affect.    Constitutional: Vital signs are examined and stable.  Resp: No signs of labored breathing         LLE: -Skin: Dressing CDI, No signs of new abrasions or lacerations, no scars           -MSK: Hip and Knee F/E, EHL/FHL, Gastroc/Tib anterior Strength 5/5           -Neuro:  Sensation intact to light touch L3-S1 dermatomes           -Lymphatic: No signs of lymphadenopathy           -CV: Capillary refill is less than 2 seconds. DP/PT pulses 2/4. Compartments soft and compressible          Diagnostic Findings:   Significant Labs:   Recent Lab Results  (Last 5 results in the past 72 hours)        10/14/23  1015   10/14/23  0433   10/13/23  1120   10/13/23  0616   10/13/23  0408        Anion Gap   8.0       10.0       Baso #   0.08       0.05       Basophil %   1.2       0.7       BUN   9.3       5.3       BUN/CREAT RATIO   16       9       Calcium   9.1       9.3       Chloride   104       104       CO2   29       27       Creatinine   0.60       0.62       eGFR   >60       >60       Eos #   0.37       0.31       Eosinophil %   5.7       4.4       Glucose   119       108       Hematocrit   32.7       34.5       Hemoglobin   11.1       11.8       Immature Grans (Abs)   0.02       0.02       Immature Granulocytes   0.3       0.3       Lymph #   2.61       2.15       LYMPH %   40.4       30.2       MCH   28.7       28.9       MCHC   33.9       34.2       MCV   84.5       84.6       Mono #   0.67       0.59       Mono %   10.4       8.3       MPV   9.9       11.6       Neut #   2.71       3.99       Neut %   42.0       56.1       nRBC   0.0       0.0       Platelet Count   226       286       POCT Glucose     130   115         Potassium   3.6       3.8       RBC   3.87    "    4.08       RDW   12.9       13.0       Sodium   141       141       Vancomycin-Trough 14.8               WBC   6.46       7.11                               Significant Imaging: I have reviewed all pertinent imaging results/findings.    Assessment/Plan:     Active Diagnoses:    Diagnosis Date Noted POA    PRINCIPAL PROBLEM:  Infection of bone of left ankle [M86.9] 10/12/2023 Yes      Problems Resolved During this Admission:       -Patient is status post excisional debridement left ankle.    -Continue multimodal pain control .  -Intraoperative cultures pending, no growth at 48 hours.  ID consulted, recommending 6 week course IV abx therapy.   -Daily dressing changes.   -NWB LLE while incisions are healing, ROMAT.  -Lovenox during stay followed by aspirin for DVT ppx at discharge.   -Will be stable for DC home once sensitivities and iv abx therapy is ready.   -Will continue to follow  -Follow up with Dr. Scott in 3 weeks for wound check.      [facility]   Orthopedic Trauma

## 2023-10-16 VITALS
HEART RATE: 71 BPM | HEIGHT: 62 IN | BODY MASS INDEX: 24.91 KG/M2 | RESPIRATION RATE: 18 BRPM | SYSTOLIC BLOOD PRESSURE: 132 MMHG | DIASTOLIC BLOOD PRESSURE: 83 MMHG | TEMPERATURE: 98 F | OXYGEN SATURATION: 97 % | WEIGHT: 135.38 LBS

## 2023-10-16 DIAGNOSIS — S91.002D OPEN WOUND OF LEFT ANKLE, SUBSEQUENT ENCOUNTER: Primary | ICD-10-CM

## 2023-10-16 LAB
ALBUMIN SERPL-MCNC: 3.3 G/DL (ref 3.5–5)
ALBUMIN/GLOB SERPL: 1.1 RATIO (ref 1.1–2)
ALP SERPL-CCNC: 75 UNIT/L (ref 40–150)
ALT SERPL-CCNC: 22 UNIT/L (ref 0–55)
AST SERPL-CCNC: 27 UNIT/L (ref 5–34)
BILIRUB SERPL-MCNC: 0.2 MG/DL
BUN SERPL-MCNC: 14.7 MG/DL (ref 9.8–20.1)
CALCIUM SERPL-MCNC: 9 MG/DL (ref 8.4–10.2)
CHLORIDE SERPL-SCNC: 103 MMOL/L (ref 98–107)
CO2 SERPL-SCNC: 27 MMOL/L (ref 22–29)
CREAT SERPL-MCNC: 0.7 MG/DL (ref 0.55–1.02)
GFR SERPLBLD CREATININE-BSD FMLA CKD-EPI: >60 MLS/MIN/1.73/M2
GLOBULIN SER-MCNC: 3 GM/DL (ref 2.4–3.5)
GLUCOSE SERPL-MCNC: 116 MG/DL (ref 74–100)
POCT GLUCOSE: 105 MG/DL (ref 70–110)
POCT GLUCOSE: 124 MG/DL (ref 70–110)
POTASSIUM SERPL-SCNC: 3.6 MMOL/L (ref 3.5–5.1)
PROT SERPL-MCNC: 6.3 GM/DL (ref 6.4–8.3)
SODIUM SERPL-SCNC: 138 MMOL/L (ref 136–145)
VANCOMYCIN TROUGH SERPL-MCNC: 7.3 UG/ML (ref 15–20)

## 2023-10-16 PROCEDURE — 25000003 PHARM REV CODE 250: Performed by: ORTHOPAEDIC SURGERY

## 2023-10-16 PROCEDURE — 25000003 PHARM REV CODE 250: Performed by: INTERNAL MEDICINE

## 2023-10-16 PROCEDURE — 80202 ASSAY OF VANCOMYCIN: CPT | Performed by: ORTHOPAEDIC SURGERY

## 2023-10-16 PROCEDURE — 25000003 PHARM REV CODE 250: Performed by: PHYSICIAN ASSISTANT

## 2023-10-16 PROCEDURE — 63600175 PHARM REV CODE 636 W HCPCS: Performed by: ORTHOPAEDIC SURGERY

## 2023-10-16 PROCEDURE — 63600175 PHARM REV CODE 636 W HCPCS: Performed by: INTERNAL MEDICINE

## 2023-10-16 PROCEDURE — 80053 COMPREHEN METABOLIC PANEL: CPT | Performed by: ORTHOPAEDIC SURGERY

## 2023-10-16 PROCEDURE — 63600175 PHARM REV CODE 636 W HCPCS: Performed by: PHYSICIAN ASSISTANT

## 2023-10-16 RX ORDER — VANCOMYCIN HCL IN 5 % DEXTROSE 1G/250ML
1000 PLASTIC BAG, INJECTION (ML) INTRAVENOUS
Status: DISCONTINUED | OUTPATIENT
Start: 2023-10-16 | End: 2023-10-16 | Stop reason: HOSPADM

## 2023-10-16 RX ORDER — OXYCODONE AND ACETAMINOPHEN 10; 325 MG/1; MG/1
1 TABLET ORAL EVERY 4 HOURS PRN
Qty: 42 TABLET | Refills: 0 | Status: SHIPPED | OUTPATIENT
Start: 2023-10-16 | End: 2023-10-30 | Stop reason: SDUPTHER

## 2023-10-16 RX ORDER — ASPIRIN 81 MG/1
81 TABLET ORAL 2 TIMES DAILY
Qty: 60 TABLET | Refills: 0 | Status: SHIPPED | OUTPATIENT
Start: 2023-10-16 | End: 2023-11-15

## 2023-10-16 RX ORDER — METHOCARBAMOL 750 MG/1
750 TABLET, FILM COATED ORAL 3 TIMES DAILY
Qty: 30 TABLET | Refills: 0 | Status: SHIPPED | OUTPATIENT
Start: 2023-10-16 | End: 2023-10-26

## 2023-10-16 RX ORDER — LACTULOSE 10 G/15ML
30 SOLUTION ORAL ONCE
Status: DISCONTINUED | OUTPATIENT
Start: 2023-10-16 | End: 2023-10-16 | Stop reason: HOSPADM

## 2023-10-16 RX ADMIN — ONDANSETRON 4 MG: 2 INJECTION INTRAMUSCULAR; INTRAVENOUS at 03:10

## 2023-10-16 RX ADMIN — METHOCARBAMOL 750 MG: 750 TABLET ORAL at 08:10

## 2023-10-16 RX ADMIN — VANCOMYCIN HYDROCHLORIDE 1000 MG: 1 INJECTION, POWDER, LYOPHILIZED, FOR SOLUTION INTRAVENOUS at 11:10

## 2023-10-16 RX ADMIN — POLYETHYLENE GLYCOL 3350 17 G: 17 POWDER, FOR SOLUTION ORAL at 08:10

## 2023-10-16 RX ADMIN — OXYCODONE HYDROCHLORIDE 10 MG: 10 TABLET ORAL at 11:10

## 2023-10-16 RX ADMIN — VANCOMYCIN HYDROCHLORIDE 1250 MG: 1.25 INJECTION, POWDER, LYOPHILIZED, FOR SOLUTION INTRAVENOUS at 03:10

## 2023-10-16 RX ADMIN — CEFEPIME 2 G: 2 INJECTION, POWDER, FOR SOLUTION INTRAVENOUS at 03:10

## 2023-10-16 RX ADMIN — OXYCODONE HYDROCHLORIDE 10 MG: 10 TABLET ORAL at 03:10

## 2023-10-16 RX ADMIN — METHOCARBAMOL 750 MG: 750 TABLET ORAL at 03:10

## 2023-10-16 RX ADMIN — OXYCODONE HYDROCHLORIDE 10 MG: 10 TABLET ORAL at 04:10

## 2023-10-16 RX ADMIN — CEFEPIME 2 G: 2 INJECTION, POWDER, FOR SOLUTION INTRAVENOUS at 08:10

## 2023-10-16 RX ADMIN — OXYCODONE HYDROCHLORIDE 10 MG: 10 TABLET ORAL at 08:10

## 2023-10-16 RX ADMIN — METFORMIN HYDROCHLORIDE 1500 MG: 750 TABLET, EXTENDED RELEASE ORAL at 08:10

## 2023-10-16 NOTE — PROGRESS NOTES
Pharmacokinetic Assessment Follow Up: IV Vancomycin    Vancomycin serum concentration assessment(s):    The trough level was drawn incorrectly and cannot be used to guide therapy at this time.    Vancomycin Regimen Plan:    Change regimen to Vancomycin 1750 mg IV every 12 hours with next serum trough concentration measured at 60 minutes prior to 1500 dose on 10/17    Drug levels (last 3 results):  Recent Labs   Lab Result Units 10/14/23  1015 10/15/23  2250   Vancomycin Trough ug/ml 14.8* 9.4*       Pharmacy will continue to follow and monitor vancomycin.    Please contact pharmacy at extension [phone] for questions regarding this assessment.    Thank you for the consult,   Vishal Edward       Patient brief summary:  Deborah Cross is a 53 y.o. female initiated on antimicrobial therapy with IV Vancomycin for treatment of bone/joint infection    The patient's current regimen is 1750mg q12h    Drug Allergies:   Review of patient's allergies indicates:  No Known Allergies    Actual Body Weight:   62kg    Renal Function:   Estimated Creatinine Clearance: 94.5 mL/min (based on SCr of 0.6 mg/dL).,     Dialysis Method (if applicable):  N/A    CBC (last 72 hours):  Recent Labs   Lab Result Units 10/13/23  0408 10/14/23  0433   WBC x10(3)/mcL 7.11 6.46   Hgb g/dL 11.8* 11.1*   Hct % 34.5* 32.7*   Platelet x10(3)/mcL 286 226   Mono % % 8.3 10.4   Eos % % 4.4 5.7   Basophil % % 0.7 1.2       Metabolic Panel (last 72 hours):  Recent Labs   Lab Result Units 10/13/23  0408 10/14/23  0433   Sodium Level mmol/L 141 141   Potassium Level mmol/L 3.8 3.6   Chloride mmol/L 104 104   Carbon Dioxide mmol/L 27 29   Glucose Level mg/dL 108* 119*   Blood Urea Nitrogen mg/dL 5.3* 9.3*   Creatinine mg/dL 0.62 0.60       Vancomycin Administrations:  vancomycin given in the last 96 hours                     vancomycin (VANCOCIN) 1,750 mg in dextrose 5 % (D5W) 500 mL IVPB (mg) 1,750 mg New Bag 10/15/23 0126     1,750 mg New Bag 10/14/23 1321     vancomycin 1.5 g in dextrose 5 % 250 mL IVPB (ready to mix) (mg) 1,500 mg New Bag 10/14/23 0050     1,500 mg New Bag 10/13/23 1120     1,500 mg New Bag 10/12/23 2341    vancomycin in dextrose 5 % 1 gram/250 mL IVPB 1,000 mg (mg) 1,000 mg New Bag 10/12/23 1020                    Microbiologic Results:  Microbiology Results (last 7 days)       Procedure Component Value Units Date/Time    Wound Culture [[id number]] Collected: 10/11/23 0731    Order Status: Completed Specimen: Wound from Ankle, Left Updated: 10/14/23 1121     Wound Culture No Growth    Anaerobic Culture [[id number]] Collected: 10/11/23 0731    Order Status: Completed Specimen: Wound from Ankle, Left Updated: 10/14/23 0948     Anaerobe Culture No Anaerobes Isolated    AFB Smear [[id number]] Collected: 10/11/23 0731    Order Status: Completed Specimen: Wound from Ankle, Left Updated: 10/12/23 0914     AFB Smear No AFB seen (Direct smear only) - Concentration to follow    Gram Stain [[id number]] Collected: 10/11/23 0731    Order Status: Completed Specimen: Wound from Ankle, Left Updated: 10/12/23 0901     GRAM STAIN No WBCs, No bacteria seen    Fungal Culture [[id number]] Collected: 10/11/23 0731    Order Status: Sent Specimen: Wound from Ankle, Left Updated: 10/11/23 0743    Mycobacteria and Nocardia Culture [[id number]] Collected: 10/11/23 0731    Order Status: Sent Specimen: Wound from Ankle, Left Updated: 10/11/23 0743

## 2023-10-16 NOTE — PROGRESS NOTES
Infectious Diseases Progress Note  53-year-old female with past medical history of diabetes type 2, HLD, HTN, migraine headache, right tibia/fibula fracture with ORIF and left ankle injury post fall from a ladder in October 2022, is admitted to [facility] on 10/11/2023 with persistent infection of left ankle with draining sinus tract.  Apparently she had done well post surgical interventions after her fall resulting in healing of the right lower extremity but has had issues with nonhealing and infectious complications to the left ankle requiring prior surgeries.  She had MRI on 10/06 which showed left ankle soft tissue changes with concern for an abscess as well as osteomyelitis involving the fibula with associated tendinitis and tenosynovitis.  Review of her records does show a left ankle culture on 08/09/2023 with normal skin tiara and prior to that on 06/02/2023 and other left ankle culture with no growth.  On this presentation she was noted to have no fevers and no leukocytosis, CRP 2.17 and ESR 13.  She was taken to surgery today 10/11 for excision of draining sinus tract over previous surgical excision of left ankle with removal of deep left fibula hardware with cultures pending.  She is on antibiotic coverage with vancomycin and Cefepime.    Subjective:  Lying in bed in no acute distress. No complaints voiced. Afebrile.     ROS  HENT: Negative.     Respiratory: Negative.     Gastrointestinal: Negative.    Genitourinary: Negative.    Musculoskeletal: Negative.         Left ankle pain   Endo/Heme/Allergies: Negative.    Psychiatric/Behavioral: Negative.     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 OR;  Service: Orthopedics;  Laterality: Left;  supine vascular bed c arm wash stuff cultures  FIRST CASE    INSERTION OF INTRAMEDULLARY NAIL INTO TIBIA Right 10/24/2022    Procedure: INSERTION, INTRAMEDULLARY JACK, TIBIA;  Surgeon: Dillon Scott DO;  Location: [facility] OR;  Service: Orthopedics;  Laterality: Right;  supine, vascular, bone foam, c-arm, wash stuff    ORIF TIBIA FRACTURE Right 2023    Procedure: ORIF, FRACTURE, TIBIA;  Surgeon: Dillon Scott DO;  Location: [facility] OR;  Service: Orthopedics;  Laterality: Right;    REPAIR OF LIGAMENT OF ANKLE  10/24/2022    Procedure: REPAIR, LIGAMENT, ANKLE;  Surgeon: Dillon Scott DO;  Location: [facility] OR;  Service: Orthopedics;;       Social History     Socioeconomic History    Marital status: Single   Tobacco Use    Smoking status: Some Days     Current packs/day: 0.50     Average packs/day: 0.5 packs/day for 15.0 years (7.5 ttl pk-yrs)     Types: Cigarettes    Smokeless tobacco: Former    Tobacco comments:     Pt currently smoking 3 cigarettes daily.   Substance and Sexual Activity    Alcohol use: Not Currently    Drug use: Not Currently     Types: Benzodiazepines, Marijuana    Sexual activity: Not Currently     Partners: Male     Birth control/protection: None   Social History Narrative    ** Merged History Encounter **          Social Determinants of Health     Financial Resource Strain: Low Risk  (10/13/2023)    Overall Financial Resource Strain (CARDIA)      "Difficulty of Paying Living Expenses: Not hard at all   Food Insecurity: No Food Insecurity (10/13/2023)    Hunger Vital Sign     Worried About Running Out of Food in the Last Year: Never true     Ran Out of Food in the Last Year: Never true   Transportation Needs: No Transportation Needs (10/13/2023)    PRAPARE - Transportation     Lack of Transportation (Medical): No     Lack of Transportation (Non-Medical): No   Social Connections: Unknown (10/13/2023)    Social Connection and Isolation Panel [NHANES]     Frequency of Communication with Friends and Family: More than three times a week     Frequency of Social Gatherings with Friends and Family: More than three times a week     Marital Status: Never    Housing Stability: Unknown (10/13/2023)    Housing Stability Vital Sign     Unable to Pay for Housing in the Last Year: No     Unstable Housing in the Last Year: No         Scheduled Meds:   ceFEPime (MAXIPIME) IVPB  2 g Intravenous Q8H    enoxaparin  40 mg Subcutaneous Daily    metFORMIN  1,500 mg Oral Daily with breakfast    methocarbamoL  750 mg Oral TID    polyethylene glycol  17 g Oral Daily    vancomycin (VANCOCIN) IV (PEDS and ADULTS)  1,750 mg Intravenous Q12H     Continuous Infusions:  PRN Meds:sodium chloride 0.9%, dextrose 10%, dextrose 10%, glucagon (human recombinant), glucose, glucose, morphine, ondansetron, oxyCODONE, oxyCODONE, Pharmacy to dose Vancomycin consult **AND** vancomycin - pharmacy to dose    Objective:  /79   Pulse 75   Temp 98.2 °F (36.8 °C) (Oral)   Resp 17   Ht 5' 2" (1.575 m)   Wt 61.4 kg (135 lb 5.8 oz)   SpO2 96%   Breastfeeding No   BMI 24.76 kg/m²     Physical Exam:   Physical Exam  Vitals reviewed.   Constitutional:       General: She is not in acute distress.     Appearance: She is not toxic-appearing.   HENT:      Head: Normocephalic and atraumatic.   Eyes:      Pupils: Pupils are equal, round, and reactive to light.   Cardiovascular:      Rate and Rhythm: " Normal rate and regular rhythm.      Heart sounds: Normal heart sounds.   Pulmonary:      Effort: Pulmonary effort is normal.      Breath sounds: Normal breath sounds.   Abdominal:      General: Bowel sounds are normal. There is no distension.      Palpations: Abdomen is soft.      Tenderness: There is no abdominal tenderness.   Genitourinary:     Comments: No suprapubic tenderness  Musculoskeletal:      Cervical back: Neck supple.   Skin:     Findings: No rash.      Comments: Left ankle is bandaged from surgery   Neurological:      Mental Status: She is alert and oriented to person, place, and time.   Psychiatric:         Thought Content: Thought content normal.        Imaging      Lab Review   Recent Results (from the past 24 hour(s))   POCT glucose    Collection Time: 10/15/23  7:14 PM   Result Value Ref Range    POCT Glucose 111 (H) 70 - 110 mg/dL       Assessment/Plan:  1. Left ankle surgical site infection with abscess/infected hardware/osteomyelitis of  fibula  2. Left ankle draining sinus tract s/p I&D with hardware removal  3. Tobacco use disorder   4.  Diabetes type 2      -Continue Vancomycin and Cefepime  -Plan a 6 week course following inflammatory markers  -Afebrile without leukocytosis  -Seen by Ortho, inputs noted  -S/P I&D of LLE with removal of all hardware on 10/11, cultures negative   -Continue wound care  -Discussed with patient and nursing

## 2023-10-16 NOTE — PROGRESS NOTES
Infectious Diseases Progress Note  53-year-old female with past medical history of diabetes type 2, HLD, HTN, migraine headache, right tibia/fibula fracture with ORIF and left ankle injury post fall from a ladder in October 2022, is admitted to [facility] on 10/11/2023 with persistent infection of left ankle with draining sinus tract.  Apparently she had done well post surgical interventions after her fall resulting in healing of the right lower extremity but has had issues with nonhealing and infectious complications to the left ankle requiring prior surgeries.  She had MRI on 10/06 which showed left ankle soft tissue changes with concern for an abscess as well as osteomyelitis involving the fibula with associated tendinitis and tenosynovitis.  Review of her records does show a left ankle culture on 08/09/2023 with normal skin tiara and prior to that on 06/02/2023 and other left ankle culture with no growth.  On this presentation she was noted to have no fevers and no leukocytosis, CRP 2.17 and ESR 13.  She was taken to surgery today 10/11 for excision of draining sinus tract over previous surgical excision of left ankle with removal of deep left fibula hardware with cultures pending.  She is on antibiotic coverage with vancomycin and Cefepime.    Subjective:  Lying in bed in no acute distress. No complaints voiced. Afebrile.     ROS  HENT: Negative.     Respiratory: Negative.     Gastrointestinal: Negative.    Genitourinary: Negative.    Musculoskeletal: Negative.         Left ankle pain   Endo/Heme/Allergies: Negative.    Psychiatric/Behavioral: Negative.     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, with nonunion, subsequent encounter        Past Surgical History:    Procedure Laterality Date     SECTION  2005    HYSTERECTOMY  2015    INCISION AND DRAINAGE, LOWER EXTREMITY Left 2023    Procedure: INCISION AND DRAINAGE, LOWER EXTREMITY - supine bone foam wash stuff cultures;  Surgeon: Dillon Scott DO;  Location: [facility] OR;  Service: Orthopedics;  Laterality: Left;  supine bone foam wash stuff cultures    INCISION AND DRAINAGE, LOWER EXTREMITY Left 10/11/2023    Procedure: INCISION AND DRAINAGE, LOWER EXTREMITY;  Surgeon: Dillon Scott DO;  Location: [facility] OR;  Service: Orthopedics;  Laterality: Left;  supine vascular bed c arm wash stuff cultures  FIRST CASE    INSERTION OF INTRAMEDULLARY NAIL INTO TIBIA Right 10/24/2022    Procedure: INSERTION, INTRAMEDULLARY JACK, TIBIA;  Surgeon: Dillon Scott DO;  Location: [facility] OR;  Service: Orthopedics;  Laterality: Right;  supine, vascular, bone foam, c-arm, wash stuff    ORIF TIBIA FRACTURE Right 2023    Procedure: ORIF, FRACTURE, TIBIA;  Surgeon: Dillon Scott DO;  Location: [facility] OR;  Service: Orthopedics;  Laterality: Right;    REPAIR OF LIGAMENT OF ANKLE  10/24/2022    Procedure: REPAIR, LIGAMENT, ANKLE;  Surgeon: Dillon Scott DO;  Location: [facility] OR;  Service: Orthopedics;;       Social History     Socioeconomic History    Marital status: Single   Tobacco Use    Smoking status: Some Days     Current packs/day: 0.50     Average packs/day: 0.5 packs/day for 15.0 years (7.5 ttl pk-yrs)     Types: Cigarettes    Smokeless tobacco: Former    Tobacco comments:     Pt currently smoking 3 cigarettes daily.   Substance and Sexual Activity    Alcohol use: Not Currently    Drug use: Not Currently     Types: Benzodiazepines, Marijuana    Sexual activity: Not Currently     Partners: Male     Birth control/protection: None   Social History Narrative    ** Merged History Encounter **          Social Determinants of Health     Financial Resource Strain: Low Risk  (10/13/2023)    Overall Financial Resource Strain (CARDIA)      "Difficulty of Paying Living Expenses: Not hard at all   Food Insecurity: No Food Insecurity (10/13/2023)    Hunger Vital Sign     Worried About Running Out of Food in the Last Year: Never true     Ran Out of Food in the Last Year: Never true   Transportation Needs: No Transportation Needs (10/13/2023)    PRAPARE - Transportation     Lack of Transportation (Medical): No     Lack of Transportation (Non-Medical): No   Social Connections: Unknown (10/13/2023)    Social Connection and Isolation Panel [NHANES]     Frequency of Communication with Friends and Family: More than three times a week     Frequency of Social Gatherings with Friends and Family: More than three times a week     Marital Status: Never    Housing Stability: Unknown (10/13/2023)    Housing Stability Vital Sign     Unable to Pay for Housing in the Last Year: No     Unstable Housing in the Last Year: No         Scheduled Meds:   ceFEPime (MAXIPIME) IVPB  2 g Intravenous Q8H    enoxaparin  40 mg Subcutaneous Daily    lactulose 10 gram/15 ml  30 g Oral Once    metFORMIN  1,500 mg Oral Daily with breakfast    methocarbamoL  750 mg Oral TID    polyethylene glycol  17 g Oral Daily    vancomycin (VANCOCIN) IV (PEDS and ADULTS)  1,000 mg Intravenous Q8H     Continuous Infusions:  PRN Meds:sodium chloride 0.9%, dextrose 10%, dextrose 10%, glucagon (human recombinant), glucose, glucose, morphine, ondansetron, oxyCODONE, oxyCODONE, Pharmacy to dose Vancomycin consult **AND** vancomycin - pharmacy to dose    Objective:  BP (!) 130/90   Pulse 69   Temp 98.2 °F (36.8 °C) (Oral)   Resp 18   Ht 5' 2" (1.575 m)   Wt 61.4 kg (135 lb 5.8 oz)   SpO2 97%   Breastfeeding No   BMI 24.76 kg/m²     Physical Exam:   Physical Exam  Vitals reviewed.   Constitutional:       General: She is not in acute distress.     Appearance: She is not toxic-appearing.   HENT:      Head: Normocephalic and atraumatic.   Eyes:      Pupils: Pupils are equal, round, and reactive to " light.   Cardiovascular:      Rate and Rhythm: Normal rate and regular rhythm.      Heart sounds: Normal heart sounds.   Pulmonary:      Effort: Pulmonary effort is normal.      Breath sounds: Normal breath sounds.   Abdominal:      General: Bowel sounds are normal. There is no distension.      Palpations: Abdomen is soft.      Tenderness: There is no abdominal tenderness.   Genitourinary:     Comments: No suprapubic tenderness  Musculoskeletal:      Cervical back: Neck supple.   Skin:     Findings: No rash.      Comments: Left ankle is bandaged from surgery   Neurological:      Mental Status: She is alert and oriented to person, place, and time.   Psychiatric:         Thought Content: Thought content normal.        Imaging      Lab Review   Recent Results (from the past 24 hour(s))   POCT glucose    Collection Time: 10/15/23  7:14 PM   Result Value Ref Range    POCT Glucose 111 (H) 70 - 110 mg/dL   VANCOMYCIN, TROUGH    Collection Time: 10/15/23 10:50 PM   Result Value Ref Range    Vancomycin Trough 9.4 (L) 15.0 - 20.0 ug/ml   VANCOMYCIN, TROUGH    Collection Time: 10/16/23  3:23 AM   Result Value Ref Range    Vancomycin Trough 7.3 (L) 15.0 - 20.0 ug/ml   Comprehensive Metabolic Panel    Collection Time: 10/16/23  3:23 AM   Result Value Ref Range    Sodium Level 138 136 - 145 mmol/L    Potassium Level 3.6 3.5 - 5.1 mmol/L    Chloride 103 98 - 107 mmol/L    Carbon Dioxide 27 22 - 29 mmol/L    Glucose Level 116 (H) 74 - 100 mg/dL    Blood Urea Nitrogen 14.7 9.8 - 20.1 mg/dL    Creatinine 0.70 0.55 - 1.02 mg/dL    Calcium Level Total 9.0 8.4 - 10.2 mg/dL    Protein Total 6.3 (L) 6.4 - 8.3 gm/dL    Albumin Level 3.3 (L) 3.5 - 5.0 g/dL    Globulin 3.0 2.4 - 3.5 gm/dL    Albumin/Globulin Ratio 1.1 1.1 - 2.0 ratio    Bilirubin Total 0.2 <=1.5 mg/dL    Alkaline Phosphatase 75 40 - 150 unit/L    Alanine Aminotransferase 22 0 - 55 unit/L    Aspartate Aminotransferase 27 5 - 34 unit/L    eGFR >60 mls/min/1.73/m2   POCT  glucose    Collection Time: 10/16/23 11:37 AM   Result Value Ref Range    POCT Glucose 105 70 - 110 mg/dL       Assessment/Plan:  1. Left ankle surgical site infection with abscess/infected hardware/osteomyelitis of  fibula  2. Left ankle draining sinus tract s/p I&D with hardware removal  3. Tobacco use disorder   4.  Diabetes type 2      -Continue Vancomycin and Cefepime  -Plan a 6 week course following inflammatory markers  -Afebrile without leukocytosis  -Seen by Ortho, inputs noted  -S/P I&D of LLE with removal of all hardware on 10/11, cultures negative   -Continue wound care  -Discussed with patient and nursing.  Being discharged today on OPAT.

## 2023-10-16 NOTE — PROGRESS NOTES
Pharmacokinetic Assessment Follow Up: IV Vancomycin    Vancomycin serum concentration assessment(s):    The trough level was drawn correctly and can be used to guide therapy at this time. The measurement is below the desired definitive target range of 15 to 20 mcg/mL.    Vancomycin Regimen Plan:    Change regimen to Vancomycin 2000 mg IV every 12 hours with next serum trough concentration measured at 60 minutes prior to 1300 dose on 10/17    Drug levels (last 3 results):  Recent Labs   Lab Result Units 10/14/23  1015 10/15/23  2250   Vancomycin Trough ug/ml 14.8* 9.4*       Pharmacy will continue to follow and monitor vancomycin.    Please contact pharmacy at extension [phone] for questions regarding this assessment.    Thank you for the consult,   Vishal Edward       Patient brief summary:  Deborah Cross is a 53 y.o. female initiated on antimicrobial therapy with IV Vancomycin for treatment of bone/joint infection    The patient's current regimen is 2000mg q12h    Drug Allergies:   Review of patient's allergies indicates:  No Known Allergies    Actual Body Weight:   61kg    Renal Function:   Estimated Creatinine Clearance: 94.5 mL/min (based on SCr of 0.6 mg/dL).,     Dialysis Method (if applicable):  N/A    CBC (last 72 hours):  Recent Labs   Lab Result Units 10/13/23  0408 10/14/23  0433   WBC x10(3)/mcL 7.11 6.46   Hgb g/dL 11.8* 11.1*   Hct % 34.5* 32.7*   Platelet x10(3)/mcL 286 226   Mono % % 8.3 10.4   Eos % % 4.4 5.7   Basophil % % 0.7 1.2       Metabolic Panel (last 72 hours):  Recent Labs   Lab Result Units 10/13/23  0408 10/14/23  0433   Sodium Level mmol/L 141 141   Potassium Level mmol/L 3.8 3.6   Chloride mmol/L 104 104   Carbon Dioxide mmol/L 27 29   Glucose Level mg/dL 108* 119*   Blood Urea Nitrogen mg/dL 5.3* 9.3*   Creatinine mg/dL 0.62 0.60       Vancomycin Administrations:  vancomycin given in the last 96 hours                     vancomycin (VANCOCIN) 1,750 mg in dextrose 5 % (D5W) 500 mL  IVPB (mg) 1,750 mg New Bag 10/15/23 0126     1,750 mg New Bag 10/14/23 1321    vancomycin 1.5 g in dextrose 5 % 250 mL IVPB (ready to mix) (mg) 1,500 mg New Bag 10/14/23 0050     1,500 mg New Bag 10/13/23 1120     1,500 mg New Bag 10/12/23 2341    vancomycin in dextrose 5 % 1 gram/250 mL IVPB 1,000 mg (mg) 1,000 mg New Bag 10/12/23 1020                    Microbiologic Results:  Microbiology Results (last 7 days)       Procedure Component Value Units Date/Time    Wound Culture [[id number]] Collected: 10/11/23 0731    Order Status: Completed Specimen: Wound from Ankle, Left Updated: 10/14/23 1121     Wound Culture No Growth    Anaerobic Culture [[id number]] Collected: 10/11/23 0731    Order Status: Completed Specimen: Wound from Ankle, Left Updated: 10/14/23 0948     Anaerobe Culture No Anaerobes Isolated    AFB Smear [[id number]] Collected: 10/11/23 0731    Order Status: Completed Specimen: Wound from Ankle, Left Updated: 10/12/23 0914     AFB Smear No AFB seen (Direct smear only) - Concentration to follow    Gram Stain [[id number]] Collected: 10/11/23 0731    Order Status: Completed Specimen: Wound from Ankle, Left Updated: 10/12/23 0901     GRAM STAIN No WBCs, No bacteria seen    Fungal Culture [[id number]] Collected: 10/11/23 0731    Order Status: Sent Specimen: Wound from Ankle, Left Updated: 10/11/23 0743    Mycobacteria and Nocardia Culture [[id number]] Collected: 10/11/23 0731    Order Status: Sent Specimen: Wound from Ankle, Left Updated: 10/11/23 0743

## 2023-10-16 NOTE — PROGRESS NOTES
Pharmacokinetic Assessment Follow Up: IV Vancomycin    Vancomycin serum concentration assessment(s):    The trough level was drawn correctly and can be used to guide therapy at this time. The measurement is below the desired definitive target range of 15 to 20 mcg/mL.    Vancomycin Regimen Plan:    Change regimen to Vancomycin 1000 mg IV every 8 hours with next serum trough concentration measured at 1100 prior to 5th dose on 10/17/2023    Drug levels (last 3 results):  Recent Labs   Lab Result Units 10/14/23  1015 10/15/23  2250 10/16/23  0323   Vancomycin Trough ug/ml 14.8* 9.4* 7.3*       Pharmacy will continue to follow and monitor vancomycin.    Please contact pharmacy at extension [phone] for questions regarding this assessment.    Thank you for the consult,   Angela Frausto       Patient brief summary:  Deborah Cross is a 53 y.o. female initiated on antimicrobial therapy with IV Vancomycin for treatment of bone/joint infection        Drug Allergies:   Review of patient's allergies indicates:  No Known Allergies    Actual Body Weight:   61.4 kg    Renal Function:   Estimated Creatinine Clearance: 94.5 mL/min (based on SCr of 0.6 mg/dL).,     Dialysis Method (if applicable):  N/A    CBC (last 72 hours):  Recent Labs   Lab Result Units 10/14/23  0433   WBC x10(3)/mcL 6.46   Hgb g/dL 11.1*   Hct % 32.7*   Platelet x10(3)/mcL 226   Mono % % 10.4   Eos % % 5.7   Basophil % % 1.2       Metabolic Panel (last 72 hours):  Recent Labs   Lab Result Units 10/14/23  0433   Sodium Level mmol/L 141   Potassium Level mmol/L 3.6   Chloride mmol/L 104   Carbon Dioxide mmol/L 29   Glucose Level mg/dL 119*   Blood Urea Nitrogen mg/dL 9.3*   Creatinine mg/dL 0.60       Vancomycin Administrations:  vancomycin given in the last 96 hours                     vancomycin 1.25 g in dextrose 5% 250 mL IVPB (ready to mix) (mg) 1,250 mg New Bag 10/16/23 0347    vancomycin (VANCOCIN) 1,750 mg in dextrose 5 % (D5W) 500 mL IVPB (mg) 1,750  mg New Bag 10/15/23 0126     1,750 mg New Bag 10/14/23 1321    vancomycin 1.5 g in dextrose 5 % 250 mL IVPB (ready to mix) (mg) 1,500 mg New Bag 10/14/23 0050     1,500 mg New Bag 10/13/23 1120     1,500 mg New Bag 10/12/23 2341    vancomycin in dextrose 5 % 1 gram/250 mL IVPB 1,000 mg (mg) 1,000 mg New Bag 10/12/23 1020                    Microbiologic Results:  Microbiology Results (last 7 days)       Procedure Component Value Units Date/Time    Wound Culture [[id number]] Collected: 10/11/23 0731    Order Status: Completed Specimen: Wound from Ankle, Left Updated: 10/14/23 1121     Wound Culture No Growth    Anaerobic Culture [[id number]] Collected: 10/11/23 0731    Order Status: Completed Specimen: Wound from Ankle, Left Updated: 10/14/23 0948     Anaerobe Culture No Anaerobes Isolated    AFB Smear [[id number]] Collected: 10/11/23 0731    Order Status: Completed Specimen: Wound from Ankle, Left Updated: 10/12/23 0914     AFB Smear No AFB seen (Direct smear only) - Concentration to follow    Gram Stain [[id number]] Collected: 10/11/23 0731    Order Status: Completed Specimen: Wound from Ankle, Left Updated: 10/12/23 0901     GRAM STAIN No WBCs, No bacteria seen    Fungal Culture [[id number]] Collected: 10/11/23 0731    Order Status: Sent Specimen: Wound from Ankle, Left Updated: 10/11/23 0743    Mycobacteria and Nocardia Culture [[id number]] Collected: 10/11/23 0731    Order Status: Sent Specimen: Wound from Ankle, Left Updated: 10/11/23 0743

## 2023-10-16 NOTE — DISCHARGE INSTRUCTIONS
Change dressing daily. Do not apply ointments or creams. Do not get incision wet. Make sure you have a bowel movement at least every 3 days while on pain medications. Take stool softeners/laxatives to help as needed.

## 2023-10-16 NOTE — NURSING
Pharmacy was notified that patient missed 12 noon dose of vanc that was to be administered on 10/15 due to no IV access, when vanc dosing was changed due to trough level; therefore 1750 mg will be resumed per Pharmacy recs and trough will be redrawn

## 2023-10-16 NOTE — PLAN OF CARE
Patient no longer has rolling walker at home, it was a loaner and has been returned. Order placed for walker for home use. Order, clinicals, and [facility] equipment paperwork faxed to [facility] at [phone].

## 2023-10-16 NOTE — PROCEDURES
"Deborah Cross is a 53 y.o. female patient.    Temp: 98 °F (36.7 °C) (10/15/23 1555)  Pulse: 74 (10/15/23 1555)  Resp: 19 (10/15/23 1555)  BP: (!) 146/78 (10/15/23 1555)  SpO2: 97 % (10/15/23 1555)  Weight: 61.4 kg (135 lb 5.8 oz) (10/11/23 1535)  Height: 5' 2" (157.5 cm) (10/11/23 1535)    PICC  Date/Time: 10/15/2023 7:15 PM  Performed by: Hugo Blanco RN  Consent Done: Yes  Time out: Immediately prior to procedure a time out was called to verify the correct patient, procedure, equipment, support staff and site/side marked as required  Indications: med administration  Anesthesia: local infiltration  Local anesthetic: lidocaine 1% without epinephrine  Anesthetic Total (mL): 5  Preparation: skin prepped with ChloraPrep  Skin prep agent dried: skin prep agent completely dried prior to procedure  Sterile barriers: all five maximum sterile barriers used - cap, mask, sterile gown, sterile gloves, and large sterile sheet  Hand hygiene: hand hygiene performed prior to central venous catheter insertion  Location details: right brachial  Catheter type: double lumen  Catheter size: 5 Fr  Catheter Length: 34cm    Ultrasound guidance: yes  Vessel Caliber: medium, compressibility normal  Needle advanced into vessel with real time Ultrasound guidance.  Guidewire confirmed in vessel.  Sterile sheath used.  Number of attempts: 1  Post-procedure: blood return through all ports and sterile dressing applied    Assessment: free fluid flow          Name Hugo Blanco RN  10/15/2023    "

## 2023-10-16 NOTE — PLAN OF CARE
10/16/23 0907   Final Note   Assessment Type Final Discharge Note   Anticipated Discharge Disposition Home-Health   Hospital Resources/Appts/Education Provided Post-Acute resouces added to AVS   Post-Acute Status   Post-Acute Authorization Home Health;IV Infusion   Home Health Status Set-up Complete/Auth obtained   IV Infusion Status Set-up Complete/Auth obtained   Discharge Delays None known at this time     Patient will dc home today with home health and iv home infusion services. [facility] has accepted patient and [facility] will be providing education and iv antibiotics for patient. Patient has transportation home. Patient will dc after education with Giovanni ARELLANO with [facility].

## 2023-10-17 RX ORDER — ONDANSETRON 4 MG/1
4 TABLET, ORALLY DISINTEGRATING ORAL EVERY 8 HOURS PRN
Qty: 12 TABLET | Refills: 0 | Status: SHIPPED | OUTPATIENT
Start: 2023-10-17 | End: 2023-10-21

## 2023-10-17 NOTE — DISCHARGE SUMMARY
Discharge Summary    Admit Date: 10/11/2023     Discharge Date: 10/17/2023     Operative Procedure: INCISION AND DRAINAGE, LOWER EXTREMITY (Left)     History of Present Illness/Hospital Course: Patient admitted from outpatient surgery following I&D to await culture results. Hardware removed left ankle. She tolerated the procedure well. Some high pain overnight controlled with change in medications. Cultures remained Negative. ID was consulted and recommends IV abx due to concern for bone infection. This was initiated and she was set up with home IV services. She did receive a CAM boot as well as walker prior to DC and was stable for DC home on POD 3.    Discharge Disposition: Home     Activity: NWB to affected extremity ; ROMAT    Diet: Resume previous home diet    Medications:      Medication List        START taking these medications      methocarbamoL 750 MG Tab  Commonly known as: ROBAXIN  Take 1 tablet (750 mg total) by mouth 3 (three) times daily. for 10 days     oxyCODONE-acetaminophen  mg per tablet  Commonly known as: PERCOCET  Take 1 tablet by mouth every 4 (four) hours as needed for Pain.            CONTINUE taking these medications      amLODIPine 5 MG tablet  Commonly known as: NORVASC  TAKE ONE TABLET BY MOUTH DAILY     aspirin 81 MG EC tablet  Commonly known as: ECOTRIN  Take 1 tablet (81 mg total) by mouth 2 (two) times a day.     FLUoxetine 40 MG capsule  TAKE ONE CAPSULE BY MOUTH EVERY MORNING     metFORMIN 750 MG ER 24hr tablet  Commonly known as: GLUCOPHAGE-XR  Take 2 tablets (1,500 mg total) by mouth daily with breakfast.     minocycline 100 MG capsule  Commonly known as: MINOCIN,DYNACIN  Take 1 capsule (100 mg total) by mouth every 12 (twelve) hours.     mupirocin 2 % ointment  Commonly known as: BACTROBAN  Apply topically 2 (two) times a day.     naloxone 4 mg/actuation Spry  Commonly known as: NARCAN     rosuvastatin 20 MG tablet  Commonly known as: CRESTOR  Take one tablet by mouth  once daily            STOP taking these medications      ALPRAZolam 1 MG tablet  Commonly known as: XANAX     fluticasone propionate 50 mcg/actuation nasal spray  Commonly known as: FLONASE     methylPREDNISolone 4 mg tablet  Commonly known as: MEDROL DOSEPACK     naproxen 500 MG tablet  Commonly known as: NAPROSYN     nirmatrelvir-ritonavir 300 mg (150 mg x 2)-100 mg copackaged tablets (EUA)     QUEtiapine 50 MG tablet  Commonly known as: SEROQUEL     sumatriptan 25 MG Tab  Commonly known as: IMITREX     traZODone 100 MG tablet  Commonly known as: DESYREL               Where to Get Your Medications        These medications were sent to [facility] - [place], [place] - [address]  [address], [facility] [id number]      Phone: [phone]   aspirin 81 MG EC tablet  methocarbamoL 750 MG Tab  oxyCODONE-acetaminophen  mg per tablet          Discharge Instructions: If in splint, keep clean and dry until follow up. Otherwise daily dry dressing changes until follow up. Keep incisions clean and dry. Do not apply ointments or creams.    Follow Up: Dr. Scott in approx 3 weeks    The above findings, diagnostics, and treatment plan were discussed with Dr. Scott who is in agreement with the plan of care except as stated in additional documentation.     Alexandra Blair Lemaire, PA-C [facility]   Orthopedic Trauma

## 2023-10-19 ENCOUNTER — LAB REQUISITION (OUTPATIENT)
Dept: LAB | Facility: HOSPITAL | Age: 53
End: 2023-10-19
Payer: MEDICAID

## 2023-10-19 DIAGNOSIS — E11.69 TYPE 2 DIABETES MELLITUS WITH OTHER SPECIFIED COMPLICATION: ICD-10-CM

## 2023-10-19 DIAGNOSIS — M86.09 ACUTE HEMATOGENOUS OSTEOMYELITIS, MULTIPLE SITES: ICD-10-CM

## 2023-10-19 LAB
ALBUMIN SERPL-MCNC: 3.7 G/DL (ref 3.5–5)
ALBUMIN/GLOB SERPL: 1.2 RATIO (ref 1.1–2)
ALP SERPL-CCNC: 85 UNIT/L (ref 40–150)
ALT SERPL-CCNC: 31 UNIT/L (ref 0–55)
AST SERPL-CCNC: 32 UNIT/L (ref 5–34)
BASOPHILS # BLD AUTO: 0.07 X10(3)/MCL
BASOPHILS NFR BLD AUTO: 1 %
BILIRUB SERPL-MCNC: 0.3 MG/DL
BUN SERPL-MCNC: 4.9 MG/DL (ref 9.8–20.1)
CALCIUM SERPL-MCNC: 9.1 MG/DL (ref 8.4–10.2)
CHLORIDE SERPL-SCNC: 106 MMOL/L (ref 98–107)
CO2 SERPL-SCNC: 27 MMOL/L (ref 22–29)
CREAT SERPL-MCNC: 0.61 MG/DL (ref 0.55–1.02)
CRP SERPL-MCNC: 2.3 MG/L
EOSINOPHIL # BLD AUTO: 0.3 X10(3)/MCL (ref 0–0.9)
EOSINOPHIL NFR BLD AUTO: 4.4 %
ERYTHROCYTE [DISTWIDTH] IN BLOOD BY AUTOMATED COUNT: 13.3 % (ref 11.5–17)
ERYTHROCYTE [SEDIMENTATION RATE] IN BLOOD: 36 MM/HR (ref 0–20)
GFR SERPLBLD CREATININE-BSD FMLA CKD-EPI: >60 MLS/MIN/1.73/M2
GLOBULIN SER-MCNC: 3.1 GM/DL (ref 2.4–3.5)
GLUCOSE SERPL-MCNC: 87 MG/DL (ref 74–100)
HCT VFR BLD AUTO: 33.9 % (ref 37–47)
HGB BLD-MCNC: 10.8 G/DL (ref 12–16)
IMM GRANULOCYTES # BLD AUTO: 0.01 X10(3)/MCL (ref 0–0.04)
IMM GRANULOCYTES NFR BLD AUTO: 0.1 %
LYMPHOCYTES # BLD AUTO: 2.51 X10(3)/MCL (ref 0.6–4.6)
LYMPHOCYTES NFR BLD AUTO: 36.7 %
MCH RBC QN AUTO: 28.2 PG (ref 27–31)
MCHC RBC AUTO-ENTMCNC: 31.9 G/DL (ref 33–36)
MCV RBC AUTO: 88.5 FL (ref 80–94)
MONOCYTES # BLD AUTO: 0.61 X10(3)/MCL (ref 0.1–1.3)
MONOCYTES NFR BLD AUTO: 8.9 %
NEUTROPHILS # BLD AUTO: 3.34 X10(3)/MCL (ref 2.1–9.2)
NEUTROPHILS NFR BLD AUTO: 48.9 %
PLATELET # BLD AUTO: 276 X10(3)/MCL (ref 130–400)
PMV BLD AUTO: 10.3 FL (ref 7.4–10.4)
POTASSIUM SERPL-SCNC: 3.2 MMOL/L (ref 3.5–5.1)
PROT SERPL-MCNC: 6.8 GM/DL (ref 6.4–8.3)
RBC # BLD AUTO: 3.83 X10(6)/MCL (ref 4.2–5.4)
SODIUM SERPL-SCNC: 143 MMOL/L (ref 136–145)
VANCOMYCIN SERPL-MCNC: 13.5 UG/ML (ref 15–20)
WBC # SPEC AUTO: 6.84 X10(3)/MCL (ref 4.5–11.5)

## 2023-10-19 PROCEDURE — 80202 ASSAY OF VANCOMYCIN: CPT | Performed by: GENERAL PRACTICE

## 2023-10-19 PROCEDURE — 80053 COMPREHEN METABOLIC PANEL: CPT | Performed by: GENERAL PRACTICE

## 2023-10-19 PROCEDURE — 86140 C-REACTIVE PROTEIN: CPT | Performed by: GENERAL PRACTICE

## 2023-10-19 PROCEDURE — 85025 COMPLETE CBC W/AUTO DIFF WBC: CPT | Performed by: GENERAL PRACTICE

## 2023-10-19 PROCEDURE — 85652 RBC SED RATE AUTOMATED: CPT | Performed by: GENERAL PRACTICE

## 2023-10-23 ENCOUNTER — LAB REQUISITION (OUTPATIENT)
Dept: LAB | Facility: HOSPITAL | Age: 53
End: 2023-10-23
Payer: MEDICAID

## 2023-10-23 DIAGNOSIS — M86.09 ACUTE HEMATOGENOUS OSTEOMYELITIS, MULTIPLE SITES: ICD-10-CM

## 2023-10-23 DIAGNOSIS — E11.69 TYPE 2 DIABETES MELLITUS WITH OTHER SPECIFIED COMPLICATION: ICD-10-CM

## 2023-10-23 LAB
ALBUMIN SERPL-MCNC: 3.6 G/DL (ref 3.5–5)
ALBUMIN/GLOB SERPL: 1.2 RATIO (ref 1.1–2)
ALP SERPL-CCNC: 100 UNIT/L (ref 40–150)
ALT SERPL-CCNC: 19 UNIT/L (ref 0–55)
AST SERPL-CCNC: 18 UNIT/L (ref 5–34)
BASOPHILS # BLD AUTO: 0.1 X10(3)/MCL
BASOPHILS NFR BLD AUTO: 1.4 %
BILIRUB SERPL-MCNC: 0.2 MG/DL
BUN SERPL-MCNC: 8.5 MG/DL (ref 9.8–20.1)
CALCIUM SERPL-MCNC: 9.2 MG/DL (ref 8.4–10.2)
CHLORIDE SERPL-SCNC: 103 MMOL/L (ref 98–107)
CO2 SERPL-SCNC: 27 MMOL/L (ref 22–29)
CREAT SERPL-MCNC: 0.67 MG/DL (ref 0.55–1.02)
CRP SERPL-MCNC: 3.9 MG/L
EOSINOPHIL # BLD AUTO: 0.44 X10(3)/MCL (ref 0–0.9)
EOSINOPHIL NFR BLD AUTO: 6 %
ERYTHROCYTE [DISTWIDTH] IN BLOOD BY AUTOMATED COUNT: 13.5 % (ref 11.5–17)
ERYTHROCYTE [SEDIMENTATION RATE] IN BLOOD: 21 MM/HR (ref 0–20)
GFR SERPLBLD CREATININE-BSD FMLA CKD-EPI: >60 MLS/MIN/1.73/M2
GLOBULIN SER-MCNC: 3 GM/DL (ref 2.4–3.5)
GLUCOSE SERPL-MCNC: 139 MG/DL (ref 74–100)
HCT VFR BLD AUTO: 36.7 % (ref 37–47)
HGB BLD-MCNC: 11.5 G/DL (ref 12–16)
IMM GRANULOCYTES # BLD AUTO: 0.03 X10(3)/MCL (ref 0–0.04)
IMM GRANULOCYTES NFR BLD AUTO: 0.4 %
LYMPHOCYTES # BLD AUTO: 2.34 X10(3)/MCL (ref 0.6–4.6)
LYMPHOCYTES NFR BLD AUTO: 32.1 %
MCH RBC QN AUTO: 27.5 PG (ref 27–31)
MCHC RBC AUTO-ENTMCNC: 31.3 G/DL (ref 33–36)
MCV RBC AUTO: 87.8 FL (ref 80–94)
MONOCYTES # BLD AUTO: 0.53 X10(3)/MCL (ref 0.1–1.3)
MONOCYTES NFR BLD AUTO: 7.3 %
NEUTROPHILS # BLD AUTO: 3.85 X10(3)/MCL (ref 2.1–9.2)
NEUTROPHILS NFR BLD AUTO: 52.8 %
PLATELET # BLD AUTO: 311 X10(3)/MCL (ref 130–400)
PMV BLD AUTO: 10.1 FL (ref 7.4–10.4)
POTASSIUM SERPL-SCNC: 3.2 MMOL/L (ref 3.5–5.1)
PROT SERPL-MCNC: 6.6 GM/DL (ref 6.4–8.3)
RBC # BLD AUTO: 4.18 X10(6)/MCL (ref 4.2–5.4)
SODIUM SERPL-SCNC: 142 MMOL/L (ref 136–145)
VANCOMYCIN SERPL-MCNC: 7.7 UG/ML (ref 15–20)
WBC # SPEC AUTO: 7.29 X10(3)/MCL (ref 4.5–11.5)

## 2023-10-23 PROCEDURE — 80053 COMPREHEN METABOLIC PANEL: CPT | Performed by: GENERAL PRACTICE

## 2023-10-23 PROCEDURE — 85025 COMPLETE CBC W/AUTO DIFF WBC: CPT | Performed by: GENERAL PRACTICE

## 2023-10-23 PROCEDURE — 85652 RBC SED RATE AUTOMATED: CPT | Performed by: GENERAL PRACTICE

## 2023-10-23 PROCEDURE — 80202 ASSAY OF VANCOMYCIN: CPT | Performed by: GENERAL PRACTICE

## 2023-10-23 PROCEDURE — 86140 C-REACTIVE PROTEIN: CPT | Performed by: GENERAL PRACTICE

## 2023-10-30 ENCOUNTER — LAB REQUISITION (OUTPATIENT)
Dept: LAB | Facility: HOSPITAL | Age: 53
End: 2023-10-30
Payer: MEDICAID

## 2023-10-30 DIAGNOSIS — Z79.84 LONG TERM (CURRENT) USE OF ORAL HYPOGLYCEMIC DRUGS: ICD-10-CM

## 2023-10-30 DIAGNOSIS — Z51.81 ENCOUNTER FOR THERAPEUTIC DRUG LEVEL MONITORING: ICD-10-CM

## 2023-10-30 DIAGNOSIS — Z79.82 LONG TERM (CURRENT) USE OF ASPIRIN: ICD-10-CM

## 2023-10-30 DIAGNOSIS — Z79.2 LONG TERM (CURRENT) USE OF ANTIBIOTICS: ICD-10-CM

## 2023-10-30 DIAGNOSIS — M86.162 OTHER ACUTE OSTEOMYELITIS, LEFT TIBIA AND FIBULA: ICD-10-CM

## 2023-10-30 DIAGNOSIS — I10 ESSENTIAL (PRIMARY) HYPERTENSION: ICD-10-CM

## 2023-10-30 DIAGNOSIS — Z91.81 HISTORY OF FALLING: ICD-10-CM

## 2023-10-30 DIAGNOSIS — Z45.2 ENCOUNTER FOR ADJUSTMENT AND MANAGEMENT OF VASCULAR ACCESS DEVICE: ICD-10-CM

## 2023-10-30 DIAGNOSIS — E11.69 TYPE 2 DIABETES MELLITUS WITH OTHER SPECIFIED COMPLICATION: ICD-10-CM

## 2023-10-30 DIAGNOSIS — Z79.899 OTHER LONG TERM (CURRENT) DRUG THERAPY: ICD-10-CM

## 2023-10-30 DIAGNOSIS — S82.392Q: ICD-10-CM

## 2023-10-30 DIAGNOSIS — F17.210 NICOTINE DEPENDENCE, CIGARETTES, UNCOMPLICATED: ICD-10-CM

## 2023-10-30 DIAGNOSIS — T84.69XA INFECTION AND INFLAMMATORY REACTION DUE TO INTERNAL FIXATION DEVICE OF OTHER SITE, INITIAL ENCOUNTER: ICD-10-CM

## 2023-10-30 DIAGNOSIS — S82.832Q: ICD-10-CM

## 2023-10-30 LAB
ALBUMIN SERPL-MCNC: 3.5 G/DL (ref 3.5–5)
ALBUMIN/GLOB SERPL: 1.1 RATIO (ref 1.1–2)
ALP SERPL-CCNC: 104 UNIT/L (ref 40–150)
ALT SERPL-CCNC: 16 UNIT/L (ref 0–55)
AST SERPL-CCNC: 18 UNIT/L (ref 5–34)
BASOPHILS # BLD AUTO: 0.1 X10(3)/MCL
BASOPHILS NFR BLD AUTO: 1.2 %
BILIRUB SERPL-MCNC: 0.3 MG/DL
BUN SERPL-MCNC: 6.8 MG/DL (ref 9.8–20.1)
CALCIUM SERPL-MCNC: 9.4 MG/DL (ref 8.4–10.2)
CHLORIDE SERPL-SCNC: 103 MMOL/L (ref 98–107)
CO2 SERPL-SCNC: 30 MMOL/L (ref 22–29)
CREAT SERPL-MCNC: 0.74 MG/DL (ref 0.55–1.02)
CRP SERPL-MCNC: 1.9 MG/L
EOSINOPHIL # BLD AUTO: 0.56 X10(3)/MCL (ref 0–0.9)
EOSINOPHIL NFR BLD AUTO: 6.5 %
ERYTHROCYTE [DISTWIDTH] IN BLOOD BY AUTOMATED COUNT: 13 % (ref 11.5–17)
ERYTHROCYTE [SEDIMENTATION RATE] IN BLOOD: 23 MM/HR (ref 0–20)
GFR SERPLBLD CREATININE-BSD FMLA CKD-EPI: >60 MLS/MIN/1.73/M2
GLOBULIN SER-MCNC: 3.3 GM/DL (ref 2.4–3.5)
GLUCOSE SERPL-MCNC: 141 MG/DL (ref 74–100)
HCT VFR BLD AUTO: 38 % (ref 37–47)
HGB BLD-MCNC: 12.3 G/DL (ref 12–16)
IMM GRANULOCYTES # BLD AUTO: 0.01 X10(3)/MCL (ref 0–0.04)
IMM GRANULOCYTES NFR BLD AUTO: 0.1 %
LYMPHOCYTES # BLD AUTO: 2.04 X10(3)/MCL (ref 0.6–4.6)
LYMPHOCYTES NFR BLD AUTO: 23.6 %
MCH RBC QN AUTO: 27.2 PG (ref 27–31)
MCHC RBC AUTO-ENTMCNC: 32.4 G/DL (ref 33–36)
MCV RBC AUTO: 84.1 FL (ref 80–94)
MONOCYTES # BLD AUTO: 0.64 X10(3)/MCL (ref 0.1–1.3)
MONOCYTES NFR BLD AUTO: 7.4 %
NEUTROPHILS # BLD AUTO: 5.31 X10(3)/MCL (ref 2.1–9.2)
NEUTROPHILS NFR BLD AUTO: 61.2 %
PLATELET # BLD AUTO: 300 X10(3)/MCL (ref 130–400)
PMV BLD AUTO: 9.8 FL (ref 7.4–10.4)
POTASSIUM SERPL-SCNC: 3.1 MMOL/L (ref 3.5–5.1)
PROT SERPL-MCNC: 6.8 GM/DL (ref 6.4–8.3)
RBC # BLD AUTO: 4.52 X10(6)/MCL (ref 4.2–5.4)
SODIUM SERPL-SCNC: 145 MMOL/L (ref 136–145)
VANCOMYCIN SERPL-MCNC: 31.5 UG/ML (ref 15–20)
WBC # SPEC AUTO: 8.66 X10(3)/MCL (ref 4.5–11.5)

## 2023-10-30 PROCEDURE — 85652 RBC SED RATE AUTOMATED: CPT | Performed by: PHYSICIAN ASSISTANT

## 2023-10-30 PROCEDURE — 85025 COMPLETE CBC W/AUTO DIFF WBC: CPT | Performed by: PHYSICIAN ASSISTANT

## 2023-10-30 PROCEDURE — 80202 ASSAY OF VANCOMYCIN: CPT | Performed by: PHYSICIAN ASSISTANT

## 2023-10-30 PROCEDURE — 80053 COMPREHEN METABOLIC PANEL: CPT | Performed by: PHYSICIAN ASSISTANT

## 2023-10-30 PROCEDURE — 86140 C-REACTIVE PROTEIN: CPT | Performed by: PHYSICIAN ASSISTANT

## 2023-10-31 RX ORDER — OXYCODONE AND ACETAMINOPHEN 10; 325 MG/1; MG/1
1 TABLET ORAL EVERY 6 HOURS PRN
Qty: 28 TABLET | Refills: 0 | Status: SHIPPED | OUTPATIENT
Start: 2023-10-31 | End: 2023-11-14 | Stop reason: SDUPTHER

## 2023-11-01 ENCOUNTER — OFFICE VISIT (OUTPATIENT)
Dept: ORTHOPEDICS | Facility: CLINIC | Age: 53
End: 2023-11-01
Payer: MEDICAID

## 2023-11-01 ENCOUNTER — HOSPITAL ENCOUNTER (OUTPATIENT)
Dept: RADIOLOGY | Facility: CLINIC | Age: 53
Discharge: HOME | End: 2023-11-01
Attending: ORTHOPAEDIC SURGERY
Payer: MEDICAID

## 2023-11-01 VITALS
BODY MASS INDEX: 24.84 KG/M2 | SYSTOLIC BLOOD PRESSURE: 139 MMHG | WEIGHT: 135 LBS | DIASTOLIC BLOOD PRESSURE: 79 MMHG | HEIGHT: 62 IN

## 2023-11-01 DIAGNOSIS — S91.002D OPEN WOUND OF LEFT ANKLE, SUBSEQUENT ENCOUNTER: ICD-10-CM

## 2023-11-01 DIAGNOSIS — S91.002D OPEN WOUND OF LEFT ANKLE, SUBSEQUENT ENCOUNTER: Primary | ICD-10-CM

## 2023-11-01 PROCEDURE — 3075F PR MOST RECENT SYSTOLIC BLOOD PRESS GE 130-139MM HG: ICD-10-PCS | Mod: CPTII,,, | Performed by: ORTHOPAEDIC SURGERY

## 2023-11-01 PROCEDURE — 3051F HG A1C>EQUAL 7.0%<8.0%: CPT | Mod: CPTII,,, | Performed by: ORTHOPAEDIC SURGERY

## 2023-11-01 PROCEDURE — 73610 XR ANKLE COMPLETE 3 VIEW LEFT: ICD-10-PCS | Mod: LT,,, | Performed by: ORTHOPAEDIC SURGERY

## 2023-11-01 PROCEDURE — 3051F PR MOST RECENT HEMOGLOBIN A1C LEVEL 7.0 - < 8.0%: ICD-10-PCS | Mod: CPTII,,, | Performed by: ORTHOPAEDIC SURGERY

## 2023-11-01 PROCEDURE — 1159F PR MEDICATION LIST DOCUMENTED IN MEDICAL RECORD: ICD-10-PCS | Mod: CPTII,,, | Performed by: ORTHOPAEDIC SURGERY

## 2023-11-01 PROCEDURE — 73610 X-RAY EXAM OF ANKLE: CPT | Mod: LT,,, | Performed by: ORTHOPAEDIC SURGERY

## 2023-11-01 PROCEDURE — 99024 PR POST-OP FOLLOW-UP VISIT: ICD-10-PCS | Mod: ,,, | Performed by: ORTHOPAEDIC SURGERY

## 2023-11-01 PROCEDURE — 3078F DIAST BP <80 MM HG: CPT | Mod: CPTII,,, | Performed by: ORTHOPAEDIC SURGERY

## 2023-11-01 PROCEDURE — 3075F SYST BP GE 130 - 139MM HG: CPT | Mod: CPTII,,, | Performed by: ORTHOPAEDIC SURGERY

## 2023-11-01 PROCEDURE — 99024 POSTOP FOLLOW-UP VISIT: CPT | Mod: ,,, | Performed by: ORTHOPAEDIC SURGERY

## 2023-11-01 PROCEDURE — 1159F MED LIST DOCD IN RCRD: CPT | Mod: CPTII,,, | Performed by: ORTHOPAEDIC SURGERY

## 2023-11-01 PROCEDURE — 3078F PR MOST RECENT DIASTOLIC BLOOD PRESSURE < 80 MM HG: ICD-10-PCS | Mod: CPTII,,, | Performed by: ORTHOPAEDIC SURGERY

## 2023-11-01 NOTE — PROGRESS NOTES
Subjective:       Patient ID: Deborah Cross is a 53 y.o. female.  Chief Complaint   Patient presents with    Left Ankle - Post-op Evaluation          Follow-up        Patient is 3 weeks I and Left ankle, 6mo out from incision and drainage of a draining sinus tract to her left ankle.  She is been doing well.  She is decreased her smoking.  She is healing nicely.  Here with her daughter today.  No numbness tingling fevers chills.  She will call infectious disease office today for follow up appointment.  Overall she is doing quite well  Constitutional: Denies fever chills  Eyes: No change in vision  ENT: No ringing or current infections  CV: No chest pain  Resp: No labored breathing  MSK: Pain evident at site of injury located in HPI,   Integ: No signs of abrasions or lacerations  Neuro: No numbness or tingling  Lymphatic: No swelling outside the area of injury     Current Outpatient Medications on File Prior to Visit   Medication Sig Dispense Refill    ALPRAZolam (XANAX) 1 MG tablet Take 1 tablet (1 mg total) by mouth nightly as needed for Anxiety. 30 tablet 0    amLODIPine (NORVASC) 5 MG tablet TAKE ONE TABLET BY MOUTH DAILY 90 tablet 1    aspirin (ECOTRIN) 81 MG EC tablet Take 1 tablet (81 mg total) by mouth 2 (two) times a day. 60 tablet 0    ceFEPIme (MAXIPIME) 2 gram injection       FLUoxetine 40 MG capsule TAKE ONE CAPSULE BY MOUTH EVERY MORNING 90 capsule 1    metFORMIN (GLUCOPHAGE-XR) 750 MG ER 24hr tablet Take 2 tablets (1,500 mg total) by mouth daily with breakfast. 60 tablet 3    mupirocin (BACTROBAN) 2 % ointment Apply topically 2 (two) times a day. 30 g 0    naloxone (NARCAN) 4 mg/actuation Spry use 1 spray into one nostril. If no response after 2 to 3 minutes, administer additional spray into other nostril. call 911      oxyCODONE-acetaminophen (PERCOCET)  mg per tablet Take 1 tablet by mouth every 6 (six) hours as needed for Pain. 28 tablet 0    rosuvastatin (CRESTOR) 20 MG tablet Take  "one tablet by mouth once daily 90 tablet 0    sumatriptan (IMITREX) 25 MG Tab Take one tablet at onset of headache, may repeat in 2 hours max of two tablets per 24 hours 9 tablet 1    vancomycin (VANCOCIN) 10 gram SolR Inject into the vein.       Current Facility-Administered Medications on File Prior to Visit   Medication Dose Route Frequency Provider Last Rate Last Admin    sodium chloride 0.9% flush 10 mL  10 mL Intravenous PRN Dillon Scott O, DO              Objective:      There were no vitals taken for this visit.  Physical Exam  General the patient is alert and oriented x3 no acute distress nontoxic-appearing appropriate affect.    Constitutional: Vital signs are examined and stable.  Resp: No signs of labored breathing    Musculoskeletal:     Left lower extremity:  Sutures intact.  No signs erythema no signs of surgical wound dehiscence no sign of fluid collection or swelling.  No sign of other complication.  Patient has Steri-Strips placed after sutures.  compartments are soft and compressible; No pain with ROM at the hip, knee, or ankle; minimal tenderness to palpation; dorsi/plantar flexes the foot; SILT distally; BCR distally; DP pulse 2+      There is no height or weight on file to calculate BMI.  Patient weight not recorded  Hemoglobin A1c   Date Value Ref Range Status   09/26/2023 7.2 (H) <=7.0 % Final   04/06/2023 6.0 <=7.0 % Final     Hgb   Date Value Ref Range Status   10/30/2023 12.3 12.0 - 16.0 g/dL Final   10/23/2023 11.5 (L) 12.0 - 16.0 g/dL Final     Hct   Date Value Ref Range Status   10/30/2023 38.0 37.0 - 47.0 % Final   10/23/2023 36.7 (L) 37.0 - 47.0 % Final     Iron Level   Date Value Ref Range Status   04/06/2023 90 50 - 170 ug/dL Final     No components found for: "FROLATE"  Vit D 25 OH   Date Value Ref Range Status   04/06/2023 63.1 30.0 - 80.0 ng/mL Final   12/15/2021 27.7 (L) 30.0 - 80.0 ng/mL Final     WBC   Date Value Ref Range Status   10/30/2023 8.66 4.50 - 11.50 x10(3)/mcL Final "   10/23/2023 7.29 4.50 - 11.50 x10(3)/mcL Final       Radiology: no x rays taken today.         Assessment:         1. Open wound of left ankle, subsequent encounter  X-Ray Ankle Complete Left                    Plan:         No follow-ups on file.    Deborah was seen today for post-op evaluation.    Diagnoses and all orders for this visit:    Open wound of left ankle, subsequent encounter  -     X-Ray Ankle Complete Left; Future      Patient is still smoking she is cut back to 1-2 cigarettes a day.  She is off work now she will not be returning to work until this is completely healed.  Patient works in a lunch room full-time.  Patient has been remain nonweightbearing sutures were removed today without complication the skin appears to be in the advanced stages of healing.  She will follow up after Thanksgiving for possible return to work.  No complications on today's visit  This note/OR report was created with the assistance of  voice recognition software or phone  dictation.  There may be transcription errors as a result of using this technology however minimal. Effort has been made to assure accuracy of transcription but any obvious errors or omissions should be clarified with the author of the document.       Dillon Scott DO  Orthopedic Trauma Surgery         Future Appointments   Date Time Provider Department Center   11/1/2023  8:15 AM Dillon Scott DO [facility] [place]   12/7/2023  9:00 AM Jean Paul Calle MD [facility] [place]   12/19/2023  8:00 AM Dillon Scott DO [facility] [place]   1/15/2024  1:15 PM Adamaris Cao MD [facility] [place]

## 2023-11-02 ENCOUNTER — LAB REQUISITION (OUTPATIENT)
Dept: LAB | Facility: HOSPITAL | Age: 53
End: 2023-11-02
Payer: MEDICAID

## 2023-11-02 DIAGNOSIS — M86.9 OSTEOMYELITIS, UNSPECIFIED: ICD-10-CM

## 2023-11-02 DIAGNOSIS — M86.172 OTHER ACUTE OSTEOMYELITIS, LEFT ANKLE AND FOOT: ICD-10-CM

## 2023-11-02 LAB
ANION GAP SERPL CALC-SCNC: 11 MEQ/L
BUN SERPL-MCNC: 10.6 MG/DL (ref 9.8–20.1)
CALCIUM SERPL-MCNC: 9.7 MG/DL (ref 8.4–10.2)
CHLORIDE SERPL-SCNC: 102 MMOL/L (ref 98–107)
CO2 SERPL-SCNC: 28 MMOL/L (ref 22–29)
CREAT SERPL-MCNC: 1.15 MG/DL (ref 0.55–1.02)
CREAT/UREA NIT SERPL: 9
GFR SERPLBLD CREATININE-BSD FMLA CKD-EPI: 57 MLS/MIN/1.73/M2
GLUCOSE SERPL-MCNC: 128 MG/DL (ref 74–100)
POTASSIUM SERPL-SCNC: 2.9 MMOL/L (ref 3.5–5.1)
SODIUM SERPL-SCNC: 141 MMOL/L (ref 136–145)
VANCOMYCIN TROUGH SERPL-MCNC: 15.2 UG/ML (ref 15–20)

## 2023-11-02 PROCEDURE — 80048 BASIC METABOLIC PNL TOTAL CA: CPT | Performed by: NURSE PRACTITIONER

## 2023-11-02 PROCEDURE — 80202 ASSAY OF VANCOMYCIN: CPT | Performed by: NURSE PRACTITIONER

## 2023-11-06 ENCOUNTER — LAB REQUISITION (OUTPATIENT)
Dept: LAB | Facility: HOSPITAL | Age: 53
End: 2023-11-06
Attending: GENERAL PRACTICE
Payer: MEDICAID

## 2023-11-06 DIAGNOSIS — T84.69XA INFECTION AND INFLAMMATORY REACTION DUE TO INTERNAL FIXATION DEVICE OF OTHER SITE, INITIAL ENCOUNTER: ICD-10-CM

## 2023-11-06 DIAGNOSIS — E11.69 TYPE 2 DIABETES MELLITUS WITH OTHER SPECIFIED COMPLICATION: ICD-10-CM

## 2023-11-06 DIAGNOSIS — I10 ESSENTIAL (PRIMARY) HYPERTENSION: ICD-10-CM

## 2023-11-06 LAB
ALBUMIN SERPL-MCNC: 4 G/DL (ref 3.5–5)
ALBUMIN/GLOB SERPL: 1.2 RATIO (ref 1.1–2)
ALP SERPL-CCNC: 92 UNIT/L (ref 40–150)
ALT SERPL-CCNC: 11 UNIT/L (ref 0–55)
AST SERPL-CCNC: 17 UNIT/L (ref 5–34)
BILIRUB SERPL-MCNC: 0.3 MG/DL
BUN SERPL-MCNC: 9.8 MG/DL (ref 9.8–20.1)
CALCIUM SERPL-MCNC: 9.6 MG/DL (ref 8.4–10.2)
CHLORIDE SERPL-SCNC: 102 MMOL/L (ref 98–107)
CO2 SERPL-SCNC: 26 MMOL/L (ref 22–29)
CREAT SERPL-MCNC: 0.96 MG/DL (ref 0.55–1.02)
CRP SERPL-MCNC: 2 MG/L
ERYTHROCYTE [DISTWIDTH] IN BLOOD BY AUTOMATED COUNT: 12.8 % (ref 11.5–17)
ERYTHROCYTE [SEDIMENTATION RATE] IN BLOOD: 24 MM/HR (ref 0–20)
GFR SERPLBLD CREATININE-BSD FMLA CKD-EPI: >60 MLS/MIN/1.73/M2
GLOBULIN SER-MCNC: 3.3 GM/DL (ref 2.4–3.5)
GLUCOSE SERPL-MCNC: 152 MG/DL (ref 74–100)
HCT VFR BLD AUTO: 36.6 % (ref 37–47)
HGB BLD-MCNC: 11.7 G/DL (ref 12–16)
MCH RBC QN AUTO: 26.9 PG (ref 27–31)
MCHC RBC AUTO-ENTMCNC: 32 G/DL (ref 33–36)
MCV RBC AUTO: 84.1 FL (ref 80–94)
PLATELET # BLD AUTO: 236 X10(3)/MCL (ref 130–400)
PMV BLD AUTO: 9.7 FL (ref 7.4–10.4)
POTASSIUM SERPL-SCNC: 3.2 MMOL/L (ref 3.5–5.1)
PROT SERPL-MCNC: 7.3 GM/DL (ref 6.4–8.3)
RBC # BLD AUTO: 4.35 X10(6)/MCL (ref 4.2–5.4)
SODIUM SERPL-SCNC: 140 MMOL/L (ref 136–145)
VANCOMYCIN TROUGH SERPL-MCNC: 31.5 UG/ML (ref 15–20)
WBC # SPEC AUTO: 6.52 X10(3)/MCL (ref 4.5–11.5)

## 2023-11-06 PROCEDURE — 80202 ASSAY OF VANCOMYCIN: CPT | Performed by: GENERAL PRACTICE

## 2023-11-06 PROCEDURE — 80053 COMPREHEN METABOLIC PANEL: CPT | Performed by: GENERAL PRACTICE

## 2023-11-06 PROCEDURE — 86140 C-REACTIVE PROTEIN: CPT | Performed by: GENERAL PRACTICE

## 2023-11-06 PROCEDURE — 85027 COMPLETE CBC AUTOMATED: CPT | Performed by: GENERAL PRACTICE

## 2023-11-06 PROCEDURE — 85652 RBC SED RATE AUTOMATED: CPT | Performed by: GENERAL PRACTICE

## 2023-11-07 ENCOUNTER — LAB REQUISITION (OUTPATIENT)
Dept: LAB | Facility: HOSPITAL | Age: 53
End: 2023-11-07
Attending: NURSE PRACTITIONER
Payer: MEDICAID

## 2023-11-07 DIAGNOSIS — M86.171 OTHER ACUTE OSTEOMYELITIS, RIGHT ANKLE AND FOOT: ICD-10-CM

## 2023-11-07 DIAGNOSIS — M86.9 OSTEOMYELITIS, UNSPECIFIED: ICD-10-CM

## 2023-11-07 LAB
ANION GAP SERPL CALC-SCNC: 11 MEQ/L
BUN SERPL-MCNC: 10.1 MG/DL (ref 9.8–20.1)
CALCIUM SERPL-MCNC: 9.6 MG/DL (ref 8.4–10.2)
CHLORIDE SERPL-SCNC: 103 MMOL/L (ref 98–107)
CO2 SERPL-SCNC: 26 MMOL/L (ref 22–29)
CREAT SERPL-MCNC: 0.91 MG/DL (ref 0.55–1.02)
CREAT/UREA NIT SERPL: 11
GFR SERPLBLD CREATININE-BSD FMLA CKD-EPI: >60 MLS/MIN/1.73/M2
GLUCOSE SERPL-MCNC: 124 MG/DL (ref 74–100)
POTASSIUM SERPL-SCNC: 3.3 MMOL/L (ref 3.5–5.1)
SODIUM SERPL-SCNC: 140 MMOL/L (ref 136–145)
VANCOMYCIN TROUGH SERPL-MCNC: 16.8 UG/ML (ref 15–20)

## 2023-11-07 PROCEDURE — 80202 ASSAY OF VANCOMYCIN: CPT | Performed by: NURSE PRACTITIONER

## 2023-11-07 PROCEDURE — 80048 BASIC METABOLIC PNL TOTAL CA: CPT | Performed by: NURSE PRACTITIONER

## 2023-11-08 ENCOUNTER — DOCUMENT SCAN (OUTPATIENT)
Dept: HOME HEALTH SERVICES | Facility: HOSPITAL | Age: 53
End: 2023-11-08
Payer: MEDICAID

## 2023-11-13 ENCOUNTER — LAB REQUISITION (OUTPATIENT)
Dept: LAB | Facility: HOSPITAL | Age: 53
End: 2023-11-13
Payer: MEDICAID

## 2023-11-13 DIAGNOSIS — T84.69XA INFECTION AND INFLAMMATORY REACTION DUE TO INTERNAL FIXATION DEVICE OF OTHER SITE, INITIAL ENCOUNTER: ICD-10-CM

## 2023-11-13 DIAGNOSIS — M86.162 OTHER ACUTE OSTEOMYELITIS, LEFT TIBIA AND FIBULA: ICD-10-CM

## 2023-11-13 LAB
ABS NEUT (OLG): 0.13 X10(3)/MCL (ref 2.1–9.2)
ALBUMIN SERPL-MCNC: 4.2 G/DL (ref 3.5–5)
ALBUMIN/GLOB SERPL: 1.1 RATIO (ref 1.1–2)
ALP SERPL-CCNC: 91 UNIT/L (ref 40–150)
ALT SERPL-CCNC: 11 UNIT/L (ref 0–55)
AST SERPL-CCNC: 16 UNIT/L (ref 5–34)
BASOPHILS NFR BLD MANUAL: 0.08 X10(3)/MCL (ref 0–0.2)
BASOPHILS NFR BLD MANUAL: 3 %
BILIRUB SERPL-MCNC: 0.5 MG/DL
BUN SERPL-MCNC: 15.4 MG/DL (ref 9.8–20.1)
BURR CELLS (OLG): ABNORMAL
CALCIUM SERPL-MCNC: 10 MG/DL (ref 8.4–10.2)
CHLORIDE SERPL-SCNC: 102 MMOL/L (ref 98–107)
CO2 SERPL-SCNC: 25 MMOL/L (ref 22–29)
CREAT SERPL-MCNC: 1.1 MG/DL (ref 0.55–1.02)
CRP SERPL-MCNC: 5.5 MG/L
EOSINOPHIL NFR BLD MANUAL: 0.28 X10(3)/MCL (ref 0–0.9)
EOSINOPHIL NFR BLD MANUAL: 11 %
ERYTHROCYTE [DISTWIDTH] IN BLOOD BY AUTOMATED COUNT: 12.6 % (ref 11.5–17)
ERYTHROCYTE [SEDIMENTATION RATE] IN BLOOD: 37 MM/HR (ref 0–20)
FUNGUS SPEC CULT: NORMAL
GFR SERPLBLD CREATININE-BSD FMLA CKD-EPI: >60 MLS/MIN/1.73/M2
GLOBULIN SER-MCNC: 3.8 GM/DL (ref 2.4–3.5)
GLUCOSE SERPL-MCNC: 129 MG/DL (ref 74–100)
HCT VFR BLD AUTO: 35.4 % (ref 37–47)
HGB BLD-MCNC: 12.2 G/DL (ref 12–16)
INSTRUMENT WBC (OLG): 2.59 X10(3)/MCL
LYMPHOCYTES NFR BLD MANUAL: 1.68 X10(3)/MCL
LYMPHOCYTES NFR BLD MANUAL: 65 %
MCH RBC QN AUTO: 27.6 PG (ref 27–31)
MCHC RBC AUTO-ENTMCNC: 34.5 G/DL (ref 33–36)
MCV RBC AUTO: 80.1 FL (ref 80–94)
MONOCYTES NFR BLD MANUAL: 0.41 X10(3)/MCL (ref 0.1–1.3)
MONOCYTES NFR BLD MANUAL: 16 %
NEUTROPHILS NFR BLD MANUAL: 5 %
NRBC BLD AUTO-RTO: 0 %
PLATELET # BLD AUTO: 212 X10(3)/MCL (ref 130–400)
PMV BLD AUTO: 9.8 FL (ref 7.4–10.4)
POIKILOCYTOSIS BLD QL SMEAR: ABNORMAL
POTASSIUM SERPL-SCNC: 2.7 MMOL/L (ref 3.5–5.1)
PROT SERPL-MCNC: 8 GM/DL (ref 6.4–8.3)
RBC # BLD AUTO: 4.42 X10(6)/MCL (ref 4.2–5.4)
SODIUM SERPL-SCNC: 139 MMOL/L (ref 136–145)
VANCOMYCIN TROUGH SERPL-MCNC: 21.7 UG/ML (ref 15–20)
WBC # SPEC AUTO: 2.59 X10(3)/MCL (ref 4.5–11.5)

## 2023-11-13 PROCEDURE — 85027 COMPLETE CBC AUTOMATED: CPT | Performed by: GENERAL PRACTICE

## 2023-11-13 PROCEDURE — 80202 ASSAY OF VANCOMYCIN: CPT | Performed by: GENERAL PRACTICE

## 2023-11-13 PROCEDURE — 86140 C-REACTIVE PROTEIN: CPT | Performed by: GENERAL PRACTICE

## 2023-11-13 PROCEDURE — 80053 COMPREHEN METABOLIC PANEL: CPT | Performed by: GENERAL PRACTICE

## 2023-11-13 PROCEDURE — 85652 RBC SED RATE AUTOMATED: CPT | Performed by: GENERAL PRACTICE

## 2023-11-14 RX ORDER — OXYCODONE AND ACETAMINOPHEN 10; 325 MG/1; MG/1
1 TABLET ORAL EVERY 8 HOURS PRN
Qty: 21 TABLET | Refills: 0 | Status: SHIPPED | OUTPATIENT
Start: 2023-11-14 | End: 2023-12-04 | Stop reason: SDUPTHER

## 2023-11-17 ENCOUNTER — EXTERNAL HOME HEALTH (OUTPATIENT)
Dept: HOME HEALTH SERVICES | Facility: HOSPITAL | Age: 53
End: 2023-11-17
Payer: MEDICAID

## 2023-11-20 ENCOUNTER — LAB REQUISITION (OUTPATIENT)
Dept: LAB | Facility: HOSPITAL | Age: 53
End: 2023-11-20
Payer: MEDICAID

## 2023-11-20 DIAGNOSIS — E11.69 TYPE 2 DIABETES MELLITUS WITH OTHER SPECIFIED COMPLICATION: ICD-10-CM

## 2023-11-20 DIAGNOSIS — I10 ESSENTIAL (PRIMARY) HYPERTENSION: ICD-10-CM

## 2023-11-20 DIAGNOSIS — T84.69XA INFECTION AND INFLAMMATORY REACTION DUE TO INTERNAL FIXATION DEVICE OF OTHER SITE, INITIAL ENCOUNTER: ICD-10-CM

## 2023-11-20 LAB
ABS NEUT CALC (OHS): 0.75 X10(3)/MCL (ref 2.1–9.2)
ALBUMIN SERPL-MCNC: 3.6 G/DL (ref 3.5–5)
ALBUMIN/GLOB SERPL: 1 RATIO (ref 1.1–2)
ALP SERPL-CCNC: 103 UNIT/L (ref 40–150)
ALT SERPL-CCNC: 12 UNIT/L (ref 0–55)
ANISOCYTOSIS BLD QL SMEAR: SLIGHT
AST SERPL-CCNC: 17 UNIT/L (ref 5–34)
BILIRUB SERPL-MCNC: 0.3 MG/DL
BUN SERPL-MCNC: 13.7 MG/DL (ref 9.8–20.1)
BURR CELLS (OLG): SLIGHT
CALCIUM SERPL-MCNC: 9.3 MG/DL (ref 8.4–10.2)
CHLORIDE SERPL-SCNC: 100 MMOL/L (ref 98–107)
CO2 SERPL-SCNC: 27 MMOL/L (ref 22–29)
CREAT SERPL-MCNC: 1.13 MG/DL (ref 0.55–1.02)
CRP SERPL-MCNC: 2.8 MG/L
EOSINOPHIL NFR BLD MANUAL: 0.03 X10(3)/MCL (ref 0–0.9)
EOSINOPHIL NFR BLD MANUAL: 1 % (ref 0–8)
ERYTHROCYTE [DISTWIDTH] IN BLOOD BY AUTOMATED COUNT: 12.8 % (ref 11.5–17)
ERYTHROCYTE [SEDIMENTATION RATE] IN BLOOD: 28 MM/HR (ref 0–20)
GFR SERPLBLD CREATININE-BSD FMLA CKD-EPI: 59 MLS/MIN/1.73/M2
GLOBULIN SER-MCNC: 3.6 GM/DL (ref 2.4–3.5)
GLUCOSE SERPL-MCNC: 208 MG/DL (ref 74–100)
HCT VFR BLD AUTO: 34.1 % (ref 37–47)
HGB BLD-MCNC: 11.7 G/DL (ref 12–16)
LYMPH ABN # BLD MANUAL: 5 %
LYMPHOCYTES NFR BLD MANUAL: 1.39 X10(3)/MCL
LYMPHOCYTES NFR BLD MANUAL: 52 % (ref 13–40)
MCH RBC QN AUTO: 28.1 PG (ref 27–31)
MCHC RBC AUTO-ENTMCNC: 34.3 G/DL (ref 33–36)
MCV RBC AUTO: 81.8 FL (ref 80–94)
MONOCYTES NFR BLD MANUAL: 0.38 X10(3)/MCL (ref 0.1–1.3)
MONOCYTES NFR BLD MANUAL: 14 % (ref 2–11)
NEUTROPHILS NFR BLD MANUAL: 28 % (ref 47–80)
PLATELET # BLD AUTO: 267 X10(3)/MCL (ref 130–400)
PLATELET # BLD EST: ADEQUATE 10*3/UL
PMV BLD AUTO: 9.6 FL (ref 7.4–10.4)
POIKILOCYTOSIS BLD QL SMEAR: SLIGHT
POTASSIUM SERPL-SCNC: 2.9 MMOL/L (ref 3.5–5.1)
PROT SERPL-MCNC: 7.2 GM/DL (ref 6.4–8.3)
RBC # BLD AUTO: 4.17 X10(6)/MCL (ref 4.2–5.4)
ROULEAUX BLD QL SMEAR: SLIGHT
SODIUM SERPL-SCNC: 140 MMOL/L (ref 136–145)
VANCOMYCIN SERPL-MCNC: 15.5 UG/ML (ref 15–20)
WBC # SPEC AUTO: 2.68 X10(3)/MCL (ref 4.5–11.5)

## 2023-11-20 PROCEDURE — 85027 COMPLETE CBC AUTOMATED: CPT | Performed by: GENERAL PRACTICE

## 2023-11-20 PROCEDURE — 85652 RBC SED RATE AUTOMATED: CPT | Performed by: GENERAL PRACTICE

## 2023-11-20 PROCEDURE — 80202 ASSAY OF VANCOMYCIN: CPT | Performed by: GENERAL PRACTICE

## 2023-11-20 PROCEDURE — 80053 COMPREHEN METABOLIC PANEL: CPT | Performed by: GENERAL PRACTICE

## 2023-11-20 PROCEDURE — 86140 C-REACTIVE PROTEIN: CPT | Performed by: GENERAL PRACTICE

## 2023-11-22 ENCOUNTER — DOCUMENT SCAN (OUTPATIENT)
Dept: HOME HEALTH SERVICES | Facility: HOSPITAL | Age: 53
End: 2023-11-22
Payer: MEDICAID

## 2023-11-23 LAB — MYCOBACTERIUM SPEC QL CULT: NORMAL

## 2023-12-04 DIAGNOSIS — S82.201M TYPE I OR II OPEN FRACTURE OF RIGHT TIBIA AND FIBULA WITH NONUNION, SUBSEQUENT ENCOUNTER: Primary | ICD-10-CM

## 2023-12-04 DIAGNOSIS — S82.401M TYPE I OR II OPEN FRACTURE OF RIGHT TIBIA AND FIBULA WITH NONUNION, SUBSEQUENT ENCOUNTER: Primary | ICD-10-CM

## 2023-12-04 RX ORDER — OXYCODONE AND ACETAMINOPHEN 10; 325 MG/1; MG/1
1 TABLET ORAL
Qty: 7 TABLET | Refills: 0 | Status: SHIPPED | OUTPATIENT
Start: 2023-12-04 | End: 2023-12-11

## 2023-12-09 ENCOUNTER — DOCUMENT SCAN (OUTPATIENT)
Dept: HOME HEALTH SERVICES | Facility: HOSPITAL | Age: 53
End: 2023-12-09
Payer: MEDICAID

## 2023-12-19 ENCOUNTER — OFFICE VISIT (OUTPATIENT)
Dept: ORTHOPEDICS | Facility: CLINIC | Age: 53
End: 2023-12-19
Payer: MEDICAID

## 2023-12-19 VITALS
WEIGHT: 136 LBS | HEIGHT: 62 IN | HEART RATE: 89 BPM | BODY MASS INDEX: 25.03 KG/M2 | SYSTOLIC BLOOD PRESSURE: 158 MMHG | DIASTOLIC BLOOD PRESSURE: 96 MMHG

## 2023-12-19 DIAGNOSIS — S91.002D OPEN WOUND OF LEFT ANKLE, SUBSEQUENT ENCOUNTER: Primary | ICD-10-CM

## 2023-12-19 PROCEDURE — 3008F PR BODY MASS INDEX (BMI) DOCUMENTED: ICD-10-PCS | Mod: CPTII,,, | Performed by: ORTHOPAEDIC SURGERY

## 2023-12-19 PROCEDURE — 3051F PR MOST RECENT HEMOGLOBIN A1C LEVEL 7.0 - < 8.0%: ICD-10-PCS | Mod: CPTII,,, | Performed by: ORTHOPAEDIC SURGERY

## 2023-12-19 PROCEDURE — 3080F PR MOST RECENT DIASTOLIC BLOOD PRESSURE >= 90 MM HG: ICD-10-PCS | Mod: CPTII,,, | Performed by: ORTHOPAEDIC SURGERY

## 2023-12-19 PROCEDURE — 3080F DIAST BP >= 90 MM HG: CPT | Mod: CPTII,,, | Performed by: ORTHOPAEDIC SURGERY

## 2023-12-19 PROCEDURE — 1159F MED LIST DOCD IN RCRD: CPT | Mod: CPTII,,, | Performed by: ORTHOPAEDIC SURGERY

## 2023-12-19 PROCEDURE — 3008F BODY MASS INDEX DOCD: CPT | Mod: CPTII,,, | Performed by: ORTHOPAEDIC SURGERY

## 2023-12-19 PROCEDURE — 1159F PR MEDICATION LIST DOCUMENTED IN MEDICAL RECORD: ICD-10-PCS | Mod: CPTII,,, | Performed by: ORTHOPAEDIC SURGERY

## 2023-12-19 PROCEDURE — 3051F HG A1C>EQUAL 7.0%<8.0%: CPT | Mod: CPTII,,, | Performed by: ORTHOPAEDIC SURGERY

## 2023-12-19 PROCEDURE — 99024 POSTOP FOLLOW-UP VISIT: CPT | Mod: ,,, | Performed by: ORTHOPAEDIC SURGERY

## 2023-12-19 PROCEDURE — 3077F PR MOST RECENT SYSTOLIC BLOOD PRESSURE >= 140 MM HG: ICD-10-PCS | Mod: CPTII,,, | Performed by: ORTHOPAEDIC SURGERY

## 2023-12-19 PROCEDURE — 3077F SYST BP >= 140 MM HG: CPT | Mod: CPTII,,, | Performed by: ORTHOPAEDIC SURGERY

## 2023-12-19 PROCEDURE — 99024 PR POST-OP FOLLOW-UP VISIT: ICD-10-PCS | Mod: ,,, | Performed by: ORTHOPAEDIC SURGERY

## 2023-12-19 NOTE — PROGRESS NOTES
Subjective:       Patient ID: Deborah Cross is a 53 y.o. female.  Chief Complaint   Patient presents with    Right Ankle - Follow-up     13 week f/u from I&D right ankle. Ambulates without assistance. Denies pain or discomfort.           Follow-up        Patient is 13 weeks I and Left ankle, 7-8mo out from incision and drainage of a draining sinus tract to her left ankle.  She is been doing well.  She is continued to decrease her smoking.  She is walking in normal tennis shoes today.  States it is all healed up patient has no significant consistent pain.  She walks with a normal gait.  When she does have pain that is dull achy pain worse with significant range of motion better rest.  No numbness or tingling  Eyes: No change in vision  ENT: No ringing or current infections  CV: No chest pain  Resp: No labored breathing  MSK: Pain evident at site of injury located in HPI,   Integ: No signs of abrasions or lacerations  Neuro: No numbness or tingling  Lymphatic: No swelling outside the area of injury     Current Outpatient Medications on File Prior to Visit   Medication Sig Dispense Refill    ALPRAZolam (XANAX) 1 MG tablet TAKE ONE TABLET BY MOUTH nightly as needed for anxiety 30 tablet 0    amLODIPine (NORVASC) 5 MG tablet Take one tablet by mouth once daily 90 tablet 1    FLUoxetine 40 MG capsule TAKE ONE CAPSULE BY MOUTH EVERY MORNING 90 capsule 0    metFORMIN (GLUCOPHAGE-XR) 750 MG ER 24hr tablet Take 2 tablets (1,500 mg total) by mouth daily with breakfast. 60 tablet 3    rosuvastatin (CRESTOR) 20 MG tablet Take one tablet by mouth once daily 90 tablet 0    traZODone (DESYREL) 100 MG tablet Take one tablet by mouth every evening 90 tablet 0    aspirin (ECOTRIN) 81 MG EC tablet Take 1 tablet (81 mg total) by mouth 2 (two) times a day. 60 tablet 0    ceFEPIme (MAXIPIME) 2 gram injection       mupirocin (BACTROBAN) 2 % ointment Apply topically 2 (two) times a day. (Patient not taking: Reported on  "12/19/2023) 30 g 0    naloxone (NARCAN) 4 mg/actuation Spry use 1 spray into one nostril. If no response after 2 to 3 minutes, administer additional spray into other nostril. call 911      sumatriptan (IMITREX) 25 MG Tab Take one tablet at onset of headache, may repeat in 2 hours max of two tablets per 24 hours (Patient not taking: Reported on 12/19/2023) 9 tablet 1    vancomycin (VANCOCIN) 10 gram SolR Inject into the vein.       Current Facility-Administered Medications on File Prior to Visit   Medication Dose Route Frequency Provider Last Rate Last Admin    sodium chloride 0.9% flush 10 mL  10 mL Intravenous PRN Dillon Scott O, DO              Objective:      BP (!) 158/96   Pulse 89   Ht 5' 2" (1.575 m)   Wt 61.7 kg (136 lb)   BMI 24.87 kg/m²   Physical Exam  General the patient is alert and oriented x3 no acute distress nontoxic-appearing appropriate affect.    Constitutional: Vital signs are examined and stable.  Resp: No signs of labored breathing    Musculoskeletal:     Left lower extremity:  No sign of draining sinus tract no erythema no drainage  No signs erythema no signs of surgical wound dehiscence no sign of fluid collection or swelling.  No sign of other complication.   compartments are soft and compressible; No pain with ROM at the hip, knee, or ankle; minimal tenderness to palpation; dorsi/plantar flexes the foot; SILT distally; BCR distally; DP pulse 2+      Body mass index is 24.87 kg/m².  Ideal body weight: 50.1 kg (110 lb 7.2 oz)  Adjusted ideal body weight: 54.7 kg (120 lb 10.7 oz)  Hemoglobin A1c   Date Value Ref Range Status   09/26/2023 7.2 (H) <=7.0 % Final   04/06/2023 6.0 <=7.0 % Final     Hgb   Date Value Ref Range Status   11/20/2023 11.7 (L) 12.0 - 16.0 g/dL Final   11/13/2023 12.2 12.0 - 16.0 g/dL Final     Hct   Date Value Ref Range Status   11/20/2023 34.1 (L) 37.0 - 47.0 % Final   11/13/2023 35.4 (L) 37.0 - 47.0 % Final     Iron Level   Date Value Ref Range Status   04/06/2023 " "90 50 - 170 ug/dL Final     No components found for: "FROLATE"  Vit D 25 OH   Date Value Ref Range Status   04/06/2023 63.1 30.0 - 80.0 ng/mL Final   12/15/2021 27.7 (L) 30.0 - 80.0 ng/mL Final     WBC   Date Value Ref Range Status   11/20/2023 2.68 (L) 4.50 - 11.50 x10(3)/mcL Final   11/13/2023 2.59 (L) 4.50 - 11.50 x10(3)/mcL Final   11/13/2023 2.59 x10(3)/mcL Final       Radiology: no x rays taken today.         Assessment:         1. Open wound of left ankle, subsequent encounter                        Plan:         No follow-ups on file.    There are no diagnoses linked to this encounter.    Patient doing well.  She states she is completely healed very happy with the care at this time.  She is requesting her records today.  Which we will comply.  Patient will be weight-bearing as tolerated back to full duty.  We will follow up as needed.  Overall she is very happy with her care. take over the pain counter pain medication for breakthrough.  This note/OR report was created with the assistance of  voice recognition software or phone  dictation.  There may be transcription errors as a result of using this technology however minimal. Effort has been made to assure accuracy of transcription but any obvious errors or omissions should be clarified with the author of the document.       Dillon Scott, DO  Orthopedic Trauma Surgery         Future Appointments   Date Time Provider Department Center   1/15/2024  1:15 PM Adamaris Cao MD [facility] [place]               "

## 2024-01-08 PROBLEM — E11.65 TYPE 2 DIABETES MELLITUS WITH HYPERGLYCEMIA, WITHOUT LONG-TERM CURRENT USE OF INSULIN: Status: ACTIVE | Noted: 2024-01-08

## 2024-02-15 ENCOUNTER — HOSPITAL ENCOUNTER (EMERGENCY)
Facility: HOSPITAL | Age: 54
Discharge: HOME | End: 2024-02-15
Attending: SPECIALIST
Payer: MEDICAID

## 2024-02-15 VITALS
TEMPERATURE: 98 F | OXYGEN SATURATION: 100 % | DIASTOLIC BLOOD PRESSURE: 80 MMHG | WEIGHT: 135 LBS | HEIGHT: 62 IN | BODY MASS INDEX: 24.84 KG/M2 | RESPIRATION RATE: 20 BRPM | SYSTOLIC BLOOD PRESSURE: 128 MMHG | HEART RATE: 75 BPM

## 2024-02-15 DIAGNOSIS — G56.01 CARPAL TUNNEL SYNDROME, RIGHT: Primary | ICD-10-CM

## 2024-02-15 DIAGNOSIS — G56.11 RIGHT MEDIAN NERVE NEUROPATHY: ICD-10-CM

## 2024-02-15 PROCEDURE — 99284 EMERGENCY DEPT VISIT MOD MDM: CPT | Mod: 25

## 2024-02-15 PROCEDURE — 96372 THER/PROPH/DIAG INJ SC/IM: CPT | Performed by: SPECIALIST

## 2024-02-15 PROCEDURE — 63600175 PHARM REV CODE 636 W HCPCS: Performed by: SPECIALIST

## 2024-02-15 RX ORDER — PREDNISONE 20 MG/1
20 TABLET ORAL 2 TIMES DAILY
Qty: 10 TABLET | Refills: 0 | Status: SHIPPED | OUTPATIENT
Start: 2024-02-15 | End: 2024-02-20

## 2024-02-15 RX ORDER — KETOROLAC TROMETHAMINE 30 MG/ML
60 INJECTION, SOLUTION INTRAMUSCULAR; INTRAVENOUS
Status: COMPLETED | OUTPATIENT
Start: 2024-02-15 | End: 2024-02-15

## 2024-02-15 RX ORDER — PREDNISONE 20 MG/1
40 TABLET ORAL
Status: COMPLETED | OUTPATIENT
Start: 2024-02-15 | End: 2024-02-15

## 2024-02-15 RX ORDER — DICLOFENAC SODIUM 50 MG/1
50 TABLET, DELAYED RELEASE ORAL 3 TIMES DAILY PRN
Qty: 30 TABLET | Refills: 0 | Status: SHIPPED | OUTPATIENT
Start: 2024-02-15

## 2024-02-15 RX ADMIN — PREDNISONE 40 MG: 20 TABLET ORAL at 02:02

## 2024-02-15 RX ADMIN — KETOROLAC TROMETHAMINE 60 MG: 30 INJECTION, SOLUTION INTRAMUSCULAR at 02:02

## 2024-02-15 NOTE — ED PROVIDER NOTES
Encounter Date: 2/15/2024       History     Chief Complaint   Patient presents with    Hand Pain     Pt into ED with complaints of right hand pain. Pt has carpal tunnel with chronic pain but woke up with severe hand pain greater than the usual.     Patient with a long history of bilateral carpal tunnel syndrome has been doing fairly well but woke up with worsening pain in her right hand; slight weakness right hand but overall fairly good function with both hands    The history is provided by the patient.     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, with nonunion, subsequent encounter      Past Surgical History:   Procedure Laterality Date     SECTION  2005    HYSTERECTOMY  2015    INCISION AND DRAINAGE, LOWER EXTREMITY Left 2023    Procedure: INCISION AND DRAINAGE, LOWER EXTREMITY - supine bone foam wash stuff cultures;  Surgeon: Dillon Scott DO;  Location: [facility];  Service: Orthopedics;  Laterality: Left;  supine bone foam wash stuff cultures    INCISION AND DRAINAGE, LOWER EXTREMITY Left 10/11/2023    Procedure: INCISION AND DRAINAGE, LOWER EXTREMITY;  Surgeon: Dillon Scott DO;  Location: [facility];  Service: Orthopedics;  Laterality: Left;  supine vascular bed c arm wash stuff cultures  FIRST CASE    INSERTION OF INTRAMEDULLARY NAIL INTO TIBIA Right 10/24/2022    Procedure: INSERTION, INTRAMEDULLARY JACK, TIBIA;  Surgeon: Dillon Scott DO;  Location: [facility];  Service: Orthopedics;  Laterality: Right;  supine, vascular, bone foam, c-arm, wash stuff    ORIF TIBIA FRACTURE Right 2023    Procedure: ORIF, FRACTURE, TIBIA;  Surgeon: Dillon Scott DO;  Location: [facility];  Service: Orthopedics;  Laterality: Right;    REPAIR OF LIGAMENT OF ANKLE  10/24/2022    Procedure: REPAIR,  LIGAMENT, ANKLE;  Surgeon: Dillon Scott DO;  Location: [facility] OR;  Service: Orthopedics;;     Family History   Problem Relation Age of Onset    Diabetes Mother     Heart disease Mother     Diabetes Father      Social History     Tobacco Use    Smoking status: Some Days     Current packs/day: 0.50     Average packs/day: 0.5 packs/day for 15.0 years (7.5 ttl pk-yrs)     Types: Cigarettes    Smokeless tobacco: Former    Tobacco comments:     Pt currently smoking 3 cigarettes daily.   Substance Use Topics    Alcohol use: Not Currently    Drug use: Not Currently     Types: Benzodiazepines, Marijuana     Review of Systems   Constitutional: Negative.    HENT: Negative.     Respiratory: Negative.     Cardiovascular: Negative.    Gastrointestinal: Negative.    Musculoskeletal:  Positive for arthralgias.   Skin: Negative.    Neurological: Negative.    All other systems reviewed and are negative.      Physical Exam     Initial Vitals [02/15/24 0208]   BP Pulse Resp Temp SpO2   128/80 75 20 97.8 °F (36.6 °C) 100 %      MAP       --         Physical Exam    Nursing note and vitals reviewed.  Constitutional: She appears well-developed and well-nourished.   HENT:   Head: Normocephalic and atraumatic.   Eyes: EOM are normal. Pupils are equal, round, and reactive to light.   Neck: Neck supple.   Normal range of motion.  Cardiovascular:  Normal rate, regular rhythm, normal heart sounds and intact distal pulses.           Pulmonary/Chest: Breath sounds normal.   Musculoskeletal:         General: Normal range of motion.      Cervical back: Normal range of motion and neck supple.      Comments: Right wrist with slight Tinel's positive,  4.5/5, strong 2+ radial pulse     Neurological: She is alert and oriented to person, place, and time. She has normal strength.   Skin: Skin is warm and dry.         ED Course   Procedures  Labs Reviewed - No data to display       Imaging Results    None          Medications   ketorolac injection 60 mg  (has no administration in time range)   predniSONE tablet 40 mg (has no administration in time range)     Medical Decision Making  Patient with a long history of bilateral carpal tunnel syndrome has been doing fairly well but woke up with worsening pain in her right hand; slight weakness right hand but overall fairly good function with both hands    DIFFERENTIAL DIAGNOSIS- carpal tunnel syndrome, mild median nerve palsy     Risk  Prescription drug management.  Risk Details: Patient exhibits very mild palsy of the right median nerve secondary to carpal tunnel syndrome and she understands she will need to follow up with Orthopedics especially if the weakness progresses and that she could have permanent damage to the nerve in both the right and the left hand; she is right-hand dominant and states she has fairly good function but at times it does get weak and painful and she believes she may have slept on it funny; we will give Toradol 60 mg IM and prednisone 40 mg p.o. 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 Provider    predniSONE (DELTASONE) 20 MG tablet Take 1 tablet (20 mg total) by mouth 2 (two) times daily. for 5 days 10 tablet 2/15/2024 2/20/2024 Russ Rinaldi MD    diclofenac (VOLTAREN) 50 MG EC tablet Take 1 tablet (50 mg total) by mouth 3 (three) times daily as needed (Pain). 30 tablet 2/15/2024 -- Russ Rinaldi MD          Follow-up Information       Follow up With Specialties Details Why Contact Info    Orthopedic                 Russ Rinaldi,  MD  02/15/24 0215

## 2024-03-17 ENCOUNTER — HOSPITAL ENCOUNTER (EMERGENCY)
Facility: HOSPITAL | Age: 54
Discharge: HOME | End: 2024-03-17
Attending: SPECIALIST
Payer: MEDICAID

## 2024-03-17 VITALS
SYSTOLIC BLOOD PRESSURE: 122 MMHG | WEIGHT: 130 LBS | TEMPERATURE: 98 F | DIASTOLIC BLOOD PRESSURE: 81 MMHG | HEART RATE: 98 BPM | RESPIRATION RATE: 18 BRPM | HEIGHT: 62 IN | BODY MASS INDEX: 23.92 KG/M2 | OXYGEN SATURATION: 98 %

## 2024-03-17 DIAGNOSIS — B34.9 VIRAL ILLNESS: ICD-10-CM

## 2024-03-17 DIAGNOSIS — R53.83 FATIGUE, UNSPECIFIED TYPE: Primary | ICD-10-CM

## 2024-03-17 LAB
ALBUMIN SERPL-MCNC: 3.7 G/DL (ref 3.5–5)
ALBUMIN/GLOB SERPL: 1 RATIO (ref 1.1–2)
ALP SERPL-CCNC: 103 UNIT/L (ref 40–150)
ALT SERPL-CCNC: 18 UNIT/L (ref 0–55)
APPEARANCE UR: ABNORMAL
AST SERPL-CCNC: 17 UNIT/L (ref 5–34)
BACTERIA #/AREA URNS AUTO: ABNORMAL /HPF
BASOPHILS # BLD AUTO: 0.06 X10(3)/MCL
BASOPHILS NFR BLD AUTO: 0.7 %
BILIRUB SERPL-MCNC: 0.2 MG/DL
BILIRUB UR QL STRIP.AUTO: NEGATIVE
BUN SERPL-MCNC: 21 MG/DL (ref 9.8–20.1)
CALCIUM SERPL-MCNC: 9.8 MG/DL (ref 8.4–10.2)
CHLORIDE SERPL-SCNC: 106 MMOL/L (ref 98–107)
CO2 SERPL-SCNC: 24 MMOL/L (ref 22–29)
COLOR UR AUTO: YELLOW
CREAT SERPL-MCNC: 1.08 MG/DL (ref 0.55–1.02)
EOSINOPHIL # BLD AUTO: 0.26 X10(3)/MCL (ref 0–0.9)
EOSINOPHIL NFR BLD AUTO: 3.1 %
ERYTHROCYTE [DISTWIDTH] IN BLOOD BY AUTOMATED COUNT: 13.4 % (ref 11.5–17)
FLUAV AG UPPER RESP QL IA.RAPID: NOT DETECTED
FLUBV AG UPPER RESP QL IA.RAPID: NOT DETECTED
GFR SERPLBLD CREATININE-BSD FMLA CKD-EPI: >60 MLS/MIN/1.73/M2
GLOBULIN SER-MCNC: 3.6 GM/DL (ref 2.4–3.5)
GLUCOSE SERPL-MCNC: 111 MG/DL (ref 74–100)
GLUCOSE UR QL STRIP.AUTO: 100
HCT VFR BLD AUTO: 40.8 % (ref 37–47)
HGB BLD-MCNC: 13.4 G/DL (ref 12–16)
HYALINE CASTS URNS QL MICRO: ABNORMAL /LPF
IMM GRANULOCYTES # BLD AUTO: 0.03 X10(3)/MCL (ref 0–0.04)
IMM GRANULOCYTES NFR BLD AUTO: 0.4 %
KETONES UR QL STRIP.AUTO: NEGATIVE
LEUKOCYTE ESTERASE UR QL STRIP.AUTO: ABNORMAL
LYMPHOCYTES # BLD AUTO: 3.08 X10(3)/MCL (ref 0.6–4.6)
LYMPHOCYTES NFR BLD AUTO: 36.2 %
MCH RBC QN AUTO: 28 PG (ref 27–31)
MCHC RBC AUTO-ENTMCNC: 32.8 G/DL (ref 33–36)
MCV RBC AUTO: 85.4 FL (ref 80–94)
MONOCYTES # BLD AUTO: 0.78 X10(3)/MCL (ref 0.1–1.3)
MONOCYTES NFR BLD AUTO: 9.2 %
NEUTROPHILS # BLD AUTO: 4.3 X10(3)/MCL (ref 2.1–9.2)
NEUTROPHILS NFR BLD AUTO: 50.4 %
NITRITE UR QL STRIP.AUTO: NEGATIVE
PH UR STRIP.AUTO: 5.5 [PH]
PLATELET # BLD AUTO: 306 X10(3)/MCL (ref 130–400)
PMV BLD AUTO: 9.3 FL (ref 7.4–10.4)
POTASSIUM SERPL-SCNC: 4 MMOL/L (ref 3.5–5.1)
PROT SERPL-MCNC: 7.3 GM/DL (ref 6.4–8.3)
PROT UR QL STRIP.AUTO: 100
RBC # BLD AUTO: 4.78 X10(6)/MCL (ref 4.2–5.4)
RBC #/AREA URNS AUTO: ABNORMAL /HPF
RBC UR QL AUTO: NEGATIVE
SARS-COV-2 RNA RESP QL NAA+PROBE: NOT DETECTED
SODIUM SERPL-SCNC: 141 MMOL/L (ref 136–145)
SP GR UR STRIP.AUTO: 1.02 (ref 1–1.03)
SQUAMOUS #/AREA URNS AUTO: ABNORMAL /HPF
UROBILINOGEN UR STRIP-ACNC: 0.2
WBC # SPEC AUTO: 8.51 X10(3)/MCL (ref 4.5–11.5)
WBC #/AREA URNS AUTO: ABNORMAL /HPF
WBC CLUMPS UR QL AUTO: ABNORMAL

## 2024-03-17 PROCEDURE — 80053 COMPREHEN METABOLIC PANEL: CPT | Performed by: SPECIALIST

## 2024-03-17 PROCEDURE — 87086 URINE CULTURE/COLONY COUNT: CPT | Performed by: SPECIALIST

## 2024-03-17 PROCEDURE — 85025 COMPLETE CBC W/AUTO DIFF WBC: CPT | Performed by: SPECIALIST

## 2024-03-17 PROCEDURE — 0240U COVID/FLU A&B PCR: CPT | Performed by: SPECIALIST

## 2024-03-17 PROCEDURE — 81003 URINALYSIS AUTO W/O SCOPE: CPT | Performed by: SPECIALIST

## 2024-03-17 PROCEDURE — 99283 EMERGENCY DEPT VISIT LOW MDM: CPT

## 2024-03-18 NOTE — ED PROVIDER NOTES
Encounter Date: 3/17/2024       History     Chief Complaint   Patient presents with    Fatigue     Stayed in bed all day yesterday, not feeling like herself. Denies nausea or diarrhea.     Patient reports she just felt tired over the last 2 days, no nausea or vomiting, no fever, no chills, no cough, no diarrhea, no abdominal pain    The history is provided by the patient.     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, with nonunion, subsequent encounter      Past Surgical History:   Procedure Laterality Date     SECTION  2005    HYSTERECTOMY      INCISION AND DRAINAGE, LOWER EXTREMITY Left 2023    Procedure: INCISION AND DRAINAGE, LOWER EXTREMITY - supine bone foam wash stuff cultures;  Surgeon: Dillon Scott DO;  Location: [facility] OR;  Service: Orthopedics;  Laterality: Left;  supine bone foam wash stuff cultures    INCISION AND DRAINAGE, LOWER EXTREMITY Left 10/11/2023    Procedure: INCISION AND DRAINAGE, LOWER EXTREMITY;  Surgeon: Dillon Scott DO;  Location: [facility] OR;  Service: Orthopedics;  Laterality: Left;  supine vascular bed c arm wash stuff cultures  FIRST CASE    INSERTION OF INTRAMEDULLARY NAIL INTO TIBIA Right 10/24/2022    Procedure: INSERTION, INTRAMEDULLARY JACK, TIBIA;  Surgeon: Dillon Scott DO;  Location: [facility] OR;  Service: Orthopedics;  Laterality: Right;  supine, vascular, bone foam, c-arm, wash stuff    ORIF TIBIA FRACTURE Right 2023    Procedure: ORIF, FRACTURE, TIBIA;  Surgeon: Dillon Scott DO;  Location: [facility] OR;  Service: Orthopedics;  Laterality: Right;    REPAIR OF LIGAMENT OF ANKLE  10/24/2022    Procedure: REPAIR, LIGAMENT, ANKLE;  Surgeon: Dillon Scott DO;  Location: [facility] OR;  Service: Orthopedics;;     Family History   Problem Relation Age of  Onset    Diabetes Mother     Heart disease Mother     Diabetes Father      Social History     Tobacco Use    Smoking status: Some Days     Current packs/day: 0.50     Average packs/day: 0.5 packs/day for 15.0 years (7.5 ttl pk-yrs)     Types: Cigarettes    Smokeless tobacco: Former    Tobacco comments:     Pt currently smoking 3 cigarettes daily.   Substance Use Topics    Alcohol use: Not Currently    Drug use: Not Currently     Types: Benzodiazepines, Marijuana     Review of Systems   Constitutional: Negative.    HENT: Negative.     Respiratory: Negative.     Cardiovascular: Negative.    Gastrointestinal: Negative.    Musculoskeletal: Negative.    Skin: Negative.    Neurological:  Positive for headaches.   Psychiatric/Behavioral:  Positive for sleep disturbance.    All other systems reviewed and are negative.      Physical Exam     Initial Vitals [03/17/24 1922]   BP Pulse Resp Temp SpO2   122/81 98 18 98 °F (36.7 °C) 98 %      MAP       --         Physical Exam    Nursing note and vitals reviewed.  Constitutional: She appears well-developed and well-nourished.   HENT:   Head: Normocephalic and atraumatic.   Mouth/Throat: Oropharynx is clear and moist.   Eyes: EOM are normal. Pupils are equal, round, and reactive to light.   Neck: Neck supple.   Normal range of motion.  Cardiovascular:  Normal rate, regular rhythm, normal heart sounds and intact distal pulses.           Pulmonary/Chest: Breath sounds normal.   Abdominal: Abdomen is soft. Bowel sounds are normal.   Musculoskeletal:         General: Normal range of motion.      Cervical back: Normal range of motion and neck supple.     Neurological: She is alert and oriented to person, place, and time. She has normal strength. No cranial nerve deficit. GCS score is 15. GCS eye subscore is 4. GCS verbal subscore is 5. GCS motor subscore is 6.   Skin: Skin is warm and dry.         ED Course   Procedures  Labs Reviewed   COMPREHENSIVE METABOLIC PANEL - Abnormal; Notable  for the following components:       Result Value    Glucose Level 111 (*)     Blood Urea Nitrogen 21.0 (*)     Creatinine 1.08 (*)     Globulin 3.6 (*)     Albumin/Globulin Ratio 1.0 (*)     All other components within normal limits   URINALYSIS, REFLEX TO URINE CULTURE - Abnormal; Notable for the following components:    Appearance, UA Cloudy (*)     Protein,  (*)     Glucose,  (*)     Leukocyte Esterase, UA Small (*)     All other components within normal limits   CBC WITH DIFFERENTIAL - Abnormal; Notable for the following components:    MCHC 32.8 (*)     All other components within normal limits   URINALYSIS, MICROSCOPIC - Abnormal; Notable for the following components:    Bacteria, UA Many (*)     Hyaline Casts, UA Few (*)     WBC Clumps, UA Few (*)     WBC, UA 6-10 (*)     Squamous Epithelial Cells, UA Many (*)     All other components within normal limits   COVID/FLU A&B PCR - Normal    Narrative:     The Xpert Xpress SARS-CoV-2/FLU/RSV plus is a rapid, multiplexed real-time PCR test intended for the simultaneous qualitative detection and differentiation of SARS-CoV-2, Influenza A, Influenza B, and respiratory syncytial virus (RSV) viral RNA in either nasopharyngeal swab or nasal swab specimens.         CULTURE, URINE   CBC W/ AUTO DIFFERENTIAL    Narrative:     The following orders were created for panel order CBC auto differential.  Procedure                               Abnormality         Status                     ---------                               -----------         ------                     CBC with Differential[[id number]]       Abnormal            Final result                 Please view results for these tests on the individual orders.          Imaging Results    None          Medications - No data to display  Medical Decision Making  Patient reports she just felt tired over the last 2 days, no nausea or vomiting, no fever, no chills, no cough, no diarrhea, no abdominal  "pain    DIFFERENTIAL DIAGNOSIS- viral illness, electrolyte abnormality, anemia, sleep disturbance, COVID, flu    Amount and/or Complexity of Data Reviewed  Labs: ordered. Decision-making details documented in ED Course.    Risk  Risk Details: Workup negative; I discussed findings with patient and encouraged good nutritional habits and sleep, follow up with primary care physician within the next 3 days if not improving or return emergency room                     Patient Vitals for the past 24 hrs:   BP Temp Pulse Resp SpO2 Height Weight   03/17/24 1922 122/81 98 °F (36.7 °C) 98 18 98 % 5' 2" (1.575 m) 59 kg (130 lb)     The patient is resting comfortably and in no acute distress.  She states that her symptoms have improved after treatment in Emergency Department. I personally discussed her test results and treatment plan.  Gave strict ED precautions.  Specific conditions for return to the emergency department and importance of follow up with her primary care provided or the physician listed on the discharge instructions.  Patient voices understanding and agrees to the plan discussed. All patients' questions have been answered at this time.   She has remained hemodynamically stable throughout entire stay in ED and is stable for discharge home.                 Clinical Impression:  Final diagnoses:  [R53.83] Fatigue, unspecified type (Primary)  [B34.9] Viral illness          ED Disposition Condition    Discharge Stable          ED Prescriptions    None       Follow-up Information       Follow up With Specialties Details Why Contact Info    Adamaris Cao MD Family Medicine In 3 days As needed [address] [facility] [address]  [phone]               Russ Rinaldi MD  03/17/24 1838    "

## 2024-03-20 LAB — BACTERIA UR CULT: NORMAL

## 2024-06-17 ENCOUNTER — HOSPITAL ENCOUNTER (EMERGENCY)
Facility: HOSPITAL | Age: 54
Discharge: HOME | End: 2024-06-17
Attending: FAMILY MEDICINE
Payer: MEDICAID

## 2024-06-17 VITALS
TEMPERATURE: 98 F | DIASTOLIC BLOOD PRESSURE: 92 MMHG | SYSTOLIC BLOOD PRESSURE: 144 MMHG | OXYGEN SATURATION: 98 % | HEIGHT: 62 IN | RESPIRATION RATE: 18 BRPM | BODY MASS INDEX: 25.76 KG/M2 | WEIGHT: 140 LBS | HEART RATE: 75 BPM

## 2024-06-17 DIAGNOSIS — M65.4 DE QUERVAIN'S TENOSYNOVITIS, LEFT: Primary | ICD-10-CM

## 2024-06-17 DIAGNOSIS — R52 PAIN: ICD-10-CM

## 2024-06-17 LAB — URATE SERPL-MCNC: 4.6 MG/DL (ref 2.6–6)

## 2024-06-17 PROCEDURE — 84550 ASSAY OF BLOOD/URIC ACID: CPT | Performed by: FAMILY MEDICINE

## 2024-06-17 PROCEDURE — 99284 EMERGENCY DEPT VISIT MOD MDM: CPT | Mod: 25

## 2024-06-17 PROCEDURE — 63600175 PHARM REV CODE 636 W HCPCS: Performed by: FAMILY MEDICINE

## 2024-06-17 PROCEDURE — 96372 THER/PROPH/DIAG INJ SC/IM: CPT | Performed by: FAMILY MEDICINE

## 2024-06-17 PROCEDURE — 25000003 PHARM REV CODE 250: Performed by: FAMILY MEDICINE

## 2024-06-17 RX ORDER — INDOMETHACIN 25 MG/1
25 CAPSULE ORAL
Qty: 15 CAPSULE | Refills: 0 | Status: SHIPPED | OUTPATIENT
Start: 2024-06-17

## 2024-06-17 RX ORDER — DEXAMETHASONE SODIUM PHOSPHATE 4 MG/ML
8 INJECTION, SOLUTION INTRA-ARTICULAR; INTRALESIONAL; INTRAMUSCULAR; INTRAVENOUS; SOFT TISSUE
Status: COMPLETED | OUTPATIENT
Start: 2024-06-17 | End: 2024-06-17

## 2024-06-17 RX ORDER — KETOROLAC TROMETHAMINE 10 MG/1
10 TABLET, FILM COATED ORAL
Status: COMPLETED | OUTPATIENT
Start: 2024-06-17 | End: 2024-06-17

## 2024-06-17 RX ORDER — PREDNISONE 20 MG/1
20 TABLET ORAL DAILY
Qty: 5 TABLET | Refills: 0 | Status: SHIPPED | OUTPATIENT
Start: 2024-06-17 | End: 2024-06-22

## 2024-06-17 RX ADMIN — DEXAMETHASONE SODIUM PHOSPHATE 8 MG: 4 INJECTION, SOLUTION INTRA-ARTICULAR; INTRALESIONAL; INTRAMUSCULAR; INTRAVENOUS; SOFT TISSUE at 08:06

## 2024-06-17 RX ADMIN — KETOROLAC TROMETHAMINE 10 MG: 10 TABLET, FILM COATED ORAL at 08:06

## 2024-06-17 NOTE — ED PROVIDER NOTES
Encounter Date: 2024       History     Chief Complaint   Patient presents with    Wrist Pain     L wrist pain x 3 weeks --denies injury      54-year-old presents with left wrist pain for 3 weeks no trauma no injury no history of gout on exam she has a positive Finkelstein test with some tenderness full range of motion vital signs stable x-rays unremarkable lab work unremarkable impression de Quervain tenosynovitis discussed with the patient the treatment plan options we will go ahead and put her in a thumb spica give him some anti-inflammatories have her follow up with the doctor in a week patient understands agrees with plan        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, with nonunion, subsequent encounter      Past Surgical History:   Procedure Laterality Date     SECTION  2005    HYSTERECTOMY  2015    INCISION AND DRAINAGE, LOWER EXTREMITY Left 2023    Procedure: INCISION AND DRAINAGE, LOWER EXTREMITY - supine bone foam wash stuff cultures;  Surgeon: Dillon Scott DO;  Location: [facility];  Service: Orthopedics;  Laterality: Left;  supine bone foam wash stuff cultures    INCISION AND DRAINAGE, LOWER EXTREMITY Left 10/11/2023    Procedure: INCISION AND DRAINAGE, LOWER EXTREMITY;  Surgeon: Dillon Scott DO;  Location: [facility];  Service: Orthopedics;  Laterality: Left;  supine vascular bed c arm wash stuff cultures  FIRST CASE    INSERTION OF INTRAMEDULLARY NAIL INTO TIBIA Right 10/24/2022    Procedure: INSERTION, INTRAMEDULLARY JACK, TIBIA;  Surgeon: Dillon Scott DO;  Location: [facility];  Service: Orthopedics;  Laterality: Right;  supine, vascular, bone foam, c-arm, wash stuff    ORIF TIBIA FRACTURE Right 2023    Procedure: ORIF, FRACTURE, TIBIA;  Surgeon: Dillon Scott  ;  Location: [facility] OR;  Service: Orthopedics;  Laterality: Right;    REPAIR OF LIGAMENT OF ANKLE  10/24/2022    Procedure: REPAIR, LIGAMENT, ANKLE;  Surgeon: Dillon Scott DO;  Location: [facility] OR;  Service: Orthopedics;;     Family History   Problem Relation Name Age of Onset    Diabetes Mother Alisson sepulveda     Heart disease Mother Alisson sepulveda     Diabetes Father Danny sepulveda      Social History     Tobacco Use    Smoking status: Some Days     Current packs/day: 0.50     Average packs/day: 0.5 packs/day for 15.0 years (7.5 ttl pk-yrs)     Types: Cigarettes    Smokeless tobacco: Former    Tobacco comments:     Pt currently smoking 3 cigarettes daily.   Substance Use Topics    Alcohol use: Not Currently    Drug use: Not Currently     Types: Benzodiazepines, Marijuana     Review of Systems   Constitutional:  Negative for fever.   HENT:  Negative for sore throat.    Respiratory:  Negative for shortness of breath.    Cardiovascular:  Negative for chest pain.   Gastrointestinal:  Negative for nausea.   Genitourinary:  Negative for dysuria.   Musculoskeletal:  Negative for back pain.   Skin:  Negative for rash.   Neurological:  Negative for weakness.   Hematological:  Does not bruise/bleed easily.   All other systems reviewed and are negative.      Physical Exam     Initial Vitals [06/17/24 0835]   BP Pulse Resp Temp SpO2   (!) 144/92 75 18 98.1 °F (36.7 °C) 98 %      MAP       --         Physical Exam    Nursing note and vitals reviewed.  Constitutional: She appears well-developed and well-nourished. She is active.   HENT:   Head: Normocephalic and atraumatic.   Eyes: Conjunctivae, EOM and lids are normal. Pupils are equal, round, and reactive to light.   Neck: Trachea normal and phonation normal. Neck supple. No thyroid mass present.   Normal range of motion.  Cardiovascular:  Normal rate, regular rhythm, normal heart sounds and normal pulses.           Pulmonary/Chest: Breath sounds normal.   Abdominal: Abdomen is  soft. Bowel sounds are normal.   Musculoskeletal:         General: Tenderness present.      Cervical back: Normal range of motion and neck supple.     Neurological: She is alert and oriented to person, place, and time. She has normal strength and normal reflexes.   Skin: Skin is warm and intact.   Psychiatric: She has a normal mood and affect. Her speech is normal and behavior is normal. Judgment and thought content normal. Cognition and memory are normal.         ED Course   Procedures  Labs Reviewed   URIC ACID - Normal          Imaging Results              X-Ray Wrist Complete Left (Final result)  Result time 06/17/24 09:29:05      Final result by Kendall Rangel MD (06/17/24 09:29:05)                   Impression:      Mild degenerative changes with no acute fracture appreciated.      Electronically signed by: Kendall Rangel  Date:    06/17/2024  Time:    09:29               Narrative:    EXAMINATION:  XR WRIST COMPLETE 3 VIEWS LEFT    CLINICAL HISTORY:  Pain, unspecified    TECHNIQUE:  Radiographs of the left wrist with AP, lateral and oblique  views.    COMPARISON:  No prior imaging available for comparison    FINDINGS:  No displaced fracture appreciated.  There is mild degenerative change with loss of radiocarpal interval.  No gross edema.  Mild osteophytes about the scaphoid.                                       Medications   dexAMETHasone injection 8 mg (8 mg Intramuscular Given 6/17/24 0852)   ketorolac tablet 10 mg (10 mg Oral Given 6/17/24 0851)     Medical Decision Making  54-year-old presents with left wrist pain for 3 weeks no trauma no injury no history of gout on exam she has a positive Finkelstein test with some tenderness full range of motion vital signs stable x-rays unremarkable lab work unremarkable impression de Quervain tenosynovitis discussed with the patient the treatment plan options we will go ahead and put her in a thumb spica give him some anti-inflammatories have her follow up with  the doctor in a week patient understands agrees with plan          Amount and/or Complexity of Data Reviewed  Labs: ordered. Decision-making details documented in ED Course.  Radiology: ordered and independent interpretation performed.    Risk  Prescription drug management.  Risk Details: Differential diagnosis wrist sprain wrist fracture tenosynovitis gout               ED Course as of 06/17/24 1015   Mon Jun 17, 2024   1009 Uric Acid: 4.6 [BL]      ED Course User Index  [BL] Everett Thomason MD                           Clinical Impression:  Final diagnoses:  [R52] Pain  [M65.4] De Quervain's tenosynovitis, left (Primary)          ED Disposition Condition    Discharge Stable          ED Prescriptions       Medication Sig Dispense Start Date End Date Auth. Provider    indomethacin (INDOCIN) 25 MG capsule Take 1 capsule (25 mg total) by mouth 3 (three) times daily with meals. 15 capsule 6/17/2024 -- Everett Thomason MD    predniSONE (DELTASONE) 20 MG tablet Take 1 tablet (20 mg total) by mouth once daily. for 5 days 5 tablet 6/17/2024 6/22/2024 Everett Thomason MD          Follow-up Information       Follow up With Specialties Details Why Contact Info    Adamaris Cao MD Family Medicine In 3 days  [address] [facility] [address]  [phone]               Everett Thomason MD  06/17/24 1015

## 2024-07-16 NOTE — ED PROVIDER NOTES
Encounter Date: 10/23/2022    SCRIBE #1 NOTE: I, Lisa Fu, am scribing for, and in the presence of,  Salvatore Soliz MD. I have scribed the following portions of the note - Other sections scribed: HPI, ROS, PE.     History   No chief complaint on file.    A 53 year old female is presenting to the ED via EMS following a fall. The pt states that she fell 8 ft off a ladder trying to hang lights on a tree. She denies any loss of consciousness or hitting her head. She is not on blood thinners. A c-collar was present on arrival.     The history is provided by the patient and the EMS personnel. No  was used.   Fall  The accident occurred just prior to arrival. The fall occurred from a ladder (8ft). She fell from a height of 6 to 10 ft. Point of impact: right and left leg. Pain location: right and left leg. She was Not ambulatory at the scene. There was No entrapment after the fall. Pertinent negatives include no neck pain, no fever, no numbness, no abdominal pain, no nausea, no vomiting, no hematuria and no headaches. Treatment on scene includes A c-collar.   Review of patient's allergies indicates:  No Known Allergies  No past medical history on file.  No past surgical history on file.  No family history on file.     Review of Systems   Constitutional:  Negative for chills, fatigue and fever.   HENT:  Negative for congestion, ear discharge, ear pain, nosebleeds, rhinorrhea and sore throat.    Eyes:  Negative for photophobia, pain, discharge and redness.   Respiratory:  Negative for cough, chest tightness, shortness of breath and wheezing.    Cardiovascular:  Negative for chest pain and leg swelling.   Gastrointestinal:  Negative for abdominal pain, blood in stool, constipation, diarrhea, nausea and vomiting.   Genitourinary:  Negative for dysuria, frequency, hematuria and urgency.   Musculoskeletal:  Negative for arthralgias, myalgias and neck pain.        Left and right leg pain   Skin:  Negative  for color change and rash.   Neurological:  Negative for dizziness, seizures, weakness, numbness and headaches.     Physical Exam     Initial Vitals [10/23/22 1910]   BP Pulse Resp Temp SpO2   (!) 179/121 87 20 97.5 °F (36.4 °C) 99 %      MAP       --         Physical Exam    Nursing note and vitals reviewed.  Constitutional: She appears well-developed and well-nourished. She is not diaphoretic. No distress.   HENT:   Head: Normocephalic.   Right Ear: External ear normal.   Left Ear: External ear normal.   Nose: Nose normal.   Mouth/Throat: Oropharynx is clear and moist.   Eyes: Conjunctivae and EOM are normal. Pupils are equal, round, and reactive to light. Right eye exhibits no discharge. Left eye exhibits no discharge. No scleral icterus.   Neck: Neck supple. No tracheal deviation present.   Normal range of motion.  Cardiovascular:  Normal rate, regular rhythm, normal heart sounds and intact distal pulses.     Exam reveals no gallop and no friction rub.       No murmur heard.  Pulses:       Radial pulses are 2+ on the right side and 2+ on the left side.        Dorsalis pedis pulses are 1+ on the right side and 2+ on the left side.   Pulmonary/Chest: Breath sounds normal. No stridor. No respiratory distress. She has no wheezes. She has no rhonchi. She has no rales. She exhibits no tenderness.   Abdominal: Abdomen is soft. Bowel sounds are normal. She exhibits no distension and no mass. There is no abdominal tenderness. There is no guarding.   Musculoskeletal:         General: No tenderness or edema. Normal range of motion.      Cervical back: Normal range of motion and neck supple.      Comments: Abrasion to the left anterior shin  Contaminated laceration to the left lateral malleoli of ankle, no obvious fracture  Open fracture with exposed bone to the right anterior shin with obvious deformity   Good strength and sensation lower extremities bilaterally     Neurological: She is alert and oriented to person, place,  and time. No cranial nerve deficit or sensory deficit.   Skin: Skin is warm and dry. Capillary refill takes less than 2 seconds. No rash noted. No erythema. No pallor.             ED Course   Orthopedic Injury    Date/Time: 10/23/2022 7:45 PM  Performed by: Salvatore Soliz MD  Authorized by: Salvatroe Soliz MD     Location procedure was performed:  [facility] EMERGENCY DEPARTMENT  Consent Done?:  Yes  Universal Protocol:     Verbal consent obtained?: Yes      Risks and benefits: Risks, benefits and alternatives were discussed      Consent given by:  Patient    Patient states understanding of procedure being performed: Yes      Patient identity confirmed:  Verbally with patient  Injury:     Injury location:  Lower leg    Location details:  Right lower leg    Injury type:  Fracture-dislocation      Pre-procedure assessment:     Neurovascular status: Neurovascularly intact      Distal perfusion: normal      Neurological function: normal      Range of motion: reduced      Local anesthesia used?: No      Patient sedated?: Yes        Selections made in this section will also lock the Injury type section above.:     Manipulation performed?: Yes      Skin traction used?: No      Skeletal traction used?: Yes      Reduction successful?: Yes      Confirmation: Reduction confirmed by x-ray      Immobilization:  Splint    Splint type:  Long leg (with stirrup)    Supplies used:  Ortho-Glass    Complications: No      Estimated blood loss (mL):  10  Post-procedure assessment:     Neurovascular status: Neurovascularly intact      Distal perfusion: normal      Neurological function: normal      Range of motion: splinted      Patient tolerance:  Patient tolerated the procedure well with no immediate complications  Procedural Sedation        Date/Time: 10/23/2022 8:41 PM  Performed by: Salvatore Soliz MD  Authorized by: Salvatore Soliz MD   Consent Done: Yes  Consent: Verbal consent obtained.  Risks and benefits: risks, benefits and  No change alternatives were discussed  Consent given by: patient  Patient identity confirmed: verbally with patient  ASA Class: Class 2 - Mild Illness without functional impairment.  Mallampati Score: Class 2 - Visualization of the soft palate, fauces, and uvula.     Equipment: on cardiac monitor., on BP monitor., on supplemental oxygen., on CO2 monitor., suction available. and airway equipment available.     Sedation type: moderate (conscious) sedation    Sedatives: propofol  Total Sedation Time (min): 20  Vitals: Vital signs were monitored during sedation.  Complications: No complications.   Patient/Family history of anesthesia or sedation complications: No    Labs Reviewed   COMPREHENSIVE METABOLIC PANEL - Abnormal; Notable for the following components:       Result Value    Carbon Dioxide 20 (*)     Glucose Level 124 (*)     All other components within normal limits   URINALYSIS, REFLEX TO URINE CULTURE - Abnormal; Notable for the following components:    Specific Gravity, UA 1.038 (*)     All other components within normal limits   DRUG SCREEN, URINE (BEAKER) - Abnormal; Notable for the following components:    Amphetamines, Urine Positive (*)     Cannabinoids, Urine Positive (*)     Fentanyl, Urine Positive (*)     Specific Gravity, Urine Auto 1.038 (*)     All other components within normal limits    Narrative:     Cut off concentrations:    Amphetamines - 1000 ng/ml  Barbiturates - 200 ng/ml  Benzodiazepine - 200 ng/ml  Cannabinoids (THC) - 50 ng/ml  Cocaine - 300 ng/ml  Fentanyl - 1.0 ng/ml  MDMA - 500 ng/ml  Opiates - 300 ng/ml   Phencyclidine (PCP) - 25 ng/ml    Specimen submitted for drug analysis and tested for pH and specific gravity in order to evaluate sample integrity. Suspect tampering if specific gravity is <1.003 and/or pH is not within the range of 4.5 - 8.0  False negatives may result form substances such as bleach added to urine.  False positives may result for the presence of a substance with similar  chemical structure to the drug or its metabolite.    This test provides only a PRELIMINARY analytical test result. A more specific alternate chemical method must be used in order to obtain a confirmed analytical result. Gas chromatography/mass spectrometry (GC/MS) is the preferred confirmatory method. Other chemical confirmation methods are available. Clinical consideration and professional judgement should be applied to any drug of abuse test result, particularly when preliminary positive results are used.    Positive results will be confirmed only at the physicians request. Unconfirmed screening results are to be used only for medical purposes (treatment).        CBC WITH DIFFERENTIAL - Abnormal; Notable for the following components:    WBC 13.8 (*)     Neut # 10.6 (*)     IG# 0.17 (*)     All other components within normal limits   PROTIME-INR - Normal   APTT - Normal   LACTIC ACID, PLASMA - Normal   ALCOHOL,MEDICAL (ETHANOL) - Normal   SARS-COV-2 RNA AMPLIFICATION, QUAL - Normal    Narrative:     The IDNOW COVID-19 assay is a rapid molecular in vitro diagnostic test utilizing an isothermal nucleic acid amplification technology intended for the qualitative detection of nucleic acid from the SARS-CoV-2 viral RNA in direct nasal, nasopharyngeal or throat swabs from individuals who are suspected of COVID-19 by their healthcare provider.   URINALYSIS, MICROSCOPIC - Normal   CBC W/ AUTO DIFFERENTIAL    Narrative:     The following orders were created for panel order CBC auto differential.  Procedure                               Abnormality         Status                     ---------                               -----------         ------                     CBC with Differential[[id number]]        Abnormal            Final result                 Please view results for these tests on the individual orders.   TYPE & SCREEN          Imaging Results              CT Ankle (Including Hindfoot) Without Contrast Right  (Preliminary result)  Result time 10/23/22 22:56:52      Preliminary result by Arjun Noriega MD (10/23/22 22:56:52)                   Narrative:    START OF REPORT:  Technique: CT of the right ankle was performed with direct axial images as well as sagittal and coronal reconstruction images without intravenous contrast.    Clinical History: Fell 8ft, rt ankle fx.    Findings:  Ankle:  Ankle mortise: Intact.  Alignment: Normal.  Mineralization: Normal.  Bones:  Tibia: There is a comminuted mildly displaced acute oblique fracture of the distal shaft of the tibia. A bony fragment is seen that extends to the skin surface anteromedially. This is suggestive of an open fracture. There is associated mild soft tissue emphysema and swelling at the level of fracture.  Fibula: The visualized distal fibula is intact with no fracture.  Tarsal bones: Intact with no fracture.  Metatarsal bones: Intact to the extent visualized with no fracture.      Impression:  1. There is a comminuted mildly displaced acute oblique fracture of the distal shaft of the tibia. A bony fragment is seen that extends to the skin surface anteromedially. This is suggestive of an open fracture. There is associated mild soft tissue emphysema and swelling at the level of fracture.  2. Correlate clinically as regards additional evaluation and follow-up.                          Preliminary result by Interface, Rad Results In (10/23/22 22:56:52)                   Narrative:    START OF REPORT:  Technique: CT of the right ankle was performed with direct axial images as well as sagittal and coronal reconstruction images without intravenous contrast.    Clinical History: Fell 8ft, rt ankle fx.    Findings:  Ankle:  Ankle mortise: Intact.  Alignment: Normal.  Mineralization: Normal.  Bones:  Tibia: There is a comminuted mildly displaced acute oblique fracture of the distal shaft of the tibia. A bony fragment is seen that extends to the skin surface  anteromedially. This is suggestive of an open fracture. There is associated mild soft tissue emphysema and swelling at the level of fracture.  Fibula: The visualized distal fibula is intact with no fracture.  Tarsal bones: Intact with no fracture.  Metatarsal bones: Intact to the extent visualized with no fracture.      Impression:  1. There is a comminuted mildly displaced acute oblique fracture of the distal shaft of the tibia. A bony fragment is seen that extends to the skin surface anteromedially. This is suggestive of an open fracture. There is associated mild soft tissue emphysema and swelling at the level of fracture.  2. Correlate clinically as regards additional evaluation and follow-up.                                         CT Chest Abdomen Pelvis With Contrast (xpd) (Final result)  Result time 10/23/22 20:44:21      Final result by Florencio Atkinson MD (10/23/22 20:44:21)                   Impression:      No sequela of trauma involving the chest, abdomen, or pelvis.  Other secondary findings as above.      Electronically signed by: Florencio Atkinson MD  Date:    10/23/2022  Time:    20:44               Narrative:    EXAMINATION:  CT CHEST ABDOMEN PELVIS WITH CONTRAST (XPD)    CLINICAL HISTORY:  Trauma;    TECHNIQUE:  Multiple cross-section of the chest, abdomen, and pelvis were obtained after the intravenous administration of contrast.  Coronal and sagittal reformatted images were obtained.  An automated dose exposure technique was utilized.  This limits radiation does the patient.    COMPARISON:  None    FINDINGS:  Chest:    Heart size within normal limits with coronary artery calcifications.  The course and caliber of the thoracic aorta is normal.  A triple vessel aortic arch is identified with a great vessels being widely patent.  No evidence for aortic injury.  No evidence for mediastinal hematoma.  Likely remanent thymic tissues identified versus thymic rebound.  Main pulmonary artery is of normal caliber.   No evidence for hilar or mediastinal adenopathy.    Bibasilar atelectatic changes lungs.  No evidence for consolidation.  Emphysematous changes are identified with apical blebs and bulla predominantly paraseptal emphysematous changes.  No pneumothorax, pulmonary contusion, laceration.  The trachea and airways are patent.    Abdomen/pelvis:    Liver demonstrates likely hemangioma in segment Yung numeral 8.  Liver is enlarged.  Gallbladder is present.  Focal area of perfusional defects in segment 5.  The spleen, adrenals, kidneys, and pancreas are normal.    Small bowel is of normal caliber.  Colon is normal caliber in stool filled.  Normal appendix.    Uterus is surgically absent.  No evidence for adnexal mass.  Bladder is distended.  Course and caliber of the abdominal aorta is normal with scattered calcified atheromatous disease.  No free fluid in the abdomen pelvis.  No evidence for adenopathy.    No suspicious osseous lesions.  Spondylotic changes are identified.  Soft tissues are normal.                                       CT Cervical Spine Without Contrast (Final result)  Result time 10/23/22 20:38:03      Final result by Florencio Atkinson MD (10/23/22 20:38:03)                   Impression:      No fracture of the cervical spine.      Electronically signed by: Florencio Atkinson MD  Date:    10/23/2022  Time:    20:38               Narrative:    EXAMINATION:  CT CERVICAL SPINE WITHOUT CONTRAST    CLINICAL HISTORY:  Trauma;    TECHNIQUE:  Low dose axial images, sagittal and coronal reformations were performed though the cervical spine.  Contrast was not administered.    COMPARISON:  None    FINDINGS:  The alignment curvature of the cervical spine is normal. The vertebral heights and intervertebral disc spaces are maintained. No evidence for compression deformity, fracture, or subluxation. The predental space and prevertebral soft tissues are normal. No evidence for central canal stenosis or neural foraminal  narrowing.    Calcified thyroid nodules are identified. The remaining soft tissues within normal limits. The lung apices are clear.                                       CT Head Without Contrast (Final result)  Result time 10/23/22 20:36:48      Final result by Florencio Atkinson MD (10/23/22 20:36:48)                   Impression:      No acute abnormality.      Electronically signed by: Florencio Atkinson MD  Date:    10/23/2022  Time:    20:36               Narrative:    EXAMINATION:  CT HEAD WITHOUT CONTRAST    CLINICAL HISTORY:  Trauma;    TECHNIQUE:  Low dose axial CT images obtained throughout the head without intravenous contrast. Sagittal and coronal reconstructions were performed.    COMPARISON:  None.    FINDINGS:  Intracranial compartment:    Ventricles and sulci are normal in size for age without evidence of hydrocephalus. No extra-axial blood or fluid collections.    The brain parenchyma appears normal. No parenchymal mass, hemorrhage, edema or major vascular distribution infarct.    Skull/extracranial contents (limited evaluation): No fracture. Mastoid air cells and paranasal sinuses are essentially clear.                                       X-Ray Tibia Fibula 2 View Right (Final result)  Result time 10/23/22 21:49:45      Final result by Florencio Atkinson MD (10/23/22 21:49:45)                   Impression:      Comminuted fractures of the tibia and fibula with an open fracture of the tibia.      Electronically signed by: Florencio Atkinson MD  Date:    10/23/2022  Time:    21:49               Narrative:    EXAMINATION:  XR TIBIA FIBULA 2 VIEW RIGHT    CLINICAL HISTORY:  Unspecified fall, initial encounter    TECHNIQUE:  AP and lateral views of the right tibia and fibula were performed.    COMPARISON:  None.    FINDINGS:  Comminuted spiral fracture of the distal tibia as well as fracture of the proximal fibula.  An open fracture is identified distally with subcutaneous emphysema.  The ankle appears grossly intact peer                                        X-Ray Tibia Fibula 2 View Right (Final result)  Result time 10/23/22 21:50:22      Final result by Florencio Atkinson MD (10/23/22 21:50:22)                   Impression:      As above      Electronically signed by: Florencio Atkinson MD  Date:    10/23/2022  Time:    21:50               Narrative:    EXAMINATION:  XR TIBIA FIBULA 2 VIEW RIGHT    CLINICAL HISTORY:  Unspecified fracture of shaft of right tibia, initial encounter for closed fracture    TECHNIQUE:  AP and lateral views of the right tibia and fibula were performed.    COMPARISON:  Same day    FINDINGS:  Status post reduction with improved alignment of the tibial fracture.  No significant interval change of the fibular fracture.  Splinting material is identified.                                       X-Ray Tibia Fibula 2 View Left (Final result)  Result time 10/23/22 21:46:12      Final result by Florencio Atkinson MD (10/23/22 21:46:12)                   Impression:      Likely open fracture with subluxation of the ankle mortise and subcutaneous emphysema.  No definitive fracture of the ankle.  Incidental note of avulsion injury at the base of the 5th metatarsal.      Electronically signed by: Florencio Atkinson MD  Date:    10/23/2022  Time:    21:46               Narrative:    EXAMINATION:  XR TIBIA FIBULA 2 VIEW LEFT    CLINICAL HISTORY:  Unspecified fall, initial encounter    TECHNIQUE:  AP and lateral views of the left tibia and fibula were performed.    COMPARISON:  None.    FINDINGS:  Subluxation of the ankle mortise is identified without definitive fracture.  The subluxation is anterior and medial.  Diffuse soft tissue swelling is identified.  Concern for subcutaneous emphysema is identified.  Avulsion injury of the 5th meta tarsal is identified.                                       X-Ray Chest 1 View (Final result)  Result time 10/23/22 20:45:21      Final result by Florencio Atkinson MD (10/23/22 20:45:21)                   Impression:       No acute abnormality.      Electronically signed by: Florencio Atkinson MD  Date:    10/23/2022  Time:    20:45               Narrative:    EXAMINATION:  XR CHEST 1 VIEW    CLINICAL HISTORY:  r/o bleeding or hemorrhage;    TECHNIQUE:  Single frontal view of the chest was performed.    COMPARISON:  None    FINDINGS:  The lungs are clear, with normal appearance of pulmonary vasculature and no pleural effusion or pneumothorax.    The cardiac silhouette is normal in size. The hilar and mediastinal contours are unremarkable.    Bones are intact.                                       X-Ray Ankle Complete Left (Final result)  Result time 10/23/22 20:50:38      Final result by Florencio Atkinson MD (10/23/22 20:50:38)                   Impression:      Reduction of the ankle without definitive talar dome defect.  Concern for avulsion injury at the base of the 5th metatarsal..      Electronically signed by: Florencio Atkinson MD  Date:    10/23/2022  Time:    20:50               Narrative:    EXAMINATION:  XR ANKLE COMPLETE 3 VIEW LEFT    CLINICAL HISTORY:  Pain, unspecified    TECHNIQUE:  AP, lateral and oblique views of the left ankle were performed.    COMPARISON:  None    FINDINGS:  Casting material is identified with reduction of the subluxed ankle.  No definitive osteochondral defect is identified.  Likely avulsion injury at the base of the 5th metatarsal is identified.                                       Medications   ceFAZolin (ANCEF) 1 g in dextrose 5 % in water (D5W) 5 % 50 mL IVPB (MB+) (1 g Intravenous Not Given 10/23/22 2145)   lactated ringers infusion (1,000 mLs Intravenous New Bag 10/23/22 2200)   LIDOcaine (PF) 20 mg/mL (2%) injection 10 mg (has no administration in time range)   sodium chloride 0.9% flush 10 mL (has no administration in time range)   ondansetron disintegrating tablet 8 mg (has no administration in time range)   melatonin tablet 6 mg (has no administration in time range)   acetaminophen tablet 650 mg  (650 mg Oral Given 10/23/22 2201)   HYDROmorphone (PF) injection 1 mg (1 mg Intravenous Given 10/23/22 2307)   HYDROmorphone (PF) injection 0.5 mg (has no administration in time range)   methocarbamoL injection 1,000 mg (1,000 mg Intramuscular Given 10/23/22 2230)   ketorolac injection 15 mg (has no administration in time range)   Tdap (BOOSTRIX) vaccine injection 0.5 mL (0.5 mLs Intramuscular Given 10/23/22 1917)   cefazolin (ANCEF) 2 gram in dextrose 5% 50 mL IVPB (premix) (0 mg Intravenous Stopped 10/23/22 1945)   ceFAZolin (ANCEF) 1 gram injection (  Override Pull 10/23/22 1930)   propofol (DIPRIVAN) 10 mg/mL IVP injection (  Override Pull 10/23/22 1930)   fentaNYL 50 mcg/mL injection (100 mcg Intravenous Given 10/23/22 1943)   fentaNYL (SUBLIMAZE) 50 mcg/mL injection (  Override Pull 10/23/22 2000)   HYDROmorphone (PF) injection 1 mg (1 mg Intravenous Given 10/23/22 2015)   ondansetron injection 4 mg (4 mg Intravenous Given 10/23/22 2015)   iopamidoL (ISOVUE-370) injection 100 mL (100 mLs Intravenous Given 10/23/22 2035)   HYDROmorphone (PF) injection (1 mg Intravenous Given 10/23/22 2019)   ondansetron injection (4 mg Intravenous Given 10/23/22 2018)   propofol (DIPRIVAN) 10 mg/mL IVP (20 mg Intravenous Given 10/23/22 1937)     Medical Decision Making:   Initial Assessment:   Patient presents as level 2 trauma with open right tibia fracture  Differential Diagnosis:   Head bleed, fracture, abrasion, laceration, contusion, hollow organ injury, solid organ injury, vascular injury, pneumothorax  Clinical Tests:   Lab Tests: Reviewed and Ordered  Radiological Study: Reviewed and Ordered  ED Management:  Patient is awake, alert, she does have significant pain.  Obvious deformity right lower extremity.  Plain films reveal open tibia fracture, all she also has a proximal fibula fracture on the right side.  On the left there is a large laceration that is grossly contaminated with grass, washed out with 1 L of I Flores  impregnated saline here in the emergency department.  Betadine impregnated dressing + placed over wound and she is splinted.  There was a mild dislocation and possible avulsion fracture of the 5th metacarpal on the left side.  She has no other traumatic injuries, CT head neck chest abdomen pelvis are unremarkable for acute.  On re-evaluation she is resting comfortably.  I have spoken with orthopedic surgery.  Will admit patient.  Plan for OR to day 10/24.  Patient and family room amenable to plan.  Care transferred.        Scribe Attestation:   Scribe #1: I performed the above scribed service and the documentation accurately describes the services I performed. I attest to the accuracy of the note.    Attending Attestation:           Physician Attestation for Scribe:  Physician Attestation Statement for Scribe #1: I, Salvatore Soliz MD, reviewed documentation, as scribed by Lisa Fu in my presence, and it is both accurate and complete.           ED Course as of 10/24/22 0154   Sun Oct 23, 2022   2035 Spoke with Dr. Scott, orthopedic surgeon.  Plan to take to OR tomorrow.  Okay with reduction, splinting here in the ER.  Will schedule Ancef, tetanus already given. [MM]      ED Course User Index  [MM] Salvatore Soliz MD                 Clinical Impression:   Final diagnoses:  [W19.XXXA] Fall  [S82.201A] Fracture of right tibia  [R52] Pain  [S82.201A] Fracture of right tibia - post reduction  [S82.831A] Closed fracture of proximal end of right fibula, unspecified fracture morphology, initial encounter (Primary)  [S91.022A] Laceration of left ankle with foreign body, initial encounter  [S93.05XA] Ankle dislocation, left, initial encounter        ED Disposition Condition    Admit Stable                Salvatore Soliz MD  10/24/22 0154

## 2024-07-23 PROBLEM — S82.201A CLOSED FRACTURE OF RIGHT FIBULA AND TIBIA: Status: RESOLVED | Noted: 2022-11-04 | Resolved: 2024-07-23

## 2024-07-23 PROBLEM — R73.01 IMPAIRED FASTING GLUCOSE: Status: RESOLVED | Noted: 2022-06-19 | Resolved: 2024-07-23

## 2024-07-23 PROBLEM — M86.9: Status: RESOLVED | Noted: 2023-10-12 | Resolved: 2024-07-23

## 2024-07-23 PROBLEM — G43.909 MIGRAINE HEADACHE: Status: RESOLVED | Noted: 2022-06-19 | Resolved: 2024-07-23

## 2024-07-23 PROBLEM — S91.312A FOOT LACERATION, LEFT, INITIAL ENCOUNTER: Status: RESOLVED | Noted: 2022-10-24 | Resolved: 2024-07-23

## 2024-07-23 PROBLEM — S91.022A: Status: RESOLVED | Noted: 2022-11-04 | Resolved: 2024-07-23

## 2024-07-23 PROBLEM — L98.8: Status: RESOLVED | Noted: 2023-07-27 | Resolved: 2024-07-23

## 2024-07-23 PROBLEM — T81.32XA DISRUPTION OF INTERNAL OPERATION (SURGICAL) WOUND, NOT ELSEWHERE CLASSIFIED, INITIAL ENCOUNTER: Status: RESOLVED | Noted: 2023-07-27 | Resolved: 2024-07-23

## 2024-07-23 PROBLEM — M79.604 ACUTE LEG PAIN, RIGHT: Status: RESOLVED | Noted: 2022-11-04 | Resolved: 2024-07-23

## 2024-07-23 PROBLEM — S82.401A CLOSED FRACTURE OF RIGHT FIBULA AND TIBIA: Status: RESOLVED | Noted: 2022-11-04 | Resolved: 2024-07-23

## 2024-07-23 PROBLEM — R26.2 UNABLE TO AMBULATE: Status: RESOLVED | Noted: 2022-11-04 | Resolved: 2024-07-23

## 2024-07-23 PROBLEM — E78.49 OTHER HYPERLIPIDEMIA: Status: RESOLVED | Noted: 2023-08-09 | Resolved: 2024-07-23

## 2024-07-23 PROBLEM — T81.42XA DEEP INCISIONAL SURGICAL SITE INFECTION: Status: RESOLVED | Noted: 2023-07-27 | Resolved: 2024-07-23

## 2024-07-23 PROBLEM — S91.002A OPEN WOUND OF LEFT ANKLE: Status: RESOLVED | Noted: 2023-07-27 | Resolved: 2024-07-23

## 2024-09-25 PROCEDURE — 87591 N.GONORRHOEAE DNA AMP PROB: CPT | Performed by: OBSTETRICS & GYNECOLOGY

## 2024-09-25 PROCEDURE — 87491 CHLMYD TRACH DNA AMP PROBE: CPT | Performed by: OBSTETRICS & GYNECOLOGY

## 2024-09-25 PROCEDURE — 87661 TRICHOMONAS VAGINALIS AMPLIF: CPT | Performed by: OBSTETRICS & GYNECOLOGY

## 2024-09-25 PROCEDURE — 87624 HPV HI-RISK TYP POOLED RSLT: CPT | Performed by: OBSTETRICS & GYNECOLOGY

## 2024-12-31 ENCOUNTER — HOSPITAL ENCOUNTER (OUTPATIENT)
Dept: RADIOLOGY | Facility: HOSPITAL | Age: 54
Discharge: HOME | End: 2024-12-31
Attending: OBSTETRICS & GYNECOLOGY
Payer: MEDICAID

## 2024-12-31 DIAGNOSIS — Z12.31 ENCOUNTER FOR SCREENING MAMMOGRAM FOR MALIGNANT NEOPLASM OF BREAST: ICD-10-CM

## 2024-12-31 PROCEDURE — 77063 BREAST TOMOSYNTHESIS BI: CPT | Mod: 26,,, | Performed by: STUDENT IN AN ORGANIZED HEALTH CARE EDUCATION/TRAINING PROGRAM

## 2024-12-31 PROCEDURE — 77063 BREAST TOMOSYNTHESIS BI: CPT | Mod: TC

## 2024-12-31 PROCEDURE — 77067 SCR MAMMO BI INCL CAD: CPT | Mod: 26,,, | Performed by: STUDENT IN AN ORGANIZED HEALTH CARE EDUCATION/TRAINING PROGRAM

## 2025-01-17 ENCOUNTER — LAB VISIT (OUTPATIENT)
Dept: LAB | Facility: HOSPITAL | Age: 55
End: 2025-01-17
Attending: FAMILY MEDICINE
Payer: MEDICAID

## 2025-01-17 DIAGNOSIS — E11.65 TYPE 2 DIABETES MELLITUS WITH HYPERGLYCEMIA, WITHOUT LONG-TERM CURRENT USE OF INSULIN: ICD-10-CM

## 2025-01-17 LAB
ALBUMIN SERPL-MCNC: 3.8 G/DL (ref 3.5–5)
ALBUMIN/GLOB SERPL: 1 RATIO (ref 1.1–2)
ALP SERPL-CCNC: 105 UNIT/L (ref 40–150)
ALT SERPL-CCNC: 14 UNIT/L (ref 0–55)
ANION GAP SERPL CALC-SCNC: 8 MEQ/L
AST SERPL-CCNC: 19 UNIT/L (ref 5–34)
BACTERIA #/AREA URNS AUTO: ABNORMAL /HPF
BILIRUB SERPL-MCNC: 0.5 MG/DL
BILIRUB UR QL STRIP.AUTO: NEGATIVE
BUN SERPL-MCNC: 9.2 MG/DL (ref 9.8–20.1)
CALCIUM SERPL-MCNC: 9.4 MG/DL (ref 8.4–10.2)
CHLORIDE SERPL-SCNC: 106 MMOL/L (ref 98–107)
CHOLEST SERPL-MCNC: 210 MG/DL
CHOLEST/HDLC SERPL: 4 {RATIO} (ref 0–5)
CLARITY UR: CLEAR
CO2 SERPL-SCNC: 27 MMOL/L (ref 22–29)
COLOR UR AUTO: ABNORMAL
CREAT SERPL-MCNC: 0.76 MG/DL (ref 0.55–1.02)
CREAT UR-MCNC: 55.3 MG/DL (ref 45–106)
CREAT/UREA NIT SERPL: 12
EST. AVERAGE GLUCOSE BLD GHB EST-MCNC: 128.4 MG/DL
GFR SERPLBLD CREATININE-BSD FMLA CKD-EPI: >60 ML/MIN/1.73/M2
GLOBULIN SER-MCNC: 3.7 GM/DL (ref 2.4–3.5)
GLUCOSE SERPL-MCNC: 141 MG/DL (ref 74–100)
GLUCOSE UR QL STRIP: NEGATIVE
HBA1C MFR BLD: 6.1 %
HDLC SERPL-MCNC: 57 MG/DL (ref 35–60)
HGB UR QL STRIP: NEGATIVE
KETONES UR QL STRIP: NEGATIVE
LDLC SERPL CALC-MCNC: 123 MG/DL (ref 50–140)
LEUKOCYTE ESTERASE UR QL STRIP: ABNORMAL
MICROALBUMIN UR-MCNC: 365.9 UG/ML
MICROALBUMIN/CREAT RATIO PNL UR: 661.7 MG/GM CR (ref 0–30)
NITRITE UR QL STRIP: NEGATIVE
PH UR STRIP: 6.5 [PH]
POTASSIUM SERPL-SCNC: 4 MMOL/L (ref 3.5–5.1)
PROT SERPL-MCNC: 7.5 GM/DL (ref 6.4–8.3)
PROT UR QL STRIP: 100
RBC #/AREA URNS AUTO: ABNORMAL /HPF
SODIUM SERPL-SCNC: 141 MMOL/L (ref 136–145)
SP GR UR STRIP.AUTO: 1.01 (ref 1–1.03)
SQUAMOUS #/AREA URNS AUTO: ABNORMAL /HPF
TRIGL SERPL-MCNC: 148 MG/DL (ref 37–140)
UROBILINOGEN UR STRIP-ACNC: 0.2
VLDLC SERPL CALC-MCNC: 30 MG/DL
WBC #/AREA URNS AUTO: ABNORMAL /HPF

## 2025-01-17 PROCEDURE — 80061 LIPID PANEL: CPT

## 2025-01-17 PROCEDURE — 83036 HEMOGLOBIN GLYCOSYLATED A1C: CPT

## 2025-01-17 PROCEDURE — 81003 URINALYSIS AUTO W/O SCOPE: CPT

## 2025-01-17 PROCEDURE — 82570 ASSAY OF URINE CREATININE: CPT

## 2025-01-17 PROCEDURE — 36415 COLL VENOUS BLD VENIPUNCTURE: CPT

## 2025-01-17 PROCEDURE — 80053 COMPREHEN METABOLIC PANEL: CPT

## 2025-03-22 ENCOUNTER — HOSPITAL ENCOUNTER (EMERGENCY)
Facility: HOSPITAL | Age: 55
Discharge: HOME | End: 2025-03-22
Attending: SPECIALIST
Payer: MEDICAID

## 2025-03-22 VITALS
HEIGHT: 62 IN | WEIGHT: 145 LBS | DIASTOLIC BLOOD PRESSURE: 82 MMHG | RESPIRATION RATE: 18 BRPM | TEMPERATURE: 99 F | BODY MASS INDEX: 26.68 KG/M2 | OXYGEN SATURATION: 95 % | SYSTOLIC BLOOD PRESSURE: 145 MMHG | HEART RATE: 108 BPM

## 2025-03-22 DIAGNOSIS — B34.9 VIRAL ILLNESS: Primary | ICD-10-CM

## 2025-03-22 LAB
FLUAV AG UPPER RESP QL IA.RAPID: NOT DETECTED
FLUBV AG UPPER RESP QL IA.RAPID: NOT DETECTED
SARS-COV-2 RNA RESP QL NAA+PROBE: NOT DETECTED

## 2025-03-22 PROCEDURE — 99284 EMERGENCY DEPT VISIT MOD MDM: CPT | Mod: 25

## 2025-03-22 PROCEDURE — 63600175 PHARM REV CODE 636 W HCPCS: Performed by: SPECIALIST

## 2025-03-22 PROCEDURE — 0240U COVID/FLU A&B PCR: CPT | Performed by: EMERGENCY MEDICINE

## 2025-03-22 PROCEDURE — 96372 THER/PROPH/DIAG INJ SC/IM: CPT | Performed by: SPECIALIST

## 2025-03-22 RX ORDER — PREDNISONE 20 MG/1
20 TABLET ORAL 2 TIMES DAILY
Qty: 6 TABLET | Refills: 0 | Status: SHIPPED | OUTPATIENT
Start: 2025-03-22 | End: 2025-03-25

## 2025-03-22 RX ORDER — BETAMETHASONE SODIUM PHOSPHATE AND BETAMETHASONE ACETATE 3; 3 MG/ML; MG/ML
9 INJECTION, SUSPENSION INTRA-ARTICULAR; INTRALESIONAL; INTRAMUSCULAR; SOFT TISSUE
Status: COMPLETED | OUTPATIENT
Start: 2025-03-22 | End: 2025-03-22

## 2025-03-22 RX ADMIN — BETAMETHASONE SODIUM PHOSPHATE AND BETAMETHASONE ACETATE 9 MG: 3; 3 INJECTION, SUSPENSION INTRA-ARTICULAR; INTRALESIONAL; INTRAMUSCULAR at 09:03

## 2025-03-23 NOTE — ED PROVIDER NOTES
Encounter Date: 3/22/2025       History     Chief Complaint   Patient presents with    Fever     Fever, body aches/ chills, HA, diarrhea started today, 100.8, took APAP/ ibuprofen, denies cough or sore throat.      55-year-old female with fever, body aches, headache and an episode of diarrhea; no fever, no cough, no runny nose, no nasal congestion    The history is provided by the patient.     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, with nonunion, subsequent encounter      Past Surgical History:   Procedure Laterality Date     SECTION  2005    HYSTERECTOMY  2015    INCISION AND DRAINAGE, LOWER EXTREMITY Left 2023    Procedure: INCISION AND DRAINAGE, LOWER EXTREMITY - supine bone foam wash stuff cultures;  Surgeon: Dillon Scott DO;  Location: [facility];  Service: Orthopedics;  Laterality: Left;  supine bone foam wash stuff cultures    INCISION AND DRAINAGE, LOWER EXTREMITY Left 10/11/2023    Procedure: INCISION AND DRAINAGE, LOWER EXTREMITY;  Surgeon: Dillon Scott DO;  Location: [facility];  Service: Orthopedics;  Laterality: Left;  supine vascular bed c arm wash stuff cultures  FIRST CASE    INSERTION OF INTRAMEDULLARY NAIL INTO TIBIA Right 10/24/2022    Procedure: INSERTION, INTRAMEDULLARY JACK, TIBIA;  Surgeon: Dillon Scott DO;  Location: [facility] OR;  Service: Orthopedics;  Laterality: Right;  supine, vascular, bone foam, c-arm, wash stuff    ORIF TIBIA FRACTURE Right 2023    Procedure: ORIF, FRACTURE, TIBIA;  Surgeon: Dillon Scott DO;  Location: [facility];  Service: Orthopedics;  Laterality: Right;    REPAIR OF LIGAMENT OF ANKLE  10/24/2022    Procedure: REPAIR, LIGAMENT, ANKLE;  Surgeon: Dillon Scott DO;  Location: [facility] OR;  Service: Orthopedics;;     Family History   Problem  Relation Name Age of Onset    Diabetes Mother Alisson sepulveda     Heart disease Mother Alisson sepulveda     Diabetes Father Danny sepulveda      Social History[1]  Review of Systems   Constitutional: Negative.    HENT: Negative.     Respiratory: Negative.     Cardiovascular: Negative.    Gastrointestinal: Negative.    Musculoskeletal: Negative.    Skin: Negative.    Neurological: Negative.    Psychiatric/Behavioral:  Positive for sleep disturbance.    All other systems reviewed and are negative.      Physical Exam     Initial Vitals [03/22/25 1830]   BP Pulse Resp Temp SpO2   (!) 145/82 108 18 99.3 °F (37.4 °C) 95 %      MAP       --         Physical Exam    Nursing note and vitals reviewed.  Constitutional: She appears well-developed and well-nourished.   HENT:   Head: Normocephalic and atraumatic.   Nose: Nose normal. Mouth/Throat: Oropharynx is clear and moist.   Eyes: EOM are normal. Pupils are equal, round, and reactive to light.   Neck: Neck supple.   Normal range of motion.  Cardiovascular:  Normal rate, regular rhythm, normal heart sounds and intact distal pulses.           Pulmonary/Chest: Breath sounds normal. She has no wheezes.   Abdominal: Abdomen is soft. There is no abdominal tenderness.   Musculoskeletal:         General: Normal range of motion.      Cervical back: Normal range of motion and neck supple.     Neurological: She is alert and oriented to person, place, and time. She has normal strength. No cranial nerve deficit. GCS score is 15. GCS eye subscore is 4. GCS verbal subscore is 5. GCS motor subscore is 6.   Skin: Skin is warm and dry.         ED Course   Procedures  Labs Reviewed   COVID/FLU A&B PCR - Normal       Result Value    Influenza A PCR Not Detected      Influenza B PCR Not Detected      SARS-CoV-2 PCR Not Detected      Narrative:     The Xpert Xpress SARS-CoV-2/FLU/RSV plus is a rapid, multiplexed real-time PCR test intended for the simultaneous qualitative detection and differentiation of  "SARS-CoV-2, Influenza A, Influenza B, and respiratory syncytial virus (RSV) viral RNA in either nasopharyngeal swab or nasal swab specimens.                Imaging Results    None          Medications   betamethasone acetate-betamethasone sodium phosphate injection 9 mg (9 mg Intramuscular Given 3/22/25 2110)     Medical Decision Making  55-year-old female with fever, body aches, headache and an episode of diarrhea; no fever, no cough, no runny nose, no nasal congestion    DIFFERENTIAL DIAGNOSIS-viral illness , COVID, flu    Amount and/or Complexity of Data Reviewed  Labs: ordered. Decision-making details documented in ED Course.    Risk  Prescription drug management.  Risk Details: Workup unremarkable; patient given Celestone 9 mg IM and a prescription of prednisone 20 mg twice a day for 3 days; discussed adequate hydration                   Patient Vitals for the past 24 hrs:   BP Temp Temp src Pulse Resp SpO2 Height Weight   03/22/25 1830 (!) 145/82 99.3 °F (37.4 °C) Oral 108 18 95 % 5' 2" (1.575 m) 65.8 kg (145 lb)       The patient is resting comfortably and in no acute distress.  She states that her symptoms have improved after treatment in Emergency Department. I personally discussed her test results and treatment plan.  Gave strict ED precautions.  Specific conditions for return to the emergency department and importance of follow up with her primary care provided or the physician listed on the discharge instructions.  Patient voices understanding and agrees to the plan discussed. All patients' questions have been answered at this time.   She has remained hemodynamically stable throughout entire stay in ED and is stable for discharge home.                 Clinical Impression:  Final diagnoses:  [B34.9] Viral illness (Primary)          ED Disposition Condition    Discharge Stable          ED Prescriptions       Medication Sig Dispense Start Date End Date Auth. Provider    predniSONE (DELTASONE) 20 MG tablet " Take 1 tablet (20 mg total) by mouth 2 (two) times daily. for 3 days 6 tablet 3/22/2025 3/25/2025 Russ Rinaldi MD          Follow-up Information       Follow up With Specialties Details Why Contact Info    Adamaris Cao MD Family Medicine In 2 days As needed [address] [facility] [address]  [phone]                 [1]   Social History  Tobacco Use    Smoking status: Some Days     Current packs/day: 0.50     Average packs/day: 0.5 packs/day for 15.0 years (7.5 ttl pk-yrs)     Types: Cigarettes, Vaping with nicotine    Smokeless tobacco: Former    Tobacco comments:     Pt currently smoking 1 cigarette on some days   Substance Use Topics    Alcohol use: Not Currently    Drug use: Not Currently     Types: Marijuana        Russ Rinaldi MD  03/23/25 0017

## 2025-03-26 ENCOUNTER — APPOINTMENT (OUTPATIENT)
Dept: LAB | Facility: HOSPITAL | Age: 55
End: 2025-03-26
Attending: FAMILY MEDICINE
Payer: MEDICAID

## 2025-03-26 ENCOUNTER — HOSPITAL ENCOUNTER (EMERGENCY)
Facility: HOSPITAL | Age: 55
Discharge: HOME | End: 2025-03-27
Attending: STUDENT IN AN ORGANIZED HEALTH CARE EDUCATION/TRAINING PROGRAM
Payer: MEDICAID

## 2025-03-26 DIAGNOSIS — K62.5 RECTAL BLEEDING: Primary | ICD-10-CM

## 2025-03-26 DIAGNOSIS — K64.9 HEMORRHOIDS, UNSPECIFIED HEMORRHOID TYPE: ICD-10-CM

## 2025-03-26 DIAGNOSIS — K52.9 PROCTOCOLITIS: ICD-10-CM

## 2025-03-26 DIAGNOSIS — R19.7 DIARRHEA OF PRESUMED INFECTIOUS ORIGIN: Primary | ICD-10-CM

## 2025-03-26 LAB
ALBUMIN SERPL-MCNC: 3.4 G/DL (ref 3.5–5)
ALBUMIN/GLOB SERPL: 0.8 RATIO (ref 1.1–2)
ALP SERPL-CCNC: 95 UNIT/L (ref 40–150)
ALT SERPL-CCNC: 13 UNIT/L (ref 0–55)
ANION GAP SERPL CALC-SCNC: 12 MEQ/L
AST SERPL-CCNC: 13 UNIT/L (ref 11–45)
BASOPHILS # BLD AUTO: 0.06 X10(3)/MCL
BASOPHILS NFR BLD AUTO: 0.5 %
BILIRUB SERPL-MCNC: 0.3 MG/DL
BUN SERPL-MCNC: 17.1 MG/DL (ref 9.8–20.1)
C DIFF TOX A+B STL QL IA: NEGATIVE
CALCIUM SERPL-MCNC: 9 MG/DL (ref 8.4–10.2)
CHLORIDE SERPL-SCNC: 105 MMOL/L (ref 98–107)
CLOSTRIDIUM DIFFICILE GDH ANTIGEN (OHS): NEGATIVE
CO2 SERPL-SCNC: 21 MMOL/L (ref 22–29)
CREAT SERPL-MCNC: 0.86 MG/DL (ref 0.55–1.02)
CREAT/UREA NIT SERPL: 20
EOSINOPHIL # BLD AUTO: 0.16 X10(3)/MCL (ref 0–0.9)
EOSINOPHIL NFR BLD AUTO: 1.4 %
ERYTHROCYTE [DISTWIDTH] IN BLOOD BY AUTOMATED COUNT: 13.2 % (ref 11.5–17)
GFR SERPLBLD CREATININE-BSD FMLA CKD-EPI: >60 ML/MIN/1.73/M2
GLOBULIN SER-MCNC: 4.1 GM/DL (ref 2.4–3.5)
GLUCOSE SERPL-MCNC: 157 MG/DL (ref 74–100)
HCT VFR BLD AUTO: 38 % (ref 37–47)
HGB BLD-MCNC: 12.8 G/DL (ref 12–16)
IMM GRANULOCYTES # BLD AUTO: 0.42 X10(3)/MCL (ref 0–0.04)
IMM GRANULOCYTES NFR BLD AUTO: 3.6 %
INR PPP: 1
LYMPHOCYTES # BLD AUTO: 2.64 X10(3)/MCL (ref 0.6–4.6)
LYMPHOCYTES NFR BLD AUTO: 22.5 %
MCH RBC QN AUTO: 27.6 PG (ref 27–31)
MCHC RBC AUTO-ENTMCNC: 33.7 G/DL (ref 33–36)
MCV RBC AUTO: 82.1 FL (ref 80–94)
MONOCYTES # BLD AUTO: 1.56 X10(3)/MCL (ref 0.1–1.3)
MONOCYTES NFR BLD AUTO: 13.3 %
NEUTROPHILS # BLD AUTO: 6.87 X10(3)/MCL (ref 2.1–9.2)
NEUTROPHILS NFR BLD AUTO: 58.7 %
NRBC BLD AUTO-RTO: 0 %
PLATELET # BLD AUTO: 285 X10(3)/MCL (ref 130–400)
PMV BLD AUTO: 9.4 FL (ref 7.4–10.4)
POTASSIUM SERPL-SCNC: 3 MMOL/L (ref 3.5–5.1)
PROT SERPL-MCNC: 7.5 GM/DL (ref 6.4–8.3)
PROTHROMBIN TIME: 13.7 SECONDS (ref 12.5–14.5)
RBC # BLD AUTO: 4.63 X10(6)/MCL (ref 4.2–5.4)
SODIUM SERPL-SCNC: 138 MMOL/L (ref 136–145)
WBC # BLD AUTO: 11.71 X10(3)/MCL (ref 4.5–11.5)

## 2025-03-26 PROCEDURE — 86901 BLOOD TYPING SEROLOGIC RH(D): CPT | Performed by: STUDENT IN AN ORGANIZED HEALTH CARE EDUCATION/TRAINING PROGRAM

## 2025-03-26 PROCEDURE — 99285 EMERGENCY DEPT VISIT HI MDM: CPT | Mod: 25

## 2025-03-26 PROCEDURE — 96374 THER/PROPH/DIAG INJ IV PUSH: CPT

## 2025-03-26 PROCEDURE — 85610 PROTHROMBIN TIME: CPT | Performed by: STUDENT IN AN ORGANIZED HEALTH CARE EDUCATION/TRAINING PROGRAM

## 2025-03-26 PROCEDURE — 80053 COMPREHEN METABOLIC PANEL: CPT | Performed by: STUDENT IN AN ORGANIZED HEALTH CARE EDUCATION/TRAINING PROGRAM

## 2025-03-26 PROCEDURE — 85025 COMPLETE CBC W/AUTO DIFF WBC: CPT | Performed by: STUDENT IN AN ORGANIZED HEALTH CARE EDUCATION/TRAINING PROGRAM

## 2025-03-26 PROCEDURE — 63600175 PHARM REV CODE 636 W HCPCS: Performed by: STUDENT IN AN ORGANIZED HEALTH CARE EDUCATION/TRAINING PROGRAM

## 2025-03-26 PROCEDURE — 96375 TX/PRO/DX INJ NEW DRUG ADDON: CPT

## 2025-03-26 PROCEDURE — 86318 IA INFECTIOUS AGENT ANTIBODY: CPT

## 2025-03-26 RX ORDER — ONDANSETRON HYDROCHLORIDE 2 MG/ML
4 INJECTION, SOLUTION INTRAVENOUS
Status: COMPLETED | OUTPATIENT
Start: 2025-03-26 | End: 2025-03-26

## 2025-03-26 RX ORDER — MORPHINE SULFATE 4 MG/ML
4 INJECTION, SOLUTION INTRAMUSCULAR; INTRAVENOUS
Refills: 0 | Status: COMPLETED | OUTPATIENT
Start: 2025-03-26 | End: 2025-03-26

## 2025-03-26 RX ADMIN — ONDANSETRON 4 MG: 2 INJECTION INTRAMUSCULAR; INTRAVENOUS at 11:03

## 2025-03-26 RX ADMIN — MORPHINE SULFATE 4 MG: 4 INJECTION INTRAVENOUS at 11:03

## 2025-03-26 NOTE — Clinical Note
"Deborah Ayala" America was seen and treated in our emergency department on 3/26/2025.  She may return to work on 03/31/2025.       If you have any questions or concerns, please don't hesitate to call.      Tania Jordan RN    "

## 2025-03-27 VITALS
DIASTOLIC BLOOD PRESSURE: 97 MMHG | HEART RATE: 81 BPM | RESPIRATION RATE: 18 BRPM | OXYGEN SATURATION: 98 % | BODY MASS INDEX: 25.76 KG/M2 | SYSTOLIC BLOOD PRESSURE: 128 MMHG | HEIGHT: 62 IN | TEMPERATURE: 98 F | WEIGHT: 140 LBS

## 2025-03-27 DIAGNOSIS — K64.9 HEMORRHOIDS, UNSPECIFIED HEMORRHOID TYPE: Primary | ICD-10-CM

## 2025-03-27 LAB
GROUP & RH: NORMAL
INDIRECT COOMBS: NORMAL
SPECIMEN OUTDATE: NORMAL

## 2025-03-27 PROCEDURE — 25500020 PHARM REV CODE 255: Performed by: STUDENT IN AN ORGANIZED HEALTH CARE EDUCATION/TRAINING PROGRAM

## 2025-03-27 PROCEDURE — 96375 TX/PRO/DX INJ NEW DRUG ADDON: CPT | Mod: 59

## 2025-03-27 PROCEDURE — 96374 THER/PROPH/DIAG INJ IV PUSH: CPT | Mod: 59

## 2025-03-27 PROCEDURE — 63600175 PHARM REV CODE 636 W HCPCS: Performed by: STUDENT IN AN ORGANIZED HEALTH CARE EDUCATION/TRAINING PROGRAM

## 2025-03-27 PROCEDURE — 63600175 PHARM REV CODE 636 W HCPCS

## 2025-03-27 RX ORDER — OXYCODONE AND ACETAMINOPHEN 5; 325 MG/1; MG/1
1 TABLET ORAL EVERY 6 HOURS PRN
Qty: 20 TABLET | Refills: 0 | Status: SHIPPED | OUTPATIENT
Start: 2025-03-27

## 2025-03-27 RX ORDER — OXYCODONE AND ACETAMINOPHEN 5; 325 MG/1; MG/1
1 TABLET ORAL EVERY 6 HOURS PRN
Qty: 20 TABLET | Refills: 0 | Status: SHIPPED | OUTPATIENT
Start: 2025-03-27 | End: 2025-03-27

## 2025-03-27 RX ORDER — CIPROFLOXACIN 500 MG/1
500 TABLET ORAL 2 TIMES DAILY
Qty: 14 TABLET | Refills: 0 | Status: SHIPPED | OUTPATIENT
Start: 2025-03-27 | End: 2025-04-03

## 2025-03-27 RX ORDER — METRONIDAZOLE 500 MG/1
500 TABLET ORAL 3 TIMES DAILY
Qty: 21 TABLET | Refills: 0 | Status: SHIPPED | OUTPATIENT
Start: 2025-03-27 | End: 2025-04-03

## 2025-03-27 RX ORDER — FENTANYL CITRATE 50 UG/ML
INJECTION, SOLUTION INTRAMUSCULAR; INTRAVENOUS
Status: COMPLETED
Start: 2025-03-27 | End: 2025-03-27

## 2025-03-27 RX ORDER — POLYETHYLENE GLYCOL 3350 17 G/17G
17 POWDER, FOR SOLUTION ORAL DAILY
Qty: 5 EACH | Refills: 0 | Status: SHIPPED | OUTPATIENT
Start: 2025-03-27 | End: 2025-04-01

## 2025-03-27 RX ORDER — FENTANYL CITRATE 50 UG/ML
100 INJECTION, SOLUTION INTRAMUSCULAR; INTRAVENOUS
Refills: 0 | Status: COMPLETED | OUTPATIENT
Start: 2025-03-27 | End: 2025-03-27

## 2025-03-27 RX ORDER — MORPHINE SULFATE 4 MG/ML
4 INJECTION, SOLUTION INTRAMUSCULAR; INTRAVENOUS
Refills: 0 | Status: COMPLETED | OUTPATIENT
Start: 2025-03-27 | End: 2025-03-27

## 2025-03-27 RX ADMIN — FENTANYL CITRATE 100 MCG: 50 INJECTION, SOLUTION INTRAMUSCULAR; INTRAVENOUS at 02:03

## 2025-03-27 RX ADMIN — MORPHINE SULFATE 4 MG: 4 INJECTION INTRAVENOUS at 01:03

## 2025-03-27 RX ADMIN — IOHEXOL 100 ML: 350 INJECTION, SOLUTION INTRAVENOUS at 12:03

## 2025-03-27 NOTE — CONSULTS
Acute Care Surgery   Consult    Patient Name: Deborah Cross  YOB: 1970  Date: 2025 5:04 AM  Date of Admission: 3/26/2025  HD#0  POD#* No surgery found *    PRESENTING HISTORY   Chief Complaint/Reason for Admission: <principal problem not specified>  History source(s): patient and chart   History of Present Illness:  55F w/ HTN/HLD AND DM (6.1) here for prolapsed internal hemorrhoids. She had a GI virus and had diarrhea Saturday () and  w/ constipation since w/ straining exacerbating her existing hemorrhoids. She has had hemorrhoids for about 27 years but they have never been this bad. Her main complaint is pain w/ discomfort walking. She does not describe any sudden onset of rectal pain but did have some on-and-off rectal bleeding for the past few days. Since the hemorrhoids have been reduced in the ED, she has begun to feel better. She typically has one bowel movement a day which she describes as normal in consistency. She has never been seen for her hemorrhoids before. No chest pain, shortness of breath, fever, fatigue, or symptoms otherwise.    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, with nonunion, subsequent encounter        Past surgical history:  Past Surgical History:   Procedure Laterality Date     SECTION  2005    HYSTERECTOMY  2015    INCISION AND DRAINAGE, LOWER EXTREMITY Left 2023    Procedure: INCISION AND DRAINAGE, LOWER EXTREMITY - supine bone foam wash stuff cultures;  Surgeon: Dillon Scott DO;  Location: [facility];  Service: Orthopedics;   Laterality: Left;  supine bone foam wash stuff cultures    INCISION AND DRAINAGE, LOWER EXTREMITY Left 10/11/2023    Procedure: INCISION AND DRAINAGE, LOWER EXTREMITY;  Surgeon: Dillon Scott DO;  Location: [facility] OR;  Service: Orthopedics;  Laterality: Left;  supine vascular bed c arm wash stuff cultures  FIRST CASE    INSERTION OF INTRAMEDULLARY NAIL INTO TIBIA Right 10/24/2022    Procedure: INSERTION, INTRAMEDULLARY JACK, TIBIA;  Surgeon: Dillon Scott DO;  Location: [facility] OR;  Service: Orthopedics;  Laterality: Right;  supine, vascular, bone foam, c-arm, wash stuff    ORIF TIBIA FRACTURE Right 4/12/2023    Procedure: ORIF, FRACTURE, TIBIA;  Surgeon: Dillon Scott DO;  Location: [facility] OR;  Service: Orthopedics;  Laterality: Right;    REPAIR OF LIGAMENT OF ANKLE  10/24/2022    Procedure: REPAIR, LIGAMENT, ANKLE;  Surgeon: Dillon Scott DO;  Location: [facility] OR;  Service: Orthopedics;;       Family history:  Family History   Problem Relation Name Age of Onset    Diabetes Mother Alisson sepulveda     Heart disease Mother Alisson sepulveda     Diabetes Father Danny sepulveda        Social history:  Social History     Socioeconomic History    Marital status: Single   Tobacco Use    Smoking status: Some Days     Current packs/day: 0.50     Average packs/day: 0.5 packs/day for 15.0 years (7.5 ttl pk-yrs)     Types: Cigarettes, Vaping with nicotine    Smokeless tobacco: Former    Tobacco comments:     Pt currently smoking 1 cigarette on some days   Substance and Sexual Activity    Alcohol use: Not Currently    Drug use: Not Currently     Types: Marijuana    Sexual activity: Not Currently     Partners: Male     Birth control/protection: None   Social History Narrative    ** Merged History Encounter **          Social Drivers of Health     Financial Resource Strain: Low Risk  (7/23/2024)    Overall Financial Resource Strain (CARDIA)     Difficulty of Paying Living Expenses: Not hard at all   Food Insecurity: Food Insecurity Present  (7/23/2024)    Hunger Vital Sign     Worried About Running Out of Food in the Last Year: Never true     Ran Out of Food in the Last Year: Sometimes true   Transportation Needs: No Transportation Needs (10/13/2023)    PRAPARE - Transportation     Lack of Transportation (Medical): No     Lack of Transportation (Non-Medical): No   Physical Activity: Inactive (7/23/2024)    Exercise Vital Sign     Days of Exercise per Week: 0 days     Minutes of Exercise per Session: 0 min   Stress: Stress Concern Present (7/23/2024)    [employer] - Occupational Stress Questionnaire     Feeling of Stress : Very much   Housing Stability: Unknown (10/13/2023)    Housing Stability Vital Sign     Unable to Pay for Housing in the Last Year: No     Unstable Housing in the Last Year: No     Tobacco Use History[1]   Social History     Substance and Sexual Activity   Alcohol Use Not Currently        MEDICATIONS & ALLERGIES:     No current facility-administered medications on file prior to encounter.     Current Outpatient Medications on File Prior to Encounter   Medication Sig    ALPRAZolam (XANAX) 1 MG tablet TAKE 1 TABLET BY MOUTH TWICE DAILY AS NEEDED FOR ANXIETY    amLODIPine (NORVASC) 5 MG tablet Take 1 tablet (5 mg total) by mouth once daily.    aspirin (ECOTRIN) 81 MG EC tablet Take 1 tablet (81 mg total) by mouth 2 (two) times a day.    blood sugar diagnostic (CONTOUR NEXT TEST STRIPS) Strp Test sugar bid to tid E11.9    dextroamphetamine-amphetamine (ADDERALL) 20 mg tablet Take 1 tablet by mouth.    FLUoxetine 60 mg Tab Take 1 tablet by mouth Daily.    ibuprofen (ADVIL,MOTRIN) 800 MG tablet Take 1 tablet (800 mg total) by mouth 3 (three) times daily.    metFORMIN (GLUCOPHAGE-XR) 750 MG ER 24hr tablet Take 2 tablets (1,500 mg total) by mouth daily with breakfast.    multivitamin with minerals tablet Take 1 tablet by mouth once daily.    mupirocin (BACTROBAN) 2 % ointment Apply topically 3 (three) times  "daily.    QUEtiapine (SEROQUEL) 25 MG Tab Take 1 tablet (25 mg total) by mouth once daily.    rosuvastatin (CRESTOR) 20 MG tablet Take one tablet by mouth once daily    sulfamethoxazole-trimethoprim 800-160mg (BACTRIM DS) 800-160 mg Tab Take 1 tablet by mouth 2 (two) times daily.    sumatriptan (IMITREX) 25 MG Tab Take 1 tablet (25 mg total) by mouth every 2 (two) hours as needed.     Allergies: Review of patient's allergies indicates:  No Known Allergies  Scheduled Meds:  Continuous Infusions:  PRN Meds:    OBJECTIVE:   Vital Signs:  VITAL SIGNS: 24 HR MIN & MAX LAST   Temp  Min: 97.9 °F (36.6 °C)  Max: 97.9 °F (36.6 °C)  97.9 °F (36.6 °C)   BP  Min: 116/81  Max: 151/114  126/86    Pulse  Min: 82  Max: 111  82    Resp  Min: 13  Max: 22  19    SpO2  Min: 96 %  Max: 98 %  97 %      HT: 5' 2" (157.5 cm)  WT: 63.5 kg (140 lb)  BMI: 25.6     Intake/output:  Intake/Output - Last 3 Shifts       None          No intake or output data in the 24 hours ending 03/27/25 0504      Physical Exam:  General: Well developed, well nourished, no acute distress  HEENT: Normocephalic, PERRL  CV: RR  Resp: NWOB on RA  GI:  Abdomen soft, non-tender, non-distended, no guarding, no rebound  :  Deferred  MSK: No muscle atrophy, cyanosis, peripheral edema, moving all extremities spontaneously  Skin/wounds:  No rashes, ulcers, erythema  Neuro:  CNII-XII grossly intact, alert and oriented to person, place, and time  Rectal exam: easily reducible grade 3 hemorrhoid at right posterolateral (7 'oclock) position w/ minimal inflammation    Labs:  Troponin:  No results for input(s): "TROPONINI" in the last 72 hours.  CBC:  Recent Labs     03/26/25  2312   WBC 11.71*   RBC 4.63   HGB 12.8   HCT 38.0      MCV 82.1   MCH 27.6   MCHC 33.7     CMP:  Recent Labs     03/26/25  2312   CALCIUM 9.0   ALBUMIN 3.4*      K 3.0*   CO2 21*      BUN 17.1   CREATININE 0.86   ALKPHOS 95   ALT 13   AST 13   BILITOT 0.3     Lactic Acid:  No results " "for input(s): "LACTATE" in the last 72 hours.  ETOH:  No results for input(s): "ETHANOL" in the last 72 hours.   Urine Drug Screen:  No results for input(s): "COCAINE", "OPIATE", "BARBITURATE", "AMPHETAMINE", "FENTANYL", "CANNABINOIDS", "MDMA" in the last 72 hours.    Invalid input(s): "BENZODIAZEPINE", "PHENCYCLIDINE"   ABG:  No results for input(s): "PH", "PO2", "PCO2", "HCO3", "BE" in the last 168 hours.   I have reviewed all pertinent lab results within the past 24 hours.    Diagnostic Results:  Imaging Results              CT Abdomen Pelvis With IV Contrast NO Oral Contrast (Preliminary result)  Result time 03/27/25 00:49:02      Preliminary result by Kole Holman Jr., MD (03/27/25 00:49:02)                   Narrative:    START OF REPORT:  Technique: CT of the abdomen and pelvis was performed with axial images as well as sagittal and coronal reconstruction images with intravenous contrast.    Comparison: Comparison is with study dated 2023-03-02 20:42:28.    Clinical History: Lower abdominal pain, Pt reports pressure in her bottom (rectal pain), no BM X3 days, rectal bleeding noted as well.    Dosage Information: Automated Exposure Control was utilized.    Findings:  Lines and Tubes: None.  Thorax:  Lungs: The visualized lung bases appear unremarkable.  Pleura: No effusions or thickening.  Heart: The heart size is within normal limits.  Abdomen:  Abdominal Wall: There is a small umbilical hernia which contains mesenteric fat.  Liver: The liver appears unremarkable.  Biliary System: No extrahepatic biliary duct dilatation is seen.  Gallbladder: The gallbladder appears unremarkable.  Pancreas: The pancreas appears unremarkable.  Spleen: The spleen appears unremarkable.  Adrenals: The adrenal glands appear unremarkable.  Kidneys: A single stone measuring 4.8 mm is again seen on Image 63, Series 2 in the mid pole lower pole of the left kidney. A single cyst measuring 6.4 mm is seen on Image 58, Series 2 " in the of the left kidney. The left kidney otherwise appears unremarkable with no masses or hydronephrosis identified. Multiple cysts are identified in the right kidney the largest of which measures 12.7 mm is on Image 60, Series 2 in the mid pole of the right kidney. The right kidney otherwise appears unremarkable with no stones masses or hydronephrosis identified.  Aorta: The abdominal aorta appears unremarkable.  IVC: Unremarkable.  Bowel:  Esophagus: There is a small hiatal hernia.  Stomach: The stomach appears unremarkable.  Duodenum: Unremarkable appearing duodenum.  Small Bowel: The small bowel appears unremarkable.  Colon: Mild wall thickening and enhancement of the sigmoid and rectum of concern for proctocolitis.  Appendix: The appendix appears unremarkable and is seen on Image 99, Series 2 through Image 102, Series 2.  Peritoneum: No intraperitoneal free air or ascites is seen.    Pelvis:  Bladder: The bladder appears unremarkable.  Female:  Uterus: The uterus is not identified.  Ovaries: No adnexal masses are seen.    Bony structures:  Dorsal Spine: There is mild spondylosis of the visualized dorsal spine.  Bony Pelvis: The visualized bony structures of the pelvis appear unremarkable.      Impression:  1. Mild wall thickening and enhancement of the sigmoid and rectum of concern for proctocolitis.  2. No acute intraabdominal or pelvic pathology is otherwise identified. Details and other findings as discussed above.                          Preliminary result by [employer], Rad Results In (03/27/25 00:49:02)                   Narrative:    START OF REPORT:  Technique: CT of the abdomen and pelvis was performed with axial images as well as sagittal and coronal reconstruction images with intravenous contrast.    Comparison: Comparison is with study dated 2023-03-02 20:42:28.    Clinical History: Lower abdominal pain, Pt reports pressure in her bottom (rectal pain), no BM X3 days, rectal bleeding noted as  well.    Dosage Information: Automated Exposure Control was utilized.    Findings:  Lines and Tubes: None.  Thorax:  Lungs: The visualized lung bases appear unremarkable.  Pleura: No effusions or thickening.  Heart: The heart size is within normal limits.  Abdomen:  Abdominal Wall: There is a small umbilical hernia which contains mesenteric fat.  Liver: The liver appears unremarkable.  Biliary System: No extrahepatic biliary duct dilatation is seen.  Gallbladder: The gallbladder appears unremarkable.  Pancreas: The pancreas appears unremarkable.  Spleen: The spleen appears unremarkable.  Adrenals: The adrenal glands appear unremarkable.  Kidneys: A single stone measuring 4.8 mm is again seen on Image 63, Series 2 in the mid pole lower pole of the left kidney. A single cyst measuring 6.4 mm is seen on Image 58, Series 2 in the of the left kidney. The left kidney otherwise appears unremarkable with no masses or hydronephrosis identified. Multiple cysts are identified in the right kidney the largest of which measures 12.7 mm is on Image 60, Series 2 in the mid pole of the right kidney. The right kidney otherwise appears unremarkable with no stones masses or hydronephrosis identified.  Aorta: The abdominal aorta appears unremarkable.  IVC: Unremarkable.  Bowel:  Esophagus: There is a small hiatal hernia.  Stomach: The stomach appears unremarkable.  Duodenum: Unremarkable appearing duodenum.  Small Bowel: The small bowel appears unremarkable.  Colon: Mild wall thickening and enhancement of the sigmoid and rectum of concern for proctocolitis.  Appendix: The appendix appears unremarkable and is seen on Image 99, Series 2 through Image 102, Series 2.  Peritoneum: No intraperitoneal free air or ascites is seen.    Pelvis:  Bladder: The bladder appears unremarkable.  Female:  Uterus: The uterus is not identified.  Ovaries: No adnexal masses are seen.    Bony structures:  Dorsal Spine: There is mild spondylosis of the  visualized dorsal spine.  Bony Pelvis: The visualized bony structures of the pelvis appear unremarkable.      Impression:  1. Mild wall thickening and enhancement of the sigmoid and rectum of concern for proctocolitis.  2. No acute intraabdominal or pelvic pathology is otherwise identified. Details and other findings as discussed above.                                       I have reviewed all pertinent imaging results/findings within the past 24 hours.    ASSESSMENT & PLAN:    55F w/ HTN/HLD AND DM (6.1) here for easily reducible grade 3 hemorrhoid, would like to be set up for outpatient follow-up for discussion of treatment during the summer.     - no acute surgical intervention  - patient given instructions for bowel habits and ED return precautions  - recommend discharge w/ bowel regiment  - follow-up in Acute Care General Surgery clinic in 2 weeks or sooner if needed for discussion of further treatment      Lamin Spence MD  [employer] General Surgery PGY1        [1]   Social History  Tobacco Use   Smoking Status Some Days    Current packs/day: 0.50    Average packs/day: 0.5 packs/day for 15.0 years (7.5 ttl pk-yrs)    Types: Cigarettes, Vaping with nicotine   Smokeless Tobacco Former   Tobacco Comments    Pt currently smoking 1 cigarette on some days

## 2025-03-27 NOTE — ED PROVIDER NOTES
"Encounter Date: 3/26/2025    SCRIBE #1 NOTE: I, Owen Son, am scribing for, and in the presence of,  Berlin Durand IV, MD. I have scribed the following portions of the note - Other sections scribed: HPI,ROS,PE.       History     Chief Complaint   Patient presents with    Rectal Bleeding     Pt arrives via [facility], EMS / Pt reports lower abdominal pain, Pt reports "pressure in her bottom" (rectal pain). Pt reports that she had several days of diarrhea and has not had a BM in the last 3 days. Pt reports urge to have BM however unable to pass stool. Pt also report rectal bleeding , bright red, some "clots". Abdomen is distended , firm, non distended on palpation.      56 y/o female with PMHx of DM, HTN, and HLD presents to ED c/o rectal pain onset ~3x days. Pt reports she's had multiple episodes of diarrhea Saturday through Monday. She reports since Monday she has been constipated. She states she has been straining to have a bowel movement, and states her known hemorrhoids have "come out". She complains of rectal pain and bleeding since onset. She denies any other complaints.     The history is provided by the patient.     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, with nonunion, subsequent encounter      Past Surgical History:   Procedure Laterality Date     SECTION  2005    HYSTERECTOMY  2015    INCISION AND DRAINAGE, LOWER EXTREMITY Left 2023    Procedure: INCISION AND DRAINAGE, LOWER EXTREMITY - supine bone foam wash stuff cultures;  Surgeon: Dillon Scott DO;  Location: [facility];  Service: Orthopedics;  Laterality: Left;  supine bone foam wash stuff cultures    INCISION AND DRAINAGE, LOWER EXTREMITY Left 10/11/2023    Procedure: INCISION AND DRAINAGE, LOWER EXTREMITY;  Surgeon: " Dillon Scott DO;  Location: [facility] OR;  Service: Orthopedics;  Laterality: Left;  supine vascular bed c arm wash stuff cultures  FIRST CASE    INSERTION OF INTRAMEDULLARY NAIL INTO TIBIA Right 10/24/2022    Procedure: INSERTION, INTRAMEDULLARY JACK, TIBIA;  Surgeon: Dillon Scott DO;  Location: [facility] OR;  Service: Orthopedics;  Laterality: Right;  supine, vascular, bone foam, c-arm, wash stuff    ORIF TIBIA FRACTURE Right 4/12/2023    Procedure: ORIF, FRACTURE, TIBIA;  Surgeon: Dillon Scott DO;  Location: [facility] OR;  Service: Orthopedics;  Laterality: Right;    REPAIR OF LIGAMENT OF ANKLE  10/24/2022    Procedure: REPAIR, LIGAMENT, ANKLE;  Surgeon: Dillon Scott DO;  Location: [facility] OR;  Service: Orthopedics;;     Family History   Problem Relation Name Age of Onset    Diabetes Mother Alisson sepulveda     Heart disease Mother Alisson sepulveda     Diabetes Father Danny sepulveda      Social History[1]  Review of Systems   Constitutional:  Negative for chills and fever.   HENT:  Negative for congestion, rhinorrhea and sore throat.    Eyes:  Negative for visual disturbance.   Respiratory:  Negative for cough and shortness of breath.    Cardiovascular:  Negative for chest pain and leg swelling.   Gastrointestinal:  Positive for anal bleeding, constipation, diarrhea and rectal pain. Negative for abdominal pain, nausea and vomiting.   Genitourinary:  Negative for dysuria, hematuria, vaginal bleeding and vaginal discharge.   Musculoskeletal:  Negative for joint swelling.   Skin:  Negative for rash.   Neurological:  Negative for weakness.   Psychiatric/Behavioral:  Negative for confusion.        Physical Exam     Initial Vitals [03/26/25 2135]   BP Pulse Resp Temp SpO2   (!) 148/117 (!) 111 18 97.9 °F (36.6 °C) 98 %      MAP       --         Physical Exam    Nursing note and vitals reviewed.  Constitutional: She is not diaphoretic. No distress.   HENT:   Head: Normocephalic and atraumatic.   Neck: Neck supple.   Normal range of  motion.  Cardiovascular:  Normal rate and regular rhythm.           No murmur heard.  Pulmonary/Chest: Breath sounds normal. No respiratory distress.   Abdominal: Abdomen is soft. She exhibits no distension. There is no abdominal tenderness.   Genitourinary:    Genitourinary Comments: RN present as a chaperone for rectal exam.   Prolapsed internal hemorrhoids that appear to be inflamed, with scant bleeding.      Musculoskeletal:      Cervical back: Normal range of motion and neck supple.     Neurological: She is alert and oriented to person, place, and time. She has normal strength. No cranial nerve deficit or sensory deficit.   Skin: Skin is warm. Capillary refill takes less than 2 seconds.   Psychiatric: She has a normal mood and affect.         ED Course   Procedures  Labs Reviewed   COMPREHENSIVE METABOLIC PANEL - Abnormal       Result Value    Sodium 138      Potassium 3.0 (*)     Chloride 105      CO2 21 (*)     Glucose 157 (*)     Blood Urea Nitrogen 17.1      Creatinine 0.86      Calcium 9.0      Protein Total 7.5      Albumin 3.4 (*)     Globulin 4.1 (*)     Albumin/Globulin Ratio 0.8 (*)     Bilirubin Total 0.3      ALP 95      ALT 13      AST 13      eGFR >60      Anion Gap 12.0      BUN/Creatinine Ratio 20     CBC WITH DIFFERENTIAL - Abnormal    WBC 11.71 (*)     RBC 4.63      Hgb 12.8      Hct 38.0      MCV 82.1      MCH 27.6      MCHC 33.7      RDW 13.2      Platelet 285      MPV 9.4      Neut % 58.7      Lymph % 22.5      Mono % 13.3      Eos % 1.4      Basophil % 0.5      Imm Grans % 3.6      Neut # 6.87      Lymph # 2.64      Mono # 1.56 (*)     Eos # 0.16      Baso # 0.06      Imm Gran # 0.42 (*)     NRBC% 0.0     PROTIME-INR - Normal    PT 13.7      INR 1.0      Narrative:     Protimes are used to monitor anticoagulant agents such as warfarin. PT INR values are based on the current patient normal mean and the DAVEY value for the specific instrument reagent used.  **Routine theraputic target values  for the INR are 2.0-3.0**   CBC W/ AUTO DIFFERENTIAL    Narrative:     The following orders were created for panel order CBC auto differential.  Procedure                               Abnormality         Status                     ---------                               -----------         ------                     CBC with Differential[[id number]]       Abnormal            Final result                 Please view results for these tests on the individual orders.   TYPE & SCREEN    Group & Rh O POS      Indirect Renzo GEL NEG      Specimen Outdate 03/29/2025 23:59            Imaging Results              CT Abdomen Pelvis With IV Contrast NO Oral Contrast (Final result)  Result time 03/27/25 07:26:04      Final result by Gino Rodriguez MD (03/27/25 07:26:04)                   Impression:      Suspect a nonspecific colitis.    No significant discrepancy with the preliminary report.      Electronically signed by: Gino Rodriguez  Date:    03/27/2025  Time:    07:26               Narrative:    EXAMINATION:  CT ABDOMEN PELVIS WITH IV CONTRAST    CLINICAL HISTORY:  Rectal bleeding.Abdominal pain, acute, nonlocalized;    TECHNIQUE:  Helical acquisition through the abdomen and pelvis with IV contrast.  Three plane reconstructions were provided for review.  mGycm. Automatic exposure control, adjustment of mA/kV or iterative reconstruction technique was used to reduce radiation.    COMPARISON:  2 March 2023    FINDINGS:  The limited imaged lung bases are clear.    No significant abnormality of the liver, gallbladder, spleen, pancreas or adrenals.  No hydronephrosis.  Small nonobstructing renal calculi similar to prior.    No bowel obstruction.  No convincing evidence of appendicitis.  There is a small hiatal hernia.  Some distal esophageal wall thickening could be seen with esophagitis.  There is some mucosal enhancement of the sigmoid and rectum.  No perforation or abscess.  Fluid-filled colon.    Distended urinary  bladder.  No pelvic free fluid.  No adnexal mass.  Uterus surgically absent.  Abdominal aorta normal in caliber.  Mild atherosclerotic disease.    Acute osseous findings.                        Preliminary result by Kole Holman Jr., MD (03/27/25 00:49:02)                   Impression:    1. Mild wall thickening and enhancement of the sigmoid and rectum of concern for proctocolitis.  2. No acute intraabdominal or pelvic pathology is otherwise identified. Details and other findings as discussed above.               Narrative:    START OF REPORT:  Technique: CT of the abdomen and pelvis was performed with axial images as well as sagittal and coronal reconstruction images with intravenous contrast.    Comparison: Comparison is with study dated 2023-03-02 20:42:28.    Clinical History: Lower abdominal pain, Pt reports pressure in her bottom (rectal pain), no BM X3 days, rectal bleeding noted as well.    Dosage Information: Automated Exposure Control was utilized.    Findings:  Lines and Tubes: None.  Thorax:  Lungs: The visualized lung bases appear unremarkable.  Pleura: No effusions or thickening.  Heart: The heart size is within normal limits.  Abdomen:  Abdominal Wall: There is a small umbilical hernia which contains mesenteric fat.  Liver: The liver appears unremarkable.  Biliary System: No extrahepatic biliary duct dilatation is seen.  Gallbladder: The gallbladder appears unremarkable.  Pancreas: The pancreas appears unremarkable.  Spleen: The spleen appears unremarkable.  Adrenals: The adrenal glands appear unremarkable.  Kidneys: A single stone measuring 4.8 mm is again seen on Image 63, Series 2 in the mid pole lower pole of the left kidney. A single cyst measuring 6.4 mm is seen on Image 58, Series 2 in the of the left kidney. The left kidney otherwise appears unremarkable with no masses or hydronephrosis identified. Multiple cysts are identified in the right kidney the largest of which measures 12.7  mm is on Image 60, Series 2 in the mid pole of the right kidney. The right kidney otherwise appears unremarkable with no stones masses or hydronephrosis identified.  Aorta: The abdominal aorta appears unremarkable.  IVC: Unremarkable.  Bowel:  Esophagus: There is a small hiatal hernia.  Stomach: The stomach appears unremarkable.  Duodenum: Unremarkable appearing duodenum.  Small Bowel: The small bowel appears unremarkable.  Colon: Mild wall thickening and enhancement of the sigmoid and rectum of concern for proctocolitis.  Appendix: The appendix appears unremarkable and is seen on Image 99, Series 2 through Image 102, Series 2.  Peritoneum: No intraperitoneal free air or ascites is seen.    Pelvis:  Bladder: The bladder appears unremarkable.  Female:  Uterus: The uterus is not identified.  Ovaries: No adnexal masses are seen.    Bony structures:  Dorsal Spine: There is mild spondylosis of the visualized dorsal spine.  Bony Pelvis: The visualized bony structures of the pelvis appear unremarkable.                          Preliminary result by Postdeck, Rad Results In (03/27/25 00:49:02)                   Impression:    1. Mild wall thickening and enhancement of the sigmoid and rectum of concern for proctocolitis.  2. No acute intraabdominal or pelvic pathology is otherwise identified. Details and other findings as discussed above.               Narrative:    START OF REPORT:  Technique: CT of the abdomen and pelvis was performed with axial images as well as sagittal and coronal reconstruction images with intravenous contrast.    Comparison: Comparison is with study dated 2023-03-02 20:42:28.    Clinical History: Lower abdominal pain, Pt reports pressure in her bottom (rectal pain), no BM X3 days, rectal bleeding noted as well.    Dosage Information: Automated Exposure Control was utilized.    Findings:  Lines and Tubes: None.  Thorax:  Lungs: The visualized lung bases appear unremarkable.  Pleura: No effusions or  thickening.  Heart: The heart size is within normal limits.  Abdomen:  Abdominal Wall: There is a small umbilical hernia which contains mesenteric fat.  Liver: The liver appears unremarkable.  Biliary System: No extrahepatic biliary duct dilatation is seen.  Gallbladder: The gallbladder appears unremarkable.  Pancreas: The pancreas appears unremarkable.  Spleen: The spleen appears unremarkable.  Adrenals: The adrenal glands appear unremarkable.  Kidneys: A single stone measuring 4.8 mm is again seen on Image 63, Series 2 in the mid pole lower pole of the left kidney. A single cyst measuring 6.4 mm is seen on Image 58, Series 2 in the of the left kidney. The left kidney otherwise appears unremarkable with no masses or hydronephrosis identified. Multiple cysts are identified in the right kidney the largest of which measures 12.7 mm is on Image 60, Series 2 in the mid pole of the right kidney. The right kidney otherwise appears unremarkable with no stones masses or hydronephrosis identified.  Aorta: The abdominal aorta appears unremarkable.  IVC: Unremarkable.  Bowel:  Esophagus: There is a small hiatal hernia.  Stomach: The stomach appears unremarkable.  Duodenum: Unremarkable appearing duodenum.  Small Bowel: The small bowel appears unremarkable.  Colon: Mild wall thickening and enhancement of the sigmoid and rectum of concern for proctocolitis.  Appendix: The appendix appears unremarkable and is seen on Image 99, Series 2 through Image 102, Series 2.  Peritoneum: No intraperitoneal free air or ascites is seen.    Pelvis:  Bladder: The bladder appears unremarkable.  Female:  Uterus: The uterus is not identified.  Ovaries: No adnexal masses are seen.    Bony structures:  Dorsal Spine: There is mild spondylosis of the visualized dorsal spine.  Bony Pelvis: The visualized bony structures of the pelvis appear unremarkable.                                         Medications   morphine injection 4 mg (4 mg Intravenous  Given 3/26/25 2329)   ondansetron injection 4 mg (4 mg Intravenous Given 3/26/25 2329)   iohexoL (OMNIPAQUE 350) injection 100 mL (100 mLs Intravenous Given 3/27/25 0015)   morphine injection 4 mg (4 mg Intravenous Given 3/27/25 0113)   fentaNYL injection 100 mcg (100 mcg Intravenous Given 3/27/25 0217)     Medical Decision Making  54 yo with grade III internal hemorrhoids, very painful on arrival able to be controlled in ED.   Hemorrhoids reducible. Due to degree of symptoms, surgery consulted for evaluation and will expedite outpatient follow up     The differential diagnosis includes, but is not limited to:  Hemorrhoids, constipation, colitis      Problems Addressed:  Hemorrhoids, unspecified hemorrhoid type: chronic illness or injury with exacerbation, progression, or side effects of treatment  Proctocolitis: acute illness or injury that poses a threat to life or bodily functions  Rectal bleeding: acute illness or injury that poses a threat to life or bodily functions    Amount and/or Complexity of Data Reviewed  Labs: ordered. Decision-making details documented in ED Course.  Radiology: ordered. Decision-making details documented in ED Course.    Risk  Prescription drug management.            Scribe Attestation:   Scribe #1: I performed the above scribed service and the documentation accurately describes the services I performed. I attest to the accuracy of the note.    Attending Attestation:           Physician Attestation for Scribe:  Physician Attestation Statement for Scribe #1: I, Berlin Durand IV, MD, reviewed documentation, as scribed by Owen Son in my presence, and it is both accurate and complete.             ED Course as of 03/27/25 2046   Thu Mar 27, 2025   0219 Hemorrhoids wiill manually reduce but not stay reduced. Will discuss  with surgery to expedite clinical follow up  [AC]   0316 Surgery will come evaluate   [AC]      ED Course User Index  [AC] Berlin Durand IV, MD                            Clinical Impression:  Final diagnoses:  [K62.5] Rectal bleeding (Primary)  [K64.9] Hemorrhoids, unspecified hemorrhoid type  [K52.9] Proctocolitis          ED Disposition Condition    Discharge Stable          ED Prescriptions       Medication Sig Dispense Start Date End Date Auth. Provider    ciprofloxacin HCl (CIPRO) 500 MG tablet Take 1 tablet (500 mg total) by mouth 2 (two) times daily. for 7 days 14 tablet 3/27/2025 4/3/2025 Berlin Durand IV, MD    metroNIDAZOLE (FLAGYL) 500 MG tablet Take 1 tablet (500 mg total) by mouth 3 (three) times daily. for 7 days 21 tablet 3/27/2025 4/3/2025 Berlin Durand IV, MD    oxyCODONE-acetaminophen (PERCOCET) 5-325 mg per tablet  (Status: Discontinued) Take 1 tablet by mouth every 6 (six) hours as needed for Pain. 20 tablet 3/27/2025 3/27/2025 Berlin Durand IV, MD    oxyCODONE-acetaminophen (PERCOCET) 5-325 mg per tablet Take 1 tablet by mouth every 6 (six) hours as needed for Pain. 20 tablet 3/27/2025 -- Berlin Durand IV, MD          Follow-up Information       Follow up With Specialties Details Why Contact Info    Adamaris Cao MD Family Medicine   [address] [place] [id number] [phone]      Primary care physician  Schedule an appointment as soon as possible for a visit   Follow up with you primary care physician.   If you do not have a primary care physician call [phone] to schedule an appointment.    Ronald Voss MD Colon and Rectal Surgery Schedule an appointment as soon as possible for a visit   [address]  [address]  [facility] [id number] [phone]      Mauricio Younger MD General Surgery Schedule an appointment as soon as possible for a visit   [address] [facility]  [address]  [facility] [address]  [phone]                   [1]   Social History  Tobacco Use    Smoking status: Some Days     Current packs/day: 0.50     Average packs/day: 0.5 packs/day for 15.0 years (7.5 ttl pk-yrs)     Types: Cigarettes,  Vaping with nicotine    Smokeless tobacco: Former    Tobacco comments:     Pt currently smoking 1 cigarette on some days   Substance Use Topics    Alcohol use: Not Currently    Drug use: Not Currently     Types: Marijuana        Berlin Durand IV, MD  03/27/25 2041

## 2025-03-27 NOTE — PROGRESS NOTES
Spoke with patient. Notified her of referral to [facility] Dr Voss. Patient verbalized understanding of the plan.

## 2025-03-27 NOTE — DISCHARGE INSTRUCTIONS
Thanks for letting use take care of you today! It is our goal to give you courteous care and to keep you comfortable and informed. If you have any questions before you leave I will be happy to try and answer them.     Advice after your visit:  Your visit in the emergency department is NOT definitive care - please follow-up with your primary care doctor and/or specialist within 1-2 days. If you do not have a primary care physician call [phone] to schedule an appointment. Please return if you have any worsening in your condition or if you have any other concerns.    Return to the emergency department if any worsening symptoms including fever, chest pain, difficulty breathing, weakness, numbness, tingling, nausea, vomiting, inability to eat, drink or take your medication, or any other new symptoms or concerns arise.      Please signup for MyChart as noted below in your paperwork to review all labwork, imaging results, and any other incidental findings from today's visit.     If you had radiology exams like an XRAY or CT in the emergency Department the interpreation on them may be preliminary - there may be less time sensitive findings on the reports please obtain these reports within 24 hours from the hospital or by using your out on your mobile phone to access records.  Bring these to your primary care doctor and/or specialist for further review of incidental findings.    Please review any LAB WORK from your visit today with your primary care physician.    If you were prescribed OPIATE PAIN MEDICATION - please understand of these medications can be addictive, you may fill less of the prescription was written for, you do not have to take the full prescription.  You may discard what you do not use.  Please seek help if you feel you are having problems with addiction.  Do not drive or operate heavy machinery if you are taking sedating medications.  Do not mix these medications with alcohol.      If you had a SPLINT  placed in the emergency department if you have severe pain numbness tingling or discoloration of year digits please remove the splint and return to the emergency department for further evaluation as this may represent a sign of compromise to the nerves or blood vessels due to swelling.    If you had SUTURES in the emergency department please have them removed in the prescribed time frame typically within 7-14 days.  You may shower but please do not bathe or swim.  Keep the wounds clean and dry and covered with a clean dressing.  Please return if he have any signs of infection like redness or drainage or pain at the suture site.    Please take the full course of  any ANTIBIOTICS you were prescribed - incomplete courses of antibiotics can cause resistance to antibiotics in the future which will make it difficult to treat any infections you may have.

## 2025-07-17 PROBLEM — Z78.0 MENOPAUSE: Status: ACTIVE | Noted: 2025-07-17

## 2025-07-22 ENCOUNTER — LAB VISIT (OUTPATIENT)
Dept: LAB | Facility: HOSPITAL | Age: 55
End: 2025-07-22
Attending: FAMILY MEDICINE
Payer: MEDICAID

## 2025-07-22 DIAGNOSIS — Z00.00 WELLNESS EXAMINATION: ICD-10-CM

## 2025-07-22 LAB
25(OH)D3+25(OH)D2 SERPL-MCNC: 103 NG/ML (ref 30–80)
ALBUMIN SERPL-MCNC: 4.2 G/DL (ref 3.5–5)
ALBUMIN/CREAT UR: 492.3 MG/GM CR (ref 0–30)
ALBUMIN/GLOB SERPL: 1 RATIO (ref 1.1–2)
ALP SERPL-CCNC: 102 UNIT/L (ref 40–150)
ALT SERPL-CCNC: 13 UNIT/L (ref 0–55)
ANION GAP SERPL CALC-SCNC: 9 MEQ/L
AST SERPL-CCNC: 17 UNIT/L (ref 11–45)
BACTERIA #/AREA URNS AUTO: ABNORMAL /HPF
BASOPHILS # BLD AUTO: 0.07 X10(3)/MCL
BASOPHILS NFR BLD AUTO: 0.8 %
BILIRUB SERPL-MCNC: 0.5 MG/DL
BILIRUB UR QL STRIP.AUTO: NEGATIVE
BUN SERPL-MCNC: 11 MG/DL (ref 9.8–20.1)
CALCIUM SERPL-MCNC: 9.3 MG/DL (ref 8.4–10.2)
CHLORIDE SERPL-SCNC: 104 MMOL/L (ref 98–107)
CHOLEST SERPL-MCNC: 191 MG/DL
CHOLEST/HDLC SERPL: 3 {RATIO} (ref 0–5)
CLARITY UR: CLEAR
CO2 SERPL-SCNC: 27 MMOL/L (ref 22–29)
COLOR UR AUTO: YELLOW
CREAT SERPL-MCNC: 0.76 MG/DL (ref 0.55–1.02)
CREAT UR-MCNC: 169.8 MG/DL (ref 45–106)
CREAT/UREA NIT SERPL: 14
EOSINOPHIL # BLD AUTO: 0.46 X10(3)/MCL (ref 0–0.9)
EOSINOPHIL NFR BLD AUTO: 5.4 %
ERYTHROCYTE [DISTWIDTH] IN BLOOD BY AUTOMATED COUNT: 12.8 % (ref 11.5–17)
EST. AVERAGE GLUCOSE BLD GHB EST-MCNC: 131.2 MG/DL
FOLATE SERPL-MCNC: 16.3 NG/ML (ref 7–31.4)
GFR SERPLBLD CREATININE-BSD FMLA CKD-EPI: >60 ML/MIN/1.73/M2
GLOBULIN SER-MCNC: 4.1 GM/DL (ref 2.4–3.5)
GLUCOSE SERPL-MCNC: 128 MG/DL (ref 74–100)
GLUCOSE UR QL STRIP: NEGATIVE
HBA1C MFR BLD: 6.2 %
HCT VFR BLD AUTO: 40.9 % (ref 37–47)
HDLC SERPL-MCNC: 68 MG/DL (ref 35–60)
HGB BLD-MCNC: 13.9 G/DL (ref 12–16)
HGB UR QL STRIP: ABNORMAL
IMM GRANULOCYTES # BLD AUTO: 0.01 X10(3)/MCL (ref 0–0.04)
IMM GRANULOCYTES NFR BLD AUTO: 0.1 %
KETONES UR QL STRIP: NEGATIVE
LDLC SERPL CALC-MCNC: 74 MG/DL (ref 50–140)
LEUKOCYTE ESTERASE UR QL STRIP: ABNORMAL
LYMPHOCYTES # BLD AUTO: 2.55 X10(3)/MCL (ref 0.6–4.6)
LYMPHOCYTES NFR BLD AUTO: 29.7 %
MCH RBC QN AUTO: 28.7 PG (ref 27–31)
MCHC RBC AUTO-ENTMCNC: 34 G/DL (ref 33–36)
MCV RBC AUTO: 84.3 FL (ref 80–94)
MICROALBUMIN UR-MCNC: 836 UG/ML
MONOCYTES # BLD AUTO: 0.48 X10(3)/MCL (ref 0.1–1.3)
MONOCYTES NFR BLD AUTO: 5.6 %
NEUTROPHILS # BLD AUTO: 5.02 X10(3)/MCL (ref 2.1–9.2)
NEUTROPHILS NFR BLD AUTO: 58.4 %
NITRITE UR QL STRIP: NEGATIVE
PH UR STRIP: 6 [PH]
PLATELET # BLD AUTO: 308 X10(3)/MCL (ref 130–400)
PMV BLD AUTO: 9.8 FL (ref 7.4–10.4)
POTASSIUM SERPL-SCNC: 4.5 MMOL/L (ref 3.5–5.1)
PROT SERPL-MCNC: 8.3 GM/DL (ref 6.4–8.3)
PROT UR QL STRIP: 100
RBC # BLD AUTO: 4.85 X10(6)/MCL (ref 4.2–5.4)
RBC #/AREA URNS AUTO: ABNORMAL /HPF
SODIUM SERPL-SCNC: 140 MMOL/L (ref 136–145)
SP GR UR STRIP.AUTO: 1.02 (ref 1–1.03)
SQUAMOUS #/AREA URNS AUTO: ABNORMAL /HPF
T3FREE SERPL-MCNC: 2.8 PG/ML (ref 1.58–3.91)
T4 FREE SERPL-MCNC: 0.9 NG/DL (ref 0.7–1.48)
TRIGL SERPL-MCNC: 243 MG/DL (ref 37–140)
TSH SERPL-ACNC: 1.05 UIU/ML (ref 0.35–4.94)
UROBILINOGEN UR STRIP-ACNC: 0.2
VIT B12 SERPL-MCNC: >2000 PG/ML (ref 213–816)
VLDLC SERPL CALC-MCNC: 49 MG/DL
WBC # BLD AUTO: 8.59 X10(3)/MCL (ref 4.5–11.5)
WBC #/AREA URNS AUTO: ABNORMAL /HPF

## 2025-07-22 PROCEDURE — 83036 HEMOGLOBIN GLYCOSYLATED A1C: CPT

## 2025-07-22 PROCEDURE — 81003 URINALYSIS AUTO W/O SCOPE: CPT

## 2025-07-22 PROCEDURE — 85025 COMPLETE CBC W/AUTO DIFF WBC: CPT

## 2025-07-22 PROCEDURE — 84443 ASSAY THYROID STIM HORMONE: CPT

## 2025-07-22 PROCEDURE — 82746 ASSAY OF FOLIC ACID SERUM: CPT

## 2025-07-22 PROCEDURE — 84481 FREE ASSAY (FT-3): CPT

## 2025-07-22 PROCEDURE — 36415 COLL VENOUS BLD VENIPUNCTURE: CPT

## 2025-07-22 PROCEDURE — 82607 VITAMIN B-12: CPT

## 2025-07-22 PROCEDURE — 84439 ASSAY OF FREE THYROXINE: CPT

## 2025-07-22 PROCEDURE — 82306 VITAMIN D 25 HYDROXY: CPT

## 2025-07-22 PROCEDURE — 80061 LIPID PANEL: CPT

## 2025-07-22 PROCEDURE — 80053 COMPREHEN METABOLIC PANEL: CPT

## 2025-07-22 PROCEDURE — 82043 UR ALBUMIN QUANTITATIVE: CPT

## 2025-07-22 PROCEDURE — 87086 URINE CULTURE/COLONY COUNT: CPT

## 2025-07-24 LAB — BACTERIA UR CULT: NO GROWTH

## 2025-08-04 ENCOUNTER — HOSPITAL ENCOUNTER (OUTPATIENT)
Dept: RADIOLOGY | Facility: HOSPITAL | Age: 55
Discharge: HOME | End: 2025-08-04
Attending: FAMILY MEDICINE
Payer: MEDICAID

## 2025-08-04 DIAGNOSIS — Z87.891 PERSONAL HISTORY OF SMOKING: ICD-10-CM

## 2025-08-04 PROCEDURE — 71271 CT THORAX LUNG CANCER SCR C-: CPT | Mod: TC

## (undated) DEVICE — GOWN SMARTGOWN 3XL XLONG

## (undated) DEVICE — SOL NACL IRR 1000ML BTL

## (undated) DEVICE — APPLICATOR CHLORAPREP ORN 26ML

## (undated) DEVICE — DRAPE STERI U-SHAPED 47X51IN

## (undated) DEVICE — GLOVE PROTEXIS NEU-THERA SZ6

## (undated) DEVICE — SUT VICRYL BR 1 GEN 27 CT-1

## (undated) DEVICE — COVER TABLE HVY DTY 60X90IN

## (undated) DEVICE — DRAPE C-ARMOR EQUIPMENT COVER

## (undated) DEVICE — STAPLER SKIN PROXIMATE WIDE

## (undated) DEVICE — Device

## (undated) DEVICE — SUT VICRYL 1 OB 36 CTX

## (undated) DEVICE — GLOVE PROTEXIS BLUE LATEX 9

## (undated) DEVICE — GLOVE 7.0 PROTEXIS PI BLUE

## (undated) DEVICE — BIT DRILL QC 3FLUTED 4.2X145 S

## (undated) DEVICE — ELECTRODE PATIENT RETURN DISP

## (undated) DEVICE — BANDAGE ESMARK 6INX3YD

## (undated) DEVICE — SUT ETHILON 2-0 FSLX 30 BLK

## (undated) DEVICE — SUT MCRYL PLUS 2-0 CT-1 36IN

## (undated) DEVICE — PAD ABD 8X10 STERILE

## (undated) DEVICE — GLOVE PROTEXIS HYDROGEL SZ6

## (undated) DEVICE — IRRIGATION SET Y-TYPE TUR/BLAD

## (undated) DEVICE — PADDING 4X4YD SPECIALIST100

## (undated) DEVICE — SEE L#162208

## (undated) DEVICE — PAD CAST SPEC STRL COT 4X4YD

## (undated) DEVICE — PAD CAST SPECIALIST STRL 3

## (undated) DEVICE — DRESSING XEROFORM 1X8IN

## (undated) DEVICE — DRAPE ORTH SPLIT 77X108IN

## (undated) DEVICE — BANDAGE ACE DOUBLE STER 6IN

## (undated) DEVICE — DRESSING XEROFORM FOIL PK 1X8

## (undated) DEVICE — COVER FULLGUARD SHOE HIGH-TOP

## (undated) DEVICE — PAD CAST 6X4YD SPECIALISTIC

## (undated) DEVICE — SPONGE GAUZE 16PLY 4X4

## (undated) DEVICE — BIT DRILL 4.2MM 3 FLUTD 145MM

## (undated) DEVICE — GLOVE 7.0 PROTEXIS PI MICRO

## (undated) DEVICE — PADDING WYTEX UNDRCST 6INX4YD

## (undated) DEVICE — XPERIENCE

## (undated) DEVICE — SUT VICRYL+ 1 CT1 18IN

## (undated) DEVICE — GLOVE PROTEXIS HYDROGEL SZ9

## (undated) DEVICE — DRESSING XEROFORM 5X9IN

## (undated) DEVICE — KIT SURGICAL TURNOVER

## (undated) DEVICE — GLOVE 8 PROTEXIS PI ORTHO

## (undated) DEVICE — CUFF ATS 2 PORT SNGL BLDR 34IN

## (undated) DEVICE — TAPE SILK 3IN

## (undated) DEVICE — BANDAGE ACE NON LATEX 3IN

## (undated) DEVICE — STOCKINETTE IMPERVIOUS 16X54IN

## (undated) DEVICE — ELECTRODE REM POLYHESIVE II

## (undated) DEVICE — BANDAGE ACE ELASTIC 6"

## (undated) DEVICE — GLOVE PROTEXIS HYDROGEL SZ6.5

## (undated) DEVICE — SLEEVE OTR PROTCT STRL 12MM

## (undated) DEVICE — SPONGE COTTON TRAY 4X4IN

## (undated) DEVICE — GAUZE SPONGE 4X4 12PLY

## (undated) DEVICE — SUT 2-0 ETHILON 18 FS

## (undated) DEVICE — GLOVE BIOGEL SZ 8 1/2

## (undated) DEVICE — BANDAGE MATRIX HK LOOP 4IN 5YD

## (undated) DEVICE — COVER EQUIPMENT 36X25

## (undated) DEVICE — DRAPE T EXTRM SURG 121X128X90

## (undated) DEVICE — GOWN POLY REINF X-LONG 2XL

## (undated) DEVICE — SUT VICRYL 2-0 8-18 CP-2

## (undated) DEVICE — GLOVE 6.5 PROTEXIS PI BLUE

## (undated) DEVICE — GLOVE 8 PROTEXIS PI BLUE